# Patient Record
Sex: FEMALE | Race: WHITE | NOT HISPANIC OR LATINO | Employment: OTHER | URBAN - METROPOLITAN AREA
[De-identification: names, ages, dates, MRNs, and addresses within clinical notes are randomized per-mention and may not be internally consistent; named-entity substitution may affect disease eponyms.]

---

## 2017-05-02 ENCOUNTER — HISTORICAL (OUTPATIENT)
Dept: ADMINISTRATIVE | Facility: HOSPITAL | Age: 82
End: 2017-05-02

## 2017-05-02 LAB
ABS NEUT (OLG): 4.38 X10(3)/MCL (ref 2.1–9.2)
ALBUMIN SERPL-MCNC: 3.5 GM/DL (ref 3.4–5)
ALBUMIN/GLOB SERPL: 1.2 {RATIO}
ALP SERPL-CCNC: 79 UNIT/L (ref 38–126)
ALT SERPL-CCNC: 22 UNIT/L (ref 12–78)
APPEARANCE, UA: CLEAR
AST SERPL-CCNC: 19 UNIT/L (ref 15–37)
BACTERIA SPEC CULT: NORMAL /HPF
BASOPHILS # BLD AUTO: 0.1 X10(3)/MCL (ref 0–0.2)
BASOPHILS NFR BLD AUTO: 1 %
BILIRUB SERPL-MCNC: 0.6 MG/DL (ref 0.2–1)
BILIRUB UR QL STRIP: NEGATIVE
BILIRUBIN DIRECT+TOT PNL SERPL-MCNC: 0.1 MG/DL (ref 0–0.2)
BILIRUBIN DIRECT+TOT PNL SERPL-MCNC: 0.5 MG/DL (ref 0–0.8)
BUN SERPL-MCNC: 13 MG/DL (ref 7–18)
CALCIUM SERPL-MCNC: 8.8 MG/DL (ref 8.5–10.1)
CHLORIDE SERPL-SCNC: 105 MMOL/L (ref 98–107)
CHOLEST SERPL-MCNC: 199 MG/DL (ref 0–200)
CHOLEST/HDLC SERPL: 3.2 {RATIO} (ref 0–4)
CO2 SERPL-SCNC: 30 MMOL/L (ref 21–32)
COLOR UR: YELLOW
CREAT SERPL-MCNC: 0.66 MG/DL (ref 0.55–1.02)
DEPRECATED CALCIDIOL+CALCIFEROL SERPL-MC: 47.58 NG/ML (ref 30–80)
EOSINOPHIL # BLD AUTO: 0.3 X10(3)/MCL (ref 0–0.9)
EOSINOPHIL NFR BLD AUTO: 4 %
ERYTHROCYTE [DISTWIDTH] IN BLOOD BY AUTOMATED COUNT: 13.1 % (ref 11.5–17)
GLOBULIN SER-MCNC: 2.9 GM/DL (ref 2.4–3.5)
GLUCOSE (UA): NEGATIVE
GLUCOSE SERPL-MCNC: 86 MG/DL (ref 74–106)
HCT VFR BLD AUTO: 44.3 % (ref 37–47)
HDLC SERPL-MCNC: 63 MG/DL (ref 35–60)
HGB BLD-MCNC: 13.7 GM/DL (ref 12–16)
HGB UR QL STRIP: NEGATIVE
KETONES UR QL STRIP: NEGATIVE
LDLC SERPL CALC-MCNC: 113 MG/DL (ref 0–129)
LEUKOCYTE ESTERASE UR QL STRIP: NEGATIVE
LYMPHOCYTES # BLD AUTO: 1.8 X10(3)/MCL (ref 0.6–4.6)
LYMPHOCYTES NFR BLD AUTO: 25 %
MCH RBC QN AUTO: 28.2 PG (ref 27–31)
MCHC RBC AUTO-ENTMCNC: 30.9 GM/DL (ref 33–36)
MCV RBC AUTO: 91.3 FL (ref 80–94)
MONOCYTES # BLD AUTO: 0.6 X10(3)/MCL (ref 0.1–1.3)
MONOCYTES NFR BLD AUTO: 8 %
NEUTROPHILS # BLD AUTO: 4.38 X10(3)/MCL (ref 1.4–7.9)
NEUTROPHILS NFR BLD AUTO: 60 %
NITRITE UR QL STRIP: NEGATIVE
PH UR STRIP: 7 [PH] (ref 5–9)
PLATELET # BLD AUTO: 296 X10(3)/MCL (ref 130–400)
PMV BLD AUTO: 10.9 FL (ref 9.4–12.4)
POTASSIUM SERPL-SCNC: 5.3 MMOL/L (ref 3.5–5.1)
PROT SERPL-MCNC: 6.4 GM/DL (ref 6.4–8.2)
PROT UR QL STRIP: NEGATIVE
RBC # BLD AUTO: 4.85 X10(6)/MCL (ref 4.2–5.4)
RBC #/AREA URNS HPF: NORMAL /[HPF]
SODIUM SERPL-SCNC: 140 MMOL/L (ref 136–145)
SP GR UR STRIP: 1.01 (ref 1–1.03)
SQUAMOUS EPITHELIAL, UA: NORMAL
TRIGL SERPL-MCNC: 113 MG/DL (ref 30–150)
UROBILINOGEN UR STRIP-ACNC: 0.2
VLDLC SERPL CALC-MCNC: 23 MG/DL
WBC # SPEC AUTO: 7.3 X10(3)/MCL (ref 4.5–11.5)
WBC #/AREA URNS HPF: NORMAL /[HPF]

## 2017-08-01 ENCOUNTER — HISTORICAL (OUTPATIENT)
Dept: ADMINISTRATIVE | Facility: HOSPITAL | Age: 82
End: 2017-08-01

## 2017-11-28 ENCOUNTER — HISTORICAL (OUTPATIENT)
Dept: ADMINISTRATIVE | Facility: HOSPITAL | Age: 82
End: 2017-11-28

## 2017-11-28 LAB
ABS NEUT (OLG): 6.44 X10(3)/MCL (ref 2.1–9.2)
ALBUMIN SERPL-MCNC: 3.4 GM/DL (ref 3.4–5)
ALBUMIN/GLOB SERPL: 1.1 {RATIO}
ALP SERPL-CCNC: 82 UNIT/L (ref 38–126)
ALT SERPL-CCNC: 25 UNIT/L (ref 12–78)
APPEARANCE, UA: ABNORMAL
AST SERPL-CCNC: 18 UNIT/L (ref 15–37)
BACTERIA #/AREA URNS AUTO: ABNORMAL /HPF
BASOPHILS # BLD AUTO: 0.1 X10(3)/MCL (ref 0–0.2)
BASOPHILS NFR BLD AUTO: 1 %
BILIRUB SERPL-MCNC: 0.6 MG/DL (ref 0.2–1)
BILIRUB UR QL STRIP: NEGATIVE
BILIRUBIN DIRECT+TOT PNL SERPL-MCNC: 0.2 MG/DL (ref 0–0.2)
BILIRUBIN DIRECT+TOT PNL SERPL-MCNC: 0.4 MG/DL (ref 0–0.8)
BUN SERPL-MCNC: 13 MG/DL (ref 7–18)
CALCIUM SERPL-MCNC: 8.7 MG/DL (ref 8.5–10.1)
CHLORIDE SERPL-SCNC: 103 MMOL/L (ref 98–107)
CHOLEST SERPL-MCNC: 191 MG/DL (ref 0–200)
CHOLEST/HDLC SERPL: 2.8 {RATIO} (ref 0–4)
CO2 SERPL-SCNC: 29 MMOL/L (ref 21–32)
COLOR UR: YELLOW
CREAT SERPL-MCNC: 0.65 MG/DL (ref 0.55–1.02)
DEPRECATED CALCIDIOL+CALCIFEROL SERPL-MC: 42.86 NG/ML (ref 30–80)
EOSINOPHIL # BLD AUTO: 0.3 X10(3)/MCL (ref 0–0.9)
EOSINOPHIL NFR BLD AUTO: 3 %
ERYTHROCYTE [DISTWIDTH] IN BLOOD BY AUTOMATED COUNT: 12.9 % (ref 11.5–17)
GLOBULIN SER-MCNC: 3.2 GM/DL (ref 2.4–3.5)
GLUCOSE (UA): NEGATIVE
GLUCOSE SERPL-MCNC: 84 MG/DL (ref 74–106)
HCT VFR BLD AUTO: 42.9 % (ref 37–47)
HDLC SERPL-MCNC: 68 MG/DL (ref 35–60)
HGB BLD-MCNC: 13.3 GM/DL (ref 12–16)
HGB UR QL STRIP: ABNORMAL
KETONES UR QL STRIP: NEGATIVE
LDLC SERPL CALC-MCNC: 103 MG/DL (ref 0–129)
LEUKOCYTE ESTERASE UR QL STRIP: ABNORMAL
LYMPHOCYTES # BLD AUTO: 1.4 X10(3)/MCL (ref 0.6–4.6)
LYMPHOCYTES NFR BLD AUTO: 16 %
MCH RBC QN AUTO: 28.6 PG (ref 27–31)
MCHC RBC AUTO-ENTMCNC: 31 GM/DL (ref 33–36)
MCV RBC AUTO: 92.3 FL (ref 80–94)
MONOCYTES # BLD AUTO: 0.7 X10(3)/MCL (ref 0.1–1.3)
MONOCYTES NFR BLD AUTO: 8 %
NEUTROPHILS # BLD AUTO: 6.44 X10(3)/MCL (ref 1.4–7.9)
NEUTROPHILS NFR BLD AUTO: 72 %
NITRITE UR QL STRIP.AUTO: POSITIVE
PH UR STRIP: 6.5 [PH] (ref 5–9)
PLATELET # BLD AUTO: 336 X10(3)/MCL (ref 130–400)
PMV BLD AUTO: 10.1 FL (ref 9.4–12.4)
POTASSIUM SERPL-SCNC: 4.2 MMOL/L (ref 3.5–5.1)
PROT SERPL-MCNC: 6.6 GM/DL (ref 6.4–8.2)
PROT UR QL STRIP: ABNORMAL
RBC # BLD AUTO: 4.65 X10(6)/MCL (ref 4.2–5.4)
RBC #/AREA URNS HPF: 12 /HPF (ref 0–2)
SODIUM SERPL-SCNC: 139 MMOL/L (ref 136–145)
SP GR UR STRIP: 1.01 (ref 1–1.03)
SQUAMOUS EPITHELIAL, UA: ABNORMAL
TRIGL SERPL-MCNC: 101 MG/DL (ref 30–150)
UROBILINOGEN UR STRIP-ACNC: 0.2
VLDLC SERPL CALC-MCNC: 20 MG/DL
WBC # SPEC AUTO: 9 X10(3)/MCL (ref 4.5–11.5)
WBC #/AREA URNS AUTO: >900 /HPF (ref 0–3)

## 2018-02-08 ENCOUNTER — HISTORICAL (OUTPATIENT)
Dept: RADIOLOGY | Facility: HOSPITAL | Age: 83
End: 2018-02-08

## 2018-08-13 ENCOUNTER — HISTORICAL (OUTPATIENT)
Dept: ADMINISTRATIVE | Facility: HOSPITAL | Age: 83
End: 2018-08-13

## 2018-08-13 LAB
ABS NEUT (OLG): 4.32 X10(3)/MCL (ref 2.1–9.2)
ALBUMIN SERPL-MCNC: 3.9 GM/DL (ref 3.4–5)
ALBUMIN/GLOB SERPL: 1.3 RATIO (ref 1.1–2)
ALP SERPL-CCNC: 75 UNIT/L (ref 38–126)
ALT SERPL-CCNC: 22 UNIT/L (ref 12–78)
AST SERPL-CCNC: 21 UNIT/L (ref 15–37)
BASOPHILS # BLD AUTO: 0.1 X10(3)/MCL (ref 0–0.2)
BASOPHILS NFR BLD AUTO: 1 %
BILIRUB SERPL-MCNC: 0.4 MG/DL (ref 0.2–1)
BILIRUBIN DIRECT+TOT PNL SERPL-MCNC: 0.1 MG/DL (ref 0–0.5)
BILIRUBIN DIRECT+TOT PNL SERPL-MCNC: 0.3 MG/DL (ref 0–0.8)
BUN SERPL-MCNC: 11 MG/DL (ref 7–18)
CALCIUM SERPL-MCNC: 8.8 MG/DL (ref 8.5–10.1)
CHLORIDE SERPL-SCNC: 102 MMOL/L (ref 98–107)
CO2 SERPL-SCNC: 29 MMOL/L (ref 21–32)
CREAT SERPL-MCNC: 0.72 MG/DL (ref 0.55–1.02)
EOSINOPHIL # BLD AUTO: 0.2 X10(3)/MCL (ref 0–0.9)
EOSINOPHIL NFR BLD AUTO: 4 %
ERYTHROCYTE [DISTWIDTH] IN BLOOD BY AUTOMATED COUNT: 12.7 % (ref 11.5–17)
GLOBULIN SER-MCNC: 3 GM/DL (ref 2.4–3.5)
GLUCOSE SERPL-MCNC: 83 MG/DL (ref 74–106)
HCT VFR BLD AUTO: 45.3 % (ref 37–47)
HGB BLD-MCNC: 14.2 GM/DL (ref 12–16)
LYMPHOCYTES # BLD AUTO: 1.9 X10(3)/MCL (ref 0.6–4.6)
LYMPHOCYTES NFR BLD AUTO: 27 %
MCH RBC QN AUTO: 28.6 PG (ref 27–31)
MCHC RBC AUTO-ENTMCNC: 31.3 GM/DL (ref 33–36)
MCV RBC AUTO: 91.3 FL (ref 80–94)
MONOCYTES # BLD AUTO: 0.6 X10(3)/MCL (ref 0.1–1.3)
MONOCYTES NFR BLD AUTO: 9 %
NEUTROPHILS # BLD AUTO: 4.32 X10(3)/MCL (ref 1.4–7.9)
NEUTROPHILS NFR BLD AUTO: 60 %
PLATELET # BLD AUTO: 298 X10(3)/MCL (ref 130–400)
PMV BLD AUTO: 10.5 FL (ref 9.4–12.4)
POTASSIUM SERPL-SCNC: 4.6 MMOL/L (ref 3.5–5.1)
PROT SERPL-MCNC: 6.9 GM/DL (ref 6.4–8.2)
RBC # BLD AUTO: 4.96 X10(6)/MCL (ref 4.2–5.4)
SODIUM SERPL-SCNC: 139 MMOL/L (ref 136–145)
TSH SERPL-ACNC: 2.52 MIU/L (ref 0.36–3.74)
WBC # SPEC AUTO: 7.2 X10(3)/MCL (ref 4.5–11.5)

## 2018-08-29 ENCOUNTER — HISTORICAL (OUTPATIENT)
Dept: INTENSIVE CARE | Facility: HOSPITAL | Age: 83
End: 2018-08-29

## 2018-10-03 ENCOUNTER — HISTORICAL (OUTPATIENT)
Dept: ADMINISTRATIVE | Facility: HOSPITAL | Age: 83
End: 2018-10-03

## 2018-10-03 LAB
ALBUMIN SERPL-MCNC: 3.5 GM/DL (ref 3.4–5)
ALBUMIN/GLOB SERPL: 1.1 {RATIO}
ALP SERPL-CCNC: 78 UNIT/L (ref 38–126)
ALT SERPL-CCNC: 22 UNIT/L (ref 12–78)
AMYLASE SERPL-CCNC: 53 UNIT/L (ref 25–115)
AST SERPL-CCNC: 17 UNIT/L (ref 15–37)
BILIRUB SERPL-MCNC: 0.4 MG/DL (ref 0.2–1)
BILIRUBIN DIRECT+TOT PNL SERPL-MCNC: 0.1 MG/DL (ref 0–0.2)
BILIRUBIN DIRECT+TOT PNL SERPL-MCNC: 0.3 MG/DL (ref 0–0.8)
BUN SERPL-MCNC: 14 MG/DL (ref 7–18)
CALCIUM SERPL-MCNC: 9 MG/DL (ref 8.5–10.1)
CHLORIDE SERPL-SCNC: 104 MMOL/L (ref 98–107)
CO2 SERPL-SCNC: 27 MMOL/L (ref 21–32)
CREAT SERPL-MCNC: 0.96 MG/DL (ref 0.55–1.02)
ERYTHROCYTE [DISTWIDTH] IN BLOOD BY AUTOMATED COUNT: 13 % (ref 11.5–17)
GLOBULIN SER-MCNC: 3.2 GM/DL (ref 2.4–3.5)
GLUCOSE SERPL-MCNC: 102 MG/DL (ref 74–106)
HCT VFR BLD AUTO: 44.9 % (ref 37–47)
HGB BLD-MCNC: 14.1 GM/DL (ref 12–16)
LIPASE SERPL-CCNC: 70 UNIT/L (ref 73–393)
MCH RBC QN AUTO: 28.8 PG (ref 27–31)
MCHC RBC AUTO-ENTMCNC: 31.4 GM/DL (ref 33–36)
MCV RBC AUTO: 91.6 FL (ref 80–94)
PLATELET # BLD AUTO: 309 X10(3)/MCL (ref 130–400)
PMV BLD AUTO: 10 FL (ref 9.4–12.4)
POTASSIUM SERPL-SCNC: 4.6 MMOL/L (ref 3.5–5.1)
PROT SERPL-MCNC: 6.7 GM/DL (ref 6.4–8.2)
RBC # BLD AUTO: 4.9 X10(6)/MCL (ref 4.2–5.4)
SODIUM SERPL-SCNC: 139 MMOL/L (ref 136–145)
WBC # SPEC AUTO: 9.1 X10(3)/MCL (ref 4.5–11.5)

## 2018-11-06 ENCOUNTER — HISTORICAL (OUTPATIENT)
Dept: ADMINISTRATIVE | Facility: HOSPITAL | Age: 83
End: 2018-11-06

## 2018-11-06 LAB
APPEARANCE, UA: ABNORMAL
BACTERIA #/AREA URNS AUTO: ABNORMAL /HPF
BILIRUB UR QL STRIP: NEGATIVE
COLOR UR: YELLOW
GLUCOSE (UA): NEGATIVE
HGB UR QL STRIP: NEGATIVE
KETONES UR QL STRIP: NEGATIVE
LEUKOCYTE ESTERASE UR QL STRIP: ABNORMAL
NITRITE UR QL STRIP.AUTO: NEGATIVE
PH UR STRIP: 7 [PH] (ref 5–9)
PROT UR QL STRIP: NEGATIVE
RBC #/AREA URNS HPF: ABNORMAL /[HPF]
SP GR UR STRIP: 1.01 (ref 1–1.03)
SQUAMOUS EPITHELIAL, UA: ABNORMAL
UROBILINOGEN UR STRIP-ACNC: 0.2
WBC #/AREA URNS AUTO: 12 /HPF (ref 0–3)

## 2019-04-09 ENCOUNTER — HISTORICAL (OUTPATIENT)
Dept: ADMINISTRATIVE | Facility: HOSPITAL | Age: 84
End: 2019-04-09

## 2019-04-09 LAB
CHOLEST SERPL-MCNC: 195 MG/DL (ref 0–200)
CHOLEST/HDLC SERPL: 3 {RATIO} (ref 0–4)
HDLC SERPL-MCNC: 66 MG/DL (ref 35–60)
LDLC SERPL CALC-MCNC: 101 MG/DL (ref 0–129)
TRIGL SERPL-MCNC: 140 MG/DL (ref 30–150)
VLDLC SERPL CALC-MCNC: 28 MG/DL

## 2019-04-17 ENCOUNTER — HISTORICAL (OUTPATIENT)
Dept: ADMINISTRATIVE | Facility: HOSPITAL | Age: 84
End: 2019-04-17

## 2019-04-17 LAB
ABS NEUT (OLG): 9.1 X10(3)/MCL (ref 2.1–9.2)
BASOPHILS # BLD AUTO: 0 X10(3)/MCL (ref 0–0.2)
BASOPHILS NFR BLD AUTO: 0 %
EOSINOPHIL # BLD AUTO: 0.7 X10(3)/MCL (ref 0–0.9)
EOSINOPHIL NFR BLD AUTO: 5 %
ERYTHROCYTE [DISTWIDTH] IN BLOOD BY AUTOMATED COUNT: 13.1 % (ref 11.5–17)
HCT VFR BLD AUTO: 46.4 % (ref 37–47)
HGB BLD-MCNC: 14.3 GM/DL (ref 12–16)
LYMPHOCYTES # BLD AUTO: 2.2 X10(3)/MCL (ref 0.6–4.6)
LYMPHOCYTES NFR BLD AUTO: 16 %
MCH RBC QN AUTO: 28.1 PG (ref 27–31)
MCHC RBC AUTO-ENTMCNC: 30.8 GM/DL (ref 33–36)
MCV RBC AUTO: 91.3 FL (ref 80–94)
MONOCYTES # BLD AUTO: 1.4 X10(3)/MCL (ref 0.1–1.3)
MONOCYTES NFR BLD AUTO: 10 %
NEUTROPHILS # BLD AUTO: 9.1 X10(3)/MCL (ref 2.1–9.2)
NEUTROPHILS NFR BLD AUTO: 67 %
PLATELET # BLD AUTO: 387 X10(3)/MCL (ref 130–400)
PMV BLD AUTO: 10.1 FL (ref 9.4–12.4)
RBC # BLD AUTO: 5.08 X10(6)/MCL (ref 4.2–5.4)
TSH SERPL-ACNC: 2.93 MIU/L (ref 0.36–3.74)
WBC # SPEC AUTO: 13.6 X10(3)/MCL (ref 4.5–11.5)

## 2019-07-30 ENCOUNTER — HISTORICAL (OUTPATIENT)
Dept: RADIOLOGY | Facility: HOSPITAL | Age: 84
End: 2019-07-30

## 2019-12-04 ENCOUNTER — HISTORICAL (OUTPATIENT)
Dept: LAB | Facility: HOSPITAL | Age: 84
End: 2019-12-04

## 2019-12-04 LAB
ALBUMIN SERPL-MCNC: 3.6 GM/DL (ref 3.4–5)
ALBUMIN/GLOB SERPL: 1.1 RATIO (ref 1.1–2)
ALP SERPL-CCNC: 68 UNIT/L (ref 38–126)
ALT SERPL-CCNC: 27 UNIT/L (ref 12–78)
AST SERPL-CCNC: 20 UNIT/L (ref 15–37)
BILIRUB SERPL-MCNC: 0.5 MG/DL (ref 0.2–1)
BILIRUBIN DIRECT+TOT PNL SERPL-MCNC: 0.1 MG/DL (ref 0–0.5)
BILIRUBIN DIRECT+TOT PNL SERPL-MCNC: 0.4 MG/DL (ref 0–0.8)
BUN SERPL-MCNC: 12 MG/DL (ref 7–18)
CALCIUM SERPL-MCNC: 9 MG/DL (ref 8.5–10.1)
CHLORIDE SERPL-SCNC: 104 MMOL/L (ref 98–107)
CHOLEST SERPL-MCNC: 185 MG/DL (ref 0–200)
CHOLEST/HDLC SERPL: 3.1 {RATIO} (ref 0–4)
CO2 SERPL-SCNC: 28 MMOL/L (ref 21–32)
CREAT SERPL-MCNC: 0.79 MG/DL (ref 0.55–1.02)
GLOBULIN SER-MCNC: 3.2 GM/DL (ref 2.4–3.5)
GLUCOSE SERPL-MCNC: 98 MG/DL (ref 74–106)
HDLC SERPL-MCNC: 60 MG/DL (ref 35–60)
LDLC SERPL CALC-MCNC: 107 MG/DL (ref 0–129)
POTASSIUM SERPL-SCNC: 4.4 MMOL/L (ref 3.5–5.1)
PROT SERPL-MCNC: 6.8 GM/DL (ref 6.4–8.2)
SODIUM SERPL-SCNC: 139 MMOL/L (ref 136–145)
TRIGL SERPL-MCNC: 89 MG/DL (ref 30–150)
TSH SERPL-ACNC: 2.84 MIU/L (ref 0.36–3.74)
VLDLC SERPL CALC-MCNC: 18 MG/DL

## 2019-12-11 ENCOUNTER — HISTORICAL (OUTPATIENT)
Dept: LAB | Facility: HOSPITAL | Age: 84
End: 2019-12-11

## 2020-05-11 ENCOUNTER — HISTORICAL (OUTPATIENT)
Dept: LAB | Facility: HOSPITAL | Age: 85
End: 2020-05-11

## 2020-06-25 ENCOUNTER — HISTORICAL (OUTPATIENT)
Dept: ADMINISTRATIVE | Facility: HOSPITAL | Age: 85
End: 2020-06-25

## 2020-06-25 LAB
ABS NEUT (OLG): 4.19 X10(3)/MCL (ref 2.1–9.2)
ALBUMIN SERPL-MCNC: 3.6 GM/DL (ref 3.4–5)
ALBUMIN/GLOB SERPL: 1.1 RATIO (ref 1.1–2)
ALP SERPL-CCNC: 75 UNIT/L (ref 40–150)
ALT SERPL-CCNC: 17 UNIT/L (ref 0–55)
AST SERPL-CCNC: 20 UNIT/L (ref 5–34)
BASOPHILS # BLD AUTO: 0.1 X10(3)/MCL (ref 0–0.2)
BASOPHILS NFR BLD AUTO: 1 %
BILIRUB SERPL-MCNC: 0.5 MG/DL
BILIRUBIN DIRECT+TOT PNL SERPL-MCNC: 0.2 MG/DL (ref 0–0.5)
BILIRUBIN DIRECT+TOT PNL SERPL-MCNC: 0.3 MG/DL (ref 0–0.8)
BUN SERPL-MCNC: 13.7 MG/DL (ref 9.8–20.1)
CALCIUM SERPL-MCNC: 9.1 MG/DL (ref 8.4–10.2)
CHLORIDE SERPL-SCNC: 100 MMOL/L (ref 98–107)
CHOLEST SERPL-MCNC: 194 MG/DL
CHOLEST/HDLC SERPL: 3 {RATIO} (ref 0–5)
CO2 SERPL-SCNC: 30 MMOL/L (ref 23–31)
CREAT SERPL-MCNC: 0.68 MG/DL (ref 0.55–1.02)
DEPRECATED CALCIDIOL+CALCIFEROL SERPL-MC: 98.3 NG/ML (ref 6.6–49.9)
EOSINOPHIL # BLD AUTO: 0.3 X10(3)/MCL (ref 0–0.9)
EOSINOPHIL NFR BLD AUTO: 5 %
ERYTHROCYTE [DISTWIDTH] IN BLOOD BY AUTOMATED COUNT: 14.1 % (ref 11.5–17)
GLOBULIN SER-MCNC: 3.3 GM/DL (ref 2.4–3.5)
GLUCOSE SERPL-MCNC: 89 MG/DL (ref 82–115)
HCT VFR BLD AUTO: 44.7 % (ref 37–47)
HDLC SERPL-MCNC: 65 MG/DL (ref 35–60)
HGB BLD-MCNC: 13.8 GM/DL (ref 12–16)
LDLC SERPL CALC-MCNC: 107 MG/DL (ref 50–140)
LYMPHOCYTES # BLD AUTO: 1.6 X10(3)/MCL (ref 0.6–4.6)
LYMPHOCYTES NFR BLD AUTO: 23 %
MCH RBC QN AUTO: 27.7 PG (ref 27–31)
MCHC RBC AUTO-ENTMCNC: 30.9 GM/DL (ref 33–36)
MCV RBC AUTO: 89.8 FL (ref 80–94)
MONOCYTES # BLD AUTO: 0.6 X10(3)/MCL (ref 0.1–1.3)
MONOCYTES NFR BLD AUTO: 8 %
NEUTROPHILS # BLD AUTO: 4.19 X10(3)/MCL (ref 2.1–9.2)
NEUTROPHILS NFR BLD AUTO: 62 %
PLATELET # BLD AUTO: 316 X10(3)/MCL (ref 130–400)
PMV BLD AUTO: 10.4 FL (ref 9.4–12.4)
POTASSIUM SERPL-SCNC: 4.1 MMOL/L (ref 3.5–5.1)
PROT SERPL-MCNC: 6.9 GM/DL (ref 5.8–7.6)
RBC # BLD AUTO: 4.98 X10(6)/MCL (ref 4.2–5.4)
SODIUM SERPL-SCNC: 135 MMOL/L (ref 136–145)
TRIGL SERPL-MCNC: 110 MG/DL (ref 37–140)
TSH SERPL-ACNC: 3.1 UIU/ML (ref 0.35–4.94)
VLDLC SERPL CALC-MCNC: 22 MG/DL
WBC # SPEC AUTO: 6.7 X10(3)/MCL (ref 4.5–11.5)

## 2021-01-21 ENCOUNTER — HISTORICAL (OUTPATIENT)
Dept: CARDIOLOGY | Facility: HOSPITAL | Age: 86
End: 2021-01-21

## 2021-07-07 ENCOUNTER — HISTORICAL (OUTPATIENT)
Dept: ADMINISTRATIVE | Facility: HOSPITAL | Age: 86
End: 2021-07-07

## 2021-07-07 LAB
ABS NEUT (OLG): 4.22 X10(3)/MCL (ref 2.1–9.2)
ALBUMIN SERPL-MCNC: 3.6 GM/DL (ref 3.4–4.8)
ALBUMIN/GLOB SERPL: 1 RATIO (ref 1.1–2)
ALP SERPL-CCNC: 68 UNIT/L (ref 40–150)
ALT SERPL-CCNC: 17 UNIT/L (ref 0–55)
APPEARANCE, UA: ABNORMAL
AST SERPL-CCNC: 24 UNIT/L (ref 5–34)
BACTERIA SPEC CULT: ABNORMAL /HPF
BASOPHILS # BLD AUTO: 0 X10(3)/MCL (ref 0–0.2)
BASOPHILS NFR BLD AUTO: 1 %
BILIRUB SERPL-MCNC: 0.9 MG/DL
BILIRUB UR QL STRIP: NEGATIVE
BILIRUBIN DIRECT+TOT PNL SERPL-MCNC: 0.3 MG/DL (ref 0–0.5)
BILIRUBIN DIRECT+TOT PNL SERPL-MCNC: 0.6 MG/DL (ref 0–0.8)
BUN SERPL-MCNC: 17.6 MG/DL (ref 9.8–20.1)
CALCIUM SERPL-MCNC: 9.7 MG/DL (ref 8.4–10.2)
CHLORIDE SERPL-SCNC: 101 MMOL/L (ref 98–107)
CHOLEST SERPL-MCNC: 216 MG/DL
CHOLEST/HDLC SERPL: 3 {RATIO} (ref 0–5)
CO2 SERPL-SCNC: 29 MMOL/L (ref 23–31)
COLOR UR: YELLOW
CREAT SERPL-MCNC: 0.83 MG/DL (ref 0.55–1.02)
DEPRECATED CALCIDIOL+CALCIFEROL SERPL-MC: 81.2 NG/ML (ref 30–80)
EOSINOPHIL # BLD AUTO: 0.2 X10(3)/MCL (ref 0–0.9)
EOSINOPHIL NFR BLD AUTO: 4 %
ERYTHROCYTE [DISTWIDTH] IN BLOOD BY AUTOMATED COUNT: 13.9 % (ref 11.5–17)
GLOBULIN SER-MCNC: 3.5 GM/DL (ref 2.4–3.5)
GLUCOSE (UA): NEGATIVE
GLUCOSE SERPL-MCNC: 77 MG/DL (ref 82–115)
HCT VFR BLD AUTO: 45.8 % (ref 37–47)
HDLC SERPL-MCNC: 69 MG/DL (ref 35–60)
HGB BLD-MCNC: 14.2 GM/DL (ref 12–16)
HGB UR QL STRIP: NEGATIVE
KETONES UR QL STRIP: NEGATIVE
LDLC SERPL CALC-MCNC: 123 MG/DL (ref 50–140)
LEUKOCYTE ESTERASE UR QL STRIP: ABNORMAL
LYMPHOCYTES # BLD AUTO: 1.6 X10(3)/MCL (ref 0.6–4.6)
LYMPHOCYTES NFR BLD AUTO: 24 %
MCH RBC QN AUTO: 28.7 PG (ref 27–31)
MCHC RBC AUTO-ENTMCNC: 31 GM/DL (ref 33–36)
MCV RBC AUTO: 92.5 FL (ref 80–94)
MONOCYTES # BLD AUTO: 0.7 X10(3)/MCL (ref 0.1–1.3)
MONOCYTES NFR BLD AUTO: 10 %
MUCOUS THREADS URNS QL MICRO: ABNORMAL
NEUTROPHILS # BLD AUTO: 4.22 X10(3)/MCL (ref 2.1–9.2)
NEUTROPHILS NFR BLD AUTO: 62 %
NITRITE UR QL STRIP: POSITIVE
PH UR STRIP: 7 [PH] (ref 5–9)
PLATELET # BLD AUTO: 317 X10(3)/MCL (ref 130–400)
PMV BLD AUTO: 10.7 FL (ref 9.4–12.4)
POTASSIUM SERPL-SCNC: 4.3 MMOL/L (ref 3.5–5.1)
PROT SERPL-MCNC: 7.1 GM/DL (ref 5.8–7.6)
PROT UR QL STRIP: NEGATIVE
RBC # BLD AUTO: 4.95 X10(6)/MCL (ref 4.2–5.4)
RBC #/AREA URNS HPF: ABNORMAL /[HPF]
SODIUM SERPL-SCNC: 139 MMOL/L (ref 136–145)
SP GR UR STRIP: 1.01 (ref 1–1.03)
SQUAMOUS EPITHELIAL, UA: ABNORMAL /HPF
TRIGL SERPL-MCNC: 118 MG/DL (ref 37–140)
UROBILINOGEN UR STRIP-ACNC: 0.2
VLDLC SERPL CALC-MCNC: 24 MG/DL
WBC # SPEC AUTO: 6.8 X10(3)/MCL (ref 4.5–11.5)
WBC #/AREA URNS HPF: ABNORMAL /HPF

## 2022-03-10 ENCOUNTER — HISTORICAL (OUTPATIENT)
Dept: ADMINISTRATIVE | Facility: HOSPITAL | Age: 87
End: 2022-03-10

## 2022-04-10 ENCOUNTER — HISTORICAL (OUTPATIENT)
Dept: ADMINISTRATIVE | Facility: HOSPITAL | Age: 87
End: 2022-04-10

## 2022-04-12 ENCOUNTER — HISTORICAL (OUTPATIENT)
Dept: LAB | Facility: HOSPITAL | Age: 87
End: 2022-04-12

## 2022-04-12 LAB
ALBUMIN SERPL-MCNC: 3.8 G/DL (ref 3.4–4.8)
ALBUMIN/GLOB SERPL: 1.3 {RATIO} (ref 1.1–2)
ALP SERPL-CCNC: 73 U/L (ref 40–150)
ALT SERPL-CCNC: 17 U/L (ref 0–55)
AST SERPL-CCNC: 23 U/L (ref 5–34)
BILIRUB SERPL-MCNC: 0.8 MG/DL
BILIRUBIN DIRECT+TOT PNL SERPL-MCNC: 0.3 (ref 0–0.5)
BILIRUBIN DIRECT+TOT PNL SERPL-MCNC: 0.5 (ref 0–0.8)
BUN SERPL-MCNC: 11.6 MG/DL (ref 9.8–20.1)
CALCIUM SERPL-MCNC: 9.6 MG/DL (ref 8.7–10.5)
CHLORIDE SERPL-SCNC: 101 MMOL/L (ref 98–111)
CO2 SERPL-SCNC: 27 MMOL/L (ref 23–31)
CREAT SERPL-MCNC: 0.86 MG/DL (ref 0.55–1.02)
GLOBULIN SER-MCNC: 3 G/DL (ref 2.4–3.5)
GLUCOSE SERPL-MCNC: 76 MG/DL (ref 75–121)
HEMOLYSIS INTERF INDEX SERPL-ACNC: 2
ICTERIC INTERF INDEX SERPL-ACNC: 1
LIPEMIC INTERF INDEX SERPL-ACNC: 6
POTASSIUM SERPL-SCNC: 4.4 MMOL/L (ref 3.5–5.1)
PROT SERPL-MCNC: 6.8 G/DL (ref 5.8–7.6)
SODIUM SERPL-SCNC: 139 MMOL/L (ref 132–146)

## 2022-04-25 VITALS
SYSTOLIC BLOOD PRESSURE: 150 MMHG | WEIGHT: 150.81 LBS | BODY MASS INDEX: 26.72 KG/M2 | HEIGHT: 63 IN | DIASTOLIC BLOOD PRESSURE: 84 MMHG

## 2022-07-27 ENCOUNTER — LAB VISIT (OUTPATIENT)
Dept: LAB | Facility: HOSPITAL | Age: 87
End: 2022-07-27
Attending: INTERNAL MEDICINE
Payer: MEDICARE

## 2022-07-27 DIAGNOSIS — I10 ESSENTIAL HYPERTENSION, MALIGNANT: Primary | ICD-10-CM

## 2022-07-27 DIAGNOSIS — E55.9 AVITAMINOSIS D: ICD-10-CM

## 2022-07-27 LAB
ALBUMIN SERPL-MCNC: 3.8 GM/DL (ref 3.4–4.8)
ALBUMIN/GLOB SERPL: 1.4 RATIO (ref 1.1–2)
ALP SERPL-CCNC: 68 UNIT/L (ref 40–150)
ALT SERPL-CCNC: 21 UNIT/L (ref 0–55)
APPEARANCE UR: ABNORMAL
AST SERPL-CCNC: 21 UNIT/L (ref 5–34)
BACTERIA #/AREA URNS AUTO: ABNORMAL /HPF
BASOPHILS # BLD AUTO: 0.09 X10(3)/MCL (ref 0–0.2)
BASOPHILS NFR BLD AUTO: 1.3 %
BILIRUB UR QL STRIP.AUTO: NEGATIVE MG/DL
BILIRUBIN DIRECT+TOT PNL SERPL-MCNC: 0.7 MG/DL
BUN SERPL-MCNC: 13.1 MG/DL (ref 9.8–20.1)
CALCIUM SERPL-MCNC: 9.4 MG/DL (ref 8.4–10.2)
CHLORIDE SERPL-SCNC: 101 MMOL/L (ref 98–111)
CHOLEST SERPL-MCNC: 222 MG/DL
CHOLEST/HDLC SERPL: 4 {RATIO} (ref 0–5)
CO2 SERPL-SCNC: 28 MMOL/L (ref 23–31)
COLOR UR AUTO: YELLOW
CREAT SERPL-MCNC: 0.77 MG/DL (ref 0.55–1.02)
DEPRECATED CALCIDIOL+CALCIFEROL SERPL-MC: 89.2 NG/ML (ref 30–80)
EOSINOPHIL # BLD AUTO: 0.66 X10(3)/MCL (ref 0–0.9)
EOSINOPHIL NFR BLD AUTO: 9.8 %
ERYTHROCYTE [DISTWIDTH] IN BLOOD BY AUTOMATED COUNT: 13.5 % (ref 11.5–17)
GLOBULIN SER-MCNC: 2.7 GM/DL (ref 2.4–3.5)
GLUCOSE SERPL-MCNC: 85 MG/DL (ref 75–121)
GLUCOSE UR QL STRIP.AUTO: NEGATIVE MG/DL
HCT VFR BLD AUTO: 44 % (ref 37–47)
HDLC SERPL-MCNC: 60 MG/DL (ref 35–60)
HGB BLD-MCNC: 13.7 GM/DL (ref 12–16)
IMM GRANULOCYTES # BLD AUTO: 0.03 X10(3)/MCL (ref 0–0.04)
IMM GRANULOCYTES NFR BLD AUTO: 0.4 %
KETONES UR QL STRIP.AUTO: NEGATIVE MG/DL
LDLC SERPL CALC-MCNC: 137 MG/DL (ref 50–140)
LEUKOCYTE ESTERASE UR QL STRIP.AUTO: ABNORMAL UNIT/L
LYMPHOCYTES # BLD AUTO: 1.41 X10(3)/MCL (ref 0.6–4.6)
LYMPHOCYTES NFR BLD AUTO: 21 %
MCH RBC QN AUTO: 29 PG (ref 27–31)
MCHC RBC AUTO-ENTMCNC: 31.1 MG/DL (ref 33–36)
MCV RBC AUTO: 93.2 FL (ref 80–94)
MONOCYTES # BLD AUTO: 0.58 X10(3)/MCL (ref 0.1–1.3)
MONOCYTES NFR BLD AUTO: 8.6 %
NEUTROPHILS # BLD AUTO: 4 X10(3)/MCL (ref 2.1–9.2)
NEUTROPHILS NFR BLD AUTO: 58.9 %
NITRITE UR QL STRIP.AUTO: POSITIVE
NRBC BLD AUTO-RTO: 0 %
PH UR STRIP.AUTO: 7 [PH]
PLATELET # BLD AUTO: 359 X10(3)/MCL (ref 130–400)
PMV BLD AUTO: 10.4 FL (ref 7.4–10.4)
POTASSIUM SERPL-SCNC: 4.4 MMOL/L (ref 3.5–5.1)
PROT SERPL-MCNC: 6.5 GM/DL (ref 5.8–7.6)
PROT UR QL STRIP.AUTO: NEGATIVE MG/DL
RBC # BLD AUTO: 4.72 X10(6)/MCL (ref 4.2–5.4)
RBC #/AREA URNS AUTO: <5 /HPF
RBC UR QL AUTO: NEGATIVE UNIT/L
SODIUM SERPL-SCNC: 138 MMOL/L (ref 132–146)
SP GR UR STRIP.AUTO: 1.01 (ref 1–1.03)
SQUAMOUS #/AREA URNS AUTO: <5 /HPF
TRIGL SERPL-MCNC: 124 MG/DL (ref 37–140)
UROBILINOGEN UR STRIP-ACNC: 0.2 MG/DL
VLDLC SERPL CALC-MCNC: 25 MG/DL
WBC # SPEC AUTO: 6.7 X10(3)/MCL (ref 4.5–11.5)
WBC #/AREA URNS AUTO: 42 /HPF

## 2022-07-27 PROCEDURE — 82306 VITAMIN D 25 HYDROXY: CPT

## 2022-07-27 PROCEDURE — 85025 COMPLETE CBC W/AUTO DIFF WBC: CPT

## 2022-07-27 PROCEDURE — 87077 CULTURE AEROBIC IDENTIFY: CPT

## 2022-07-27 PROCEDURE — 36415 COLL VENOUS BLD VENIPUNCTURE: CPT

## 2022-07-27 PROCEDURE — 81001 URINALYSIS AUTO W/SCOPE: CPT

## 2022-07-27 PROCEDURE — 80061 LIPID PANEL: CPT

## 2022-07-27 PROCEDURE — 80053 COMPREHEN METABOLIC PANEL: CPT

## 2022-07-29 LAB — BACTERIA UR CULT: ABNORMAL

## 2022-12-06 ENCOUNTER — IMMUNIZATION (OUTPATIENT)
Dept: FAMILY MEDICINE | Facility: CLINIC | Age: 87
End: 2022-12-06
Payer: MEDICARE

## 2022-12-06 DIAGNOSIS — Z23 NEED FOR VACCINATION: Primary | ICD-10-CM

## 2022-12-06 PROCEDURE — 91312 COVID-19, MRNA, LNP-S, BIVALENT BOOSTER, PF, 30 MCG/0.3 ML DOSE: CPT | Mod: S$GLB,,, | Performed by: INTERNAL MEDICINE

## 2022-12-06 PROCEDURE — 91312 COVID-19, MRNA, LNP-S, BIVALENT BOOSTER, PF, 30 MCG/0.3 ML DOSE: ICD-10-PCS | Mod: S$GLB,,, | Performed by: INTERNAL MEDICINE

## 2022-12-06 PROCEDURE — 0124A COVID-19, MRNA, LNP-S, BIVALENT BOOSTER, PF, 30 MCG/0.3 ML DOSE: ICD-10-PCS | Mod: S$GLB,,, | Performed by: INTERNAL MEDICINE

## 2022-12-06 PROCEDURE — 0124A COVID-19, MRNA, LNP-S, BIVALENT BOOSTER, PF, 30 MCG/0.3 ML DOSE: CPT | Mod: S$GLB,,, | Performed by: INTERNAL MEDICINE

## 2023-10-07 ENCOUNTER — HOSPITAL ENCOUNTER (EMERGENCY)
Facility: HOSPITAL | Age: 88
Discharge: HOME | End: 2023-10-07
Attending: EMERGENCY MEDICINE
Payer: MEDICARE

## 2023-10-07 VITALS
OXYGEN SATURATION: 96 % | HEART RATE: 70 BPM | WEIGHT: 143 LBS | TEMPERATURE: 98 F | SYSTOLIC BLOOD PRESSURE: 168 MMHG | DIASTOLIC BLOOD PRESSURE: 76 MMHG | RESPIRATION RATE: 17 BRPM | HEIGHT: 63 IN | BODY MASS INDEX: 25.34 KG/M2

## 2023-10-07 DIAGNOSIS — S63.259A DISLOCATION OF FINGER, INITIAL ENCOUNTER: Primary | ICD-10-CM

## 2023-10-07 PROCEDURE — 96375 TX/PRO/DX INJ NEW DRUG ADDON: CPT

## 2023-10-07 PROCEDURE — 96374 THER/PROPH/DIAG INJ IV PUSH: CPT

## 2023-10-07 PROCEDURE — 99284 EMERGENCY DEPT VISIT MOD MDM: CPT | Mod: 25

## 2023-10-07 PROCEDURE — 25000003 PHARM REV CODE 250: Performed by: EMERGENCY MEDICINE

## 2023-10-07 PROCEDURE — 63600175 PHARM REV CODE 636 W HCPCS: Performed by: EMERGENCY MEDICINE

## 2023-10-07 RX ORDER — CLINDAMYCIN HYDROCHLORIDE 300 MG/1
300 CAPSULE ORAL 3 TIMES DAILY
Qty: 21 CAPSULE | Refills: 0 | Status: SHIPPED | OUTPATIENT
Start: 2023-10-07 | End: 2023-10-14

## 2023-10-07 RX ORDER — ONDANSETRON 2 MG/ML
4 INJECTION INTRAMUSCULAR; INTRAVENOUS
Status: COMPLETED | OUTPATIENT
Start: 2023-10-07 | End: 2023-10-07

## 2023-10-07 RX ORDER — MORPHINE SULFATE 4 MG/ML
4 INJECTION, SOLUTION INTRAMUSCULAR; INTRAVENOUS
Status: COMPLETED | OUTPATIENT
Start: 2023-10-07 | End: 2023-10-07

## 2023-10-07 RX ORDER — HYDROCODONE BITARTRATE AND ACETAMINOPHEN 5; 325 MG/1; MG/1
1 TABLET ORAL EVERY 6 HOURS PRN
Qty: 18 TABLET | Refills: 0 | Status: SHIPPED | OUTPATIENT
Start: 2023-10-07

## 2023-10-07 RX ORDER — HYDROCODONE BITARTRATE AND ACETAMINOPHEN 5; 325 MG/1; MG/1
1 TABLET ORAL
Status: COMPLETED | OUTPATIENT
Start: 2023-10-07 | End: 2023-10-07

## 2023-10-07 RX ORDER — ONDANSETRON 4 MG/1
4 TABLET, ORALLY DISINTEGRATING ORAL
Status: DISCONTINUED | OUTPATIENT
Start: 2023-10-07 | End: 2023-10-07 | Stop reason: HOSPADM

## 2023-10-07 RX ORDER — CLINDAMYCIN HYDROCHLORIDE 150 MG/1
300 CAPSULE ORAL
Status: COMPLETED | OUTPATIENT
Start: 2023-10-07 | End: 2023-10-07

## 2023-10-07 RX ADMIN — HYDROCODONE BITARTRATE AND ACETAMINOPHEN 1 TABLET: 5; 325 TABLET ORAL at 11:10

## 2023-10-07 RX ADMIN — MORPHINE SULFATE 4 MG: 4 INJECTION INTRAVENOUS at 11:10

## 2023-10-07 RX ADMIN — ONDANSETRON 4 MG: 2 INJECTION INTRAMUSCULAR; INTRAVENOUS at 11:10

## 2023-10-07 RX ADMIN — BACITRACIN ZINC, NEOMYCIN, POLYMYXIN B: 400; 3.5; 5 OINTMENT TOPICAL at 12:10

## 2023-10-07 RX ADMIN — CLINDAMYCIN HYDROCHLORIDE 300 MG: 150 CAPSULE ORAL at 11:10

## 2023-10-07 NOTE — ED PROVIDER NOTES
Encounter Date: 10/7/2023    SCRIBE #1 NOTE: I, Diana Cam, am scribing for, and in the presence of,  Francisco Hughes MD. I have scribed the following portions of the note - Other sections scribed: HPI, RED, MD SHERRI.       History     Chief Complaint   Patient presents with    Fall     Patient fell today while walking her dog. Right pinky appears dislocated and had a nose bleed. No blood thinner. GCS 15.      89 year old female with a history of HTN presents to ED after a fall.  Pt says that she tripped while walking her dog this morning about 2 hours ago.   Pt says that she used her right hand to stop herself when she fell. She is complaining of right pinky pain and a nosebleed. She denies hitting her head, headaches or use of blood thinners.  She says her tetanus is UTD.    The history is provided by the patient.     Review of patient's allergies indicates:   Allergen Reactions    Codeine     Ofloxacin     Penicillins     Sulfamethoxazole-trimethoprim      Other reaction(s): Unknown     No past medical history on file.  No past surgical history on file.  No family history on file.     Review of Systems   Constitutional:  Negative for activity change, chills, diaphoresis, fatigue and fever.   HENT:  Negative for congestion, ear pain, sinus pain and sore throat.    Eyes:  Negative for visual disturbance.   Respiratory:  Negative for cough, shortness of breath, wheezing and stridor.    Cardiovascular:  Negative for chest pain, palpitations and leg swelling.   Gastrointestinal:  Negative for abdominal pain, constipation, diarrhea, nausea, rectal pain and vomiting.   Genitourinary:  Negative for dysuria and hematuria.   Musculoskeletal:  Negative for arthralgias, back pain and myalgias.        Right 5th digit pain   Skin:  Negative for rash.        Abrasion and laceration to right hand   Neurological:  Negative for dizziness, syncope, weakness, numbness and headaches.   All other systems reviewed and are  negative.      Physical Exam     Initial Vitals [10/07/23 1102]   BP Pulse Resp Temp SpO2   133/75 95 18 97.5 °F (36.4 °C) 98 %      MAP       --         Physical Exam    Nursing note and vitals reviewed.  Constitutional: No distress.   HENT:   Head: Normocephalic and atraumatic.   Eyes: EOM are normal.   Neck: Trachea normal. Neck supple.   Normal range of motion.  Cardiovascular:  Normal rate and regular rhythm.           No murmur heard.  Pulmonary/Chest: Breath sounds normal. No respiratory distress.   Abdominal: Abdomen is soft. Bowel sounds are normal. She exhibits no distension. There is no abdominal tenderness. There is no rebound and no guarding.   Musculoskeletal:         General: Normal range of motion.      Cervical back: Normal range of motion and neck supple.      Lumbar back: Normal range of motion.      Comments: Abrasion to right hypothenar eminence.  Deformity starting at the 1st phalanx of the 5th finger of the right hand with an overlying laceration.      Neurological: She is alert and oriented to person, place, and time. She has normal strength.   Skin: Skin is warm and dry. No rash noted.   Psychiatric: She has a normal mood and affect.         ED Course   Procedures  Labs Reviewed - No data to display       Imaging Results              X-Ray Finger 2 or More Views Right (In process)                      X-Ray Hand 3 view Right (Final result)  Result time 10/07/23 12:00:26      Final result by Avery Cross MD (10/07/23 12:00:26)                   Impression:      Right 5th finger proximal interphalangeal joint dislocation.      Electronically signed by: Avery Cross  Date:    10/07/2023  Time:    12:00               Narrative:    EXAMINATION:  XR HAND COMPLETE 3 VIEW RIGHT    CLINICAL HISTORY:  Fall;    TECHNIQUE:  Three views    COMPARISON:  None available.    FINDINGS:  There is dislocated proximal interphalangeal joint of the the 5th finger.  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  Demineralization of the bones.                                       Medications   ondansetron disintegrating tablet 4 mg (4 mg Oral Not Given 10/7/23 1130)   HYDROcodone-acetaminophen 5-325 mg per tablet 1 tablet (1 tablet Oral Given 10/7/23 1128)   clindamycin capsule 300 mg (300 mg Oral Given 10/7/23 1128)   morphine injection 4 mg (4 mg Intravenous Given 10/7/23 1144)   ondansetron injection 4 mg (4 mg Intravenous Given 10/7/23 1144)   neomycin-bacitracnZn-polymyxnB packet ( Topical (Top) Given 10/7/23 1247)     Medical Decision Making  As per HPI.  Differential diagnosis: Finger dislocation, finger fracture    Amount and/or Complexity of Data Reviewed  Radiology: ordered.    Risk  OTC drugs.  Prescription drug management.            Scribe Attestation:   Scribe #1: I performed the above scribed service and the documentation accurately describes the services I performed. I attest to the accuracy of the note.    Attending Attestation:           Physician Attestation for Scribe:  Physician Attestation Statement for Scribe #1: I, Francisco Hughes MD, reviewed documentation, as scribed by Diana Cam in my presence, and it is both accurate and complete.             ED Course as of 10/07/23 1405   Sat Oct 07, 2023   1356 Dr. Guy Jaquez, have patient follow-up with Dr. Adams [KG]      ED Course User Index  [KG] Francisco Hughes MD               Medical Decision Making:   Clinical Tests:   Lab Tests: Ordered and Reviewed  Radiological Study: Ordered and Reviewed      Clinical Impression:   Final diagnoses:  [S63.259A] Dislocation of finger, initial encounter (Primary)        ED Disposition Condition    Discharge Stable          ED Prescriptions       Medication Sig Dispense Start Date End Date Auth. Provider    HYDROcodone-acetaminophen (NORCO) 5-325 mg per tablet Take 1 tablet by mouth  every 6 (six) hours as needed for Pain. 18 tablet 10/7/2023 -- Francisco Hughes MD    clindamycin (CLEOCIN) 300 MG capsule Take 1 capsule (300 mg total) by mouth 3 (three) times daily. for 7 days 21 capsule 10/7/2023 10/14/2023 Francisco Hughes MD          Follow-up Information       Follow up With Specialties Details Why Contact Info    Clark Adams MD Hand Surgery, Orthopedic Surgery  All above number on Monday morning to establish an appointment time [address] [facility] [address] [place] [id number] [phone]               Francisco Hughes MD  10/07/23 5888

## 2023-10-18 ENCOUNTER — HOSPITAL ENCOUNTER (OUTPATIENT)
Dept: RADIOLOGY | Facility: CLINIC | Age: 88
Discharge: HOME | End: 2023-10-18
Attending: STUDENT IN AN ORGANIZED HEALTH CARE EDUCATION/TRAINING PROGRAM
Payer: MEDICARE

## 2023-10-18 ENCOUNTER — OFFICE VISIT (OUTPATIENT)
Dept: ORTHOPEDICS | Facility: CLINIC | Age: 88
End: 2023-10-18
Payer: MEDICARE

## 2023-10-18 DIAGNOSIS — M79.644 FINGER PAIN, RIGHT: Primary | ICD-10-CM

## 2023-10-18 DIAGNOSIS — S63.286A DISLOCATION OF PROXIMAL INTERPHALANGEAL JOINT OF RIGHT LITTLE FINGER, INITIAL ENCOUNTER: ICD-10-CM

## 2023-10-18 DIAGNOSIS — M79.644 FINGER PAIN, RIGHT: ICD-10-CM

## 2023-10-18 PROCEDURE — 73140 X-RAY EXAM OF FINGER(S): CPT | Mod: RT,,, | Performed by: STUDENT IN AN ORGANIZED HEALTH CARE EDUCATION/TRAINING PROGRAM

## 2023-10-18 PROCEDURE — 73140 XR FINGER 2 OR MORE VIEWS RIGHT: ICD-10-PCS | Mod: RT,,, | Performed by: STUDENT IN AN ORGANIZED HEALTH CARE EDUCATION/TRAINING PROGRAM

## 2023-10-18 PROCEDURE — 99203 PR OFFICE/OUTPT VISIT, NEW, LEVL III, 30-44 MIN: ICD-10-PCS | Mod: ,,, | Performed by: STUDENT IN AN ORGANIZED HEALTH CARE EDUCATION/TRAINING PROGRAM

## 2023-10-18 PROCEDURE — 99203 OFFICE O/P NEW LOW 30 MIN: CPT | Mod: ,,, | Performed by: STUDENT IN AN ORGANIZED HEALTH CARE EDUCATION/TRAINING PROGRAM

## 2023-10-18 RX ORDER — TRIAMCINOLONE ACETONIDE 55 UG/1
2 SPRAY, METERED NASAL
COMMUNITY

## 2023-10-18 RX ORDER — ZINC GLUCONATE 50 MG
50 TABLET ORAL
COMMUNITY

## 2023-10-18 RX ORDER — AMLODIPINE BESYLATE 5 MG/1
5 TABLET ORAL
COMMUNITY
Start: 2023-09-14

## 2023-10-18 RX ORDER — CEFUROXIME AXETIL 250 MG/1
250 TABLET ORAL EVERY 12 HOURS
COMMUNITY
Start: 2023-08-01

## 2023-10-18 RX ORDER — ESTRADIOL 0.05 MG/D
1 FILM, EXTENDED RELEASE TRANSDERMAL
COMMUNITY
Start: 2023-09-20

## 2023-10-18 RX ORDER — METHENAMINE HIPPURATE 1000 MG/1
1 TABLET ORAL
COMMUNITY

## 2023-10-18 RX ORDER — ASCORBIC ACID 125 MG
TABLET,CHEWABLE ORAL DAILY
COMMUNITY

## 2023-10-19 NOTE — PROGRESS NOTES
Chief Complaint:  Right little finger PIP joint dislocation    Consulting Physician: No ref. provider found    History of present illness:    Patient is a 89-year-old right-hand dominant female who presents for initial evaluation of a right little finger injury that she sustained on 10/07/2023.  She fell and injured her right little finger.  She was seen in the emergency department where she was diagnosed with a right little finger PIP joint dislocation.  This was reduced and splinted.  She is been wearing an Alumafoam splint.  She tried to wear the buddy tape but this was not comfortable for her so she is been wearing an aluminum splint in extension.  Her pain has been improving.  She is accompanied here by her grandson who is a physical therapist in our clinic.    Past Medical History:   Diagnosis Date    COPD (chronic obstructive pulmonary disease)     Hypertension        History reviewed. No pertinent surgical history.    Current Outpatient Medications   Medication Sig    amLODIPine (NORVASC) 5 MG tablet Take 5 mg by mouth.    calcium-vitamin D3-vitamin K 500-100-40 mg-unit-mcg Chew once daily.    cefUROXime (CEFTIN) 250 MG tablet Take 250 mg by mouth every 12 (twelve) hours.    cyanocobalamin, vitamin B-12, 5,000 mcg Cap once daily.    estradiol 0.05 mg/24 hr td ptsw (VIVELLE-DOT) 0.05 mg/24 hr 1 patch twice a week.    methenamine (HIPREX) 1 gram Tab Take 1 g by mouth.    triamcinolone (NASACORT) 55 mcg nasal inhaler 2 sprays by Nasal route.    zinc gluconate 50 mg tablet Take 50 mg by mouth.    HYDROcodone-acetaminophen (NORCO) 5-325 mg per tablet Take 1 tablet by mouth every 6 (six) hours as needed for Pain. (Patient not taking: Reported on 10/18/2023)     No current facility-administered medications for this visit.       Review of patient's allergies indicates:   Allergen Reactions    Codeine     Ofloxacin     Penicillins     Sulfamethoxazole-trimethoprim      Other reaction(s): Unknown       History  reviewed. No pertinent family history.    Social History     Socioeconomic History    Marital status:    Tobacco Use    Smoking status: Never    Smokeless tobacco: Never       Review of Systems:    Constitution:   Denies chills, fever, and sweats.  HENT:   Denies headaches or blurry vision.  Cardiovascular:  Denies chest pain or irregular heart beat.  Respiratory:   Denies cough or shortness of breath.  Gastrointestinal:  Denies abdominal pain, nausea, or vomiting.  Musculoskeletal:   Denies muscle cramps.  Neurological:   Denies dizziness or focal weakness.  Psychiatric/Behavior: Normal mental status.  Hematology/Lymph:  Denies bleeding problem or easy bruising/bleeding.  Skin:    Denies rash or suspicious lesions.    Examination:    Vital Signs:  There were no vitals filed for this visit.    There is no height or weight on file to calculate BMI.    Constitution:   Well-developed, well nourished patient in no acute distress.  Neurological:   Alert and oriented x 3 and cooperative to examination.     Psychiatric/Behavior: Normal mental status.  Respiratory:   No shortness of breath.  Eyes:    Extraoccular muscles intact  Skin:    No scars, rash or suspicious lesions.    MSK:   Right hand:  No open wounds or rashes.  There is mild swelling over the little finger PIP joint.  The little finger is stiff with very little motion of the PIP joint.  Clinically the alignment looks good without significant malrotation or coronal deviation compared to the contralateral side.  Sensation light touch is intact to median ulnar and radial distribution.  Radial pulses 2+ hand is warm well perfused    Imaging:   X-ray of the right hand shows that the right little finger PIP joint is reduced in acceptable position     Assessment:  Right little finger PIP joint dislocation status post reduction    Plan:  She has been immobilized in a reduced position for about a week and a half now.  I will send her to occupational hand therapy  where they will blake tape the ring and small finger together.  She can now start gentle active range of motion.  She still should not work on strengthening or passive range of motion of the digits yet but gentle active range of motion to try to get motion back at the PIP joint yet keeping it stable enough to reduce the risk of dislocation again.  I explained that if she notices swelling pain dysfunction or deformity at the PIP joint she should come back and get an x-ray immediately.  I will see her back in 2 weeks weeks to see how she is doing    Follow Up:  2 weeks  Xray at next visit:  Right hand

## 2024-04-08 ENCOUNTER — HOSPITAL ENCOUNTER (INPATIENT)
Facility: HOSPITAL | Age: 89
LOS: 4 days | Discharge: NURSING, REHAB OR LONG-TERM CARE | DRG: 481 | End: 2024-04-12
Attending: STUDENT IN AN ORGANIZED HEALTH CARE EDUCATION/TRAINING PROGRAM | Admitting: INTERNAL MEDICINE
Payer: MEDICARE

## 2024-04-08 DIAGNOSIS — Z01.818 PRE-OP TESTING: ICD-10-CM

## 2024-04-08 DIAGNOSIS — S72.142A CLOSED 2-PART INTERTROCHANTERIC FRACTURE OF LEFT FEMUR, INITIAL ENCOUNTER: Primary | ICD-10-CM

## 2024-04-08 DIAGNOSIS — R07.9 CHEST PAIN: ICD-10-CM

## 2024-04-08 LAB
ALBUMIN SERPL-MCNC: 3.7 G/DL (ref 3.4–4.8)
ALBUMIN/GLOB SERPL: 1.2 RATIO (ref 1.1–2)
ALP SERPL-CCNC: 77 UNIT/L (ref 40–150)
ALT SERPL-CCNC: 14 UNIT/L (ref 0–55)
APTT PPP: 31 SECONDS (ref 23.2–33.7)
AST SERPL-CCNC: 22 UNIT/L (ref 5–34)
BASOPHILS # BLD AUTO: 0.09 X10(3)/MCL
BASOPHILS NFR BLD AUTO: 0.6 %
BILIRUB SERPL-MCNC: 0.3 MG/DL
BUN SERPL-MCNC: 17.6 MG/DL (ref 9.8–20.1)
CALCIUM SERPL-MCNC: 9.1 MG/DL (ref 8.4–10.2)
CHLORIDE SERPL-SCNC: 104 MMOL/L (ref 98–111)
CO2 SERPL-SCNC: 22 MMOL/L (ref 23–31)
CREAT SERPL-MCNC: 1.01 MG/DL (ref 0.55–1.02)
EOSINOPHIL # BLD AUTO: 0.09 X10(3)/MCL (ref 0–0.9)
EOSINOPHIL NFR BLD AUTO: 0.6 %
ERYTHROCYTE [DISTWIDTH] IN BLOOD BY AUTOMATED COUNT: 13.6 % (ref 11.5–17)
GFR SERPLBLD CREATININE-BSD FMLA CKD-EPI: 52 MLS/MIN/1.73/M2
GLOBULIN SER-MCNC: 3.2 GM/DL (ref 2.4–3.5)
GLUCOSE SERPL-MCNC: 123 MG/DL (ref 75–121)
HCT VFR BLD AUTO: 44.5 % (ref 37–47)
HGB BLD-MCNC: 14.5 G/DL (ref 12–16)
IMM GRANULOCYTES # BLD AUTO: 0.08 X10(3)/MCL (ref 0–0.04)
IMM GRANULOCYTES NFR BLD AUTO: 0.5 %
INR PPP: 1.1
LYMPHOCYTES # BLD AUTO: 1.21 X10(3)/MCL (ref 0.6–4.6)
LYMPHOCYTES NFR BLD AUTO: 7.4 %
MCH RBC QN AUTO: 29.1 PG (ref 27–31)
MCHC RBC AUTO-ENTMCNC: 32.6 G/DL (ref 33–36)
MCV RBC AUTO: 89.4 FL (ref 80–94)
MONOCYTES # BLD AUTO: 0.99 X10(3)/MCL (ref 0.1–1.3)
MONOCYTES NFR BLD AUTO: 6.1 %
NEUTROPHILS # BLD AUTO: 13.85 X10(3)/MCL (ref 2.1–9.2)
NEUTROPHILS NFR BLD AUTO: 84.8 %
NRBC BLD AUTO-RTO: 0 %
OHS QRS DURATION: 84 MS
OHS QTC CALCULATION: 442 MS
PLATELET # BLD AUTO: 283 X10(3)/MCL (ref 130–400)
PMV BLD AUTO: 10.3 FL (ref 7.4–10.4)
POTASSIUM SERPL-SCNC: 4.2 MMOL/L (ref 3.5–5.1)
PROT SERPL-MCNC: 6.9 GM/DL (ref 5.8–7.6)
PROTHROMBIN TIME: 13.7 SECONDS (ref 12.5–14.5)
RBC # BLD AUTO: 4.98 X10(6)/MCL (ref 4.2–5.4)
SODIUM SERPL-SCNC: 136 MMOL/L (ref 132–146)
WBC # SPEC AUTO: 16.31 X10(3)/MCL (ref 4.5–11.5)

## 2024-04-08 PROCEDURE — S5010 5% DEXTROSE AND 0.45% SALINE: HCPCS | Performed by: INTERNAL MEDICINE

## 2024-04-08 PROCEDURE — 85730 THROMBOPLASTIN TIME PARTIAL: CPT | Performed by: STUDENT IN AN ORGANIZED HEALTH CARE EDUCATION/TRAINING PROGRAM

## 2024-04-08 PROCEDURE — 85610 PROTHROMBIN TIME: CPT | Performed by: STUDENT IN AN ORGANIZED HEALTH CARE EDUCATION/TRAINING PROGRAM

## 2024-04-08 PROCEDURE — 63600175 PHARM REV CODE 636 W HCPCS

## 2024-04-08 PROCEDURE — 96375 TX/PRO/DX INJ NEW DRUG ADDON: CPT

## 2024-04-08 PROCEDURE — 93010 ELECTROCARDIOGRAM REPORT: CPT | Mod: ,,, | Performed by: INTERNAL MEDICINE

## 2024-04-08 PROCEDURE — 25000003 PHARM REV CODE 250: Performed by: INTERNAL MEDICINE

## 2024-04-08 PROCEDURE — 80053 COMPREHEN METABOLIC PANEL: CPT | Performed by: STUDENT IN AN ORGANIZED HEALTH CARE EDUCATION/TRAINING PROGRAM

## 2024-04-08 PROCEDURE — 63600175 PHARM REV CODE 636 W HCPCS: Performed by: INTERNAL MEDICINE

## 2024-04-08 PROCEDURE — 93005 ELECTROCARDIOGRAM TRACING: CPT

## 2024-04-08 PROCEDURE — 85025 COMPLETE CBC W/AUTO DIFF WBC: CPT | Performed by: STUDENT IN AN ORGANIZED HEALTH CARE EDUCATION/TRAINING PROGRAM

## 2024-04-08 PROCEDURE — 99285 EMERGENCY DEPT VISIT HI MDM: CPT | Mod: 25

## 2024-04-08 PROCEDURE — 96374 THER/PROPH/DIAG INJ IV PUSH: CPT

## 2024-04-08 PROCEDURE — 11000001 HC ACUTE MED/SURG PRIVATE ROOM

## 2024-04-08 RX ORDER — METHOCARBAMOL 500 MG/1
500 TABLET, FILM COATED ORAL 4 TIMES DAILY PRN
Status: DISCONTINUED | OUTPATIENT
Start: 2024-04-08 | End: 2024-04-12 | Stop reason: HOSPADM

## 2024-04-08 RX ORDER — ACETAMINOPHEN 325 MG/1
650 TABLET ORAL EVERY 4 HOURS PRN
Status: DISCONTINUED | OUTPATIENT
Start: 2024-04-08 | End: 2024-04-12 | Stop reason: HOSPADM

## 2024-04-08 RX ORDER — ONDANSETRON HYDROCHLORIDE 2 MG/ML
4 INJECTION, SOLUTION INTRAVENOUS
Status: COMPLETED | OUTPATIENT
Start: 2024-04-08 | End: 2024-04-08

## 2024-04-08 RX ORDER — AMOXICILLIN 250 MG
2 CAPSULE ORAL 2 TIMES DAILY PRN
Status: DISCONTINUED | OUTPATIENT
Start: 2024-04-08 | End: 2024-04-12 | Stop reason: HOSPADM

## 2024-04-08 RX ORDER — CLONIDINE HYDROCHLORIDE 0.1 MG/1
0.1 TABLET ORAL 3 TIMES DAILY PRN
Status: DISCONTINUED | OUTPATIENT
Start: 2024-04-08 | End: 2024-04-12 | Stop reason: HOSPADM

## 2024-04-08 RX ORDER — MORPHINE SULFATE 4 MG/ML
2 INJECTION, SOLUTION INTRAMUSCULAR; INTRAVENOUS
Status: COMPLETED | OUTPATIENT
Start: 2024-04-08 | End: 2024-04-08

## 2024-04-08 RX ORDER — DEXTROSE MONOHYDRATE AND SODIUM CHLORIDE 5; .45 G/100ML; G/100ML
INJECTION, SOLUTION INTRAVENOUS CONTINUOUS
Status: DISCONTINUED | OUTPATIENT
Start: 2024-04-08 | End: 2024-04-10

## 2024-04-08 RX ORDER — ONDANSETRON HYDROCHLORIDE 2 MG/ML
INJECTION, SOLUTION INTRAVENOUS
Status: COMPLETED
Start: 2024-04-08 | End: 2024-04-08

## 2024-04-08 RX ORDER — ALUMINUM HYDROXIDE, MAGNESIUM HYDROXIDE, AND SIMETHICONE 1200; 120; 1200 MG/30ML; MG/30ML; MG/30ML
30 SUSPENSION ORAL 4 TIMES DAILY PRN
Status: DISCONTINUED | OUTPATIENT
Start: 2024-04-08 | End: 2024-04-12 | Stop reason: HOSPADM

## 2024-04-08 RX ORDER — MORPHINE SULFATE 4 MG/ML
4 INJECTION, SOLUTION INTRAMUSCULAR; INTRAVENOUS EVERY 4 HOURS PRN
Status: DISCONTINUED | OUTPATIENT
Start: 2024-04-08 | End: 2024-04-09

## 2024-04-08 RX ORDER — ENOXAPARIN SODIUM 100 MG/ML
40 INJECTION SUBCUTANEOUS EVERY 24 HOURS
Status: DISCONTINUED | OUTPATIENT
Start: 2024-04-08 | End: 2024-04-12 | Stop reason: HOSPADM

## 2024-04-08 RX ORDER — MORPHINE SULFATE 4 MG/ML
INJECTION, SOLUTION INTRAMUSCULAR; INTRAVENOUS
Status: COMPLETED
Start: 2024-04-08 | End: 2024-04-08

## 2024-04-08 RX ORDER — POLYETHYLENE GLYCOL 3350 17 G/17G
17 POWDER, FOR SOLUTION ORAL 2 TIMES DAILY PRN
Status: DISCONTINUED | OUTPATIENT
Start: 2024-04-08 | End: 2024-04-12 | Stop reason: HOSPADM

## 2024-04-08 RX ORDER — LABETALOL HYDROCHLORIDE 5 MG/ML
10 INJECTION, SOLUTION INTRAVENOUS EVERY 4 HOURS PRN
Status: DISCONTINUED | OUTPATIENT
Start: 2024-04-08 | End: 2024-04-12 | Stop reason: HOSPADM

## 2024-04-08 RX ORDER — ONDANSETRON HYDROCHLORIDE 2 MG/ML
4 INJECTION, SOLUTION INTRAVENOUS EVERY 4 HOURS PRN
Status: DISCONTINUED | OUTPATIENT
Start: 2024-04-08 | End: 2024-04-12 | Stop reason: HOSPADM

## 2024-04-08 RX ORDER — PROCHLORPERAZINE EDISYLATE 5 MG/ML
5 INJECTION INTRAMUSCULAR; INTRAVENOUS EVERY 6 HOURS PRN
Status: DISCONTINUED | OUTPATIENT
Start: 2024-04-08 | End: 2024-04-12 | Stop reason: HOSPADM

## 2024-04-08 RX ORDER — TALC
6 POWDER (GRAM) TOPICAL NIGHTLY PRN
Status: DISCONTINUED | OUTPATIENT
Start: 2024-04-08 | End: 2024-04-12 | Stop reason: HOSPADM

## 2024-04-08 RX ORDER — OXYCODONE AND ACETAMINOPHEN 5; 325 MG/1; MG/1
1 TABLET ORAL EVERY 6 HOURS PRN
Status: DISCONTINUED | OUTPATIENT
Start: 2024-04-08 | End: 2024-04-09

## 2024-04-08 RX ORDER — AMOXICILLIN 250 MG
2 CAPSULE ORAL NIGHTLY
Status: DISCONTINUED | OUTPATIENT
Start: 2024-04-09 | End: 2024-04-12 | Stop reason: HOSPADM

## 2024-04-08 RX ORDER — SODIUM CHLORIDE 0.9 % (FLUSH) 0.9 %
10 SYRINGE (ML) INJECTION
Status: DISCONTINUED | OUTPATIENT
Start: 2024-04-08 | End: 2024-04-12 | Stop reason: HOSPADM

## 2024-04-08 RX ORDER — HYDRALAZINE HYDROCHLORIDE 20 MG/ML
10 INJECTION INTRAMUSCULAR; INTRAVENOUS EVERY 4 HOURS PRN
Status: DISCONTINUED | OUTPATIENT
Start: 2024-04-08 | End: 2024-04-12 | Stop reason: HOSPADM

## 2024-04-08 RX ADMIN — ENOXAPARIN SODIUM 40 MG: 40 INJECTION SUBCUTANEOUS at 10:04

## 2024-04-08 RX ADMIN — DEXTROSE AND SODIUM CHLORIDE: 5; 450 INJECTION, SOLUTION INTRAVENOUS at 10:04

## 2024-04-08 RX ADMIN — Medication 6 MG: at 09:04

## 2024-04-08 RX ADMIN — OXYCODONE HYDROCHLORIDE AND ACETAMINOPHEN 1 TABLET: 5; 325 TABLET ORAL at 09:04

## 2024-04-08 RX ADMIN — ONDANSETRON HYDROCHLORIDE 4 MG: 2 INJECTION, SOLUTION INTRAVENOUS at 07:04

## 2024-04-08 RX ADMIN — MORPHINE SULFATE 2 MG: 4 INJECTION, SOLUTION INTRAMUSCULAR; INTRAVENOUS at 07:04

## 2024-04-08 RX ADMIN — ONDANSETRON 4 MG: 2 INJECTION INTRAMUSCULAR; INTRAVENOUS at 07:04

## 2024-04-08 RX ADMIN — MORPHINE SULFATE 2 MG: 4 INJECTION INTRAVENOUS at 07:04

## 2024-04-08 NOTE — Clinical Note
Diagnosis: Closed 2-part intertrochanteric fracture of left femur, initial encounter [[id number]]   Future Attending Provider: MOISÉS MONZON [[id number]]   Admit to which facility:: [facility] [[id number]]   Reason for IP Medical Treatment  (Clinical interventions that can only be accomplished in the IP setting? ) :: Ortho   I certify that Inpatient services for greater than or equal to 2 midnights are medically necessary:: Yes   Plans for Post-Acute care--if anticipated (pick the single best option):: A. No post acute care anticipated at this time

## 2024-04-09 ENCOUNTER — ANESTHESIA EVENT (OUTPATIENT)
Dept: SURGERY | Facility: HOSPITAL | Age: 89
DRG: 481 | End: 2024-04-09
Payer: MEDICARE

## 2024-04-09 ENCOUNTER — ANESTHESIA (OUTPATIENT)
Dept: SURGERY | Facility: HOSPITAL | Age: 89
DRG: 481 | End: 2024-04-09
Payer: MEDICARE

## 2024-04-09 LAB
ABORH RETYPE: NORMAL
AMORPH URATE CRY URNS QL MICRO: ABNORMAL /UL
ANION GAP SERPL CALC-SCNC: 10 MEQ/L
APPEARANCE UR: ABNORMAL
BACTERIA #/AREA URNS AUTO: ABNORMAL /HPF
BASOPHILS # BLD AUTO: 0.05 X10(3)/MCL
BASOPHILS NFR BLD AUTO: 0.4 %
BILIRUB UR QL STRIP.AUTO: NEGATIVE
BUN SERPL-MCNC: 13.6 MG/DL (ref 9.8–20.1)
CALCIUM SERPL-MCNC: 8.9 MG/DL (ref 8.4–10.2)
CHLORIDE SERPL-SCNC: 103 MMOL/L (ref 98–111)
CO2 SERPL-SCNC: 22 MMOL/L (ref 23–31)
COLOR UR AUTO: YELLOW
CREAT SERPL-MCNC: 0.79 MG/DL (ref 0.55–1.02)
CREAT/UREA NIT SERPL: 17
EOSINOPHIL # BLD AUTO: 0.02 X10(3)/MCL (ref 0–0.9)
EOSINOPHIL NFR BLD AUTO: 0.1 %
ERYTHROCYTE [DISTWIDTH] IN BLOOD BY AUTOMATED COUNT: 13.9 % (ref 11.5–17)
GFR SERPLBLD CREATININE-BSD FMLA CKD-EPI: >60 MLS/MIN/1.73/M2
GLUCOSE SERPL-MCNC: 140 MG/DL (ref 75–121)
GLUCOSE UR QL STRIP.AUTO: NORMAL
GROUP & RH: NORMAL
HCT VFR BLD AUTO: 43.6 % (ref 37–47)
HGB BLD-MCNC: 13.5 G/DL (ref 12–16)
HYALINE CASTS #/AREA URNS LPF: ABNORMAL /LPF
IMM GRANULOCYTES # BLD AUTO: 0.07 X10(3)/MCL (ref 0–0.04)
IMM GRANULOCYTES NFR BLD AUTO: 0.5 %
INDIRECT COOMBS: NORMAL
KETONES UR QL STRIP.AUTO: ABNORMAL
LEUKOCYTE ESTERASE UR QL STRIP.AUTO: 75
LYMPHOCYTES # BLD AUTO: 2.02 X10(3)/MCL (ref 0.6–4.6)
LYMPHOCYTES NFR BLD AUTO: 14.2 %
MCH RBC QN AUTO: 28.9 PG (ref 27–31)
MCHC RBC AUTO-ENTMCNC: 31 G/DL (ref 33–36)
MCV RBC AUTO: 93.4 FL (ref 80–94)
MONOCYTES # BLD AUTO: 1.47 X10(3)/MCL (ref 0.1–1.3)
MONOCYTES NFR BLD AUTO: 10.3 %
MUCOUS THREADS URNS QL MICRO: ABNORMAL /LPF
NEUTROPHILS # BLD AUTO: 10.58 X10(3)/MCL (ref 2.1–9.2)
NEUTROPHILS NFR BLD AUTO: 74.5 %
NITRITE UR QL STRIP.AUTO: ABNORMAL
NRBC BLD AUTO-RTO: 0 %
PH UR STRIP.AUTO: 5.5 [PH]
PLATELET # BLD AUTO: 270 X10(3)/MCL (ref 130–400)
PMV BLD AUTO: 10.2 FL (ref 7.4–10.4)
POTASSIUM SERPL-SCNC: 4.4 MMOL/L (ref 3.5–5.1)
PROT UR QL STRIP.AUTO: ABNORMAL
RBC # BLD AUTO: 4.67 X10(6)/MCL (ref 4.2–5.4)
RBC #/AREA URNS AUTO: ABNORMAL /HPF
RBC UR QL AUTO: NEGATIVE
SODIUM SERPL-SCNC: 135 MMOL/L (ref 132–146)
SP GR UR STRIP.AUTO: 1.03 (ref 1–1.03)
SPECIMEN OUTDATE: NORMAL
SQUAMOUS #/AREA URNS LPF: ABNORMAL /HPF
UROBILINOGEN UR STRIP-ACNC: NORMAL
WBC # SPEC AUTO: 14.21 X10(3)/MCL (ref 4.5–11.5)
WBC #/AREA URNS AUTO: ABNORMAL /HPF

## 2024-04-09 PROCEDURE — 87086 URINE CULTURE/COLONY COUNT: CPT | Performed by: NURSE PRACTITIONER

## 2024-04-09 PROCEDURE — 80048 BASIC METABOLIC PNL TOTAL CA: CPT | Performed by: INTERNAL MEDICINE

## 2024-04-09 PROCEDURE — 27000221 HC OXYGEN, UP TO 24 HOURS

## 2024-04-09 PROCEDURE — 25000003 PHARM REV CODE 250: Performed by: INTERNAL MEDICINE

## 2024-04-09 PROCEDURE — 63600175 PHARM REV CODE 636 W HCPCS: Performed by: PHYSICIAN ASSISTANT

## 2024-04-09 PROCEDURE — 36000710: Performed by: ORTHOPAEDIC SURGERY

## 2024-04-09 PROCEDURE — 27201423 OPTIME MED/SURG SUP & DEVICES STERILE SUPPLY: Performed by: ORTHOPAEDIC SURGERY

## 2024-04-09 PROCEDURE — 37000008 HC ANESTHESIA 1ST 15 MINUTES: Performed by: ORTHOPAEDIC SURGERY

## 2024-04-09 PROCEDURE — 85025 COMPLETE CBC W/AUTO DIFF WBC: CPT | Performed by: INTERNAL MEDICINE

## 2024-04-09 PROCEDURE — 63600175 PHARM REV CODE 636 W HCPCS: Performed by: ANESTHESIOLOGY

## 2024-04-09 PROCEDURE — S5010 5% DEXTROSE AND 0.45% SALINE: HCPCS | Performed by: INTERNAL MEDICINE

## 2024-04-09 PROCEDURE — 63600175 PHARM REV CODE 636 W HCPCS: Performed by: INTERNAL MEDICINE

## 2024-04-09 PROCEDURE — 71000015 HC POSTOP RECOV 1ST HR: Performed by: ORTHOPAEDIC SURGERY

## 2024-04-09 PROCEDURE — 36000711: Performed by: ORTHOPAEDIC SURGERY

## 2024-04-09 PROCEDURE — 25000003 PHARM REV CODE 250: Performed by: NURSE PRACTITIONER

## 2024-04-09 PROCEDURE — 63600175 PHARM REV CODE 636 W HCPCS: Mod: JZ,JG | Performed by: INTERNAL MEDICINE

## 2024-04-09 PROCEDURE — 63600175 PHARM REV CODE 636 W HCPCS: Performed by: ORTHOPAEDIC SURGERY

## 2024-04-09 PROCEDURE — 25000003 PHARM REV CODE 250: Performed by: NURSE ANESTHETIST, CERTIFIED REGISTERED

## 2024-04-09 PROCEDURE — 86850 RBC ANTIBODY SCREEN: CPT | Performed by: INTERNAL MEDICINE

## 2024-04-09 PROCEDURE — 11000001 HC ACUTE MED/SURG PRIVATE ROOM

## 2024-04-09 PROCEDURE — 63600175 PHARM REV CODE 636 W HCPCS: Performed by: NURSE ANESTHETIST, CERTIFIED REGISTERED

## 2024-04-09 PROCEDURE — 25000003 PHARM REV CODE 250: Performed by: PHYSICIAN ASSISTANT

## 2024-04-09 PROCEDURE — 0QH736Z INSERTION OF INTRAMEDULLARY INTERNAL FIXATION DEVICE INTO LEFT UPPER FEMUR, PERCUTANEOUS APPROACH: ICD-10-PCS | Performed by: ORTHOPAEDIC SURGERY

## 2024-04-09 PROCEDURE — 71000039 HC RECOVERY, EACH ADD'L HOUR: Performed by: ORTHOPAEDIC SURGERY

## 2024-04-09 PROCEDURE — C1713 ANCHOR/SCREW BN/BN,TIS/BN: HCPCS | Performed by: ORTHOPAEDIC SURGERY

## 2024-04-09 PROCEDURE — 99223 1ST HOSP IP/OBS HIGH 75: CPT | Mod: ,,, | Performed by: ORTHOPAEDIC SURGERY

## 2024-04-09 PROCEDURE — 81001 URINALYSIS AUTO W/SCOPE: CPT | Performed by: NURSE PRACTITIONER

## 2024-04-09 PROCEDURE — 71000033 HC RECOVERY, INTIAL HOUR: Performed by: ORTHOPAEDIC SURGERY

## 2024-04-09 PROCEDURE — D9220A PRA ANESTHESIA: Mod: ANES,,, | Performed by: ANESTHESIOLOGY

## 2024-04-09 PROCEDURE — D9220A PRA ANESTHESIA: Mod: CRNA,,, | Performed by: NURSE ANESTHETIST, CERTIFIED REGISTERED

## 2024-04-09 PROCEDURE — P9047 ALBUMIN (HUMAN), 25%, 50ML: HCPCS | Mod: JZ,JG | Performed by: INTERNAL MEDICINE

## 2024-04-09 PROCEDURE — 37000009 HC ANESTHESIA EA ADD 15 MINS: Performed by: ORTHOPAEDIC SURGERY

## 2024-04-09 PROCEDURE — 27245 TREAT THIGH FRACTURE: CPT | Mod: LT,,, | Performed by: ORTHOPAEDIC SURGERY

## 2024-04-09 DEVICE — IMPLANTABLE DEVICE: Type: IMPLANTABLE DEVICE | Site: HIP | Status: FUNCTIONAL

## 2024-04-09 DEVICE — BLADE HELICAL PERF GOLD 90MM: Type: IMPLANTABLE DEVICE | Site: HIP | Status: FUNCTIONAL

## 2024-04-09 DEVICE — SCREW XL25 IM NAIL 42X5MM: Type: IMPLANTABLE DEVICE | Site: HIP | Status: FUNCTIONAL

## 2024-04-09 DEVICE — SCREW LOK XL25 5X48MM: Type: IMPLANTABLE DEVICE | Site: HIP | Status: FUNCTIONAL

## 2024-04-09 DEVICE — NAIL TFN ADVANCE 10X380MM 130D: Type: IMPLANTABLE DEVICE | Site: HIP | Status: FUNCTIONAL

## 2024-04-09 RX ORDER — MORPHINE SULFATE 10 MG/ML
4 INJECTION INTRAMUSCULAR; INTRAVENOUS; SUBCUTANEOUS EVERY 6 HOURS PRN
Status: DISCONTINUED | OUTPATIENT
Start: 2024-04-09 | End: 2024-04-12 | Stop reason: HOSPADM

## 2024-04-09 RX ORDER — DIPHENHYDRAMINE HYDROCHLORIDE 50 MG/ML
25 INJECTION INTRAMUSCULAR; INTRAVENOUS EVERY 6 HOURS PRN
Status: DISCONTINUED | OUTPATIENT
Start: 2024-04-09 | End: 2024-04-09 | Stop reason: HOSPADM

## 2024-04-09 RX ORDER — HYDROMORPHONE HYDROCHLORIDE 2 MG/ML
0.5 INJECTION, SOLUTION INTRAMUSCULAR; INTRAVENOUS; SUBCUTANEOUS EVERY 5 MIN PRN
Status: DISCONTINUED | OUTPATIENT
Start: 2024-04-09 | End: 2024-04-09 | Stop reason: HOSPADM

## 2024-04-09 RX ORDER — HYDROCODONE BITARTRATE AND ACETAMINOPHEN 5; 325 MG/1; MG/1
1 TABLET ORAL
Status: DISCONTINUED | OUTPATIENT
Start: 2024-04-09 | End: 2024-04-09 | Stop reason: HOSPADM

## 2024-04-09 RX ORDER — ACETAMINOPHEN 500 MG
1000 TABLET ORAL ONCE
Status: DISCONTINUED | OUTPATIENT
Start: 2024-04-09 | End: 2024-04-09 | Stop reason: HOSPADM

## 2024-04-09 RX ORDER — OXYCODONE AND ACETAMINOPHEN 5; 325 MG/1; MG/1
1 TABLET ORAL EVERY 4 HOURS PRN
Status: DISCONTINUED | OUTPATIENT
Start: 2024-04-09 | End: 2024-04-12 | Stop reason: HOSPADM

## 2024-04-09 RX ORDER — PROPOFOL 10 MG/ML
VIAL (ML) INTRAVENOUS
Status: DISCONTINUED | OUTPATIENT
Start: 2024-04-09 | End: 2024-04-09

## 2024-04-09 RX ORDER — ONDANSETRON HYDROCHLORIDE 2 MG/ML
INJECTION, SOLUTION INTRAVENOUS
Status: DISCONTINUED | OUTPATIENT
Start: 2024-04-09 | End: 2024-04-09

## 2024-04-09 RX ORDER — ACETAMINOPHEN 10 MG/ML
INJECTION, SOLUTION INTRAVENOUS
Status: DISCONTINUED | OUTPATIENT
Start: 2024-04-09 | End: 2024-04-09

## 2024-04-09 RX ORDER — FENTANYL CITRATE 50 UG/ML
INJECTION, SOLUTION INTRAMUSCULAR; INTRAVENOUS
Status: DISCONTINUED | OUTPATIENT
Start: 2024-04-09 | End: 2024-04-09

## 2024-04-09 RX ORDER — FAMOTIDINE 10 MG/ML
20 INJECTION INTRAVENOUS ONCE
Status: DISCONTINUED | OUTPATIENT
Start: 2024-04-09 | End: 2024-04-09 | Stop reason: HOSPADM

## 2024-04-09 RX ORDER — METOCLOPRAMIDE HYDROCHLORIDE 5 MG/ML
10 INJECTION INTRAMUSCULAR; INTRAVENOUS ONCE
Status: DISCONTINUED | OUTPATIENT
Start: 2024-04-09 | End: 2024-04-09 | Stop reason: HOSPADM

## 2024-04-09 RX ORDER — MEPERIDINE HYDROCHLORIDE 25 MG/ML
12.5 INJECTION INTRAMUSCULAR; INTRAVENOUS; SUBCUTANEOUS ONCE
Status: DISCONTINUED | OUTPATIENT
Start: 2024-04-09 | End: 2024-04-09 | Stop reason: HOSPADM

## 2024-04-09 RX ORDER — PHENYLEPHRINE HYDROCHLORIDE 10 MG/ML
INJECTION INTRAVENOUS
Status: DISCONTINUED | OUTPATIENT
Start: 2024-04-09 | End: 2024-04-09

## 2024-04-09 RX ORDER — ONDANSETRON HYDROCHLORIDE 2 MG/ML
4 INJECTION, SOLUTION INTRAVENOUS ONCE
Status: DISCONTINUED | OUTPATIENT
Start: 2024-04-09 | End: 2024-04-09 | Stop reason: HOSPADM

## 2024-04-09 RX ORDER — HYDROCODONE BITARTRATE AND ACETAMINOPHEN 10; 325 MG/1; MG/1
1 TABLET ORAL EVERY 4 HOURS PRN
Status: DISCONTINUED | OUTPATIENT
Start: 2024-04-09 | End: 2024-04-12 | Stop reason: HOSPADM

## 2024-04-09 RX ORDER — LIDOCAINE HYDROCHLORIDE 10 MG/ML
1 INJECTION, SOLUTION EPIDURAL; INFILTRATION; INTRACAUDAL; PERINEURAL ONCE
Status: DISCONTINUED | OUTPATIENT
Start: 2024-04-09 | End: 2024-04-09 | Stop reason: HOSPADM

## 2024-04-09 RX ORDER — CEFAZOLIN SODIUM 2 G/50ML
2 SOLUTION INTRAVENOUS
Status: COMPLETED | OUTPATIENT
Start: 2024-04-09 | End: 2024-04-10

## 2024-04-09 RX ORDER — AMLODIPINE BESYLATE 5 MG/1
5 TABLET ORAL DAILY
Status: DISCONTINUED | OUTPATIENT
Start: 2024-04-09 | End: 2024-04-10

## 2024-04-09 RX ORDER — METOCLOPRAMIDE HYDROCHLORIDE 5 MG/ML
10 INJECTION INTRAMUSCULAR; INTRAVENOUS EVERY 10 MIN PRN
Status: DISCONTINUED | OUTPATIENT
Start: 2024-04-09 | End: 2024-04-09 | Stop reason: HOSPADM

## 2024-04-09 RX ORDER — MIDAZOLAM HYDROCHLORIDE 2 MG/2ML
2 INJECTION, SOLUTION INTRAMUSCULAR; INTRAVENOUS ONCE AS NEEDED
Status: DISCONTINUED | OUTPATIENT
Start: 2024-04-09 | End: 2024-04-09 | Stop reason: HOSPADM

## 2024-04-09 RX ORDER — IPRATROPIUM BROMIDE AND ALBUTEROL SULFATE 2.5; .5 MG/3ML; MG/3ML
3 SOLUTION RESPIRATORY (INHALATION) ONCE AS NEEDED
Status: DISCONTINUED | OUTPATIENT
Start: 2024-04-09 | End: 2024-04-09 | Stop reason: HOSPADM

## 2024-04-09 RX ORDER — SODIUM CHLORIDE 9 MG/ML
INJECTION, SOLUTION INTRAVENOUS CONTINUOUS
Status: DISCONTINUED | OUTPATIENT
Start: 2024-04-09 | End: 2024-04-09

## 2024-04-09 RX ORDER — ALBUMIN HUMAN 250 G/1000ML
25 SOLUTION INTRAVENOUS ONCE
Status: COMPLETED | OUTPATIENT
Start: 2024-04-09 | End: 2024-04-09

## 2024-04-09 RX ORDER — METHOCARBAMOL 100 MG/ML
1000 INJECTION, SOLUTION INTRAMUSCULAR; INTRAVENOUS ONCE
Status: COMPLETED | OUTPATIENT
Start: 2024-04-09 | End: 2024-04-09

## 2024-04-09 RX ORDER — HYDROMORPHONE HYDROCHLORIDE 2 MG/ML
0.2 INJECTION, SOLUTION INTRAMUSCULAR; INTRAVENOUS; SUBCUTANEOUS EVERY 5 MIN PRN
Status: DISCONTINUED | OUTPATIENT
Start: 2024-04-09 | End: 2024-04-09 | Stop reason: HOSPADM

## 2024-04-09 RX ORDER — VANCOMYCIN HYDROCHLORIDE 1 G/20ML
INJECTION, POWDER, LYOPHILIZED, FOR SOLUTION INTRAVENOUS
Status: DISCONTINUED | OUTPATIENT
Start: 2024-04-09 | End: 2024-04-09 | Stop reason: HOSPADM

## 2024-04-09 RX ORDER — ROCURONIUM BROMIDE 10 MG/ML
INJECTION, SOLUTION INTRAVENOUS
Status: DISCONTINUED | OUTPATIENT
Start: 2024-04-09 | End: 2024-04-09

## 2024-04-09 RX ORDER — LIDOCAINE HYDROCHLORIDE 20 MG/ML
INJECTION, SOLUTION EPIDURAL; INFILTRATION; INTRACAUDAL; PERINEURAL
Status: DISCONTINUED | OUTPATIENT
Start: 2024-04-09 | End: 2024-04-09

## 2024-04-09 RX ADMIN — SODIUM CHLORIDE: 9 INJECTION, SOLUTION INTRAVENOUS at 11:04

## 2024-04-09 RX ADMIN — PHENYLEPHRINE HYDROCHLORIDE 50 MCG: 10 INJECTION INTRAVENOUS at 12:04

## 2024-04-09 RX ADMIN — ROCURONIUM BROMIDE 50 MG: 10 SOLUTION INTRAVENOUS at 12:04

## 2024-04-09 RX ADMIN — LIDOCAINE HYDROCHLORIDE 60 MG: 20 INJECTION, SOLUTION EPIDURAL; INFILTRATION; INTRACAUDAL; PERINEURAL at 12:04

## 2024-04-09 RX ADMIN — MORPHINE SULFATE 4 MG: 4 INJECTION INTRAVENOUS at 01:04

## 2024-04-09 RX ADMIN — ONDANSETRON 4 MG: 2 INJECTION INTRAMUSCULAR; INTRAVENOUS at 01:04

## 2024-04-09 RX ADMIN — METHOCARBAMOL 1000 MG: 100 INJECTION, SOLUTION INTRAMUSCULAR; INTRAVENOUS at 01:04

## 2024-04-09 RX ADMIN — PHENYLEPHRINE HYDROCHLORIDE 100 MCG: 10 INJECTION INTRAVENOUS at 01:04

## 2024-04-09 RX ADMIN — PROPOFOL 80 MG: 10 INJECTION, EMULSION INTRAVENOUS at 12:04

## 2024-04-09 RX ADMIN — HYDROMORPHONE HYDROCHLORIDE 0.2 MG: 2 INJECTION, SOLUTION INTRAMUSCULAR; INTRAVENOUS; SUBCUTANEOUS at 01:04

## 2024-04-09 RX ADMIN — ENOXAPARIN SODIUM 40 MG: 40 INJECTION SUBCUTANEOUS at 10:04

## 2024-04-09 RX ADMIN — PHENYLEPHRINE HYDROCHLORIDE 100 MCG: 10 INJECTION INTRAVENOUS at 12:04

## 2024-04-09 RX ADMIN — FENTANYL CITRATE 25 MCG: 50 INJECTION, SOLUTION INTRAMUSCULAR; INTRAVENOUS at 01:04

## 2024-04-09 RX ADMIN — FENTANYL CITRATE 50 MCG: 50 INJECTION, SOLUTION INTRAMUSCULAR; INTRAVENOUS at 11:04

## 2024-04-09 RX ADMIN — ALBUMIN (HUMAN) 25 G: 12.5 SOLUTION INTRAVENOUS at 05:04

## 2024-04-09 RX ADMIN — SENNOSIDES, DOCUSATE SODIUM 2 TABLET: 8.6; 5 TABLET ORAL at 10:04

## 2024-04-09 RX ADMIN — DEXTROSE AND SODIUM CHLORIDE: 5; 450 INJECTION, SOLUTION INTRAVENOUS at 07:04

## 2024-04-09 RX ADMIN — ACETAMINOPHEN 1000 MG: 10 INJECTION, SOLUTION INTRAVENOUS at 12:04

## 2024-04-09 RX ADMIN — SUGAMMADEX 200 MG: 100 INJECTION, SOLUTION INTRAVENOUS at 01:04

## 2024-04-09 RX ADMIN — ONDANSETRON 4 MG: 2 INJECTION INTRAMUSCULAR; INTRAVENOUS at 12:04

## 2024-04-09 RX ADMIN — PROPOFOL 20 MG: 10 INJECTION, EMULSION INTRAVENOUS at 12:04

## 2024-04-09 RX ADMIN — AMLODIPINE BESYLATE 5 MG: 5 TABLET ORAL at 08:04

## 2024-04-09 RX ADMIN — CEFAZOLIN SODIUM 2 G: 2 SOLUTION INTRAVENOUS at 10:04

## 2024-04-09 RX ADMIN — CEFAZOLIN SODIUM 2 G: 2 SOLUTION INTRAVENOUS at 12:04

## 2024-04-09 RX ADMIN — OXYCODONE HYDROCHLORIDE AND ACETAMINOPHEN 1 TABLET: 5; 325 TABLET ORAL at 03:04

## 2024-04-09 RX ADMIN — OXYCODONE HYDROCHLORIDE AND ACETAMINOPHEN 1 TABLET: 5; 325 TABLET ORAL at 10:04

## 2024-04-09 NOTE — OP NOTE
OPERATIVE REPORT    Patient: Safia Muñoz   : 1931    MRN: [id number]  Date: 2024      Surgeon:Guy Jaquez DO  Preoperative Diagnosis: : Closed left intertrochanteric femur fracture  Postoperative Diagnosis: Same  Procedure:  Treatment left intertrochanteric femur fracture with intramedullary nail CPT 69964  Anesthesiologist: Cristian Higgins DO  OR Staff: Circulator: Ana Nix RN; Calixto Mcgraw RN  Scrub Person: Kay Paige CST  X-Ray Technologist: Galina Knapp, RT  Implants:   Implant Name Type Inv. Item Serial No.  Lot No. LRB No. Used Action   NAIL TFN ADVANCE [id number] - [id number]  NAIL TFN ADVANCE [id number] [id number]  [employer] [id number] Left 1 Implanted   BLADE HELICAL PERF GOLD 90MM - [id number]  BLADE HELICAL PERF GOLD 90MM  [employer] [id number] Left 1 Implanted   KASH REAM BALL TP [id number] - [id number]  KASH REAM BALL TP [id number]  [employer] [facility] [id number] Left 1 Implanted   SCREW XL25 IM NAIL 42X5MM - [id number]  SCREW XL25 IM NAIL 42X5MM  [employer] [id number] Left 1 Implanted   SCREW LATOSHA XL25 5X48MM - [id number]  SCREW LATOSHA XL25 5X48MM  [employer] [id number] Left 1 Implanted     EBL:  200 cc  Complications: None  Disposition: To PACU, stable    Indications: Safia Muñoz is a 89 y.o. female presenting with the aforementioned injuries. The procedure is indicated to best obtain and maintain stability of the femur while allowing early ROM.  The patient is awake and alert. After thorough discussion of the risks, benefits, expected outcomes, and alternatives to surgical intervention, the patient agreed to proceed with surgical treatment.  Specific risks discussed included, but were not limited to: superficial or deep infection, wound healing complications, DVT/PE, significant bleeding requiring transfusion, damage to named anatomic structures in the immediate area including named neurovascular structures, implant failure, and general risks of anesthesia.  The  patient voiced understanding and written as well as verbal consent was obtained by myself prior to the procedure.    Procedure in Detail:  The patient's left lower extremity was marked by me in the preoperative area.  She was taken to the operating room, and after satisfactory induction of anesthesia, the patient was placed in the supine position with a small bump under the ipsilateral hip.   The ipsilateral lower extremity was then sterilely prepped and draped in the usual sterile fashion.  Standard time out procedure was performed confirming the correct surgeon, site, side, patient ID, procedure, and administration of antibiotics.       A 3 cm longitudinal incision was made just proximal to the greater trochanter.  The subcutaneous tissue and gluteal fascia were incised.  A threaded guide pin was then placed in the tip of the greater trochanter and extended and introduced into the proximal femur under AP and lateral fluoroscopy.  The Synthes one-step reamer was then used to ream proximally. A ball-tipped guide diana was then placed through the entry site down to the physeal scar of the femur.  This was measured and then was reamed .  A Synthes TFN  appropiate size  was then passed over the guidewire, which was subsequently removed.  The proximal interlocking device was then placed and a 2-cm longitudinal incision was made over the lateral aspect of the proximal thigh and the fascia kylah was incised.  A threaded guide pin was then placed through the lateral cortex of the femur through the femoral neck and into the femoral head in the center-center position.  A helical blade was then placed under fluoroscopy and satisfactory position was noted on AP and lateral fluoroscopy and the setscrew was then set. The proximal interlocking device was then removed.    Distal interlocking screws were done with  two single lateral to medial interlocking screw under fluoroscopic guidance.  All wounds were then thoroughly irrigated.   Subcutaneous tissues were closed with interrupted inverted of 2-0 Monocryl.  Skin was approximated using skin staples.  Sterile dressing was applied.  General anesthesia was reversed and she was returned to the Postanesthesia Care Unit in stable condition.      All sponge and needle counts were correct at the end of the case.  I was present and participated in all aspects of the procedure.    Prognosis:  The patient will be kept WBAT on the ipsilateral extremity.  DVT prophylaxis is indicated for this patient and procedure.  The patient is at risk of pain, infection, and nonunion, and we will watch for all of these, amongst other issues, as the patient continues to heal.      This note/OR report was created with the assistance of  voice recognition software or phone  dictation.  There may be transcription errors as a result of using this technology however minimal. Effort has been made to assure accuracy of transcription but any obvious errors or omissions should be clarified with the author of the document.       Guy Jaquez, DO  Orthopedic Trauma Surgery

## 2024-04-09 NOTE — ANESTHESIA PREPROCEDURE EVALUATION
04/09/2024  Safia Muñoz is a 89 y.o., female w/ history of COPD and HTN presenting to the ED with c/o left hip pain. The pt is reporting left hip pain following a mechanical fall that occurred PTA. The pt denies any blood thinners. She denies any other complaints. She   sustained a left intertrochanteric femur fracture and is to have insertion of left IM Nail.    No specialty history recorded    Other Medical History   COPD (chronic obstructive pulmonary disease) Hypertension     EKG:  Sinus rhythm with Premature supraventricular complexes   Nonspecific T wave abnormality   Abnormal ECG     H/H 13/43    Pre-op Assessment    I have reviewed the Patient Summary Reports.     I have reviewed the Nursing Notes. I have reviewed the NPO Status.   I have reviewed the Medications.     Review of Systems  Anesthesia Hx:  No problems with previous Anesthesia                Social:  Former Smoker       Cardiovascular:     Hypertension                                        Pulmonary:   COPD                         Physical Exam  General: Well nourished, Cooperative, Alert and Oriented    Airway:  Mallampati: II   Mouth Opening: Normal  TM Distance: Normal  Tongue: Normal  Neck ROM: Normal ROM    Dental:  Intact    Chest/Lungs:  Clear to auscultation, Normal Respiratory Rate    Heart:  Rate: Normal  Rhythm: Regular Rhythm  Sounds: Normal    Abdomen:  Normal, Soft, Nontender        Anesthesia Plan  Type of Anesthesia, risks & benefits discussed:    Anesthesia Type: Gen ETT  Intra-op Monitoring Plan: Standard ASA Monitors  Post Op Pain Control Plan: multimodal analgesia  Induction:  IV  Airway Plan: Direct  Informed Consent: Informed consent signed with the Patient and all parties understand the risks and agree with anesthesia plan.  All questions answered.   ASA Score: 3 Emergent  Day of Surgery Review of History &  Physical: H&P Update referred to the surgeon/provider.    Ready For Surgery From Anesthesia Perspective.     .

## 2024-04-09 NOTE — TRANSFER OF CARE
Anesthesia Transfer of Care Note    Patient: Safia Muñoz    Procedure(s) Performed: Procedure(s) (LRB):  INSERTION, INTRAMEDULLARY NAIL INTERTROCHENTERIC FRACTURE (Left)    Patient location: PACU    Anesthesia Type: general    Transport from OR: Transported from OR on room air with adequate spontaneous ventilation    Post pain: adequate analgesia    Post assessment: no apparent anesthetic complications and tolerated procedure well    Post vital signs: stable    Level of consciousness: sedated and responds to stimulation    Nausea/Vomiting: no nausea/vomiting    Complications: none    Transfer of care protocol was followed

## 2024-04-09 NOTE — H&P
[facility] Hospital Medicine - H&P Note    Patient Name: Safia Muñoz  : 1931  MRN: [id number]  PCP: Iglesia Steel MD  Admitting Physician: Micheal Rivera MD  Admission Class: IP- Inpatient   Length of Stay: 0  Face-to-Face encounter date: 2024  Code status: --Full    Chief Complaint   Left hip pain    History of Present Illness   Ms. Muñoz is a 89 year old female with apmh of HTN and COPD who presented to the ED with c/o left hip pain following a trip and fall at home. She states that she tripped over her rug and fell. Denies hitting her head, Denies LOC. Not on any anticoagulants.    ED vitals on arrival:  Temp 99° F, pulse 95, resp 16, /110, SpO2 95% on RA.  Today's ED lab work revealed WBC 16.31, all other indices unremarkable.  CXR showed no acute pulmonary process.  Left hip x-ray with pelvis showed intertrochanteric left femur fracture.  Orthopedics was consulted in the ED. She was admitted to Hospital Medicine for management.    ROS   Except as documented, all other systems reviewed and negative     Past Medical History     Past Medical History:   Diagnosis Date    COPD (chronic obstructive pulmonary disease)     Hypertension        Past Surgical History   No past surgical history on file.    Social History     Social History     Tobacco Use    Smoking status: Never    Smokeless tobacco: Never   Substance Use Topics    Alcohol use: Not on file        Family History   Reviewed and negative    Allergies   Codeine, Ofloxacin, Penicillins, and Sulfamethoxazole-trimethoprim    Home Medications     Prior to Admission medications    Medication Sig Start Date End Date Taking? Authorizing Provider   amLODIPine (NORVASC) 5 MG tablet Take 5 mg by mouth. 23  Yes Provider, Historical   cyanocobalamin, vitamin B-12, 5,000 mcg Cap once daily.   Yes Provider, Historical   estradiol 0.05 mg/24 hr td ptsw (VIVELLE-DOT) 0.05 mg/24 hr 1 patch twice a week. 23  " Yes Provider, Historical   calcium-vitamin D3-vitamin K 500-100-40 mg-unit-mcg Chew once daily.    Provider, Historical   cefUROXime (CEFTIN) 250 MG tablet Take 250 mg by mouth every 12 (twelve) hours. 8/1/23   Provider, Historical   HYDROcodone-acetaminophen (NORCO) 5-325 mg per tablet Take 1 tablet by mouth every 6 (six) hours as needed for Pain.  Patient not taking: Reported on 10/18/2023 10/7/23   Francisco Hughes MD   methenamine (HIPREX) 1 gram Tab Take 1 g by mouth.    Provider, Historical   triamcinolone (NASACORT) 55 mcg nasal inhaler 2 sprays by Nasal route.    Provider, Historical   zinc gluconate 50 mg tablet Take 50 mg by mouth.    Provider, Historical        Physical Exam   Vital Signs  Temp:  [99 °F (37.2 °C)]   Pulse:  [92-95]   Resp:  [16-24]   BP: (131-191)/(107-110)   SpO2:  [95 %-96 %]    General: Well developed, well nourished. In no acute distress, non-toxic appearing  HEENT: NC/AT  Neck:  Supple. No JVD  Chest: Clear bilaterally, no wheezing, no accessory muscle use.  CVS: Regular rhythm. Normal S1/S2.  Abdomen: nondistended, normoactive BS, soft and non-tender.  MSK: LLE shortened and externally rotated, decreased ROM due to pain.  Skin: Warm and dry  Neuro: AAOx3, no focal neurological deficit  Psych: Cooperative      Labs     Recent Labs     04/08/24 2000   WBC 16.31*   RBC 4.98   HGB 14.5   HCT 44.5   MCV 89.4   MCH 29.1   MCHC 32.6*   RDW 13.6        Recent Labs     04/08/24 2000   PROTIME 13.7   INR 1.1   PTT 31.0      Recent Labs     04/08/24 2000      K 4.2   CHLORIDE 104   CO2 22*   BUN 17.6   CREATININE 1.01   EGFRNORACEVR 52   GLUCOSE 123*   CALCIUM 9.1   ALBUMIN 3.7   GLOBULIN 3.2   ALKPHOS 77   ALT 14   AST 22   BILITOT 0.3     No results for input(s): "LACTIC" in the last 72 hours.  No results for input(s): "CPK", "TROPONINI" in the last 72 hours.     Microbiology Results (last 7 days)       ** No results found for the last 168 hours. **           Imaging "     X-Ray Hip 2 or 3 views Left (with Pelvis when performed)   Final Result      Inter trochanteric left femur fracture.         Electronically signed by: Jaren Nix   Date:    04/08/2024   Time:    19:36      X-Ray Chest 1 View   Final Result      No acute pulmonary process identified.         Electronically signed by: Jaren Nix   Date:    04/08/2024   Time:    19:34        Assessment & Plan   Mechanical fall resulting in closed left intertrochanteric femur fracture  Leukocytosis - likely reactive  Accelerated blood pressure    History:  HTN, COPD    Plan:  -Orthopedics consulted in ED  -Plan for surgery tomorrow  -NPO after midnight  -Pain control  -D5 1/2 NS @ 70 ml/hr  -Monitor WBC, signs of infection  -Antihypertensives as needed  -Resume home meds as appropriate  -Labs in AM       VTE Prophylaxis: SCDs, Lovenox       I, Melissa Lanier, NP have reviewed and discussed this case with Dr. Rivera.  Please see addendum for further assessment and plan from attending MD.

## 2024-04-09 NOTE — CONSULTS
[facility]                       [address] [place] [address]    PATIENT NAME:       BRIDGETT MONTEZ  YOB: 1931  CSN:                [id number]   MRN:                [id number]  ADMIT DATE:         04/08/2024 19:02:00  PHYSICIAN:          Dallas Hutchinson MD                            CONSULTATION    DATE OF CONSULT:  04/09/2024 00:00:00    CONSULTATION DIAGNOSIS:  Left intertrochanteric femur fracture.    CHIEF COMPLAINT:  Left hip pain.    HISTORY OF PRESENT ILLNESS:  The patient is a 89-year-old female with past   medical history significant for hypertension and COPD, presents to the emergency   department after a fall at home.  She tripped over her rug.  She had no head   injury.  No loss of consciousness.  She is not on any anticoagulation.  She   sustained a left intertrochanteric femur fracture.  She was seen and evaluated   and admitted to the hospital Medicine Service and Orthopedics was consulted for   management of her left intertrochanteric fracture.  Upon my evaluation at the   bedside, she is resting comfortably.  States she has pain with any attempted   movement of her left hip and she has had no acute issues since admission.    REVIEW OF SYSTEMS:  All reported negative aside from HPI  Constitutional: Negative for chills and fever.   HENT: Negative for congestion and hearing loss.    Eyes: Negative for visual disturbance.   Cardiovascular: Negative for chest pain and syncope.   Respiratory: Negative for cough and shortness of breath.    Hematologic/Lymphatic: Does not bruise/bleed easily.   Skin: Negative for color change and rash.   Gastrointestinal: Negative for abdominal pain, nausea and vomiting.   Genitourinary: Negative for dysuria and hematuria.   Neurological: Negative for numbness, sensory change and weakness.   Psychiatric/Behavioral: Negative for altered mental status.       PAST  MEDICAL HISTORY:  As above.    PAST SURGICAL HISTORY:  She states that she has operations in the past and   tolerated anesthesia well.    SOCIAL HISTORY:  She denies alcohol, tobacco, or drug use.    PHYSICAL EXAMINATION:  GENERAL:  She is in no apparent distress.  She is awake and alert.  HEAD, EYES, EARS, NOSE, THROAT:  Her extraocular movements are intact.  She is   normocephalic, atraumatic.  She has good range of motion of cervical spine   without tenderness.  PULMONARY:  She has unlabored respirations.  She is saturating well on nasal   cannula O2.  CARDIOVASCULAR:  She has normal rate with regular rhythm.  She has normal   peripheral perfusion.  SKIN:  Clean, dry, and intact.  MUSCULOSKELETAL:  Evaluation of left lower extremity, her limb is shortened and   externally rotated.  Her thigh is soft and compressible.  No attempted range of   motion of the hip was performed.  She is able to perform active range of motion   of the ankle and digits with palpable DP pulses.  Sensation to light touch   intact distally.    IMAGING DATA:  X-ray evaluations of left hip revealed displaced left   intertrochanteric femur fracture.    PLAN:  The risks, benefits, and alternatives to treatment were discussed at   length with the patient and family that is at the bedside including, but not   limited to pain, bleeding, scarring, infection, damage to neurovascular   structures, malunion, nonunion, need for future procedures, and complications   leading to amputation and even death.  She will benefit from intramedullary   stabilization of her left intertrochanteric femur fracture.  We will plan to   take her to the operating room later today.  They understand and agree with all   that we have discussed and all questions and concerns were addressed.        ______________________________  MD MARIBEL Hernandez/NALINI  DD:  04/09/2024  Time:  09:36AM  DT:  04/09/2024  Time:  10:30AM  Job #:  [id number]      CONSULTATION

## 2024-04-09 NOTE — ED PROVIDER NOTES
Encounter Date: 4/8/2024    SCRIBE #1 NOTE: I, Kelli Granda, am scribing for, and in the presence of,  Ephraim Menjivar MD. I have scribed the following portions of the note - Other sections scribed: HPI, ROS, PE.       History     Chief Complaint   Patient presents with    Hip Pain     Presents via [facility] with c/o L hip pain following a fall. LLE shortened and rotated. +PMS. 100mcg Fentanyl.      Patient is a 89-year-old female with a history of COPD and HTN presenting to the ED with c/o left hip pain. The pt is reporting left hip pain following a mechanical fall that occurred PTA. The pt denies any blood thinners. She denies any other complaints.       PCP: Iglesia Steel MD.     The history is provided by the patient. No  was used.     Review of patient's allergies indicates:   Allergen Reactions    Codeine     Ofloxacin     Penicillins     Sulfamethoxazole-trimethoprim      Other reaction(s): Unknown     Past Medical History:   Diagnosis Date    COPD (chronic obstructive pulmonary disease)     Hypertension      No past surgical history on file.  No family history on file.  Social History     Tobacco Use    Smoking status: Never    Smokeless tobacco: Never     Review of Systems   Musculoskeletal:  Positive for arthralgias (left hip pain.).       Physical Exam     Initial Vitals [04/08/24 1901]   BP Pulse Resp Temp SpO2   (!) 191/110 95 16 99 °F (37.2 °C) 95 %      MAP       --         Physical Exam    Nursing note and vitals reviewed.  Constitutional: She appears well-developed and well-nourished. She is not diaphoretic. No distress.   Very pleasant.    HENT:   Head: Normocephalic and atraumatic.   Right Ear: External ear normal.   Left Ear: External ear normal.   Nose: Nose normal.   Eyes: Conjunctivae and EOM are normal. Pupils are equal, round, and reactive to light. Right eye exhibits no discharge. Left eye exhibits no discharge.   Cardiovascular:  Normal rate, regular rhythm, normal heart  sounds and intact distal pulses.     Exam reveals no gallop and no friction rub.       No murmur heard.  Pulses:       Dorsalis pedis pulses are 2+ on the left side.   Pulmonary/Chest: Breath sounds normal. No respiratory distress. She has no wheezes. She has no rhonchi. She has no rales. She exhibits no tenderness.   Abdominal: Abdomen is soft. Bowel sounds are normal. She exhibits no distension and no mass. There is no abdominal tenderness. There is no rebound and no guarding.   Musculoskeletal:         General: No edema. Normal range of motion.      Comments: Left hip shortened and externally rotated.       Neurological: She is alert and oriented to person, place, and time. No cranial nerve deficit or sensory deficit. GCS eye subscore is 4. GCS verbal subscore is 5. GCS motor subscore is 6.   Motor and sensation intact in the left lower extremity.    Skin: Skin is warm and dry. Capillary refill takes less than 2 seconds. No erythema. No pallor.         ED Course   Procedures  Labs Reviewed   COMPREHENSIVE METABOLIC PANEL - Abnormal; Notable for the following components:       Result Value    Carbon Dioxide 22 (*)     Glucose Level 123 (*)     All other components within normal limits   CBC WITH DIFFERENTIAL - Abnormal; Notable for the following components:    WBC 16.31 (*)     MCHC 32.6 (*)     Neut # 13.85 (*)     IG# 0.08 (*)     All other components within normal limits   APTT - Normal   PROTIME-INR - Normal   CBC W/ AUTO DIFFERENTIAL    Narrative:     The following orders were created for panel order CBC auto differential.  Procedure                               Abnormality         Status                     ---------                               -----------         ------                     CBC with Differential[[id number]]       Abnormal            Final result                 Please view results for these tests on the individual orders.          Imaging Results              X-Ray Hip 2 or 3 views Left  (with Pelvis when performed) (Final result)  Result time 04/08/24 19:36:15      Final result by Jaren Nix MD (04/08/24 19:36:15)                   Impression:      Inter trochanteric left femur fracture.      Electronically signed by: Jaren Nix  Date:    04/08/2024  Time:    19:36               Narrative:    EXAMINATION:  XR HIP WITH PELVIS WHEN PERFORMED, 2 OR 3 VIEWS LEFT    CLINICAL HISTORY:  hip pain;    TECHNIQUE:  AP view of the pelvis with frontal and frog leg lateral views of the left hip.    COMPARISON:  No relevant comparison studies available at time of dictation.    FINDINGS:  Comminuted inter trochanteric left femur fracture.  Mild impaction with horizontal rotation of the left femoral neck.  Left femoral head remains aligned with the acetabulum.                                       X-Ray Chest 1 View (Final result)  Result time 04/08/24 19:34:45      Final result by Jaren Nix MD (04/08/24 19:34:45)                   Impression:      No acute pulmonary process identified.      Electronically signed by: Jaren Nix  Date:    04/08/2024  Time:    19:34               Narrative:    EXAMINATION:  XR CHEST 1 VIEW    CLINICAL HISTORY:  fall;    TECHNIQUE:  Frontal view(s) of the chest.    COMPARISON:  Radiography 03/10/2022    FINDINGS:  Normal cardiac silhouette.  The lungs are well-inflated.  No consolidation identified.  No significant pleural effusion or discernible pneumothorax.                                       Medications   morphine injection 4 mg (has no administration in time range)   ondansetron injection 4 mg (has no administration in time range)   prochlorperazine injection Soln 5 mg (has no administration in time range)   oxyCODONE-acetaminophen 5-325 mg per tablet 1 tablet (1 tablet Oral Given 4/8/24 2112)   methocarbamoL tablet 500 mg (has no administration in time range)   sodium chloride 0.9% flush 10 mL (has no administration in time range)   melatonin tablet 6 mg  (6 mg Oral Given 4/8/24 2112)   polyethylene glycol packet 17 g (has no administration in time range)   senna-docusate 8.6-50 mg per tablet 2 tablet (has no administration in time range)   acetaminophen tablet 650 mg (has no administration in time range)   aluminum-magnesium hydroxide-simethicone 200-200-20 mg/5 mL suspension 30 mL (has no administration in time range)   enoxaparin injection 40 mg (40 mg Subcutaneous Given 4/8/24 2200)   hydrALAZINE injection 10 mg (has no administration in time range)   labetaloL injection 10 mg (has no administration in time range)   cloNIDine tablet 0.1 mg (has no administration in time range)   senna-docusate 8.6-50 mg per tablet 2 tablet (has no administration in time range)   dextrose 5 % and 0.45 % NaCl infusion ( Intravenous New Bag 4/8/24 2201)   morphine injection 2 mg (2 mg Intravenous Given 4/8/24 1947)   ondansetron injection 4 mg (4 mg Intravenous Given 4/8/24 1947)     Medical Decision Making  Differential diagnosis include but are not limited to:  Fracture laceration abrasion contusion.      Patient is awake alert well-appearing.  NAD.  No respiratory distress.  Does have a shortened deformed externally rotated left lower extremity.  Plain films do reveal intertrochanteric fracture.  Discussed with Orthopedic surgery will admit to medicine.  NPO at midnight.  Plan for OR in a.m..  Patient was comfortable with plan.  Discussed with Melissa on-call for hospitalist.  Will admit.      Amount and/or Complexity of Data Reviewed  External Data Reviewed: notes.  Labs: ordered. Decision-making details documented in ED Course.  Radiology: ordered and independent interpretation performed. Decision-making details documented in ED Course.    Risk  Prescription drug management.  Decision regarding hospitalization.            Scribe Attestation:   Scribe #1: I performed the above scribed service and the documentation accurately describes the services I performed. I attest to the  accuracy of the note.    Attending Attestation:           Physician Attestation for Scribe:  Physician Attestation Statement for Scribe #1: I, Ephraim Menjivar MD, reviewed documentation, as scribed by Kelli Granda in my presence, and it is both accurate and complete.             ED Course as of 04/08/24 2258 Mon Apr 08, 2024 1949 History of PIP joint dislocation 10/07/2023 seen evaluated by hand surgery. [MM]   1949 Patient ortho [MM]   1957 Spoke with ortho, Dr. Hutchinson agrees medicine admit.  Recommends NPO at midnight. [MM]   2005 Paged hospitalist.  [RB]   2014 EKG done at 8:04 p.m. shows sinus rhythm rate of 86 .  Normal axis.  No ST elevation or depression.  Sinus arrhythmia noted.   [MM]   2258 PTT: 31.0 [MM]   2258 INR: 1.1 [MM]   2258 CBC auto differential(!)  Leukocytosis no anemia [MM]   2258 Comprehensive metabolic panel(!)  No significant electrolyte abnormality or renal dysfunction. [MM]      ED Course User Index  [MM] Ephraim Menjivar MD  [RB] Kelli Granda                           Clinical Impression:  Final diagnoses:  [Z01.818] Pre-op testing  [S72.142A] Closed 2-part intertrochanteric fracture of left femur, initial encounter (Primary)          ED Disposition Condition    Admit Stable                Ephraim Menjivar MD  04/08/24 2258

## 2024-04-09 NOTE — FIRST PROVIDER EVALUATION
"Medical screening examination initiated.  I have conducted a focused provider triage encounter, findings are as follows:    Brief history of present illness:  89-year-old female presents to the ER for evaluation of left hip pain following trip and fall at home.  Patient reports that she stood up and her right foot got caught in the rug and she fell onto her left hip.  She denies any numbness tingling or weakness to the left lower extremity.  She reports she has not ambulated since the fall.  She reports exquisite pain in the left hip.  She denies any head injury or loss of consciousness.  She was given fentanyl en route to the ER.    Vitals:    04/08/24 1901   BP: (!) 191/110   Pulse: 95   Resp: 16   Temp: 99 °F (37.2 °C)   TempSrc: Oral   SpO2: 95%   Weight: 68 kg (150 lb)   Height: 5' 3" (1.6 m)       Pertinent physical exam:  Alert, nonlabored, on stretcher.  Limited exam and movement of left lower extremity secondary to hip pain.    Brief workup plan:  Imaging    Preliminary workup initiated; this workup will be continued and followed by the physician or advanced practice provider that is assigned to the patient when roomed.  "

## 2024-04-09 NOTE — PROGRESS NOTES
Agree with Dr. Hutchinson is a previous consult note.  Patient has a left 3 part intertrochanteric femur fracture meeting surgical indications for stabilization.  It was shortened and displaced.  Femur films show no sign of fracture distally.  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  Patient understands risks benefits with the procedure as stated below.  We will proceed with internal fixation    I explained that surgery and the nature of their condition are not without risks. These include, but are not limited to, bleeding, infection, neurovascular compromise, malunion, nonunion, hardware complications, wound complications, scarring, cosmetic defects, need for later and/or repeated surgeries, avascular necrosis, bone death due to initial trauma, pain, loss of ROM, loss of function, PTOA, deformity, stance/gait and/or functional abnormalities, thromboembolic complications, compartment syndrome, loss of limb, loss of life, anesthetic complications, and other imponderables. I explained that these can occur despite the adequacy of treatments rendered, and that their risks are heightened given the nature of their condition.  I have also discussed the importance not using nicotine products due to the increased risk of infection surgical wound healing complications and nonunion of the fracture.  They verbalized understanding.  No guarantees were made.  They would like to continue with surgery at this time. If appropriate family was involved with surgical discussion.     This note/OR report was created with the assistance of  voice recognition software or phone  dictation.  There may be transcription errors as a result of using this technology however minimal. Effort has been made to assure accuracy of transcription but any obvious errors or omissions should be clarified with the author of the document.       Guy Jaquez, DO  Orthopedic Trauma Surgery

## 2024-04-09 NOTE — ANESTHESIA PROCEDURE NOTES
Intubation    Date/Time: 4/9/2024 12:09 PM    Performed by: Arielle Enriquez CRNA  Authorized by: Arielle Enriquez CRNA    Intubation:     Induction:  Intravenous    Intubated:  Postinduction    Mask Ventilation:  Easy mask    Attempts:  1    Attempted By:  CRNA    Method of Intubation:  Direct    Blade:  Issa 2    Laryngeal View Grade: Grade I - full view of cords      Difficult Airway Encountered?: No      Complications:  None    Airway Device:  Oral endotracheal tube    Airway Device Size:  7.0    Style/Cuff Inflation:  Cuffed (inflated to minimal occlusive pressure)    Tube secured:  22    Secured at:  The lips    Placement Verified By:  Capnometry    Complicating Factors:  None    Findings Post-Intubation:  BS equal bilateral and atraumatic/condition of teeth unchanged

## 2024-04-09 NOTE — ANESTHESIA POSTPROCEDURE EVALUATION
Anesthesia Post Evaluation    Patient: Safia Muñoz    Procedure(s) Performed: Procedure(s) (LRB):  INSERTION, INTRAMEDULLARY NAIL INTERTROCHENTERIC FRACTURE (Left)    Final Anesthesia Type: general      Patient location during evaluation: PACU  Patient participation: Yes- Able to Participate  Level of consciousness: awake and alert  Post-procedure vital signs: reviewed and stable  Pain management: adequate  Airway patency: patent  NADIA mitigation strategies: Multimodal analgesia  PONV status at discharge: No PONV  Anesthetic complications: no      Cardiovascular status: hemodynamically stable  Respiratory status: unassisted  Hydration status: euvolemic  Follow-up not needed.              Vitals Value Taken Time   /70 04/09/24 1422   Temp 36.4 °C (97.5 °F) 04/09/24 1320   Pulse 85 04/09/24 1424   Resp 10 04/09/24 1424   SpO2 97 % 04/09/24 1424   Vitals shown include unvalidated device data.      No case tracking events are documented in the log.      Pain/Blanco Score: Pain Rating Prior to Med Admin: 6 (4/9/2024  1:31 PM)  Pain Rating Post Med Admin: 0 (patient resting comfortably with no s/s of acute distress noted.) (4/9/2024  4:55 AM)  Blanco Score: 8 (4/9/2024  1:15 PM)

## 2024-04-10 LAB
ALBUMIN SERPL-MCNC: 3.3 G/DL (ref 3.4–4.8)
ALBUMIN/GLOB SERPL: 1.6 RATIO (ref 1.1–2)
ALP SERPL-CCNC: 60 UNIT/L (ref 40–150)
ALT SERPL-CCNC: 18 UNIT/L (ref 0–55)
AST SERPL-CCNC: 35 UNIT/L (ref 5–34)
BASOPHILS # BLD AUTO: 0.05 X10(3)/MCL
BASOPHILS NFR BLD AUTO: 0.5 %
BILIRUB SERPL-MCNC: 0.5 MG/DL
BUN SERPL-MCNC: 12.8 MG/DL (ref 9.8–20.1)
CALCIUM SERPL-MCNC: 7.8 MG/DL (ref 8.4–10.2)
CHLORIDE SERPL-SCNC: 100 MMOL/L (ref 98–111)
CO2 SERPL-SCNC: 23 MMOL/L (ref 23–31)
CREAT SERPL-MCNC: 0.82 MG/DL (ref 0.55–1.02)
DEPRECATED CALCIDIOL+CALCIFEROL SERPL-MC: 42.1 NG/ML (ref 30–80)
EOSINOPHIL # BLD AUTO: 0.1 X10(3)/MCL (ref 0–0.9)
EOSINOPHIL NFR BLD AUTO: 0.9 %
ERYTHROCYTE [DISTWIDTH] IN BLOOD BY AUTOMATED COUNT: 13.9 % (ref 11.5–17)
FERRITIN SERPL-MCNC: 92.84 NG/ML (ref 4.63–204)
GFR SERPLBLD CREATININE-BSD FMLA CKD-EPI: >60 MLS/MIN/1.73/M2
GLOBULIN SER-MCNC: 2.1 GM/DL (ref 2.4–3.5)
GLUCOSE SERPL-MCNC: 135 MG/DL (ref 75–121)
HCT VFR BLD AUTO: 33.5 % (ref 37–47)
HGB BLD-MCNC: 10.4 G/DL (ref 12–16)
IMM GRANULOCYTES # BLD AUTO: 0.05 X10(3)/MCL (ref 0–0.04)
IMM GRANULOCYTES NFR BLD AUTO: 0.5 %
IRON SATN MFR SERPL: 12 % (ref 20–50)
IRON SERPL-MCNC: 26 UG/DL (ref 50–170)
LYMPHOCYTES # BLD AUTO: 1.33 X10(3)/MCL (ref 0.6–4.6)
LYMPHOCYTES NFR BLD AUTO: 12.6 %
MCH RBC QN AUTO: 29.3 PG (ref 27–31)
MCHC RBC AUTO-ENTMCNC: 31 G/DL (ref 33–36)
MCV RBC AUTO: 94.4 FL (ref 80–94)
MONOCYTES # BLD AUTO: 1.33 X10(3)/MCL (ref 0.1–1.3)
MONOCYTES NFR BLD AUTO: 12.6 %
NEUTROPHILS # BLD AUTO: 7.71 X10(3)/MCL (ref 2.1–9.2)
NEUTROPHILS NFR BLD AUTO: 72.9 %
NRBC BLD AUTO-RTO: 0 %
PLATELET # BLD AUTO: 211 X10(3)/MCL (ref 130–400)
PMV BLD AUTO: 10.4 FL (ref 7.4–10.4)
POTASSIUM SERPL-SCNC: 4 MMOL/L (ref 3.5–5.1)
PROT SERPL-MCNC: 5.4 GM/DL (ref 5.8–7.6)
RBC # BLD AUTO: 3.55 X10(6)/MCL (ref 4.2–5.4)
SODIUM SERPL-SCNC: 132 MMOL/L (ref 132–146)
TIBC SERPL-MCNC: 192 UG/DL (ref 70–310)
TIBC SERPL-MCNC: 218 UG/DL (ref 250–450)
TRANSFERRIN SERPL-MCNC: 195 MG/DL
VIT B12 SERPL-MCNC: 1025 PG/ML (ref 213–816)
WBC # SPEC AUTO: 10.57 X10(3)/MCL (ref 4.5–11.5)

## 2024-04-10 PROCEDURE — 11000001 HC ACUTE MED/SURG PRIVATE ROOM

## 2024-04-10 PROCEDURE — 27000221 HC OXYGEN, UP TO 24 HOURS

## 2024-04-10 PROCEDURE — 63600175 PHARM REV CODE 636 W HCPCS: Performed by: NURSE PRACTITIONER

## 2024-04-10 PROCEDURE — 97166 OT EVAL MOD COMPLEX 45 MIN: CPT

## 2024-04-10 PROCEDURE — 83540 ASSAY OF IRON: CPT | Performed by: INTERNAL MEDICINE

## 2024-04-10 PROCEDURE — 85025 COMPLETE CBC W/AUTO DIFF WBC: CPT | Performed by: PHYSICIAN ASSISTANT

## 2024-04-10 PROCEDURE — 25000003 PHARM REV CODE 250: Performed by: NURSE PRACTITIONER

## 2024-04-10 PROCEDURE — 82728 ASSAY OF FERRITIN: CPT | Performed by: INTERNAL MEDICINE

## 2024-04-10 PROCEDURE — 80053 COMPREHEN METABOLIC PANEL: CPT | Performed by: PHYSICIAN ASSISTANT

## 2024-04-10 PROCEDURE — 97162 PT EVAL MOD COMPLEX 30 MIN: CPT

## 2024-04-10 PROCEDURE — 25000003 PHARM REV CODE 250: Performed by: INTERNAL MEDICINE

## 2024-04-10 PROCEDURE — 97530 THERAPEUTIC ACTIVITIES: CPT

## 2024-04-10 PROCEDURE — 25000003 PHARM REV CODE 250: Performed by: PHYSICIAN ASSISTANT

## 2024-04-10 PROCEDURE — 82306 VITAMIN D 25 HYDROXY: CPT | Performed by: INTERNAL MEDICINE

## 2024-04-10 PROCEDURE — 82607 VITAMIN B-12: CPT | Performed by: INTERNAL MEDICINE

## 2024-04-10 PROCEDURE — 63600175 PHARM REV CODE 636 W HCPCS: Performed by: PHYSICIAN ASSISTANT

## 2024-04-10 PROCEDURE — 63600175 PHARM REV CODE 636 W HCPCS: Performed by: INTERNAL MEDICINE

## 2024-04-10 RX ORDER — ESTRADIOL 0.05 MG/D
1 FILM, EXTENDED RELEASE TRANSDERMAL
Status: DISCONTINUED | OUTPATIENT
Start: 2024-04-10 | End: 2024-04-12 | Stop reason: HOSPADM

## 2024-04-10 RX ORDER — ACETAMINOPHEN 325 MG/1
650 TABLET ORAL 3 TIMES DAILY
Status: DISCONTINUED | OUTPATIENT
Start: 2024-04-10 | End: 2024-04-12 | Stop reason: HOSPADM

## 2024-04-10 RX ORDER — ALBUTEROL SULFATE 90 UG/1
2 AEROSOL, METERED RESPIRATORY (INHALATION) EVERY 6 HOURS PRN
Status: DISCONTINUED | OUTPATIENT
Start: 2024-04-10 | End: 2024-04-12 | Stop reason: HOSPADM

## 2024-04-10 RX ORDER — FERROUS SULFATE, DRIED 160(50) MG
1 TABLET, EXTENDED RELEASE ORAL 2 TIMES DAILY
Status: DISCONTINUED | OUTPATIENT
Start: 2024-04-10 | End: 2024-04-12 | Stop reason: HOSPADM

## 2024-04-10 RX ORDER — LANOLIN ALCOHOL/MO/W.PET/CERES
1 CREAM (GRAM) TOPICAL DAILY
Status: DISCONTINUED | OUTPATIENT
Start: 2024-04-11 | End: 2024-04-12 | Stop reason: HOSPADM

## 2024-04-10 RX ORDER — ESTRADIOL 0.05 MG/D
1 FILM, EXTENDED RELEASE TRANSDERMAL
Status: DISCONTINUED | OUTPATIENT
Start: 2024-04-12 | End: 2024-04-12 | Stop reason: HOSPADM

## 2024-04-10 RX ADMIN — CEFTRIAXONE SODIUM 1 G: 1 INJECTION, POWDER, FOR SOLUTION INTRAMUSCULAR; INTRAVENOUS at 12:04

## 2024-04-10 RX ADMIN — AMLODIPINE BESYLATE 5 MG: 5 TABLET ORAL at 07:04

## 2024-04-10 RX ADMIN — CEFAZOLIN SODIUM 2 G: 2 SOLUTION INTRAVENOUS at 06:04

## 2024-04-10 RX ADMIN — METHOCARBAMOL 500 MG: 500 TABLET ORAL at 01:04

## 2024-04-10 RX ADMIN — ACETAMINOPHEN 650 MG: 325 TABLET, FILM COATED ORAL at 02:04

## 2024-04-10 RX ADMIN — ENOXAPARIN SODIUM 40 MG: 40 INJECTION SUBCUTANEOUS at 04:04

## 2024-04-10 RX ADMIN — METHOCARBAMOL 500 MG: 500 TABLET ORAL at 07:04

## 2024-04-10 RX ADMIN — ACETAMINOPHEN 650 MG: 325 TABLET, FILM COATED ORAL at 07:04

## 2024-04-10 RX ADMIN — OXYCODONE HYDROCHLORIDE AND ACETAMINOPHEN 1 TABLET: 5; 325 TABLET ORAL at 07:04

## 2024-04-10 RX ADMIN — Medication 1 TABLET: at 02:04

## 2024-04-10 RX ADMIN — Medication 1 TABLET: at 09:04

## 2024-04-10 RX ADMIN — OXYCODONE HYDROCHLORIDE AND ACETAMINOPHEN 1 TABLET: 5; 325 TABLET ORAL at 09:04

## 2024-04-10 RX ADMIN — OXYCODONE HYDROCHLORIDE AND ACETAMINOPHEN 1 TABLET: 5; 325 TABLET ORAL at 12:04

## 2024-04-10 RX ADMIN — METHOCARBAMOL 500 MG: 500 TABLET ORAL at 02:04

## 2024-04-10 RX ADMIN — METHOCARBAMOL 500 MG: 500 TABLET ORAL at 09:04

## 2024-04-10 RX ADMIN — SENNOSIDES, DOCUSATE SODIUM 2 TABLET: 8.6; 5 TABLET ORAL at 09:04

## 2024-04-10 RX ADMIN — ESTRADIOL 1 PATCH: 0.05 PATCH TRANSDERMAL at 04:04

## 2024-04-10 RX ADMIN — OXYCODONE HYDROCHLORIDE AND ACETAMINOPHEN 1 TABLET: 5; 325 TABLET ORAL at 03:04

## 2024-04-10 RX ADMIN — OXYCODONE HYDROCHLORIDE AND ACETAMINOPHEN 1 TABLET: 5; 325 TABLET ORAL at 04:04

## 2024-04-10 RX ADMIN — ONDANSETRON 4 MG: 2 INJECTION INTRAMUSCULAR; INTRAVENOUS at 04:04

## 2024-04-10 RX ADMIN — Medication 6 MG: at 07:04

## 2024-04-10 NOTE — PROGRESS NOTES
Inpatient Nutrition Evaluation    Admit Date: 4/8/2024   Total duration of encounter: 2 days    Nutrition Recommendation/Prescription     - continue regular diet as tolerated    Nutrition Assessment     Chart Review    Reason Seen: continuous nutrition monitoring    Malnutrition Screening Tool Results   Have you recently lost weight without trying?: No  Have you been eating poorly because of a decreased appetite?: No   MST Score: 0     Diagnosis:  Closed left intertrochanteric fracture status post intramedullary nailing  Leukocytosis most likely reactive   Essential hypertension  COPD not in exacerbation    Relevant Medical History: hypertension and COPD     Nutrition-Related Medications: Scheduled Medications:  amLODIPine, 5 mg, Daily  cefTRIAXone (Rocephin) IV (PEDS and ADULTS), 1 g, Q24H  enoxparin, 40 mg, Daily  senna-docusate 8.6-50 mg, 2 tablet, QHS    Continuous Infusions:  dextrose 5 % and 0.45 % NaCl, Last Rate: 70 mL/hr at 04/09/24 1959    PRN Medications: acetaminophen, aluminum-magnesium hydroxide-simethicone, cloNIDine, hydrALAZINE, HYDROcodone-acetaminophen, labetalol, melatonin, methocarbamoL, morphine, ondansetron, oxyCODONE-acetaminophen, polyethylene glycol, prochlorperazine, senna-docusate 8.6-50 mg, sodium chloride 0.9%     Nutrition-Related Labs:  Recent Labs   Lab 04/08/24 2000 04/09/24  0509 04/10/24  0459    135 132   K 4.2 4.4 4.0   CALCIUM 9.1 8.9 7.8*   CHLORIDE 104 103 100   CO2 22* 22* 23   BUN 17.6 13.6 12.8   CREATININE 1.01 0.79 0.82   EGFRNORACEVR 52 >60 >60   GLUCOSE 123* 140* 135*   BILITOT 0.3  --  0.5   ALKPHOS 77  --  60   ALT 14  --  18   AST 22  --  35*   ALBUMIN 3.7  --  3.3*   WBC 16.31* 14.21* 10.57   HGB 14.5 13.5 10.4*   HCT 44.5 43.6 33.5*        Diet Order: Diet Adult Regular  Oral Supplement Order: none  Appetite/Oral Intake: good/% of meals  Factors Affecting Nutritional Intake: none identified  Food/Mosque/Cultural Preferences: none reported  Food  "Allergies: no known food allergies    Skin Integrity: incision  Wound(s):       Comments    4/10/24 pt tolerating oral diet, reports good appetite and intake of meals. Eats well at home, no recent weight loss    Anthropometrics    Height: 5' 3" (160 cm) Height Method: Stated  Last Weight: 68.2 kg (150 lb 5.7 oz) (04/09/24 1951) Weight Method: Bed Scale  BMI (Calculated): 26.6  BMI Classification: overweight (BMI 25-29.9)     Ideal Body Weight (IBW), Female: 115 lb     % Ideal Body Weight, Female (lb): 130.75 %                             Usual Weight Provided By: patient denies unintentional weight loss    Wt Readings from Last 5 Encounters:   04/09/24 68.2 kg (150 lb 5.7 oz)   10/07/23 64.9 kg (143 lb)   09/24/18 68.4 kg (150 lb 12.7 oz)     Weight Change(s) Since Admission:  Admit Weight: 68 kg (150 lb) (04/08/24 1901)      Patient Education    Not applicable.    Monitoring & Evaluation     Dietitian will monitor food and beverage intake and weight change.  Nutrition Risk/Follow-Up: low (follow-up in 5-7 days)  Patients assigned 'low nutrition risk' status do not qualify for a full nutritional assessment but will be monitored and re-evaluated in a 5-7 day time period. Please consult if re-evaluation needed sooner.   "

## 2024-04-10 NOTE — PT/OT/SLP EVAL
Occupational Therapy  Evaluation    Name: Safia Muñoz  MRN: [id number]  Admitting Diagnosis: Fall: L IT femur fx s/p IM nail   Recent Surgery: Procedure(s) (LRB):  INSERTION, INTRAMEDULLARY NAIL INTERTROCHENTERIC FRACTURE (Left) 1 Day Post-Op    Recommendations:     Discharge therapy intensity: High Intensity Therapy   Discharge Equipment Recommendations:  to be determined by next level of care  Barriers to discharge:   (Severity of deficits, fall risk)    Assessment:     Safia Muñoz is a 89 y.o. female with a medical diagnosis of Fall: L IT femur fx s/p IM nail. Pt reported prior to admit she was IND c ADLs and mobility.  She presents with the following performance deficits affecting function: weakness, impaired endurance, impaired self care skills, impaired functional mobility, gait instability, impaired balance, pain. Pt required Max A for LB dressing, Mod A for sit<>stand from chair, and Min A for balance during grooming task while standing at sink. Pt is at high risk for falls and would benefit from high intensity therapy at d/c.     Rehab Prognosis: Good; patient would benefit from acute skilled OT services to address these deficits and reach maximum level of function.       Plan:     Patient to be seen 5 x/week to address the above listed problems via self-care/home management, therapeutic activities, therapeutic exercises  Plan of Care Expires: 05/08/24  Plan of Care Reviewed with: patient    Subjective     Chief Complaint: Pain in LLE   Patient/Family Comments/goals: To return to PLOF     Occupational Profile:  Living Environment: Pt lives in a [facility] c a walk in a shower c a built in seat and grab bars. Reported there are also grab bars by the ollie   Previous level of function: IND c ADLs and mobility without AD   Roles and Routines: Drives, mother, grandmother   Equipment Used at Home: grab bar  Assistance upon Discharge: Pt reported her son and daughter can check in on her at d/c.      Pain/Comfort:  Pain Rating 1: 8/10  Location - Side 1: Left  Location 1: leg  Pain Addressed 1: Reposition, Pre-medicate for activity, Distraction    Patients cultural, spiritual, Sabianist conflicts given the current situation: no    Objective:     OT communicated with RN prior to session.      Patient was found up in chair with peripheral Mikel WOODS upon OT entry to room.    General Precautions: Standard, fall  Orthopedic Precautions: LLE weight bearing as tolerated  Braces: N/A    Bed Mobility:    Did not assess; Pt seated UIC upon arrival.     Functional Mobility/Transfers:  Patient completed Sit <> Stand Transfer with moderate assistance  with  rolling walker   Functional Mobility: Pt required Max cues for hand placement during transitions.     Activities of Daily Living:  Grooming: minimum assistance Pt prepared toothbrush and brushed teeth while standing at sink c  RW c Min  for balance   Lower Body Dressing: maximal assistance doff/don socks   Toileting: total assistance mikel; OT to order bsc for future sessions.     Functional Cognition:  Intact    Visual Perceptual Skills:  Intact    Upper Extremity Function:  Right Upper Extremity:   WFL    Left Upper Extremity:  WFL      Therapeutic Positioning  Risk for acquired pressure injuries is increased due to relative decrease in mobility d/t hospitalization .    OT interventions performed during the course of today's session:   Education was provided on benefits of and recommendations for therapeutic positioning    Skin assessment: all bony prominences were assessed    Findings: no redness or breakdown noted    OT recommendations for therapeutic positioning throughout hospitalization:   Follow [facility] Pressure Injury Prevention Protocol      Patient Education:  Patient provided with verbal education education regarding OT role/goals/POC, fall prevention, and safety awareness.  Understanding was verbalized.     Patient left up in chair with all lines  intact, call button in reach, and geomat cushion.    GOALS:   Multidisciplinary Problems       Occupational Therapy Goals          Problem: Occupational Therapy    Goal Priority Disciplines Outcome Interventions   Occupational Therapy Goal     OT, PT/OT Ongoing, Progressing    Description: LTG: Pt will perform basic ADLs and ADL transfers with Modified independence using LRAD by discharge.    STG: to be met by 5/8/24:  Pt will complete grooming standing at sink with RW with SBA.  Pt will complete UB dressing with SBA.  Pt will complete LB dressing with SBA using LRAD.  Pt will complete toileting with SBA using LRAD.  Pt will complete functional mobility to/from toilet and toilet transfer with SBA using RW.                        History:     Past Medical History:   Diagnosis Date    COPD (chronic obstructive pulmonary disease)     Hypertension          Past Surgical History:   Procedure Laterality Date    RETROGRADE INTRAMEDULLARY RODDING OF DISTAL FEMUR Left 4/9/2024    Procedure: INSERTION, INTRAMEDULLARY NAIL INTERTROCHENTERIC FRACTURE;  Surgeon: Guy Jaquez DO;  Location: [facility];  Service: Orthopedics;  Laterality: Left;  HANA TABLE, SYNTHES TFNA       Time Tracking:     OT Date of Treatment: 04/10/24  OT Start Time: 0948  OT Stop Time: 1013  OT Total Time (min): 25 min    Billable Minutes:Evaluation Moderate complexity     4/10/2024

## 2024-04-10 NOTE — PROGRESS NOTES
[facility] - Ortho Neuro  Orthopedics  Progress Note    Patient Name: Safia Muñoz  MRN: [id number]  Admission Date: 4/8/2024  Hospital Length of Stay: 2 days  Attending Provider: Jn Guillermo MD  Primary Care Provider: Iglesia Steel MD  Follow-up For: Procedure(s) (LRB):  INSERTION, INTRAMEDULLARY NAIL INTERTROCHENTERIC FRACTURE (Left)    Post-Operative Day: 1 Day Post-Op  Subjective:     Principal Problem:Left IT fracture    Principal Orthopedic Problem: 1 Day Post-Op   S/P IMN Left Femur    Interval History: Seen this am. Doing well and pain controlled. Sore with movement. Discussed therapy for mobility today. All questions answered.     Review of patient's allergies indicates:   Allergen Reactions    Codeine     Ofloxacin     Penicillins     Sulfamethoxazole-trimethoprim      Other reaction(s): Unknown       Current Facility-Administered Medications   Medication    acetaminophen tablet 650 mg    aluminum-magnesium hydroxide-simethicone 200-200-20 mg/5 mL suspension 30 mL    amLODIPine tablet 5 mg    cefTRIAXone (Rocephin) 1 g in dextrose 5 % in water (D5W) 100 mL IVPB (MB+)    cloNIDine tablet 0.1 mg    dextrose 5 % and 0.45 % NaCl infusion    enoxaparin injection 40 mg    hydrALAZINE injection 10 mg    HYDROcodone-acetaminophen  mg per tablet 1 tablet    labetaloL injection 10 mg    melatonin tablet 6 mg    methocarbamoL tablet 500 mg    morphine injection 4 mg    ondansetron injection 4 mg    oxyCODONE-acetaminophen 5-325 mg per tablet 1 tablet    polyethylene glycol packet 17 g    prochlorperazine injection Soln 5 mg    senna-docusate 8.6-50 mg per tablet 2 tablet    senna-docusate 8.6-50 mg per tablet 2 tablet    sodium chloride 0.9% flush 10 mL     Objective:     Vital Signs (Most Recent):  Temp: 98.1 °F (36.7 °C) (04/10/24 0701)  Pulse: 92 (04/10/24 0701)  Resp: 18 (04/10/24 0743)  BP: 121/65 (04/10/24 0701)  SpO2: (!) 93 % (04/10/24 0701) Vital Signs (24h Range):  Temp:   "[97.5 °F (36.4 °C)-99.5 °F (37.5 °C)] 98.1 °F (36.7 °C)  Pulse:  [71-94] 92  Resp:  [9-24] 18  SpO2:  [90 %-100 %] 93 %  BP: ()/(55-96) 121/65     Weight: 68.2 kg (150 lb 5.7 oz)  Height: 5' 3" (160 cm)  Body mass index is 26.63 kg/m².      Intake/Output Summary (Last 24 hours) at 4/10/2024 0937  Last data filed at 4/10/2024 0928  Gross per 24 hour   Intake 900 ml   Output 750 ml   Net 150 ml       Physical Exam:   General the patient is alert and oriented x3 no acute distress nontoxic-appearing appropriate affect.    Constitutional: Vital signs are examined and stable.  Resp: No signs of labored breathing               LLE: -Skin: Dressing CDI           -MSK: +EHL/FHL, Gastroc/Tib anterior            -Neuro:  Sensation intact to light touch L3-S1 dermatomes            -CV: Capillary refill is less than 2 seconds. + DP  Compartments soft and compressible      Diagnostic Findings:   Significant Labs:   Recent Lab Results  (Last 5 results in the past 72 hours)        04/10/24  0459   04/09/24  2322   04/09/24  0729   04/09/24  0648   04/09/24  0509        Albumin/Globulin Ratio 1.6               ABO and RH     O POS           Albumin 3.3               ALP 60               ALT 18               AMORPHOUS CRYSTAL, UA ([facility]) UL   Trace             Anion Gap         10.0       Appearance, UA   Turbid             AST 35               Bacteria, UA   Many             Baso # 0.05         0.05       Basophil % 0.5         0.4       BILIRUBIN TOTAL 0.5               Bilirubin, UA   Negative             BUN 12.8         13.6       BUN/CREAT RATIO         17       Calcium 7.8         8.9       Chloride 100         103       CO2 23         22       Color, UA   Yellow             Creatinine 0.82         0.79       eGFR >60         >60       Eos # 0.10         0.02       Eos % 0.9         0.1       Globulin, Total 2.1               Glucose 135         140       Glucose, UA   Normal             Group & Rh       O POS         " Hematocrit 33.5         43.6       Hemoglobin 10.4         13.5       Hyaline Casts, UA   0-2             Immature Grans (Abs) 0.05         0.07       Immature Granulocytes 0.5         0.5       Indirect Sakshi GEL       NEG         Ketones, UA   Trace             Leukocyte Esterase, UA   75             Lymph # 1.33         2.02       LYMPH % 12.6         14.2       MCH 29.3         28.9       MCHC 31.0         31.0       MCV 94.4         93.4       Mono # 1.33         1.47       Mono % 12.6         10.3       MPV 10.4         10.2       Mucous, UA   Trace             Neut # 7.71         10.58       Neut % 72.9         74.5       NITRITE UA   2+             nRBC 0.0         0.0       Blood, UA   Negative             pH, UA   5.5             Platelet Count 211         270       Potassium 4.0         4.4       PROTEIN TOTAL 5.4               Protein, UA   Trace             RBC 3.55         4.67       RBC, UA   0-5             RDW 13.9         13.9       Sodium 132         135       Specific Gravity,UA   1.029             Specimen Outdate       04/12/2024 23:59         Squamous Epithelial Cells, UA   Few             Urobilinogen, UA   Normal             WBC, UA   11-20             WBC 10.57         14.21                               Significant Imaging: I have reviewed all pertinent imaging results/findings.     Assessment/Plan:     Active Diagnoses:    Diagnosis Date Noted POA    Closed 2-part intertrochanteric fracture of proximal end of left femur [S72.142A] 04/08/2024 Yes      Problems Resolved During this Admission:   89YO F here following a fall  POD #1 S/P IMN left IT fracture    Diet: as tolerated  Pain: multimodal PRN  DVT: lovenox scheduled, ok for ASA 81mg BID on DC  Abx: ceftriaxone for UTI per primary  ABLA: expected; stable will monitor  PT/OT eval and treat  Activity: WBAT LLE  Dressing changes begin tomorrow    The above findings, diagnostics, and treatment plan were discussed with Dr Jaquez who is in  agreement with the plan of care except as stated in additional documentation.      Khadijah Paz, FNP   Orthopedic Trauma Surgery  [facility]

## 2024-04-10 NOTE — PROGRESS NOTES
[facility] Medicine Progress Note        Chief Complaint: Inpatient Follow-up for     HPI: 89-year-old female with medical history of hypertension and COPD not on home oxygen present to the ED with chief complaint of left hip pain after a ground level fall where she slipped on her rug and fell. On exam she appears comfortable, lungs clear to auscultation, normal heart sounds, no pedal edema, palpable pedal pulses and sensation intact on left leg there is tenderness to left groin. X-ray show intertrochanteric left femur fracture. Orthopedic surgery consulted and referred to our service for admission.     Interval Hx:   Patient seen and examined while she was in PACU open her eyes to verbal stimuli had just had intramedullary nailing done  Case was discussed with patient's nurse     Objective/physical exam:  General: In no acute distress, afebrile  Chest: Clear to auscultation bilaterally  Heart: RRR, +S1, S2, no appreciable murmur  Abdomen: Soft, nontender, BS +  MSK: Warm,   Neurologic:  Slightly sleepy from anesthesia but opens her eyes to verbal  VITAL SIGNS: 24 HRS MIN & MAX LAST   Temp  Min: 97.5 °F (36.4 °C)  Max: 98.8 °F (37.1 °C) 98.2 °F (36.8 °C)   BP  Min: 84/62  Max: 179/96 (!) 142/80   Pulse  Min: 71  Max: 95  89   Resp  Min: 9  Max: 24 20   SpO2  Min: 84 %  Max: 100 % 95 %     I have reviewed the following labs:  Recent Labs   Lab 04/08/24 2000 04/09/24  0509   WBC 16.31* 14.21*   RBC 4.98 4.67   HGB 14.5 13.5   HCT 44.5 43.6   MCV 89.4 93.4   MCH 29.1 28.9   MCHC 32.6* 31.0*   RDW 13.6 13.9    270   MPV 10.3 10.2     Recent Labs   Lab 04/08/24 2000 04/09/24  0509    135   K 4.2 4.4   CO2 22* 22*   BUN 17.6 13.6   CREATININE 1.01 0.79   CALCIUM 9.1 8.9   ALBUMIN 3.7  --    ALKPHOS 77  --    ALT 14  --    AST 22  --    BILITOT 0.3  --      Microbiology Results (last 7 days)       ** No results found for the last 168 hours. **             See below for  Radiology    Scheduled Med:   amLODIPine  5 mg Oral Daily    ceFAZolin (Ancef) IV (PEDS and ADULTS)  2 g Intravenous Q8H    enoxparin  40 mg Subcutaneous Daily    senna-docusate 8.6-50 mg  2 tablet Oral QHS      Continuous Infusions:   sodium chloride 0.9%      dextrose 5 % and 0.45 % NaCl 70 mL/hr at 04/09/24 1959      PRN Meds:  acetaminophen, aluminum-magnesium hydroxide-simethicone, cloNIDine, hydrALAZINE, HYDROcodone-acetaminophen, labetalol, melatonin, methocarbamoL, morphine, ondansetron, oxyCODONE-acetaminophen, polyethylene glycol, prochlorperazine, senna-docusate 8.6-50 mg, sodium chloride 0.9%     Assessment/Plan:  Closed left intertrochanteric fracture status post intramedullary nailing  Leukocytosis most likely reactive   Essential hypertension  COPD not in exacerbation    Patient is status post intramedullary nailing done on April 9, 2024   Orthopedics following   Repeat blood work in a.m.   PT OT consulted   White cell count is trending down most likely stress-induced chest x-ray negative patient afebrile   Will see how patient is doing postop accordingly will make discharge disposition  Postop patient is on 2 L of OxyMask  Had episode of hypotension but now blood pressure is on the higher side    Repeat blood work in a.m.      VTE prophylaxis:  Lovenox    Patient condition:  Stable/Fair/Guarded/ Serious/ Critical    Anticipated discharge and Disposition:         All diagnosis and differential diagnosis have been reviewed; assessment and plan has been documented; I have personally reviewed the labs and test results that are presently available; I have reviewed the patients medication list; I have reviewed the consulting providers response and recommendations. I have reviewed or attempted to review medical records based upon their availability    All of the patient's questions have been  addressed and answered. Patient's is agreeable to the above stated plan. I will continue to monitor closely and make  adjustments to medical management as needed.  _____________________________________________________________________    Nutrition Status:    Radiology:  I have personally reviewed the following imaging and agree with the radiologist.     X-Ray Femur 2 View Left  Narrative: EXAMINATION:  XR FEMUR 2 VIEW LEFT    CLINICAL HISTORY:  post-op;    COMPARISON:  X-rays dated 04/09/2024    FINDINGS:  There is improved alignment following internal fixation of the femur.  Postoperative changes are noted within the soft tissues.  Impression: Improved alignment following internal fixation of the femur.    Electronically signed by: Corinna Zurita  Date:    04/09/2024  Time:    15:02  SURG FL Surgery Fluoro Usage  See OP Notes for results.     IMPRESSION: See OP Notes for results.     This procedure was auto-finalized by: Virtual Radiologist  X-Ray Femur 2 View Left  Narrative: EXAMINATION:  XR FEMUR 2 VIEW LEFT    CLINICAL HISTORY:  fx;    TECHNIQUE:  AP and lateral views of the left femur were performed.    COMPARISON:  None.    FINDINGS:  Intertrochanteric left femoral fracture with varus angulation.  Degenerative change at the knee and hip.    Atherosclerosis.  Impression: Intertrochanteric left femoral fracture    Electronically signed by: Shanell Madera  Date:    04/09/2024  Time:    12:50      Vera Barron MD  Department of Hospital Medicine   [facility]   04/09/2024

## 2024-04-10 NOTE — PLAN OF CARE
Problem: Physical Therapy  Goal: Physical Therapy Goal  Description: Goals to be met by: 5/10/24     Patient will increase functional independence with mobility by performin. Supine to sit with [place]  2. Sit to supine with [place]  3. Sit to stand transfer with Modified [place]  4. Gait  x 150 feet with Modified [place] using Rolling Walker.     Outcome: Ongoing, Progressing

## 2024-04-10 NOTE — NURSING
Nurses Note -- 4 Eyes      4/10/2024   2:54 AM      Skin assessed during: Admit      [x] No Altered Skin Integrity Present    []Prevention Measures Documented      [] Yes- Altered Skin Integrity Present or Discovered   [] LDA Added if Not in Epic (Describe Wound)   [] New Altered Skin Integrity was Present on Admit and Documented in LDA   [] Wound Image Taken    Wound Care Consulted? No    Attending Nurse:  Cindy Carolina RN/Staff Member:   Rafiq Chavarria RN

## 2024-04-10 NOTE — PT/OT/SLP PROGRESS
Physical Therapy Treatment    Patient Name:  Safia Muñoz   MRN:  [id number]    Recommendations:     Discharge therapy intensity: High Intensity Therapy   Discharge Equipment Recommendations:  (TBD)  Barriers to discharge: None    Assessment:     Safia Muñoz is a 89 y.o. female admitted with a medical diagnosis of left femur fracture.  She presents with the following impairments/functional limitations: impaired endurance, impaired self care skills, gait instability, weakness, impaired functional mobility, decreased lower extremity function, decreased safety awareness, pain.    Rehab Prognosis: Good; patient would benefit from acute skilled PT services to address these deficits and reach maximum level of function.    Recent Surgery: Procedure(s) (LRB):  INSERTION, INTRAMEDULLARY NAIL INTERTROCHENTERIC FRACTURE (Left) 1 Day Post-Op    Plan:     During this hospitalization, patient to be seen 6 x/week to address the identified rehab impairments via gait training, therapeutic activities, therapeutic exercises, neuromuscular re-education and progress toward the following goals:    Plan of Care Expires:  05/10/24    Subjective     Chief Complaint: pain  Patient/Family Comments/goals: none  Pain/Comfort:  Pain Rating 1: 8/10  Location - Side 1: Left  Location 1: hip  Pain Addressed 1: Reposition, Distraction  Pain Rating Post-Intervention 1: 8/10      Objective:     Communicated with nurse prior to session.  Patient found supine with [place] upon PT entry to room.     General Precautions: Standard, fall  Orthopedic Precautions: LLE weight bearing as tolerated  Braces: N/A  Respiratory Status: Room air  Skin Integrity: Visible skin intact      Functional Mobility:  Bed Mobility:  Sit to Supine: moderate assistance  Transfers:  Sit to Stand:  moderate assistance with rolling walker  Gait: 5 ft with RW & MOD A. Difficulty with weight shifting  Balance: poor    Education Provided:  Role and goals of PT, transfer  training, bed mobility, gait training, balance training, safety awareness, assistive device, strengthening exercises, and importance of participating in PT to return to PLOF.    Patient left supine with all lines intact, call button in reach, and family present    GOALS:   Multidisciplinary Problems       Physical Therapy Goals          Problem: Physical Therapy    Goal Priority Disciplines Outcome Goal Variances Interventions   Physical Therapy Goal     PT, PT/OT Ongoing, Progressing     Description: Goals to be met by: 5/10/24     Patient will increase functional independence with mobility by performin. Supine to sit with [place]  2. Sit to supine with [place]  3. Sit to stand transfer with Modified [place]  4. Gait  x 150 feet with Modified [place] using Rolling Walker.                          Time Tracking:     PT Received On: 04/10/24  PT Start Time: 1515     PT Stop Time: 1530  PT Total Time (min): 15 min     Billable Minutes: Therapeutic Activity 15 minutes    Treatment Type: Treatment  PT/PTA: PT     Number of PTA visits since last PT visit: 0     04/10/2024

## 2024-04-10 NOTE — PT/OT/SLP EVAL
Physical Therapy Evaluation    Patient Name:  Safia Muñoz   MRN:  [id number]    Recommendations:     Discharge therapy intensity: High Intensity Therapy   Discharge Equipment Recommendations:  (TBD by next level of care)   Barriers to discharge: Impaired mobility    Assessment:     Safia Muñoz is a 89 y.o. female admitted with a medical diagnosis of left femur fracture. S/p IM nail 4/9. Prior to admit, patient was completely independent.  She presents with the following impairments/functional limitations: weakness, impaired endurance, impaired functional mobility, impaired self care skills, gait instability, impaired balance, decreased lower extremity function, decreased safety awareness, pain. She required MOD A of 2 for bed mobility. MOD A for sit<>stand. Ambulated 3 ft with RW & MOD A. Difficulty weight shifting to LLE. Patient to benefit from skilled PT to address deficits, improve functional mobility, and progress towards functional independence. Recommending high intensity therapy at discharge. Progress as tolerated.    Rehab Prognosis: Good; patient would benefit from acute skilled PT services to address these deficits and reach maximum level of function.    Recent Surgery: Procedure(s) (LRB):  INSERTION, INTRAMEDULLARY NAIL INTERTROCHENTERIC FRACTURE (Left) 1 Day Post-Op    Plan:     During this hospitalization, patient to be seen 6 x/week to address the identified rehab impairments via gait training, therapeutic activities, therapeutic exercises, neuromuscular re-education and progress toward the following goals:    Plan of Care Expires:  05/10/24    Subjective     Chief Complaint: pain  Patient/Family Comments/goals: none  Pain/Comfort:  Pain Rating 1: 8/10  Location - Side 1: Left  Location 1: hip  Pain Addressed 1: Reposition, Distraction  Pain Rating Post-Intervention 1: 8/10    Patients cultural, spiritual, Jewish conflicts given the current situation: no    Living Environment:  Lives  alone in a 1st floor cottage.   Prior to admission, patients level of function was independent.  Equipment used at home: grab bar (Owns RW. Did not use).  DME owned (not currently used): rolling walker.  Upon discharge, patient will have assistance from TBD.    Objective:     Communicated with nurse prior to session.  Patient found supine with PureWick, telemetry  upon PT entry to room.    General Precautions: Standard, fall  Orthopedic Precautions:LLE weight bearing as tolerated   Braces: N/A  Respiratory Status: Room air    Exams:  Cognitive Exam:  Patient is oriented to Person, Place, Time, and Situation  Sensation: -       Intact  RLE ROM: WFL  RLE Strength: 4/5 grossly  LLE ROM: WFL  LLE Strength: -3/5 grossly  Skin integrity: Visible skin intact      Functional Mobility:  Bed Mobility:  Supine to Sit: moderate assistance and of 2 persons  Transfers:  Sit to Stand:  moderate assistance with rolling walker  Gait: 3 ft with RW & MOD A. Difficulty weight shifting to LLE.   Balance: fair    Therapeutic Activity:   -Multiple sit<>stands with MOD A. Rest breaks between sets  -Pre-gait: weight shifting.   Gait: 3 ft with RW & MOD A      AM-PAC 6 CLICK MOBILITY  Total Score:11     Education Provided:  Role and goals of PT, transfer training, bed mobility, gait training, balance training, safety awareness, assistive device, strengthening exercises, and importance of participating in PT to return to PLOF.    Patient left up in chair with all lines intact, call button in reach, and son present.    GOALS:   Multidisciplinary Problems       Physical Therapy Goals          Problem: Physical Therapy    Goal Priority Disciplines Outcome Goal Variances Interventions   Physical Therapy Goal     PT, PT/OT Ongoing, Progressing     Description: Goals to be met by: 5/10/24     Patient will increase functional independence with mobility by performin. Supine to sit with [place]  2. Sit to supine with [place]  3. Sit  to stand transfer with Modified [place]  4. Gait  x 150 feet with Modified [place] using Rolling Walker.                          History:     Past Medical History:   Diagnosis Date    COPD (chronic obstructive pulmonary disease)     Hypertension        Past Surgical History:   Procedure Laterality Date    RETROGRADE INTRAMEDULLARY RODDING OF DISTAL FEMUR Left 4/9/2024    Procedure: INSERTION, INTRAMEDULLARY NAIL INTERTROCHENTERIC FRACTURE;  Surgeon: Guy Jaquez DO;  Location: [facility];  Service: Orthopedics;  Laterality: Left;  HANA TABLE, SYNTHES TFNA       Time Tracking:     PT Received On: 04/10/24  PT Start Time: 0827     PT Stop Time: 0910  PT Total Time (min): 43 min     Billable Minutes: Evaluation 25 minutes and Therapeutic Activity 18 minutes      04/10/2024

## 2024-04-10 NOTE — PLAN OF CARE
04/10/24 1237   Discharge Assessment   Assessment Type Discharge Planning Assessment   Confirmed/corrected address, phone number and insurance Yes   Confirmed Demographics Correct on Facesheet   Source of Information patient;family   Communicated ANNE with patient/caregiver Date not available/Unable to determine   Reason For Admission IMN of left femur   People in Home alone   Facility Arrived From: [facility] (Independent living)   Do you expect to return to your current living situation? Yes   Do you have help at home or someone to help you manage your care at home? No   Prior to hospitilization cognitive status: Unable to Assess   Current cognitive status: Alert/Oriented   Walking or Climbing Stairs Difficulty no   Dressing/Bathing Difficulty yes   Dressing/Bathing bathing difficulty, requires equipment   Dressing/Bathing Management Seat in shower   Home Accessibility wheelchair accessible   Home Layout Able to live on 1st floor   Equipment Currently Used at Home grab bar   Do you currently have service(s) that help you manage your care at home? No   Do you take prescription medications? Yes   Do you have prescription coverage? Yes   Coverage Medicare   Who is going to help you get home at discharge? Family   How do you get to doctors appointments? car, drives self   Are you on dialysis? No   Do you take coumadin? No   Discharge Plan A Rehab   Discharge Plan B Home   DME Needed Upon Discharge  other (see comments)  (TBD)   Discharge Plan discussed with: Patient;Adult children   Transition of Care Barriers None     Patient lives at [facility] on the independent living side. PT/OT consulted CM will follow recommendations. If rehab placement is  needed patient and son would like ortho campus. FOC obtained and placed in chart.

## 2024-04-10 NOTE — PLAN OF CARE
Problem: Occupational Therapy  Goal: Occupational Therapy Goal  Description: LTG: Pt will perform basic ADLs and ADL transfers with Modified independence using LRAD by discharge.    STG: to be met by 5/8/24:  Pt will complete grooming standing at sink with RW with SBA.  Pt will complete UB dressing with SBA.  Pt will complete LB dressing with SBA using LRAD.  Pt will complete toileting with SBA using LRAD.  Pt will complete functional mobility to/from toilet and toilet transfer with SBA using RW.   Outcome: Ongoing, Progressing

## 2024-04-10 NOTE — PROGRESS NOTES
[facility] Hospital Medicine Progress Note        Chief Complaint: Inpatient Follow-up for left femur fracture     HPI:   89-year-old female with medical history of hypertension and COPD not on home oxygen present to the ED with a chief complaint of left hip pain after a ground level fall where she slipped on her rug and fell. On exam she appears comfortable, lungs clear to auscultation, normal heart sounds, no pedal edema, palpable pedal pulses and sensation intact on left leg. There is tenderness to left groin. X-ray show intertrochanteric left femur fracture. Orthopedic surgery consulted and Pt admitted to  service . Pt was totally independent with ADLs before  the fall.     Pt underwent Left intertrochanteric femur fracture  with IM nailing on 4/9/24.       Interval Hx:   Pt was seen today after PT session, sitting in the bedside chair. Reports pain is fairly controlled.  She has no other c/o.   BP noted to be soft. Will hold home Amlodipine for now.   Labs showed WBC normalized , Hgb 10.4 , Cr 0.8.  UA is suggestive of UTI. Rocephin initiated. Urine culture is pending       Case was discussed with patient's nurse and  on the floor.    Objective/physical exam:  General: In no acute distress, afebrile  Chest: Clear to auscultation bilaterally  Heart: RRR, +S1, S2, no appreciable murmur  Abdomen: Soft, nontender, BS +  MSK: Warm, no lower extremity edema, no clubbing or cyanosis. Surgical dressings to left lat upper thigh. Dressing is clean , dry and intact.   Neurologic: Alert and oriented x4, Cranial nerve II-XII intact, Moves all ext spontaneously.     VITAL SIGNS: 24 HRS MIN & MAX LAST   Temp  Min: 97.8 °F (36.6 °C)  Max: 99.5 °F (37.5 °C) 97.8 °F (36.6 °C)   BP  Min: 84/62  Max: 147/82 109/64   Pulse  Min: 75  Max: 94  86   Resp  Min: 9  Max: 20 18   SpO2  Min: 92 %  Max: 99 % (!) 93 %     I have reviewed the following labs:  Recent Labs   Lab 04/08/24 2000  04/09/24  0509 04/10/24  0459   WBC 16.31* 14.21* 10.57   RBC 4.98 4.67 3.55*   HGB 14.5 13.5 10.4*   HCT 44.5 43.6 33.5*   MCV 89.4 93.4 94.4*   MCH 29.1 28.9 29.3   MCHC 32.6* 31.0* 31.0*   RDW 13.6 13.9 13.9    270 211   MPV 10.3 10.2 10.4     Recent Labs   Lab 04/08/24  2000 04/09/24  0509 04/10/24  0459    135 132   K 4.2 4.4 4.0   CO2 22* 22* 23   BUN 17.6 13.6 12.8   CREATININE 1.01 0.79 0.82   CALCIUM 9.1 8.9 7.8*   ALBUMIN 3.7  --  3.3*   ALKPHOS 77  --  60   ALT 14  --  18   AST 22  --  35*   BILITOT 0.3  --  0.5     Microbiology Results (last 7 days)       Procedure Component Value Units Date/Time    Urine culture [[id number]] Collected: 04/09/24 2322    Order Status: Sent Specimen: Urine Updated: 04/09/24 5436             See below for Radiology    Scheduled Med:   acetaminophen  650 mg Oral TID    B12-levomefolate calcium-B6  1 tablet Oral Daily    calcium-vitamin D3  1 tablet Oral BID    cefTRIAXone (Rocephin) IV (PEDS and ADULTS)  1 g Intravenous Q24H    enoxparin  40 mg Subcutaneous Daily    senna-docusate 8.6-50 mg  2 tablet Oral QHS      Continuous Infusions:     PRN Meds:  acetaminophen, aluminum-magnesium hydroxide-simethicone, cloNIDine, hydrALAZINE, HYDROcodone-acetaminophen, labetalol, melatonin, methocarbamoL, morphine, ondansetron, oxyCODONE-acetaminophen, polyethylene glycol, prochlorperazine, senna-docusate 8.6-50 mg, sodium chloride 0.9%     Assessment/Plan:  Ground level mechanical fall at home , initial encounter   Closed 2-part intertrochanteric fracture of proximal end of left femur, s/p IM nail on 4/9/24  Leukocytosis , reactive - resolved   Acute anemia , post op  Hyperglycemia , check HbA1c  UTI due to unspecified organism , POA  Advanced age     Hx- HTN, COPD without exacerbation     Plan-   Pain control.   Continue PT/OT service   High intensity therapy is suggested   CM consult to assist with DC planning.    BP noted to be soft.   Hold home Amlodipine for now ,  resume once appropriate   Monitor H/H and transfused blood for Hgb 7 or 8 if symptomatic   Follow up urine culture   Continue Rocephin   Home meds are reviewed   Check Vitamin D      VTE prophylaxis: Lovenox     Patient condition:  Fair     Anticipated discharge and Disposition:     Rehab placement     All diagnosis and differential diagnosis have been reviewed; assessment and plan has been documented; I have personally reviewed the labs and test results that are presently available; I have reviewed the patients medication list; I have reviewed the consulting providers response and recommendations. I have reviewed or attempted to review medical records based upon their availability    All of the patient's questions have been  addressed and answered. Patient's is agreeable to the above stated plan. I will continue to monitor closely and make adjustments to medical management as needed.  ____________________________    Jn Guillermo MD  Department of Hospital Medicine   [facility]   04/10/2024

## 2024-04-10 NOTE — CARE UPDATE
[id number] Therapy is recommending inpt rehab for pt. Referral [facility]. Notified [facility] of the referral

## 2024-04-11 LAB
BACTERIA UR CULT: ABNORMAL
BASOPHILS # BLD AUTO: 0.04 X10(3)/MCL
BASOPHILS NFR BLD AUTO: 0.4 %
EOSINOPHIL # BLD AUTO: 0.15 X10(3)/MCL (ref 0–0.9)
EOSINOPHIL NFR BLD AUTO: 1.3 %
ERYTHROCYTE [DISTWIDTH] IN BLOOD BY AUTOMATED COUNT: 13.9 % (ref 11.5–17)
EST. AVERAGE GLUCOSE BLD GHB EST-MCNC: 122.6 MG/DL
HBA1C MFR BLD: 5.9 %
HCT VFR BLD AUTO: 32.8 % (ref 37–47)
HGB BLD-MCNC: 10.5 G/DL (ref 12–16)
IMM GRANULOCYTES # BLD AUTO: 0.05 X10(3)/MCL (ref 0–0.04)
IMM GRANULOCYTES NFR BLD AUTO: 0.4 %
LYMPHOCYTES # BLD AUTO: 1.13 X10(3)/MCL (ref 0.6–4.6)
LYMPHOCYTES NFR BLD AUTO: 10 %
MCH RBC QN AUTO: 29.7 PG (ref 27–31)
MCHC RBC AUTO-ENTMCNC: 32 G/DL (ref 33–36)
MCV RBC AUTO: 92.9 FL (ref 80–94)
MONOCYTES # BLD AUTO: 1.39 X10(3)/MCL (ref 0.1–1.3)
MONOCYTES NFR BLD AUTO: 12.3 %
NEUTROPHILS # BLD AUTO: 8.5 X10(3)/MCL (ref 2.1–9.2)
NEUTROPHILS NFR BLD AUTO: 75.6 %
NRBC BLD AUTO-RTO: 0 %
PLATELET # BLD AUTO: 198 X10(3)/MCL (ref 130–400)
PMV BLD AUTO: 10.7 FL (ref 7.4–10.4)
RBC # BLD AUTO: 3.53 X10(6)/MCL (ref 4.2–5.4)
WBC # SPEC AUTO: 11.26 X10(3)/MCL (ref 4.5–11.5)

## 2024-04-11 PROCEDURE — 25000003 PHARM REV CODE 250: Performed by: NURSE PRACTITIONER

## 2024-04-11 PROCEDURE — 94760 N-INVAS EAR/PLS OXIMETRY 1: CPT

## 2024-04-11 PROCEDURE — 83036 HEMOGLOBIN GLYCOSYLATED A1C: CPT | Performed by: INTERNAL MEDICINE

## 2024-04-11 PROCEDURE — 99900031 HC PATIENT EDUCATION (STAT)

## 2024-04-11 PROCEDURE — 25000003 PHARM REV CODE 250: Performed by: PHYSICIAN ASSISTANT

## 2024-04-11 PROCEDURE — 97530 THERAPEUTIC ACTIVITIES: CPT

## 2024-04-11 PROCEDURE — 25000003 PHARM REV CODE 250: Performed by: INTERNAL MEDICINE

## 2024-04-11 PROCEDURE — 97110 THERAPEUTIC EXERCISES: CPT

## 2024-04-11 PROCEDURE — 63600175 PHARM REV CODE 636 W HCPCS: Performed by: PHYSICIAN ASSISTANT

## 2024-04-11 PROCEDURE — 99900035 HC TECH TIME PER 15 MIN (STAT)

## 2024-04-11 PROCEDURE — 63600175 PHARM REV CODE 636 W HCPCS: Performed by: INTERNAL MEDICINE

## 2024-04-11 PROCEDURE — 85025 COMPLETE CBC W/AUTO DIFF WBC: CPT | Performed by: PHYSICIAN ASSISTANT

## 2024-04-11 PROCEDURE — 11000001 HC ACUTE MED/SURG PRIVATE ROOM

## 2024-04-11 PROCEDURE — 63600175 PHARM REV CODE 636 W HCPCS: Performed by: NURSE PRACTITIONER

## 2024-04-11 PROCEDURE — 97535 SELF CARE MNGMENT TRAINING: CPT | Mod: CO

## 2024-04-11 RX ADMIN — SENNOSIDES AND DOCUSATE SODIUM 2 TABLET: 8.6; 5 TABLET ORAL at 06:04

## 2024-04-11 RX ADMIN — POLYETHYLENE GLYCOL 3350 17 G: 17 POWDER, FOR SOLUTION ORAL at 06:04

## 2024-04-11 RX ADMIN — FERROUS SULFATE TAB 325 MG (65 MG ELEMENTAL FE) 1 EACH: 325 (65 FE) TAB at 09:04

## 2024-04-11 RX ADMIN — METHOCARBAMOL 500 MG: 500 TABLET ORAL at 06:04

## 2024-04-11 RX ADMIN — Medication 1 TABLET: at 08:04

## 2024-04-11 RX ADMIN — Medication 1 TABLET: at 09:04

## 2024-04-11 RX ADMIN — SENNOSIDES AND DOCUSATE SODIUM 2 TABLET: 8.6; 5 TABLET ORAL at 08:04

## 2024-04-11 RX ADMIN — OXYCODONE HYDROCHLORIDE AND ACETAMINOPHEN 1 TABLET: 5; 325 TABLET ORAL at 01:04

## 2024-04-11 RX ADMIN — ONDANSETRON 4 MG: 2 INJECTION INTRAMUSCULAR; INTRAVENOUS at 09:04

## 2024-04-11 RX ADMIN — ACETAMINOPHEN 650 MG: 325 TABLET, FILM COATED ORAL at 09:04

## 2024-04-11 RX ADMIN — HYDROCODONE BITARTRATE AND ACETAMINOPHEN 1 TABLET: 10; 325 TABLET ORAL at 05:04

## 2024-04-11 RX ADMIN — MORPHINE SULFATE 4 MG: 10 INJECTION INTRAVENOUS at 08:04

## 2024-04-11 RX ADMIN — ACETAMINOPHEN 650 MG: 325 TABLET, FILM COATED ORAL at 08:04

## 2024-04-11 RX ADMIN — METHOCARBAMOL 500 MG: 500 TABLET ORAL at 08:04

## 2024-04-11 RX ADMIN — SENNOSIDES, DOCUSATE SODIUM 2 TABLET: 8.6; 5 TABLET ORAL at 08:04

## 2024-04-11 RX ADMIN — HYDROCODONE BITARTRATE AND ACETAMINOPHEN 1 TABLET: 10; 325 TABLET ORAL at 11:04

## 2024-04-11 RX ADMIN — OXYCODONE HYDROCHLORIDE AND ACETAMINOPHEN 1 TABLET: 5; 325 TABLET ORAL at 06:04

## 2024-04-11 RX ADMIN — Medication 6 MG: at 08:04

## 2024-04-11 RX ADMIN — ENOXAPARIN SODIUM 40 MG: 40 INJECTION SUBCUTANEOUS at 05:04

## 2024-04-11 RX ADMIN — CEFTRIAXONE SODIUM 1 G: 1 INJECTION, POWDER, FOR SOLUTION INTRAMUSCULAR; INTRAVENOUS at 02:04

## 2024-04-11 RX ADMIN — METHOCARBAMOL 500 MG: 500 TABLET ORAL at 01:04

## 2024-04-11 RX ADMIN — HYDROCODONE BITARTRATE AND ACETAMINOPHEN 1 TABLET: 10; 325 TABLET ORAL at 01:04

## 2024-04-11 NOTE — PT/OT/SLP PROGRESS
Occupational Therapy   Treatment    Name: Safia Muñoz  MRN: [id number]    Recommendations:     Recommended therapy intensity at discharge: High Intensity Therapy   Discharge Equipment Recommendations:  to be determined by next level of care  Barriers to discharge:       Assessment:     Safia Muñoz is a 89 y.o. female with a medical diagnosis of L femur fx.  She presents with good ax tolerance and motivation to participate. Performance deficits affecting function are weakness, impaired endurance, impaired self care skills, impaired functional mobility, gait instability, impaired balance, decreased safety awareness, decreased lower extremity function.     Rehab Prognosis:  Good; patient would benefit from acute skilled OT services to address these deficits and reach maximum level of function.       Plan:     Patient to be seen 5 x/week to address the above listed problems via self-care/home management, therapeutic activities, therapeutic exercises  Plan of Care Expires: 05/08/24  Plan of Care Reviewed with: patient, daughter    Subjective     Pain/Comfort:  Location - Side 1: Left  Location 1: hip  Pain Addressed 1: Reposition, Distraction    Objective:     Communicated with: RN prior to session.  Patient found up in chair with PureWick upon OT entry to room.    General Precautions: Standard, fall    Orthopedic Precautions:LLE weight bearing as tolerated  Braces: N/A  Respiratory Status: Room air     Occupational Performance:     Functional Mobility/Transfers:  Patient completed Sit <> Stand Transfer with moderate assistance  with  rolling walker   Functional Mobility: stand step t/f with Min-Mod A and RW. Cues for weight shifting. Difficulty Wb'ing onto LLE.     Activities of Daily Living:  Toileting: performed BSC t/f with Mod A. Max A for management of brief in standing.     Therapeutic Positioning    OT interventions performed during the course of today's session in an effort to prevent and/or reduce  Pt here for PIV access for procedure. 20g IV placed to Left wrist. Pt left in NAD   acquired pressure injuries:   Therapeutic positioning was provided at the conclusion of session to offload all bony prominences for the prevention and/or reduction of pressure injuries    [facility] 6 Click ADL:      Patient Education:  Patient and daughter/s were provided with verbal education education regarding OT role/goals/POC, post op precautions, fall prevention, and safety awareness.  Understanding was verbalized.      Patient left up in chair with all lines intact, call button in reach, and geomat cushion.    GOALS:   Multidisciplinary Problems       Occupational Therapy Goals          Problem: Occupational Therapy    Goal Priority Disciplines Outcome Interventions   Occupational Therapy Goal     OT, PT/OT Ongoing, Progressing    Description: LTG: Pt will perform basic ADLs and ADL transfers with Modified independence using LRAD by discharge.    STG: to be met by 5/8/24:  Pt will complete grooming standing at sink with RW with SBA.  Pt will complete UB dressing with SBA.  Pt will complete LB dressing with SBA using LRAD.  Pt will complete toileting with SBA using LRAD.  Pt will complete functional mobility to/from toilet and toilet transfer with SBA using RW.                        Time Tracking:     OT Date of Treatment: 04/11/24  OT Start Time: 1415  OT Stop Time: 1430  OT Total Time (min): 15 min    Billable Minutes:Self Care/Home Management 15    OT/SHELLI: SHELLI     Number of SHELLI visits since last OT visit: 1 4/11/2024

## 2024-04-11 NOTE — PROGRESS NOTES
[facility] Neuro  Orthopedics  Progress Note    Patient Name: Safia Muñoz  MRN: [id number]  Admission Date: 4/8/2024  Hospital Length of Stay: 3 days  Attending Provider: Jn Guillermo MD  Primary Care Provider: Iglesia Steel MD  Follow-up For: Procedure(s) (LRB):  INSERTION, INTRAMEDULLARY NAIL INTERTROCHENTERIC FRACTURE (Left)    Post-Operative Day: 2 Day Post-Op  Subjective:     Principal Problem:Left IT fracture    Principal Orthopedic Problem: 2 Days Post-Op   S/P IMN Left Femur    Interval History: Patient resting comfortably this morning. States her pain has been well controlled. Able to take a few steps with physical therapy with moderate assistance per therapy note.  She has no numbness or tingling distally.   Review of patient's allergies indicates:   Allergen Reactions    Ofloxacin     Penicillins     Sulfamethoxazole-trimethoprim      Other reaction(s): Unknown    Codeine Nausea Only       Current Facility-Administered Medications   Medication    acetaminophen tablet 650 mg    acetaminophen tablet 650 mg    albuterol inhaler 2 puff    aluminum-magnesium hydroxide-simethicone 200-200-20 mg/5 mL suspension 30 mL    B12-levomefolate calcium-B6 (FOLBIC RF) 2-1.13-25 mg tablet 1 tablet    calcium-vitamin D3 500 mg-5 mcg (200 unit) per tablet 1 tablet    cefTRIAXone (Rocephin) 1 g in dextrose 5 % in water (D5W) 100 mL IVPB (MB+)    cloNIDine tablet 0.1 mg    enoxaparin injection 40 mg    estradiol 0.05 mg/24 hr td ptsw patch 1 patch    And    [START ON 4/12/2024] estradiol 0.05 mg/24 hr td ptsw patch 1 patch    ferrous sulfate tablet 1 each    hydrALAZINE injection 10 mg    HYDROcodone-acetaminophen  mg per tablet 1 tablet    labetaloL injection 10 mg    melatonin tablet 6 mg    methocarbamoL tablet 500 mg    morphine injection 4 mg    ondansetron injection 4 mg    oxyCODONE-acetaminophen 5-325 mg per tablet 1 tablet    polyethylene glycol packet 17 g     "prochlorperazine injection Soln 5 mg    senna-docusate 8.6-50 mg per tablet 2 tablet    senna-docusate 8.6-50 mg per tablet 2 tablet    sodium chloride 0.9% flush 10 mL     Objective:     Vital Signs (Most Recent):  Temp: 97.5 °F (36.4 °C) (04/11/24 0717)  Pulse: 76 (04/11/24 0717)  Resp: 16 (04/11/24 0615)  BP: 130/68 (04/11/24 0717)  SpO2: (!) 92 % (04/11/24 0717) Vital Signs (24h Range):  Temp:  [97.4 °F (36.3 °C)-98.1 °F (36.7 °C)] 97.5 °F (36.4 °C)  Pulse:  [76-91] 76  Resp:  [16-18] 16  SpO2:  [90 %-93 %] 92 %  BP: (109-152)/(56-68) 130/68     Weight: 68.2 kg (150 lb 5.7 oz)  Height: 5' 3" (160 cm)  Body mass index is 26.63 kg/m².      Intake/Output Summary (Last 24 hours) at 4/11/2024 0739  Last data filed at 4/11/2024 0643  Gross per 24 hour   Intake --   Output 2100 ml   Net -2100 ml         Physical Exam:   General the patient is alert and oriented x3 no acute distress nontoxic-appearing appropriate affect.    Constitutional: Vital signs are examined and stable.  Resp: No signs of labored breathing               LLE: -Skin: Dressing CDI to the left hip           -MSK: +EHL/FHL, Gastroc/Tib anterior            -Neuro:  Sensation intact to light touch L3-S1 dermatomes            -CV: Capillary refill is less than 2 seconds. + DP  Compartments soft and compressible      Diagnostic Findings:   Significant Labs:   Recent Lab Results  (Last 5 results in the past 72 hours)        04/11/24  0439   04/10/24  0459   04/09/24  2322   04/09/24  0729   04/09/24  0648        Albumin/Globulin Ratio   1.6             ABO and RH       O POS         Albumin   3.3             ALP   60             ALT   18             AMORPHOUS CRYSTAL, UA ([facility]) UL     Trace           Appearance, UA     Turbid           AST   35             Bacteria, UA     Many           Baso # 0.04   0.05             Basophil % 0.4   0.5             BILIRUBIN TOTAL   0.5             Bilirubin, UA     Negative           BUN   12.8             Calcium   7.8   "           Chloride   100             CO2   23             Color, UA     Yellow           Creatinine   0.82             eGFR   >60             Eos # 0.15   0.10             Eos % 1.3   0.9             Estimated Avg Glucose 122.6               Ferritin   92.84             Globulin, Total   2.1             Glucose   135             Glucose, UA     Normal           Group & Rh         O POS       Hematocrit 32.8   33.5             Hemoglobin 10.5   10.4             Hemoglobin A1C External 5.9               Hyaline Casts, UA     0-2           Immature Grans (Abs) 0.05   0.05             Immature Granulocytes 0.4   0.5             Indirect Sakshi GEL         NEG       Iron   26             Iron Binding Capacity Unsaturated   192             Iron Saturation   12             Ketones, UA     Trace           Leukocyte Esterase, UA     75           Lymph # 1.13   1.33             LYMPH % 10.0   12.6             MCH 29.7   29.3             MCHC 32.0   31.0             MCV 92.9   94.4             Mono # 1.39   1.33             Mono % 12.3   12.6             MPV 10.7   10.4             Mucous, UA     Trace           Neut # 8.50   7.71             Neut % 75.6   72.9             NITRITE UA     2+           nRBC 0.0   0.0             Blood, UA     Negative           pH, UA     5.5           Platelet Count 198   211             Potassium   4.0             PROTEIN TOTAL   5.4             Protein, UA     Trace           RBC 3.53   3.55             RBC, UA     0-5           RDW 13.9   13.9             Sodium   132             Specific Gravity,UA     1.029           Specimen Outdate         04/12/2024 23:59       Squamous Epithelial Cells, UA     Few           TIBC   218             Transferrin   195             Urine Culture     Multiple organisms present indicate probable contamination. Recommend recollection.                >/= 100,000 colonies/ml - Three different Gram-negative Rods                10,000 - 25,000 colonies/ml GAMMA  STREPTOCOCCUS  Comment: We have not further evaluated these organisms because three or more species compatible with normal genital jacques usually represents specimen contamination from the time of collection, rather than infection. If clinical circumstances warrant further   workup of these organisms, please contact the Microbiology dept at [phone]; otherwise please have the patient submit another specimen.           Urobilinogen, UA     Normal           Vitamin D   42.1             Vitamin B12   1,025             WBC, UA     11-20           WBC 11.26   10.57                                     Significant Imaging: I have reviewed all pertinent imaging results/findings.     Assessment/Plan:     Active Diagnoses:    Diagnosis Date Noted POA    Closed 2-part intertrochanteric fracture of proximal end of left femur [S72.142A] 04/08/2024 Yes      Problems Resolved During this Admission:   89YO F here following a fall  POD #2 S/P IMN left IT fracture  Daily dry dressing changes beginning today.   Diet: as tolerated  Pain: multimodal PRN  DVT: lovenox scheduled during stay, ok for ASA 81mg BID on DC   Abx: ceftriaxone for UTI per primary  ABLA: expected; now stabilized.   PT/OT eval and treat  Activity: WBAT LLE  She is orthopaedically stable for DC at this time. Follow up with Carmen Porter in 3 weeks for wound check and repeat x rays.     The above findings, diagnostics, and treatment plan were discussed with Dr Jaquez who is in agreement with the plan of care except as stated in additional documentation.      Carmen Porter PA-C   Orthopedic Trauma Surgery  [facility]

## 2024-04-11 NOTE — PT/OT/SLP PROGRESS
Physical Therapy Treatment    Patient Name:  Safia Muñoz   MRN:  [id number]    Recommendations:     Discharge therapy intensity: High Intensity Therapy   Discharge Equipment Recommendations:  (TBD)  Barriers to discharge: None    Assessment:     Safia Muñoz is a 89 y.o. female admitted with a medical diagnosis of left femur fracture..  She presents with the following impairments/functional limitations: weakness, impaired endurance, impaired self care skills, impaired functional mobility, gait instability, impaired balance, decreased lower extremity function, decreased safety awareness, pain.    Rehab Prognosis: Good; patient would benefit from acute skilled PT services to address these deficits and reach maximum level of function.    Recent Surgery: Procedure(s) (LRB):  INSERTION, INTRAMEDULLARY NAIL INTERTROCHENTERIC FRACTURE (Left) 2 Days Post-Op    Plan:     During this hospitalization, patient to be seen 6 x/week to address the identified rehab impairments via gait training, therapeutic activities, therapeutic exercises, neuromuscular re-education and progress toward the following goals:    Plan of Care Expires:  05/10/24    Subjective     Chief Complaint: pain  Patient/Family Comments/goals: none  Pain/Comfort:  Pain Rating 1: 9/10  Location - Side 1: Left  Location 1: hip  Pain Addressed 1: Reposition, Distraction  Pain Rating Post-Intervention 1: 9/10      Objective:     Communicated with nurse prior to session.  Patient found supine with PureWick upon PT entry to room.     General Precautions: Standard, fall  Orthopedic Precautions: LLE weight bearing as tolerated  Braces: N/A  Respiratory Status: Room air  Skin Integrity: Visible skin intact      Functional Mobility:  Bed Mobility:  Supine to Sit: moderate assistance  Transfers:  Sit to Stand:  moderate assistance with rolling walker  Bed to Chair: moderate assistance with  rolling walker  using  Step Transfer  Gait: 26 ft with RW & MIN A.  Assistance with weight shifting.   Balance: poor    Therapeutic Exercises:  Semi-supine AP, quad sets, glute sets, heel slides, SLR, hip ABD/ADD. 2 x 10 reps.   Therapeutic Activity:   -Bed mobility  -Sit<>stands: Performed multiple with focus on improving technique.   -Gait training    Education Provided:  Role and goals of PT, transfer training, bed mobility, gait training, balance training, safety awareness, assistive device, strengthening exercises, and importance of participating in PT to return to PLOF.    Patient left up in chair with all lines intact and call button in reach    GOALS:   Multidisciplinary Problems       Physical Therapy Goals          Problem: Physical Therapy    Goal Priority Disciplines Outcome Goal Variances Interventions   Physical Therapy Goal     PT, PT/OT Ongoing, Progressing     Description: Goals to be met by: 5/10/24     Patient will increase functional independence with mobility by performin. Supine to sit with [place]  2. Sit to supine with [place]  3. Sit to stand transfer with Modified [place]  4. Gait  x 150 feet with Modified [place] using Rolling Walker.                          Time Tracking:     PT Received On: 24  PT Start Time: 0755     PT Stop Time: 0835  PT Total Time (min): 40 min     Billable Minutes: Therapeutic Activity 25 minutes and Therapeutic Exercise 15 minutes    Treatment Type: Treatment  PT/PTA: PT     Number of PTA visits since last PT visit: 2024

## 2024-04-11 NOTE — CARE UPDATE
[id number] Rec message from Lacy with [facility] re[facility] reporting they will have a bed tomorrow. Informed Dr Solis and pt.

## 2024-04-11 NOTE — PROGRESS NOTES
[facility] Hospital Medicine Progress Note        Chief Complaint: Inpatient Follow-up for left femur fracture     HPI: 89-year-old female with medical history of hypertension and COPD not on home oxygen present to the ED with a chief complaint of left hip pain after a ground level fall where she slipped on her rug and fell. On exam she appears comfortable, lungs clear to auscultation, normal heart sounds, no pedal edema, palpable pedal pulses and sensation intact on left leg. There is tenderness to left groin. X-ray show intertrochanteric left femur fracture. Orthopedic surgery consulted and Pt admitted to  service . Pt was totally independent with ADLs before  the fall.   Pt underwent Left intertrochanteric femur fracture  with IM nailing on 4/9/24.     Interval Hx:   Patient is sitting in chair.  Family is at bedside.  Reports she walked to the door and bag this morning.  She feels she has not improving as fast as she would want to    Patient complained nausea.  Discussed will inform nurse to bring her some Zofran    Discussed awaiting rehab placement  Case was discussed with patient's nurse and  on the floor.    Objective/physical exam:  General: In no acute distress, afebrile, elderly female, looks good for her stated age  Chest: Clear to auscultation bilaterally anteriorly  Heart: RRR, +S1, S2, no appreciable murmur  Abdomen: Soft, nontender, BS +  MSK: Warm, no lower extremity edema, no clubbing or cyanosis. Surgical dressings to left lat upper thigh. Dressing is clean , dry and intact.   Neurologic: Alert and oriented x4, Cranial nerve II-XII intact, Moves all ext spontaneously.     VITAL SIGNS: 24 HRS MIN & MAX LAST   Temp  Min: 97.4 °F (36.3 °C)  Max: 98.1 °F (36.7 °C) 97.9 °F (36.6 °C)   BP  Min: 109/64  Max: 152/68 (!) 152/68   Pulse  Min: 76  Max: 91  76   Resp  Min: 16  Max: 18 16   SpO2  Min: 90 %  Max: 93 % (!) 91 %     I have reviewed the following  labs:  Recent Labs   Lab 04/09/24  0509 04/10/24  0459 04/11/24  0439   WBC 14.21* 10.57 11.26   RBC 4.67 3.55* 3.53*   HGB 13.5 10.4* 10.5*   HCT 43.6 33.5* 32.8*   MCV 93.4 94.4* 92.9   MCH 28.9 29.3 29.7   MCHC 31.0* 31.0* 32.0*   RDW 13.9 13.9 13.9    211 198   MPV 10.2 10.4 10.7*       Recent Labs   Lab 04/08/24  2000 04/09/24  0509 04/10/24  0459    135 132   K 4.2 4.4 4.0   CO2 22* 22* 23   BUN 17.6 13.6 12.8   CREATININE 1.01 0.79 0.82   CALCIUM 9.1 8.9 7.8*   ALBUMIN 3.7  --  3.3*   ALKPHOS 77  --  60   ALT 14  --  18   AST 22  --  35*   BILITOT 0.3  --  0.5       Microbiology Results (last 7 days)       Procedure Component Value Units Date/Time    Urine culture [[id number]] Collected: 04/09/24 2322    Order Status: Sent Specimen: Urine Updated: 04/09/24 5497             See below for Radiology    Scheduled Med:   acetaminophen  650 mg Oral TID    B12-levomefolate calcium-B6  1 tablet Oral Daily    calcium-vitamin D3  1 tablet Oral BID    cefTRIAXone (Rocephin) IV (PEDS and ADULTS)  1 g Intravenous Q24H    enoxparin  40 mg Subcutaneous Daily    estradiol 0.05 mg/24 hr td ptsw  1 patch Transdermal Every Wed    And    [START ON 4/12/2024] estradiol 0.05 mg/24 hr td ptsw  1 patch Transdermal Every Fri    ferrous sulfate  1 tablet Oral Daily    senna-docusate 8.6-50 mg  2 tablet Oral QHS      Continuous Infusions:     PRN Meds:  acetaminophen, albuterol, aluminum-magnesium hydroxide-simethicone, cloNIDine, hydrALAZINE, HYDROcodone-acetaminophen, labetalol, melatonin, methocarbamoL, morphine, ondansetron, oxyCODONE-acetaminophen, polyethylene glycol, prochlorperazine, senna-docusate 8.6-50 mg, sodium chloride 0.9%     Assessment/Plan:  Ground level mechanical fall at home with Closed 2-part intertrochanteric fracture of proximal end of left femur--> s/p IM nail on 4/9/24  Leukocytosis, reactive - resolved   Acute anemia, post op  Hyperglycemia, check HbA1c  Bacteriuria multiple organism- probable  contamination  Advanced age   Hx- HTN, COPD without exacerbation     Postop day 2  Pain is controlled  PT/OT service recommended High intensity therapy   CM on board for rehab placement  Amlodipine currently on hold, blood pressure acceptable  H&H stable with 10.5/32.8, transfuse less than 7  Abdomen profile shows iron-deficiency anemia, iron supplements initiated  Urinalysis was concerning for UTI, urine culture showing multiple organisms-probable contamination  Continue Rocephin - day 2 of 3  Leukocytosis resolved, now 11 K  A1c 5.9, nondiabetic for her age  Appropriate home medications for medical condition has been resumed on admission  Morning CBC, BMP ordered      VTE prophylaxis: Lovenox     Patient condition:  Fair     Anticipated discharge and Disposition:   Rehab placement, patient is medically cleared for discharge    All diagnosis and differential diagnosis have been reviewed; assessment and plan has been documented; I have personally reviewed the labs and test results that are presently available; I have reviewed the patients medication list; I have reviewed the consulting providers response and recommendations. I have reviewed or attempted to review medical records based upon their availability    All of the patient's questions have been  addressed and answered. Patient's is agreeable to the above stated plan. I will continue to monitor closely and make adjustments to medical management as needed.  ____________________________  X-Ray Femur 2 View Left  Narrative: EXAMINATION:  XR FEMUR 2 VIEW LEFT    CLINICAL HISTORY:  post-op;    COMPARISON:  X-rays dated 04/09/2024    FINDINGS:  There is improved alignment following internal fixation of the femur.  Postoperative changes are noted within the soft tissues.  Impression: Improved alignment following internal fixation of the femur.    Electronically signed by: Corinna Zurita  Date:    04/09/2024  Time:    15:02  SURG [place] Surgery Fluoro Usage  See OP  Notes for results.     IMPRESSION: See OP Notes for results.     This procedure was auto-finalized by: Virtual Radiologist  X-Ray Femur 2 View Left  Narrative: EXAMINATION:  XR FEMUR 2 VIEW LEFT    CLINICAL HISTORY:  fx;    TECHNIQUE:  AP and lateral views of the left femur were performed.    COMPARISON:  None.    FINDINGS:  Intertrochanteric left femoral fracture with varus angulation.  Degenerative change at the knee and hip.    Atherosclerosis.  Impression: Intertrochanteric left femoral fracture    Electronically signed by: Shanell Madera  Date:    04/09/2024  Time:    12:50       Fadi Solis MD  Department of Hospital Medicine   [facility]   04/11/2024

## 2024-04-11 NOTE — PT/OT/SLP PROGRESS
Physical Therapy Treatment    Patient Name:  Safia Muñoz   MRN:  [id number]    Recommendations:     Discharge therapy intensity: High Intensity Therapy   Discharge Equipment Recommendations:  (TBD by next level of care)  Barriers to discharge: None    Assessment:     Safia Muñoz is a 89 y.o. female admitted with a medical diagnosis of left femur fracture. .  She presents with the following impairments/functional limitations: weakness, impaired endurance, impaired self care skills, impaired functional mobility, gait instability, impaired balance, decreased lower extremity function, decreased safety awareness, pain.    Rehab Prognosis: Good; patient would benefit from acute skilled PT services to address these deficits and reach maximum level of function.    Recent Surgery: Procedure(s) (LRB):  INSERTION, INTRAMEDULLARY NAIL INTERTROCHENTERIC FRACTURE (Left) 2 Days Post-Op    Plan:     During this hospitalization, patient to be seen 6 x/week to address the identified rehab impairments via gait training, therapeutic activities, therapeutic exercises, neuromuscular re-education and progress toward the following goals:    Plan of Care Expires:  05/10/24    Subjective     Chief Complaint: pain  Patient/Family Comments/goals: none  Pain/Comfort:  Pain Rating 1: 5/10  Location - Side 1: Left  Location 1: hip  Pain Addressed 1: Reposition, Distraction  Pain Rating Post-Intervention 1: 5/10      Objective:     Communicated with  nurse prior to session.  Patient found supine with PureWick upon PT entry to room.     General Precautions: Standard, fall  Orthopedic Precautions: LLE weight bearing as tolerated  Braces: N/A  Respiratory Status: Room air  Skin Integrity: Visible skin intact      Functional Mobility:  Bed Mobility:  Sit to Supine: moderate assistance and of 2 persons  Transfers:  Sit to Stand:  moderate assistance with rolling walker  Bed to Chair: minimum assistance with  rolling walker  using  Stand  Pivot  Balance: fair/poor    Education Provided:  Role and goals of PT, transfer training, bed mobility, gait training, balance training, safety awareness, assistive device, strengthening exercises, and importance of participating in PT to return to PLOF.    Patient left supine with all lines intact, call button in reach, and family present    GOALS:   Multidisciplinary Problems       Physical Therapy Goals          Problem: Physical Therapy    Goal Priority Disciplines Outcome Goal Variances Interventions   Physical Therapy Goal     PT, PT/OT Ongoing, Progressing     Description: Goals to be met by: 5/10/24     Patient will increase functional independence with mobility by performin. Supine to sit with [place]  2. Sit to supine with [place]  3. Sit to stand transfer with Modified [place]  4. Gait  x 150 feet with Modified [place] using Rolling Walker.                          Time Tracking:     PT Received On: 24  PT Start Time: 1517     PT Stop Time: 1530  PT Total Time (min): 13 min     Billable Minutes: Therapeutic Activity 13 minutes    Treatment Type: Treatment  PT/PTA: PT     Number of PTA visits since last PT visit: 2024

## 2024-04-12 ENCOUNTER — HOSPITAL ENCOUNTER (INPATIENT)
Facility: HOSPITAL | Age: 89
LOS: 17 days | Discharge: HOME HEALTH | DRG: 560 | End: 2024-04-29
Attending: INTERNAL MEDICINE | Admitting: INTERNAL MEDICINE
Payer: MEDICARE

## 2024-04-12 VITALS
HEART RATE: 80 BPM | SYSTOLIC BLOOD PRESSURE: 153 MMHG | TEMPERATURE: 98 F | OXYGEN SATURATION: 90 % | WEIGHT: 150.38 LBS | RESPIRATION RATE: 16 BRPM | BODY MASS INDEX: 26.64 KG/M2 | HEIGHT: 63 IN | DIASTOLIC BLOOD PRESSURE: 71 MMHG

## 2024-04-12 DIAGNOSIS — R33.9 URINARY RETENTION: ICD-10-CM

## 2024-04-12 DIAGNOSIS — J44.9 CHRONIC OBSTRUCTIVE PULMONARY DISEASE, UNSPECIFIED COPD TYPE: ICD-10-CM

## 2024-04-12 DIAGNOSIS — S72.142A: ICD-10-CM

## 2024-04-12 DIAGNOSIS — R00.0 TACHYCARDIA: ICD-10-CM

## 2024-04-12 DIAGNOSIS — R11.10 VOMITING, UNSPECIFIED VOMITING TYPE, UNSPECIFIED WHETHER NAUSEA PRESENT: ICD-10-CM

## 2024-04-12 DIAGNOSIS — S72.142S CLOSED 2-PART INTERTROCHANTERIC FRACTURE OF LEFT FEMUR, SEQUELA: Primary | ICD-10-CM
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
## 2024-04-12 PROBLEM — I10 HYPERTENSION: Status: ACTIVE | Noted: 2024-04-12

## 2024-04-12 PROBLEM — M81.0 OSTEOPOROSIS: Status: ACTIVE | Noted: 2024-04-12

## 2024-04-12 PROBLEM — N39.0 FREQUENT UTI: Status: ACTIVE | Noted: 2024-04-12

## 2024-04-12 LAB
ANION GAP SERPL CALC-SCNC: 9 MEQ/L
BASOPHILS # BLD AUTO: 0.04 X10(3)/MCL
BASOPHILS NFR BLD AUTO: 0.3 %
BUN SERPL-MCNC: 13.1 MG/DL (ref 9.8–20.1)
CALCIUM SERPL-MCNC: 8.8 MG/DL (ref 8.4–10.2)
CHLORIDE SERPL-SCNC: 97 MMOL/L (ref 98–111)
CO2 SERPL-SCNC: 26 MMOL/L (ref 23–31)
CREAT SERPL-MCNC: 0.76 MG/DL (ref 0.55–1.02)
CREAT/UREA NIT SERPL: 17
EOSINOPHIL # BLD AUTO: 0.47 X10(3)/MCL (ref 0–0.9)
EOSINOPHIL NFR BLD AUTO: 3.7 %
ERYTHROCYTE [DISTWIDTH] IN BLOOD BY AUTOMATED COUNT: 14 % (ref 11.5–17)
GFR SERPLBLD CREATININE-BSD FMLA CKD-EPI: >60 MLS/MIN/1.73/M2
GLUCOSE SERPL-MCNC: 126 MG/DL (ref 75–121)
HCT VFR BLD AUTO: 30.4 % (ref 37–47)
HGB BLD-MCNC: 9.9 G/DL (ref 12–16)
IMM GRANULOCYTES # BLD AUTO: 0.09 X10(3)/MCL (ref 0–0.04)
IMM GRANULOCYTES NFR BLD AUTO: 0.7 %
LYMPHOCYTES # BLD AUTO: 2.5 X10(3)/MCL (ref 0.6–4.6)
LYMPHOCYTES NFR BLD AUTO: 19.7 %
MCH RBC QN AUTO: 30 PG (ref 27–31)
MCHC RBC AUTO-ENTMCNC: 32.6 G/DL (ref 33–36)
MCV RBC AUTO: 92.1 FL (ref 80–94)
MONOCYTES # BLD AUTO: 1.46 X10(3)/MCL (ref 0.1–1.3)
MONOCYTES NFR BLD AUTO: 11.5 %
NEUTROPHILS # BLD AUTO: 8.12 X10(3)/MCL (ref 2.1–9.2)
NEUTROPHILS NFR BLD AUTO: 64.1 %
NRBC BLD AUTO-RTO: 0 %
PLATELET # BLD AUTO: 256 X10(3)/MCL (ref 130–400)
PMV BLD AUTO: 10.4 FL (ref 7.4–10.4)
POTASSIUM SERPL-SCNC: 4.5 MMOL/L (ref 3.5–5.1)
RBC # BLD AUTO: 3.3 X10(6)/MCL (ref 4.2–5.4)
SODIUM SERPL-SCNC: 132 MMOL/L (ref 132–146)
WBC # SPEC AUTO: 12.68 X10(3)/MCL (ref 4.5–11.5)

## 2024-04-12 PROCEDURE — 99900035 HC TECH TIME PER 15 MIN (STAT)

## 2024-04-12 PROCEDURE — 63600175 PHARM REV CODE 636 W HCPCS: Performed by: PHYSICIAN ASSISTANT

## 2024-04-12 PROCEDURE — 99900031 HC PATIENT EDUCATION (STAT)

## 2024-04-12 PROCEDURE — 85025 COMPLETE CBC W/AUTO DIFF WBC: CPT | Performed by: PHYSICIAN ASSISTANT

## 2024-04-12 PROCEDURE — 99223 1ST HOSP IP/OBS HIGH 75: CPT | Mod: ,,, | Performed by: NURSE PRACTITIONER

## 2024-04-12 PROCEDURE — 97535 SELF CARE MNGMENT TRAINING: CPT

## 2024-04-12 PROCEDURE — 97166 OT EVAL MOD COMPLEX 45 MIN: CPT

## 2024-04-12 PROCEDURE — 94799 UNLISTED PULMONARY SVC/PX: CPT

## 2024-04-12 PROCEDURE — 25000003 PHARM REV CODE 250: Performed by: NURSE PRACTITIONER

## 2024-04-12 PROCEDURE — 11800000 HC REHAB PRIVATE ROOM

## 2024-04-12 PROCEDURE — 25000003 PHARM REV CODE 250: Performed by: INTERNAL MEDICINE

## 2024-04-12 PROCEDURE — 80048 BASIC METABOLIC PNL TOTAL CA: CPT | Performed by: INTERNAL MEDICINE

## 2024-04-12 PROCEDURE — 25000003 PHARM REV CODE 250: Performed by: PHYSICIAN ASSISTANT

## 2024-04-12 PROCEDURE — 63600175 PHARM REV CODE 636 W HCPCS: Performed by: NURSE PRACTITIONER

## 2024-04-12 PROCEDURE — 63600175 PHARM REV CODE 636 W HCPCS: Performed by: INTERNAL MEDICINE

## 2024-04-12 RX ORDER — ESTRADIOL 0.05 MG/D
1 FILM, EXTENDED RELEASE TRANSDERMAL
OUTPATIENT
Start: 2024-04-12

## 2024-04-12 RX ORDER — ESTRADIOL 0.05 MG/D
1 FILM, EXTENDED RELEASE TRANSDERMAL
Status: DISCONTINUED | OUTPATIENT
Start: 2024-04-18 | End: 2024-04-17

## 2024-04-12 RX ORDER — DOCUSATE SODIUM 100 MG/1
100 CAPSULE, LIQUID FILLED ORAL 2 TIMES DAILY
Status: DISCONTINUED | OUTPATIENT
Start: 2024-04-12 | End: 2024-04-17

## 2024-04-12 RX ORDER — ONDANSETRON 4 MG/1
4 TABLET, ORALLY DISINTEGRATING ORAL EVERY 6 HOURS PRN
Status: DISCONTINUED | OUTPATIENT
Start: 2024-04-12 | End: 2024-04-29 | Stop reason: HOSPADM

## 2024-04-12 RX ORDER — NITROGLYCERIN 0.4 MG/1
0.4 TABLET SUBLINGUAL EVERY 5 MIN PRN
Status: DISCONTINUED | OUTPATIENT
Start: 2024-04-12 | End: 2024-04-29 | Stop reason: HOSPADM

## 2024-04-12 RX ORDER — POLYETHYLENE GLYCOL 3350 17 G/17G
17 POWDER, FOR SOLUTION ORAL 2 TIMES DAILY PRN
OUTPATIENT
Start: 2024-04-12

## 2024-04-12 RX ORDER — METHOCARBAMOL 500 MG/1
500 TABLET, FILM COATED ORAL 3 TIMES DAILY PRN
Status: DISCONTINUED | OUTPATIENT
Start: 2024-04-12 | End: 2024-04-29 | Stop reason: HOSPADM

## 2024-04-12 RX ORDER — TALC
6 POWDER (GRAM) TOPICAL NIGHTLY PRN
OUTPATIENT
Start: 2024-04-12

## 2024-04-12 RX ORDER — ALBUTEROL SULFATE 90 UG/1
2 AEROSOL, METERED RESPIRATORY (INHALATION) EVERY 6 HOURS PRN
OUTPATIENT
Start: 2024-04-12

## 2024-04-12 RX ORDER — OXYCODONE HYDROCHLORIDE 5 MG/1
10 TABLET ORAL EVERY 4 HOURS PRN
Status: DISCONTINUED | OUTPATIENT
Start: 2024-04-12 | End: 2024-04-29 | Stop reason: HOSPADM

## 2024-04-12 RX ORDER — PROCHLORPERAZINE EDISYLATE 5 MG/ML
5 INJECTION INTRAMUSCULAR; INTRAVENOUS EVERY 6 HOURS PRN
OUTPATIENT
Start: 2024-04-12

## 2024-04-12 RX ORDER — AMLODIPINE BESYLATE 5 MG/1
5 TABLET ORAL DAILY
Status: DISCONTINUED | OUTPATIENT
Start: 2024-04-12 | End: 2024-04-18

## 2024-04-12 RX ORDER — FERROUS SULFATE, DRIED 160(50) MG
1 TABLET, EXTENDED RELEASE ORAL 2 TIMES DAILY
OUTPATIENT
Start: 2024-04-12

## 2024-04-12 RX ORDER — POLYETHYLENE GLYCOL 3350 17 G/17G
17 POWDER, FOR SOLUTION ORAL 2 TIMES DAILY PRN
Status: DISCONTINUED | OUTPATIENT
Start: 2024-04-12 | End: 2024-04-29 | Stop reason: HOSPADM

## 2024-04-12 RX ORDER — ENOXAPARIN SODIUM 100 MG/ML
40 INJECTION SUBCUTANEOUS EVERY 24 HOURS
OUTPATIENT
Start: 2024-04-12

## 2024-04-12 RX ORDER — ESTRADIOL 0.05 MG/D
1 FILM, EXTENDED RELEASE TRANSDERMAL
OUTPATIENT
Start: 2024-04-17

## 2024-04-12 RX ORDER — HYDROCODONE BITARTRATE AND ACETAMINOPHEN 10; 325 MG/1; MG/1
1 TABLET ORAL EVERY 4 HOURS PRN
OUTPATIENT
Start: 2024-04-12

## 2024-04-12 RX ORDER — OXYCODONE HYDROCHLORIDE 5 MG/1
5 TABLET ORAL EVERY 6 HOURS PRN
Status: DISCONTINUED | OUTPATIENT
Start: 2024-04-12 | End: 2024-04-12

## 2024-04-12 RX ORDER — UBIDECARENONE 75 MG
500 CAPSULE ORAL DAILY
Status: DISCONTINUED | OUTPATIENT
Start: 2024-04-12 | End: 2024-04-29 | Stop reason: HOSPADM

## 2024-04-12 RX ORDER — METHOCARBAMOL 500 MG/1
500 TABLET, FILM COATED ORAL 2 TIMES DAILY
Status: DISCONTINUED | OUTPATIENT
Start: 2024-04-12 | End: 2024-04-12

## 2024-04-12 RX ORDER — BISACODYL 10 MG/1
10 SUPPOSITORY RECTAL ONCE
Status: COMPLETED | OUTPATIENT
Start: 2024-04-12 | End: 2024-04-12

## 2024-04-12 RX ORDER — ACETAMINOPHEN 325 MG/1
650 TABLET ORAL 3 TIMES DAILY
OUTPATIENT
Start: 2024-04-12 | End: 2024-04-13

## 2024-04-12 RX ORDER — BENZONATATE 100 MG/1
100 CAPSULE ORAL 3 TIMES DAILY PRN
Status: DISCONTINUED | OUTPATIENT
Start: 2024-04-12 | End: 2024-04-29 | Stop reason: HOSPADM

## 2024-04-12 RX ORDER — HYDRALAZINE HYDROCHLORIDE 20 MG/ML
10 INJECTION INTRAMUSCULAR; INTRAVENOUS EVERY 4 HOURS PRN
OUTPATIENT
Start: 2024-04-12

## 2024-04-12 RX ORDER — BISACODYL 10 MG/1
10 SUPPOSITORY RECTAL DAILY PRN
Status: DISCONTINUED | OUTPATIENT
Start: 2024-04-12 | End: 2024-04-29 | Stop reason: HOSPADM

## 2024-04-12 RX ORDER — METHOCARBAMOL 500 MG/1
500 TABLET, FILM COATED ORAL 3 TIMES DAILY
Status: DISCONTINUED | OUTPATIENT
Start: 2024-04-12 | End: 2024-04-16

## 2024-04-12 RX ORDER — LABETALOL HCL 20 MG/4 ML
10 SYRINGE (ML) INTRAVENOUS EVERY 4 HOURS PRN
Status: DISCONTINUED | OUTPATIENT
Start: 2024-04-12 | End: 2024-04-29 | Stop reason: HOSPADM

## 2024-04-12 RX ORDER — ENOXAPARIN SODIUM 100 MG/ML
30 INJECTION SUBCUTANEOUS EVERY 24 HOURS
Status: DISCONTINUED | OUTPATIENT
Start: 2024-04-12 | End: 2024-04-29 | Stop reason: HOSPADM

## 2024-04-12 RX ORDER — AMOXICILLIN 250 MG
2 CAPSULE ORAL 2 TIMES DAILY PRN
OUTPATIENT
Start: 2024-04-12

## 2024-04-12 RX ORDER — CLONIDINE HYDROCHLORIDE 0.1 MG/1
0.1 TABLET ORAL 3 TIMES DAILY PRN
OUTPATIENT
Start: 2024-04-12

## 2024-04-12 RX ORDER — LANOLIN ALCOHOL/MO/W.PET/CERES
1 CREAM (GRAM) TOPICAL DAILY
Status: DISCONTINUED | OUTPATIENT
Start: 2024-04-12 | End: 2024-04-29 | Stop reason: HOSPADM

## 2024-04-12 RX ORDER — METHOCARBAMOL 500 MG/1
500 TABLET, FILM COATED ORAL 4 TIMES DAILY PRN
OUTPATIENT
Start: 2024-04-12

## 2024-04-12 RX ORDER — ONDANSETRON HYDROCHLORIDE 2 MG/ML
4 INJECTION, SOLUTION INTRAVENOUS EVERY 4 HOURS PRN
OUTPATIENT
Start: 2024-04-12

## 2024-04-12 RX ORDER — HYDROXYZINE PAMOATE 50 MG/1
50 CAPSULE ORAL NIGHTLY PRN
Status: DISCONTINUED | OUTPATIENT
Start: 2024-04-12 | End: 2024-04-18

## 2024-04-12 RX ORDER — OXYCODONE AND ACETAMINOPHEN 5; 325 MG/1; MG/1
1 TABLET ORAL EVERY 4 HOURS PRN
OUTPATIENT
Start: 2024-04-12

## 2024-04-12 RX ORDER — PROMETHAZINE HYDROCHLORIDE 25 MG/1
25 TABLET ORAL EVERY 6 HOURS PRN
Status: DISCONTINUED | OUTPATIENT
Start: 2024-04-12 | End: 2024-04-29 | Stop reason: HOSPADM

## 2024-04-12 RX ORDER — AMOXICILLIN 250 MG
2 CAPSULE ORAL NIGHTLY
OUTPATIENT
Start: 2024-04-12

## 2024-04-12 RX ORDER — ALUMINUM HYDROXIDE, MAGNESIUM HYDROXIDE, AND SIMETHICONE 1200; 120; 1200 MG/30ML; MG/30ML; MG/30ML
30 SUSPENSION ORAL 4 TIMES DAILY PRN
OUTPATIENT
Start: 2024-04-12

## 2024-04-12 RX ORDER — ACETAMINOPHEN 325 MG/1
650 TABLET ORAL EVERY 8 HOURS PRN
Status: DISCONTINUED | OUTPATIENT
Start: 2024-04-12 | End: 2024-04-29 | Stop reason: HOSPADM

## 2024-04-12 RX ORDER — ACETAMINOPHEN 325 MG/1
650 TABLET ORAL EVERY 6 HOURS
Status: DISCONTINUED | OUTPATIENT
Start: 2024-04-12 | End: 2024-04-29 | Stop reason: HOSPADM

## 2024-04-12 RX ORDER — ACETAMINOPHEN 325 MG/1
650 TABLET ORAL 3 TIMES DAILY
Status: DISCONTINUED | OUTPATIENT
Start: 2024-04-12 | End: 2024-04-12

## 2024-04-12 RX ORDER — SODIUM CHLORIDE 0.9 % (FLUSH) 0.9 %
10 SYRINGE (ML) INJECTION
OUTPATIENT
Start: 2024-04-12

## 2024-04-12 RX ORDER — ACETAMINOPHEN 325 MG/1
650 TABLET ORAL EVERY 4 HOURS PRN
OUTPATIENT
Start: 2024-04-12

## 2024-04-12 RX ORDER — MORPHINE SULFATE 4 MG/ML
4 INJECTION, SOLUTION INTRAMUSCULAR; INTRAVENOUS EVERY 6 HOURS PRN
OUTPATIENT
Start: 2024-04-12

## 2024-04-12 RX ORDER — GABAPENTIN 100 MG/1
200 CAPSULE ORAL NIGHTLY
Status: DISCONTINUED | OUTPATIENT
Start: 2024-04-12 | End: 2024-04-13

## 2024-04-12 RX ORDER — METOPROLOL TARTRATE 1 MG/ML
10 INJECTION, SOLUTION INTRAVENOUS
Status: DISCONTINUED | OUTPATIENT
Start: 2024-04-12 | End: 2024-04-29 | Stop reason: HOSPADM

## 2024-04-12 RX ORDER — LANOLIN ALCOHOL/MO/W.PET/CERES
1 CREAM (GRAM) TOPICAL DAILY
OUTPATIENT
Start: 2024-04-12

## 2024-04-12 RX ORDER — OXYCODONE HYDROCHLORIDE 5 MG/1
5 TABLET ORAL EVERY 4 HOURS PRN
Status: DISCONTINUED | OUTPATIENT
Start: 2024-04-12 | End: 2024-04-29 | Stop reason: HOSPADM

## 2024-04-12 RX ORDER — HYDRALAZINE HYDROCHLORIDE 20 MG/ML
10 INJECTION INTRAMUSCULAR; INTRAVENOUS EVERY 4 HOURS PRN
Status: DISCONTINUED | OUTPATIENT
Start: 2024-04-12 | End: 2024-04-29 | Stop reason: HOSPADM

## 2024-04-12 RX ORDER — LABETALOL HYDROCHLORIDE 5 MG/ML
10 INJECTION, SOLUTION INTRAVENOUS EVERY 4 HOURS PRN
OUTPATIENT
Start: 2024-04-12

## 2024-04-12 RX ADMIN — METHOCARBAMOL 500 MG: 500 TABLET ORAL at 04:04

## 2024-04-12 RX ADMIN — OXYCODONE HYDROCHLORIDE 5 MG: 5 TABLET ORAL at 01:04

## 2024-04-12 RX ADMIN — AMLODIPINE BESYLATE 5 MG: 5 TABLET ORAL at 04:04

## 2024-04-12 RX ADMIN — Medication 1 TABLET: at 08:04

## 2024-04-12 RX ADMIN — FERROUS SULFATE TAB 325 MG (65 MG ELEMENTAL FE) 1 EACH: 325 (65 FE) TAB at 08:04

## 2024-04-12 RX ADMIN — METHOCARBAMOL 500 MG: 500 TABLET ORAL at 10:04

## 2024-04-12 RX ADMIN — ACETAMINOPHEN 325MG 650 MG: 325 TABLET ORAL at 05:04

## 2024-04-12 RX ADMIN — BENZONATATE 100 MG: 100 CAPSULE ORAL at 08:04

## 2024-04-12 RX ADMIN — ENOXAPARIN SODIUM 30 MG: 30 INJECTION SUBCUTANEOUS at 04:04

## 2024-04-12 RX ADMIN — BISACODYL 10 MG: 10 SUPPOSITORY RECTAL at 04:04

## 2024-04-12 RX ADMIN — DOCUSATE SODIUM 100 MG: 100 CAPSULE, LIQUID FILLED ORAL at 08:04

## 2024-04-12 RX ADMIN — GABAPENTIN 200 MG: 100 CAPSULE ORAL at 08:04

## 2024-04-12 RX ADMIN — MORPHINE SULFATE 4 MG: 10 INJECTION INTRAVENOUS at 11:04

## 2024-04-12 RX ADMIN — ACETAMINOPHEN 650 MG: 325 TABLET, FILM COATED ORAL at 08:04

## 2024-04-12 RX ADMIN — ACETAMINOPHEN 325MG 650 MG: 325 TABLET ORAL at 11:04

## 2024-04-12 RX ADMIN — CEFTRIAXONE SODIUM 1 G: 1 INJECTION, POWDER, FOR SOLUTION INTRAMUSCULAR; INTRAVENOUS at 01:04

## 2024-04-12 RX ADMIN — OXYCODONE 10 MG: 5 TABLET ORAL at 10:04

## 2024-04-12 RX ADMIN — OXYCODONE 5 MG: 5 TABLET ORAL at 06:04

## 2024-04-12 RX ADMIN — HYDROCODONE BITARTRATE AND ACETAMINOPHEN 1 TABLET: 10; 325 TABLET ORAL at 09:04

## 2024-04-12 NOTE — ACP (ADVANCE CARE PLANNING)
Advance Directives:   Living Will: No        Oral Declaration: Yes  [place]: No    Do Not Resuscitate Status: Yes    Medical Power of : No        Oral Declaration: Yes    Decision Making:  Patient answered questions  Goals of Care: The patient endorses that what is most important right now is to focus on remaining as independent as possible, symptom/pain control, quality of life, even if it means sacrificing a little time, and improvement in condition but with limits to invasive therapies    Accordingly, we have decided that the best plan to meet the patient's goals includes continuing with treatment      Total time: 17 minutes

## 2024-04-12 NOTE — H&P
[facility] ([facility])  Rehab Admission H&P    Patient Name: Safia Muñoz  MRN: [id number]  Age: 89 y.o. Sex: female  : 1931  Hospital Length of Stay: 0 days  Date of Service: 2024   Chief Complaint:  Left hip fracture s/p left hip IM nailing on 2024    Subjective:   History of Present Illness   89-year-old white female presented to [facility] ED on 2024 complaining of left hip pain after a ground level fall.  PMH significant for HTN and COPD not on home O2.  Workup significant for closed comminuted intertrochanteric left femur fracture.  Tolerated left hip IM nailing on  without perioperative complications.  Received 3 days Rocephin due to underlying UTI.  Urine culture appeared to be contaminated.  Orthopedic surgery recommended WBAT to LLE.  Tolerated transfer to [facility] inpatient rehab unit on  without incident.    Sitting up in bed.  Reports good sleep.  Appetite slowly improving.  No BM in several days.  BP elevated.  WBC 12.6.  H&H trending down.  BMP unremarkable.  Recent imaging reported below.    Past Medical History: Left hip fracture s/p left hip IM nailing on 2024, osteoporosis, HTN, COPD, normocytic anemia  Procedure history:  eft hip IM nailing on 2024  Family History: Father:  Heart disease +   at age 89.  Mother: Unknown + at age 93.   Social History:   (-) TOB.  Thirty pack years quit in     (-) ETOH.   (-) Illicit drug use.   Completed high school in 1 year college.  Retired from secretarial work.   since .  Independent without use of assistive devices.  The plan is to return to the [facility].  Prior level of function:  Independent ADLs, drives, make simple meals like sandwiches, usually eats in the dining room of the [employer], grocery shops, manages finances, manages own medications, and cares for her dog.  She has a medical alert but does not use it.  Residence:  Lives  alone in [place], [place] [facility] with no steps to enter the residence.  She has a walk-in shower with a small threshold, grab bars and hand-held shower.  She does not have an elevated toilet.  No grab bars adjacent.  DME:  Rolling walker, grab bars, and hand-held shower  Anticipated discharge destination:  Home with home health    Review of patient's allergies indicates:   Allergen Reactions    Ofloxacin     Penicillins     Sulfamethoxazole-trimethoprim      Other reaction(s): Unknown    Codeine Nausea Only      No current facility-administered medications for this encounter.  No current outpatient medications on file.    Facility-Administered Medications Ordered in Other Encounters:     acetaminophen tablet 650 mg, 650 mg, Oral, Q4H PRN, Micheal Rivera MD    acetaminophen tablet 650 mg, 650 mg, Oral, TID, Jn Guillermo MD, 650 mg at 04/12/24 0834    albuterol inhaler 2 puff, 2 puff, Inhalation, Q6H PRN, Jn Guillermo MD    aluminum-magnesium hydroxide-simethicone 200-200-20 mg/5 mL suspension 30 mL, 30 mL, Oral, QID PRN, Micheal Rivera MD    G63-uzqfyrleussh calcium-B6 (FOLBIC RF) 2-1.13-25 mg tablet 1 tablet, 1 tablet, Oral, Daily, Jn Guillermo MD, 1 tablet at 04/12/24 0835    calcium-vitamin D3 500 mg-5 mcg (200 unit) per tablet 1 tablet, 1 tablet, Oral, BID, Jn Guillermo MD, 1 tablet at 04/12/24 0834    cloNIDine tablet 0.1 mg, 0.1 mg, Oral, TID PRN, Micheal Rivera MD    enoxaparin injection 40 mg, 40 mg, Subcutaneous, Daily, Micheal Rivera MD, 40 mg at 04/11/24 1748    estradiol 0.05 mg/24 hr td ptsw patch 1 patch, 1 patch, Transdermal, Every Wed, 1 patch at 04/10/24 1604 **AND** estradiol 0.05 mg/24 hr td ptsw patch 1 patch, 1 patch, Transdermal, Every Fri, Jn Guillermo MD    ferrous sulfate tablet 1 each, 1 tablet, Oral, Daily, Jn Guillermo MD, 1 each at 04/12/24 0834    hydrALAZINE injection 10 mg, 10 mg, Intravenous, Q4H PRN, Miguel, Micheal VERDUGO MD     HYDROcodone-acetaminophen  mg per tablet 1 tablet, 1 tablet, Oral, Q4H PRN, Carmen Porter PA-C, 1 tablet at 04/11/24 2331    labetaloL injection 10 mg, 10 mg, Intravenous, Q4H PRN, Micheal Rivera MD    melatonin tablet 6 mg, 6 mg, Oral, Nightly PRN, Micheal Rivera MD, 6 mg at 04/11/24 2043    methocarbamoL tablet 500 mg, 500 mg, Oral, QID PRN, Micheal Rivera MD, 500 mg at 04/11/24 2043    morphine injection 4 mg, 4 mg, Intravenous, Q6H PRN, Carmen Porter PA-C, 4 mg at 04/11/24 2015    ondansetron injection 4 mg, 4 mg, Intravenous, Q4H PRN, Micheal Rivera MD, 4 mg at 04/11/24 0944    oxyCODONE-acetaminophen 5-325 mg per tablet 1 tablet, 1 tablet, Oral, Q4H PRN, Carmen Porter PA-C, 1 tablet at 04/11/24 0615    polyethylene glycol packet 17 g, 17 g, Oral, BID PRN, Micheal Rivera MD, 17 g at 04/11/24 0616    prochlorperazine injection Soln 5 mg, 5 mg, Intravenous, Q6H PRN, Micheal Rivera MD    senna-docusate 8.6-50 mg per tablet 2 tablet, 2 tablet, Oral, BID PRN, Micheal Rivera MD, 2 tablet at 04/11/24 2043    senna-docusate 8.6-50 mg per tablet 2 tablet, 2 tablet, Oral, QHS, Micheal Rivera MD, 2 tablet at 04/11/24 2044    sodium chloride 0.9% flush 10 mL, 10 mL, Intravenous, PRN, Micheal Rivera MD     Review of Systems   Complete 12-point review of symptoms negative except for what's mentioned in HPI     Objective:     Resp 18     Physical Exam  Constitutional:       Appearance: Normal appearance.   HENT:      Head: Normocephalic.      Mouth/Throat:      Mouth: Mucous membranes are moist.   Eyes:      Pupils: Pupils are equal, round, and reactive to light.   Cardiovascular:      Rate and Rhythm: Normal rate and regular rhythm.      Heart sounds: Normal heart sounds.   Pulmonary:      Effort: Pulmonary effort is normal.      Breath sounds: Normal breath sounds.   Abdominal:      General: Bowel sounds are normal.      Palpations: Abdomen is soft.   Musculoskeletal:      Cervical back: Neck supple.       Comments: Left hip dressing clean and intact.  Diffuse muscle atrophy.   Skin:     General: Skin is warm and dry.      Comments: Scattered bruising   Neurological:      General: No focal deficit present.      Mental Status: She is alert and oriented to person, place, and time.   Psychiatric:         Mood and Affect: Mood normal.         Behavior: Behavior normal.         Thought Content: Thought content normal.         Judgment: Judgment normal.     *MD performed and documented physical examination       Lines/Drains/Airways       Peripheral Intravenous Line  Duration                  Peripheral IV - Single Lumen 04/08/24 2159 20 G Anterior;Left;Proximal Forearm 3 days         Peripheral IV - Single Lumen 04/09/24 1210 18 G Right Forearm 2 days                    Labs  Recent Results (from the past 24 hour(s))   Basic Metabolic Panel    Collection Time: 04/12/24  3:43 AM   Result Value Ref Range    Sodium Level 132 132 - 146 mmol/L    Potassium Level 4.5 3.5 - 5.1 mmol/L    Chloride 97 (L) 98 - 111 mmol/L    Carbon Dioxide 26 23 - 31 mmol/L    Glucose Level 126 (H) 75 - 121 mg/dL    Blood Urea Nitrogen 13.1 9.8 - 20.1 mg/dL    Creatinine 0.76 0.55 - 1.02 mg/dL    BUN/Creatinine Ratio 17     Calcium Level Total 8.8 8.4 - 10.2 mg/dL    Anion Gap 9.0 mEq/L    eGFR >60 mls/min/1.73/m2   CBC with Differential    Collection Time: 04/12/24  3:43 AM   Result Value Ref Range    WBC 12.68 (H) 4.50 - 11.50 x10(3)/mcL    RBC 3.30 (L) 4.20 - 5.40 x10(6)/mcL    Hgb 9.9 (L) 12.0 - 16.0 g/dL    Hct 30.4 (L) 37.0 - 47.0 %    MCV 92.1 80.0 - 94.0 fL    MCH 30.0 27.0 - 31.0 pg    MCHC 32.6 (L) 33.0 - 36.0 g/dL    RDW 14.0 11.5 - 17.0 %    Platelet 256 130 - 400 x10(3)/mcL    MPV 10.4 7.4 - 10.4 fL    Neut % 64.1 %    Lymph % 19.7 %    Mono % 11.5 %    Eos % 3.7 %    Basophil % 0.3 %    Lymph # 2.50 0.6 - 4.6 x10(3)/mcL    Neut # 8.12 2.1 - 9.2 x10(3)/mcL    Mono # 1.46 (H) 0.1 - 1.3 x10(3)/mcL    Eos # 0.47 0 - 0.9 x10(3)/mcL     Baso # 0.04 <=0.2 x10(3)/mcL    IG# 0.09 (H) 0 - 0.04 x10(3)/mcL    IG% 0.7 %    NRBC% 0.0 %     Radiology  Left hip XR on 04/09/2024, IMPRESSION: Improved alignment following internal fixation of the femur.     Assessment/Plan:     89 y.o. WF admitted on 4/12/2024    Left hip fracture   - s/p left hip IM nailing on 04/09/2024  - WBAT to LLE  - discontinue staples on 04/30  - continue    Tylenol 650 mg every 6 hours    Calcium-vitamin D3 1 tablet b.i.d.     Oxycodone 5 mg q.6 hours.    Robaxin 500 mg t.i.d.    Gabapentin 200 mg at bedtime (initiated 4/12)  - Consult physiatry for rehab and pain management  - PT/OT/RT/ST to eval and treat     Osteoporosis  - stable  - vitamin-D level 42.1 on 04/10/2024  - continue.    Calcium-vitamin D3 1 tablet b.i.d.     Estradiol 0.05 mg patch every Wednesday     HTN  - BP running on the high side  - resume    Norvasc 5 mg daily (resumed 4/12)  - continue     Hydralazine 10 mg every 2 hours as needed for BP > 160/90    Labetalol 10 mg every 2 hours as needed for BP > 160/90  - low sodium diet    COPD  - stable  - not on home 02  - keep 02 > 88%   - monitor closely    Normocytic anemia  - asymptomatic  - H/H trending down  - continue    Vitamin B12 tablet daily      Ferrous sulfate 325 mg daily   - no evidence of active bleeds  - will closely monitor and transfuse if needed     Constipation  - stable   - continue  Colace 100 mg BID   Miralax 17 gm BID PRN    MEDICAL PLAN:  - Admit to [facility]  - Medical reconciliation completed and included in the electronic health record  - Draw admit labs including CBC, CMP, and Prealbumin  - Maintain weightbearing status as ordered  - Monitor postoperative surgical incision changing dressing daily  - Teach skin protection and wound prevention techniques  - Initiate fall precaution  - Initiate bowel training program  - Initiate bladder training as indicated  - Pain management  - IS q2 hours while  awake    THERAPEUTIC PLAN:  - Physical therapy 60-90 minutes 5 to week to increase functional mobility, maintain weightbearing precautions, increase lower extremity restrengthening, increase overall activity tolerance, teach balance and safety measures as well as fall precautions, make equipment and orthotic recommendations, be involved in family training and discharge planning, monitor pain levels and vital signs during therapy adjusting treatment accordingly  - Occupational therapy 60-90 minutes 5 times a week to increase functional independence with activities of daily living, maintain weightbearing precautions, teach use of adaptive equipment, increase upper extremity restrengthening, increase overall activity tolerance, teach balance and safety measures as well as fall precautions, make equipment and orthotic recommendations, be involved in family training and discharge planning, monitor pain levels and vital signs during therapy adjusting treatment accordingly  - Speech and language pathology will do an in-depth evaluation of patient's cognition, phonation, and swallowing. They will initiate therapy 30- 60 minutes 5 times a week as indicated  - Recreational therapy will incorporate all patient's functional abilities  into recreational activities that will require visual, spatial, and perceptual abilities; gross and fine motor coordination skills; the cognition to follow multistep commands; dynamic and static balance and reaching abilities. They will also incorporate animal assisted therapy into their weekly program  - Rehabilitation nursing will act as patient family physician liaison. They will integrate patient's functional abilities, after discussions with therapist, into everyday activities with the CNA's,  LPN's and RNs that will include but are not limited to dressing, bathing, feeding, grooming, toileting, transfers, hygiene etc. They will administer medications watching for wanted as well as unwanted  effects. They will initiate DVT/VTE prophylaxis as ordered. Will monitor vital signs, lab values, and pain levels, making appropriate physician notification. They will monitor skin integrity including postop incision, making daily dressing changes. They will teach skin protection and wound prevention techniques. They will initiate bowel training They will initiate bladder training as indicated. They will initiate fall precautions  - Rehabilitation counseling/case management will act as patient and family liaison. They will set up family conferences, family training, aid in discharge planning, and aid in the procurement of assistive devices  - Patient will have 24 hour availability to physiatric care  - Patient will have nutritional services involved, monitoring oral input and visceral protein stores. They will make dietary and supplement recommendations and follow-up as necessary  - Patient will have weekly interdisciplinary team conferences  - Patient will have initial family conference and follow up conferences as indicated  - Patient will have family training prior to discharge     Estimated length of stay - 14-17 days  Anticipated discharge destination - Home with   Medical status - stabilizing postoperatively; will need to monitor pain levels, postoperative skin integrity, watch for evidence of deep vein thrombosis, monitor postoperative H&H, monitor vital signs closely.  Medical prognosis - Good for post-op healing and functional improvement  Previous functional Status:  Independent  Present Functional Status:  Min/mod assist    AB therapy  Rocephin 4/9-4/11    VTE Prophylaxis: Lovenox 30 mg daily   COVID-19 testing:  Unknown  COVID-19 vaccination status:  Vaccinated (Pfizer):  01/10/2021, 01/31/2021, 12/06/2022    POA: No  Living will: No  Contacts: Lacey Pizarro(Daughter) [phone]     CODE STATUS: DNR  Internal Medicine (attending): Pawan Myers MD  Physiatry (consulting):  Iglesia Tran  MD    OUTPATIENT PROVIDERS  PCP:  Iglesia Steel MD  Orthopedic surgery:  Guy Jaquez D.O.    DISPOSITION: Condition stable. Tolerated transfer.  Recent labs and imaging reviewed.  Rehab admission orders initiated.  Med reconciliation completed.  Consult physiatry for rehab and pain management.  PT/OT/RT/ST to eval and treat.  Resume Norvasc 5 mg daily.  Fair pain management.  Initiate Tylenol 650 mg q.6 hours, Robaxin 500 mg t.i.d., and gabapentin 200 mg at bedtime.  Obtain admission lab work in the morning.  Monitor closely.  Notify of acute changes.    PHYSICIAN ATTESTATION: I have been involved in the patient's rehabilitation prescreening process and I have now completed the post admission physician evaluation. Patient is in need of, appropriate for, and should tolerate the above outlined inpatient rehabilitation plan. I believe this is necessary to reach maximal postoperative healing and maximal functional abilities. I do not believe this would be possible in a timely fashion in a less intensive setting      Jacob Guan NP conducted independent physical examination and assisted with medical documentation.    Total time spent on this encounter including chart review and direct MD + NP 1-on-1 patient interaction: 91 minutes   Over 50% of this time was spent in counseling and coordination of care

## 2024-04-12 NOTE — PT/OT/SLP EVAL
Occupational Therapy Inpatient Rehab Evaluation    Name: Safia Muñoz  MRN: [id number]    Recommendations:     Discharge Recommendations:  Low Intensity Therapy   Discharge Equipment Recommendations: walker, rolling, bedside commode, shower chair   Barriers to discharge: time since onset    Assessment:  Safia Muñoz is a 89 y.o. female admitted with a medical diagnosis of Closed 2-part intertrochanteric fracture of proximal end of left femur.  She presents with the following impairments/functional limitations:  weakness, impaired endurance, impaired self care skills, impaired sensation, impaired functional mobility, gait instability, impaired balance, pain, decreased safety awareness, decreased lower extremity function, orthopedic precautions, impaired skin.    General Precautions: Standard, fall     Orthopedic Precautions:LLE weight bearing as tolerated     Braces: N/A    Rehab Prognosis: Fair; patient would benefit from acute skilled OT services to address these deficits and reach maximum level of function.      History:     Past Medical History:   Diagnosis Date    COPD (chronic obstructive pulmonary disease)     Hypertension        Past Surgical History:   Procedure Laterality Date    RETROGRADE INTRAMEDULLARY RODDING OF DISTAL FEMUR Left 4/9/2024    Procedure: INSERTION, INTRAMEDULLARY NAIL INTERTROCHENTERIC FRACTURE;  Surgeon: Guy Jaquez DO;  Location: [facility];  Service: Orthopedics;  Laterality: Left;  HANA TABLE, SYNTHES TFNA       Subjective     Orientation: Oriented x4    Chief Complaint: pain in LLE with movement    Patient/Family Comments/goals: to get better    Respiratory Status: Room air    Patients cultural, spiritual, Taoism conflicts given the current situation: no       Living Environment   Living Environment  People in Home: alone  Home Accessibility: wheelchair accessible  Number of Stairs, Main Entrance: none (small threshold)  Home Layout: Able to live on 1st  "floor  Transportation Anticipated: family or friend will provide  Equipment Currently Used at Home: other (see comments) (has RW, but does not use it. grab bar and HHS in WIS.)  Shower Setup: Walk-in shower    Prior Level of Function  BADL: Independent    IADL: Independent; eats in dining room at the [employer]    Equipment used at home: other (see comments) (has RW, but does not use it. grab bar and HHS in WIS.).  DME owned (not currently used): rolling walker.      Upon discharge, patient will have assistance from family.    Objective:     Patient found HOB elevated with peripheral IV  upon OT entry to room.    Mobility   Patient completed:  Rolling/Turning to Left with moderate assistance  Rolling/Turning to Right with moderate assistance  Supine to Sit with moderate assistance  Sit to Supine with moderate assistance  Sit to Stand Transfer with maximal assistance with rolling walker  Stand to Sit Transfer with maximal assistance with rolling walker    Functional Mobility   NT due to pain in LLE with movement    ADLs     Current Status   Eating 5   Oral Hygiene 4   Shower, Bathe Self 7   Upper Body Dressing 7   Lower Body Dressing 2   Toileting Hygiene 7   Toilet Transfer 2   Putting On, Taking Off Footwear 1     Limiting Factors for ADLs: motor, endurance, limited ROM, balance, weakness, coordination, safety awareness, and pain    Exams     ROM:          -       WFL    ROM Hand  Left Hand: WFL  Right Hand: WFL    Strength  Overall Strength:          -       WFL    Sensation  Patient reports that the tips of her fingers are occasionally numb, "It depends on the day."    Coordination:      -       Intact    Tone  Left: WNL  Right: WNL    Visual/Perceptual  Intact and wears "cheaters" for reading    Cognition:   WFL    Balance    Sitting  Sitting Surface: EOB  Static: Bilateral UE Extremity Support, Fair (-)  Dynamic: Bilateral UE extremity support, Poor (+)    Standing  Static: Bilateral UE Extremity Support, Poor " (+)    Patient left HOB elevated with all lines intact, call button in reach, and CNA and grandson present.    Education provided: Roles and goals of OT, ADLs, transfer training, bed mobility, body mechanics, assistive device, wheelchair precautions, sequencing, safety precautions, fall prevention, and post-op precautions    Multidisciplinary Problems       Occupational Therapy Goals          Problem: Occupational Therapy    Goal Priority Disciplines Outcome Interventions   Occupational Therapy Goal     OT, PT/OT Ongoing, Progressing    Description: Pt to perform eating tasks with independence by re-eval.  Pt to perform oral hygiene tasks with min A standing at sink with RW by re-eval.  Pt to perform UB dressing with independence by re-eval.  Pt to perform LB dressing with mod A using AE PRN by re-eval.  Pt to perform donning/doffing footwear with max A using AE PRN by re-eval.  Pt to perform toileting hygiene with mod A by re-eval.  Pt to perform bathing tasks with mod A by re-eval.    Pt to perform toilet transfers with mod A using LRAD by re-eval.  Pt to perform WIS transfers with mod A using LRAD by re-eval.    Pt to perform meal prep activity with mod A by re-eval.  Pt to perform laundry management task with mod A by re-eval.  Pt to perform light housekeeping activity with mod A by re-eval.    Balance, Strengthening, Endurance, Balance:  Pt to consistently demonstrate adherence to orthopedic precautions during all ADL's as instructed by OT.  Pt to demonstrate consistent adherence to breathing control and energy conservation techniques as educated by OT.                         Plan     During this hospitalization, patient to be seen 5 x/week to address the identified rehab impairments via self-care/home management, therapeutic activities, therapeutic exercises and progress toward the following goals:    Plan of Care Expires:  04/18/24    Time Tracking     OT Received On: 04/12/24  Time In 1415     Time Out  1500  Total Time 45 min  Therapy Time: OT Individual: 45  Missed Time:    Missed Time Reason:      Billable Minutes: Evaluation 30 and Self Care/Home Management 15    04/12/2024

## 2024-04-12 NOTE — CONSULTS
Chief Complaint  intertrochanteric left femur fracture 2/2 trip and fall    Reason for Consultation  Physiatry    History of Present Illness  Admit MD: Pawan Myers MD  Consult Physiatry: Iglesia Tran MD  HPI: 89 yo WF with a PMH of COPD and hypertension presented to the ED at [facility] on 4/8/24 with complaint of left hip pain after a ground level fall where she tripped on a rug and fell. X-ray show intertrochanteric left femur fracture. Orthopedic surgery consulted and referred to hospital medicine for admission. Chest x-ray show no airspace disease. EKG sinus rhythm with PACs. On 4/9, orthopedic surgeon recommended surgical intervention. Patient underwent IM nailing, and was placed WBAT to LLE. On 4/10, patient placed on Lovenox for DVT ppx, and can change on ASA 81mg BID at discharge. Patient was started on Ceftriazone for UTI. Patient gets frequent UTIs. Labs showed low H&H of 10.4 & 33.5, low Iron of 26, low TIBC of 218, elevated B12 of 1,025, low Iron sat of 12, elevated glucose of 135, low calcium of 7.8, low protein of 5.4, low albumin of 3.3, elevated AST of 35. PT/OT evals completed with deficits noted with recommendation for high intensity therapy needed. On 4/11, patient complained of nausea so zofran was given. Labs showed low RBC of 3.53, low H&H Of 10.5 & 32.8, low calcium of 7.8, low albumin of 3.3. Dry dressing changes started. No BM reported since admission. On 4/12, labs showed elevated WBC of 12.68, low RBC of 3.30, low H&H of 9.9 & 30.4, low MCHC of 32.6, low chloride of 97, and elevated glucose of 126. Patient will need appointment with Carmen AMAYA (Ortho) in 3 weeks for wound check and repeat Xrays. Patient complained of pain to LLE rating at 8-9/10 with movement, but controlled with rest. Patient is AAOx4.  Participating with therapy. Functional status includes setup/supervision needed for eating, moderate assist for transfers with RW, max assist for toileting, moderate assist bed  mobility, minimal assist for walking 26ft with RW, minimal assist for grooming, and max assist for lower body dressing. Patient was evaluated, accepted, and admitted to inpatient rehab to improve functional status. Transferred to [facility] on 4/12 without incident.  4/12: Seen in patient room, seated in bed with nursing at bedside administering pain medication. Patient states that she hollers sometimes when moving, but not because it hurts too bad. States that medication has been working well. Reports having eaten lunch prior to transfer. Appetite is slowly improving. Sleeping well. Reports constipation over the past several days. Suppository ordered.  Pleasant. Agreeable with not getting up unassisted. States that she has had the purewick on, so had not had to get up to go to restroom. Therapy to evaluate. VSSAF with noted mild SBP elevation. IM evaluating.             Review of Systems  Barriers to Discharge:  Social: Completed high school and 1 year of college. Denies  history. Pt. Is retired from secretarial work.  Is  approximately 3 years ago.  Was independent without use of assistive devices. The plan is to return to [facility].  Children: ()    Medical:  Left hip fracture s/p left hip IM nailing on 04/09/2024, UTI, osteoporosis, HTN, COPD, normocytic anemia      Functional:    Prior Level of Function: Independent ADLs, drives, makes simple meals like sandwiches, usually eats in the dining room of [facility], grocery shops, manages finances, manages own medications, and cares for her dog. She has a medic alert but does not use it.   Residence: Lives alone in [place] in an independent living [facility] with no steps to enter. She has a walk-in shower with a small threshold, grab bars and a HHS. She does not have an elevated toilet. No grab bars adjacent.   DME: RW, grab bars, and HHS.    Psychiatric: Does not have any confirmed mental health history or  diagnoses. Per daughter, pt. seems to be depressed and anxious since her spouse passed away. She is a former smoker.      Depression/Anxiety: no current complaints     Pain: left hip-controlled with medication  acetaminophen tablet 650 mg q6h  gabapentin capsule 200 mg qHS  methocarbamoL tablet 500 mg TID     ADD methocarbamoL tablet 500 mg TID PRN muscle spasm  ADD acetaminophen tablet 650 mg q8h PRN mild pain  oxyCODONE immediate release tablet 5 mg q4h PRN mod pain  ADD oxyCODONE immediate release tablet 10 mg q4h PRN severe pain  Bowels/Bladder: last BM prior to admission (4/8). Suppository ordered    Appetite: slowly improving     Sleep: good              Physical Exam  General: well-developed, well-nourished, in no acute distress  Eye: EOMI, clear conjunctiva, eyelids normal  HENT: normocephalic, oropharynx and nasal mucosal surfaces moist  Neck: full range of motion, supple  Respiratory: equal chest rise, no SOB, no audible wheeze  Cardiovascular: regular rate and rhythm, no edema  Gastrointestinal: soft, non-tender, non-distended   Musculoskeletal: decreased ROM/strength to LLE  Integumentary: no rashes or skin lesions present  Neurologic: cranial nerves intact, no signs of peripheral neurological deficit, motor/sensory function intact  *MD performed and documented physical examination         Labs:   Latest Reference Range & Units 04/14/24 08:57 04/15/24 05:08   WBC 4.50 - 11.50 x10(3)/mcL  11.67 (H)   RBC 4.20 - 5.40 x10(6)/mcL  3.77 (L)   Hemoglobin 12.0 - 16.0 g/dL  11.0 (L)   Hematocrit 37.0 - 47.0 %  34.0 (L)   MCV 80.0 - 94.0 fL  90.2   MCH 27.0 - 31.0 pg  29.2   MCHC 33.0 - 36.0 g/dL  32.4 (L)   RDW 11.5 - 17.0 %  14.4   Platelet Count 130 - 400 x10(3)/mcL  424 (H)   MPV 7.4 - 10.4 fL  10.1   Neut % %  72.5   LYMPH % %  14.7   Mono % %  9.5   Eos % %  0.9   Basophil % %  0.5   Immature Granulocytes %  1.9   Neut # 2.1 - 9.2 x10(3)/mcL  8.46   Lymph # 0.6 - 4.6 x10(3)/mcL  1.71   Mono # 0.1 - 1.3  x10(3)/mcL  1.11   Eos # 0 - 0.9 x10(3)/mcL  0.11   Baso # <=0.2 x10(3)/mcL  0.06   Immature Grans (Abs) 0 - 0.04 x10(3)/mcL  0.22 (H)   nRBC %  0.0   Sodium 132 - 146 mmol/L  133   Potassium 3.5 - 5.1 mmol/L  4.8   Chloride 98 - 111 mmol/L  94 (L)   CO2 23 - 31 mmol/L  31   BUN 9.8 - 20.1 mg/dL  16.9   Creatinine 0.55 - 1.02 mg/dL  0.80   eGFR mls/min/1.73/m2  >60   Glucose 75 - 121 mg/dL  121   Calcium 8.4 - 10.2 mg/dL  8.9   Phosphorus Level 2.3 - 4.7 mg/dL  4.2   Magnesium  1.60 - 2.60 mg/dL  2.10   ALP 40 - 150 unit/L  71   PROTEIN TOTAL 5.8 - 7.6 gm/dL  5.8   Albumin 3.4 - 4.8 g/dL  2.8 (L)   Albumin/Globulin Ratio 1.1 - 2.0 ratio  0.9 (L)   Prealbumin 14.0 - 37.0 mg/dL  13.5 (L)   BILIRUBIN TOTAL <=1.5 mg/dL  0.8   AST 5 - 34 unit/L  47 (H)   ALT 0 - 55 unit/L  28   Globulin, Total 2.4 - 3.5 gm/dL  3.0   Color, UA Yellow, Light-Yellow, Dark Yellow, Sharon, Straw  Yellow    Appearance, UA Clear  Clear    Specific Gravity,UA 1.005 - 1.030  1.015    pH, UA 5.0 - 8.5  6.0    Protein, UA Negative  Negative    Glucose, UA Negative, Normal  Negative    Ketones, UA Negative  Negative    Blood, UA Negative  Negative    NITRITE UA Negative  Negative    Bilirubin, UA Negative  Negative    Urobilinogen, UA 0.2, 1.0, Normal  0.2    Leukocyte Esterase, UA Negative  Negative    (H): Data is abnormally high  (L): Data is abnormally low            Diagnostics:  XR CHEST 1 VIEW   Impression:  No acute pulmonary process identified.  Date:                                            04/08/2024  Time:                                           19:34      XR HIP WITH PELVIS WHEN PERFORMED, 2 OR 3 VIEWS LEFT   FINDINGS:  Comminuted inter trochanteric left femur fracture.  Mild impaction with horizontal rotation of the left femoral neck.  Left femoral head remains aligned with the acetabulum.  Impression:  Inter trochanteric left femur fracture.  Date:                                            04/08/2024  Time:                                            19:36      XR FEMUR 2 VIEW LEFT   FINDINGS:  Intertrochanteric left femoral fracture with varus angulation.  Degenerative change at the knee and hip.  Atherosclerosis.  Impression:  Intertrochanteric left femoral fracture  Date:                                            04/09/2024  Time:                                           12:50          Assessment/Plan  Hospital   Closed 2-part intertrochanteric fracture of proximal end of left femur s/p repair   Fall     Non-Hospital   COPD (chronic obstructive pulmonary disease)   Hypertension   Frequent UTI   Osteoporosis       Wounds: LLE incisions x 3-staples, dressing-c/d/I, surrounding ecchymosis and tape blisters-use paper tape  S/p IM nail of intertrochanteric left femur fracture on 4/9  Precautions: WBAT LLE  Bracing/AD: RW  Swallowing: Regular Diet  Function: Tolerating therapy. Continue PT/OT  VTE Prophylaxis:   enoxaparin injection 30 mg SubQ q24hr  Code Status: FULL CODE   Discharge: Lives alone in [place] in an [place] living [facility] with no steps to enter. Completed high school and 1 year of college. Denies  history. Pt. Is retired from secretarial work.  Is  approximately 3 years ago.  Was independent without use of assistive devices. The plan is to return to [facility]. Date pending.   Dos 4/15/24  I have evaluated the patient on initial IRF admit. I have been involved in rehab prescreen. I have reviewed records.I have reviewed admit orders.I have discussed case with IM team.  I find the patient appropriate for, in need of and should tolerate an aggressive IRF program with good potential for functional improvement.  I am in agreement with initial Plan of Care  I have been involved in the creation of this initial PM&R consult with Ana Laura Biggs NP, conducted additional independent physical examination and assisted with medical  documentation.

## 2024-04-12 NOTE — CARE UPDATE
[id number] Pt accepted to [facility] rehab today. Alise will bring pt for 12:30 today. Pt's nurse will call report to [phone]

## 2024-04-12 NOTE — CONSULTS
[facility] - Rehab Inpatient Services  Inpatient Rehab Prescreen    PATIENT INFORMATION     Assessment date/time: 4/12/24 0733    Name: Safia Muñoz Phone: [phone]   Address:   [address] [place] [address]  Avelino VARGHESE [address] SSN:    YOB: 1931 Age: 89 y.o. Gender: female   Race: White   Marital Status:    Advance Directives: Full code     COVERAGE INFORMATION     Patient Medicare #:  [id number]      Primary Insurance Type: MEDICARE/MEDICARE PART A & B effective 9/1/96 /  Secondary Insurance Type: BLUE CROSS BLUE SHIELD/MEDICARE SUPPLEMENT BCBS OF [place]  /    Policy #:   Policy #:  [id number]    Insurance contact name/number:  Insurance contact name/number:    Authorization #:  Authorization #:    Verified Coverage: Insurance Carrier   Pending Coverage:    Prescription Coverage:  Insurance details/comments:      PHYSICIAN/REFERRAL INFORMATION     Primary Care Physician: Iglesia tSeel MD Attending Physician: Pawan Myers MD   Consulting physician/specialist:  Referring Physician:    Referring facility:  Referring contact name/phone:    Physician details/comments:      CONTACT INFORMATION   Extended Emergency Contact Information  Primary Emergency Contact: Lacey Pizarro  Mobile Phone: [phone]  Relation: Daughter  Preferred language: English   needed? No    PRIOR LIVING SITUATION    Patient lives in the independent living [facility] at the [facility] location/setup: single story    Bathroom location/setup: walk-in shower with railing, and normal height commode without railing   Equipment at home: rolling walker    Prior device use:  none      PRIOR LEVEL OF FUNCTION     Did a helper need to assist with the following activities prior to the current illness, exacerbation, or injury?   Self-care:  No, independent    Indoor mobility:   No, independent    Stairs:  No, independent    Functional Cognition:  No, independent    Comments:  Patient was independent for mobility and all ADLs    Caregivers providing assistance: Lacey Pizarro- daughter- [phone], Jeremy Muñoz- son- [phone], Speedy Muñoz- son- [phone]   Pre-hospital home care service: none  Name of Agency:    Home/personal responsibilities: Personal ADLs, driving, grocery shops, manages finances, manages medications, makes simple meals like sandwiches (usually eats in dining room at [employer]), and cares for her dog.    Pre-hospital vocational category: Retired for age   Pre-hospital vocational effort: Retired for age   Occupation/profession:  Return to work/school plan:    Educational history:    Hobbies/leisure activities:  Resources used prior to admission:    Available resources:  Resource information comments:      REHABILITATION DIAGNOSIS     History of present illness: This is a 89 year old female with a PMH of COPD and hypertension who presented to the ED at [facility] on 4/8/24 with complaint of left hip pain after a ground level fall where she tripped on a rug and fell. X-ray show intertrochanteric left femur fracture. Orthopedic surgery consulted and referred to our service for admission. Chest x-ray show no airspace disease EKG sinus rhythm with PACs. On 4/9, orthopedic surgeon recommended surgical intervention. Patient underwent IM nailing, and was placed WBAT to LLE. On 4/10, patient placed on Lovenox for DVT ppx, and can place on ASA 81mg BID at discharge. Patient was started on Ceftriazone for UTI. Patient gets frequent UTIs. Labs showed low H&H of 10.4 & 33.5, low Iron of 26, low TIBC of 218, elevated B12 of 1,025, low Iron sat of 12, elevated glucose of 135, low calcium of 7.8, low protein of 5.4, low albumin of 3.3, elevated AST of 35. PT/OT evals completed with deficits noted with recommendation for high intensity therapy needed. On 4/11, patient complained of nausea so zofran was given. Labs showed low RBC of 3.53, low H&H Of 10.5 & 32.8, low calcium of 7.8,  low albumin of 3.3. Dry dressing changes started. No BM reported since admission. On 4/12, labs showed elevated WBC of 12.68, low RBC of 3.30, low H&H of 9.9 & 30.4, low MCHC of 32.6, low chloride of 97, and elevated glucose of 126. Patient will need appointment with Carmen Porter in 3 weeks for wound check and repeat Xrays. Patient complained of pain to LLE rating at 8-9/10 with movement, but controlled with rest. Prior to hospitalization, patient was living at the [facility] in the independent living [facility], has a walk-in shower with railing, and a normal height commode without railing. Patient was independent for mobility and all ADLs without use of equipment, still driving, caring for dog, makes simple meals but mostly eats at dining room at [facility], grocery shops, manages medications, and manages finances. Currently, patient is AAOx4; setup/supervision for eating, moderate assist for transfers with RW, max assist for toileting, moderate assist bed mobility, minimal assist for walking 26ft with RW, minimal assist for grooming, and max assist for lower body dressing. Pt. Requires acute inpatient rehab admission with 24-hour nursing and active physician oversight to monitor and manage acute medical comorbid conditions, labs, pain, and functional deficits.  Patient/family will also require teaching and integration of improving functional skills into daily living.  She will also require an individualized, interdisciplinary approach to her care, receiving PT, OT services 3 hours per day, 5 days per week.    Required care cannot be provided at a lower level of care. Patient is anticipated to require approximately 10-12 days LOS with expected discharge home with spouse with HH   Impairment group (IGC):   Unilateral Hip Fracture 08.11 Etiologic diagnosis/description: Fall with left intertrochanteric femur fracture s/p IM nailing    Date of onset:  4/8/24 Date of surgery: 4/9/24   Allergies: Ofloxacin, Penicillins,  Sulfamethoxazole-trimethoprim, and Codeine   Comorbid condition: Anemia, COPD, and Hypertension, UTI, leukocytosis, hyperglycemia   Medical/functional conditions requiring inpatient rehabilitation: This patient requires medical management/24-hour nursing of complex   comorbidities (Fall with left intertrochanteric femur fracture s/p IM nailing, Anemia, COPD, and Hypertension, UTI, leukocytosis, hyperglycemia ), labs, medications (see medications list), pain, sleep hygiene, anticoagulation, nutrition, hydration, neurological, pulmonary, cardiac status, and preventive healthcare.      This patient requires intense therapy and an integrated,   interdisciplinary approach to address safety, impaired mobility,   impaired ADL's (dressing, toileting, grooming, showering), surgical wound care,   pulmonary insufficiency, bowel/bladder problems,   preventive healthcare, medication management, integration of   improving functional skills into daily living, and home caregiver   support and training.     Risk for medical/clinical complications: This patient is at risk for the following complications: DVT/PE, pneumonia,   malnutrition, neurological decline, respiratory insufficiency,   worsening activity intolerance, complications from anticoagulation,   infection, skin breakdown, inadequate sleep, and constipation.       SPECIAL REHABILITATION NEEDS     IV: 20G left forearm, 18G right forearm Preferred Language: english         Peripheral IV - Single Lumen 04/08/24 2159 20 G Anterior;Left;Proximal Forearm (Active)   Site Assessment Clean;Dry;Intact 04/12/24 0319   Extremity Assessment Distal to IV No warmth;No swelling;No redness;No abnormal discoloration 04/11/24 0800   Line Status Saline locked;Capped 04/12/24 0319   Dressing Status Clean;Dry;Intact 04/12/24 0319   Dressing Intervention Integrity maintained 04/12/24 0319            Peripheral IV - Single Lumen 04/09/24 1210 18 G Right Forearm (Active)   Site Assessment  "Clean;Dry;Intact 04/12/24 0319   Extremity Assessment Distal to IV No warmth;No swelling;No redness 04/11/24 0800   Line Status Saline locked;Capped 04/12/24 0319   Dressing Status Clean;Dry;Intact 04/12/24 0319   Dressing Intervention Integrity maintained 04/12/24 0319          PRECAUTIONS     Safety/fall precautions: High fall risk and Weight-bearing status: Weight-bearing as tolerated: LLE     PAST MEDICAL, SOCIAL, FAMILY HISTORY     Pertinent past medical history:   Past Medical History:   Diagnosis Date    COPD (chronic obstructive pulmonary disease)     Hypertension       Has the patient had two or more falls in the past year or any fall with injury in the past year: Yes    Has the patient had major surgery during the 100 days prior to admission: Yes    Family Medical History: No family history on file.   Substance use history:   Social History     Substance and Sexual Activity   Alcohol Use None     Social History     Tobacco Use   Smoking Status Never   Smokeless Tobacco Never     Social History     Substance and Sexual Activity   Drug Use Not on file      VITALS   BP:  (!) 156/92     Temp: 98 °F (36.7 °C)     HR: 92     Resp: 18     SpO2: (!) 93 %     Height: 5' 3" (1.6 m)     Weight: 68.2 kg (150 lb 5.7 oz)     BMI: 26.6          MED/LABS/DIAGNOSITICS   No current facility-administered medications for this encounter.     No current outpatient medications on file.     Facility-Administered Medications Ordered in Other Encounters   Medication Dose Route Frequency Provider Last Rate Last Admin    acetaminophen tablet 650 mg  650 mg Oral Q4H PRN Micheal Rivera MD        acetaminophen tablet 650 mg  650 mg Oral TID Jn Guillermo MD   650 mg at 04/11/24 2044    albuterol inhaler 2 puff  2 puff Inhalation Q6H PRN Jn Guillermo MD        aluminum-magnesium hydroxide-simethicone 200-200-20 mg/5 mL suspension 30 mL  30 mL Oral QID PRN Micheal Rivera MD        B12-levomefolate calcium-B6 (FOLBIC RF) 2-1.13-25 mg " tablet 1 tablet  1 tablet Oral Daily Jn Guillermo MD   1 tablet at 04/11/24 0908    calcium-vitamin D3 500 mg-5 mcg (200 unit) per tablet 1 tablet  1 tablet Oral BID Jn Guillermo MD   1 tablet at 04/11/24 2043    cloNIDine tablet 0.1 mg  0.1 mg Oral TID PRN Micheal Rivera MD        enoxaparin injection 40 mg  40 mg Subcutaneous Daily Micheal Rivera MD   40 mg at 04/11/24 1748    estradiol 0.05 mg/24 hr td ptsw patch 1 patch  1 patch Transdermal Every Wed Jn Guillermo MD   1 patch at 04/10/24 1604    And    estradiol 0.05 mg/24 hr td ptsw patch 1 patch  1 patch Transdermal Every Fri Jn Guillermo MD        ferrous sulfate tablet 1 each  1 tablet Oral Daily Jn Guillermo MD   1 each at 04/11/24 0909    hydrALAZINE injection 10 mg  10 mg Intravenous Q4H PRN Micheal Rivera MD        HYDROcodone-acetaminophen  mg per tablet 1 tablet  1 tablet Oral Q4H PRN Carmen Porter PA-C   1 tablet at 04/11/24 2331    labetaloL injection 10 mg  10 mg Intravenous Q4H PRN Micheal Rivera MD        melatonin tablet 6 mg  6 mg Oral Nightly PRN Micheal Rivera MD   6 mg at 04/11/24 2043    methocarbamoL tablet 500 mg  500 mg Oral QID PRN Micheal Rivera MD   500 mg at 04/11/24 2043    morphine injection 4 mg  4 mg Intravenous Q6H PRN Carmen Porter PA-C   4 mg at 04/11/24 2015    ondansetron injection 4 mg  4 mg Intravenous Q4H PRN Micheal Rivera MD   4 mg at 04/11/24 0944    oxyCODONE-acetaminophen 5-325 mg per tablet 1 tablet  1 tablet Oral Q4H PRN Carmen Porter PA-C   1 tablet at 04/11/24 0615    polyethylene glycol packet 17 g  17 g Oral BID PRN Micheal Rivera MD   17 g at 04/11/24 0616    prochlorperazine injection Soln 5 mg  5 mg Intravenous Q6H PRN MiguelMicheal pace MD        senna-docusate 8.6-50 mg per tablet 2 tablet  2 tablet Oral BID PRN MiguelMicheal pace MD   2 tablet at 04/11/24 2043    senna-docusate 8.6-50 mg per tablet 2 tablet  2 tablet Oral QHS MiguelMicheal pace MD   2 tablet at 04/11/24 2044     sodium chloride 0.9% flush 10 mL  10 mL Intravenous PRN Micheal Rivera MD          Pertinent lab results:   Lab Results   Component Value Date    WBC 12.68 (H) 04/12/2024    HGB 9.9 (L) 04/12/2024    HCT 30.4 (L) 04/12/2024     04/12/2024     04/12/2024    K 4.5 04/12/2024    BUN 13.1 04/12/2024    CO2 26 04/12/2024      Pertinent diagnostic studies:    Additional labs and diagnostic studies required prior to admission:      QI: GG Care Tool     QI: GG Care Tool  Therapy Evaluation   Swallowing/  Nutritional Status   Diet/Feeding/  Swallowing Setup/ supervision   Eating       Oral Hygiene   Grooming minimum assistance Pt prepared toothbrush and brushed teeth while standing at sink c  RW c Min  for balance    Toileting Hygiene       Shower/Bathe   Bathing    Upper Body Dressing   Dressing Upper    Lower Body Dressing   Dressing Lower maximal assistance doff/don socks    Putting On/Taking Off Footwear       Toilet Transfer   Toileting performed BSC t/f with Mod A. Max A for management of brief in standing.    Bladder Continence Status   Bladder     Bowel Continence Status   Bowel     Roll Left and Right   Bed Mobility Sit to Supine: moderate assistance and of 2 persons    Sit to Lying       Lying to Sitting on Side of Bed       Sit to Stand       Chair/Bed-to- Chair   Transfers Patient completed Sit <> Stand Transfer with moderate assistance  with  rolling walker      Car Transfer       Walk 10 Feet   Equipment      Walk 50 Feet with Two Turns   Balance fair/poor    Walk 150 Feet   Endurance    Walk 10 Feet on Uneven Surfaces   Gait 26 ft with RW & MIN A. Assistance with weight shifting.    Picking up an Object       Wheel 50 Feet with Two Turns   Wheelchair    Wheel 150 Feet       1 Step (Curb)   Stairs    4 Steps       12 Steps       Expression of Ideas and Wants   Communication Independent    Understanding  Verbal and Non-Verbal Content       Cognitive Patterns   Cognition Independent       Safety  Precautions       Lower Extremity      Strength RLE Strength: 4/5 grossly  LLE Strength: -3/5 grossly      ROM LLE ROM: WFL  RLE ROM: WFL      Upper Extremity      Strength Right Upper Extremity:   WFL     Left Upper Extremity:  WFL      ROM Right Upper Extremity:   WFL     Left Upper Extremity:  WFL     Care Score Value Definitions  6: Independent. Charlotte provides no assistance with tasks. A device may or may not have been used.  5: Set-up or clean-up assistance. Charlotte sets up or cleans up, but does not assist with tasks. Charlotte may have assisted prior to or following the activity.  4: Supervision or touching assistance. Charlotte provides verbal cues or touching/steadying or contact guard assistance. Assistance may be provided throughout the activity or intermittently.  3: Partial/moderate assistance. Charlotte does less than half the effort. Charlotte lifts, holds, or supports trunk or limbs, but provides less than half the effort.  2: Substantial/maximal assistance. Charlotte does more than half the effort. Charlotte lifts or holds trunk or limbs, and provides more than half the effort.  1: Dependent. Charlotte does all of the effort, or the assistance of two or more helpers is required for the patient to complete the activity.  Care Score Activity Not Attempted Value Definitions  7: Patient refused.  9: Not applicable. Not attempted and the patient did not perform this activity prior to the current illness, exacerbation, or injury.  10: Not attempted due to environmental limitations (e.g., lack of equipment, weather constraints).  88: Not attempted due to medical condition or safety concerns.    DISCHARGE GOALS/ANTICPATED INTERVENTIONS/SERVICES     Expected Level of Improvement for Safe Discharge: Patient/caregivers anticipate discharge home at or near baseline level of functioning and/or decreased burden of care.   Patient/Family/Caregiver Goals: Patient desires to increase current level of functioning to modified independence  for mobility and all ADLs so that she is able to discharge home safely    Required Treatments/Services: Rehabilitation Nursing, Dietitian/nutrition, Social , and Case Management   Required Therapy Therapy Type Min/Day Days/Week Duration of Therapy   Physical Therapy 90 5 10-12 days   Occupational Therapy 90 5 10-12 days    Speech and Language Pathology      Prosthetic/Orthotics      Recreational Therapy      Anticipated Services upon Discharge:  home health vs outpatient therapy    Additional rehabilitation needs:  none    Expected Discharge Destination:  independent living at [facility]    Barriers to Discharge: None   Discharge Support: Patient has a caregiver available, Discharge plan has been verified with patient's caregiver, and Caregiver is in agreement with the discharge plan   Patient/Family/Caregiver Orientation: Patient/family/caregiver oriented and agreeable to inpatient rehabilitation plan   Estimated Length of Stay: 10-12 days    Projected Admission Date: 4/12/24    Medical Prognosis: good   Physicians Review and Admission Determination: I have discussed patient with Nurse Liaison. I have reviewed the prescreen. Patient is in need of, appropriate for and should tolerate and benefit from an aggressive IRF stay

## 2024-04-12 NOTE — PLAN OF CARE
Safia Muñoz age: 89 *LLE WBAT      Dx: Trip and fall, left intertrochanteric femur fracture s/p IM nailing 4/9/2024. Pt. Has a past medical history of COPD and HTN. *See chart for full and complete medical history*       Hx mental health/substance abuse: Pt. Does not have any confirmed mental health history or diagnoses. Per daughter, pt. Seems to be depressed and anxious since her spouse passed away. She is a former smoker.      Is there evidence of an acute change in mental status from the patients baseline? No. Pt. Was confused after surgery but has since cleared up.     Insurance:  Medicare Part A&B/BCBS      Education//Work Hx: Pt. Completed high school and 1 year of college. Denies  history. Pt. Is retired from secretarial work.     Pt. Is  approximately 3 years ago. Pt. Was independent without use of assistive devices. The plan is to return [facility].      Children: ()/Friends/Family: 1) Lacey Pizarro, daughter ([phone]) 2) Jeremy Muñoz, son ([phone]) 3) Speedy Muñoz, son ([phone])     Power of  (yes, Lacey medical POA)/Living Will (yes)     Prior Level of Function: Independent ADLs, drives, makes simple meals like sandwiches, usually eats in the dining room of [facility], grocery shops, manages finances, manages own medications, and cares for her dog. She has a medic alert but does not use it.     Residence: Pt. Lives alone in [place] in an independent living [facility] at [facility] with no steps to enter. She has a walk-in shower with a small threshold, grab bars and a HHS. She does not have an elevated toilet. No grab bars adjacent.     DME: RW, grab bars, and HHS. She will use [facility] for DME.   HH/OP tx: Pt. Has a history of outpatient therapy in October with [facility]. She has no history of HH. Will use [facility].     FOC was obtained for [facility] for DME and [facility] for DME.      Pharmacy: [id number]  / (has prescription drug coverage)       MD(s): PCP: Dr. Iglesia Steel

## 2024-04-12 NOTE — PLAN OF CARE
04/12/24 1147   Final Note   Assessment Type Discharge Planning Assessment   Anticipated Discharge Disposition Rehab   Post-Acute Status   Post-Acute Authorization Placement   Post-Acute Placement Status Set-up Complete/Auth obtained   Discharge Delays None known at this time     [facility].

## 2024-04-12 NOTE — PROGRESS NOTES
[facility]  Orthopedics  Progress Note    Patient Name: Safia Muñoz  MRN: [id number]  Admission Date: 4/8/2024  Hospital Length of Stay: 4 days  Attending Provider: Fadi Solis MD  Primary Care Provider: Iglesia Steel MD  Follow-up For: Procedure(s) (LRB):  INSERTION, INTRAMEDULLARY NAIL INTERTROCHENTERIC FRACTURE (Left)    Post-Operative Day: 3 Day Post-Op  Subjective:     Principal Problem:Left IT fracture    Principal Orthopedic Problem: 3 Days Post-Op   S/P IMN Left Femur    Interval History: Patient resting comfortably this morning. Family bedside. States she is doing well, working with therapy. Planning DC to rehab at [facility]. No complaints overall.     Review of patient's allergies indicates:   Allergen Reactions    Ofloxacin     Penicillins     Sulfamethoxazole-trimethoprim      Other reaction(s): Unknown    Codeine Nausea Only       Current Facility-Administered Medications   Medication    acetaminophen tablet 650 mg    acetaminophen tablet 650 mg    albuterol inhaler 2 puff    aluminum-magnesium hydroxide-simethicone 200-200-20 mg/5 mL suspension 30 mL    B12-levomefolate calcium-B6 (FOLBIC RF) 2-1.13-25 mg tablet 1 tablet    calcium-vitamin D3 500 mg-5 mcg (200 unit) per tablet 1 tablet    cloNIDine tablet 0.1 mg    enoxaparin injection 40 mg    estradiol 0.05 mg/24 hr td ptsw patch 1 patch    And    estradiol 0.05 mg/24 hr td ptsw patch 1 patch    ferrous sulfate tablet 1 each    hydrALAZINE injection 10 mg    HYDROcodone-acetaminophen  mg per tablet 1 tablet    labetaloL injection 10 mg    melatonin tablet 6 mg    methocarbamoL tablet 500 mg    morphine injection 4 mg    ondansetron injection 4 mg    oxyCODONE-acetaminophen 5-325 mg per tablet 1 tablet    polyethylene glycol packet 17 g    prochlorperazine injection Soln 5 mg    senna-docusate 8.6-50 mg per tablet 2 tablet    senna-docusate 8.6-50 mg per tablet 2 tablet    sodium chloride 0.9% flush  "10 mL     Objective:     Vital Signs (Most Recent):  Temp: 97.5 °F (36.4 °C) (04/12/24 1125)  Pulse: 80 (04/12/24 1125)  Resp: 16 (04/12/24 1100)  BP: (!) 153/71 (04/12/24 1125)  SpO2: (!) 90 % (04/12/24 1125) Vital Signs (24h Range):  Temp:  [97.5 °F (36.4 °C)-98.2 °F (36.8 °C)] 97.5 °F (36.4 °C)  Pulse:  [] 80  Resp:  [16-24] 16  SpO2:  [90 %-93 %] 90 %  BP: (135-171)/(69-92) 153/71     Weight: 68.2 kg (150 lb 5.7 oz)  Height: 5' 3" (160 cm)  Body mass index is 26.63 kg/m².      Intake/Output Summary (Last 24 hours) at 4/12/2024 1230  Last data filed at 4/12/2024 0950  Gross per 24 hour   Intake 240 ml   Output 1650 ml   Net -1410 ml         Physical Exam:   General the patient is alert and oriented x3 no acute distress nontoxic-appearing appropriate affect.    Constitutional: Vital signs are examined and stable.  Resp: No signs of labored breathing               LLE: -Skin: Dressing with minimal amount of serosanguinous drainage           -MSK: +EHL/FHL, Gastroc/Tib anterior            -Neuro:  Sensation intact to light touch L3-S1 dermatomes            -CV: Capillary refill is less than 2 seconds. + DP  Compartments soft and compressible      Diagnostic Findings:   Significant Labs:   Recent Lab Results         04/12/24  0343   04/11/24  0439   04/10/24  0459   04/09/24  2322        Albumin/Globulin Ratio     1.6         Albumin     3.3         ALP     60         ALT     18         AMORPHOUS CRYSTAL, UA ([facility]) UL       Trace       Anion Gap 9.0             Appearance, UA       Turbid       AST     35         Bacteria, UA       Many       Baso # 0.04   0.04   0.05         Basophil % 0.3   0.4   0.5         BILIRUBIN TOTAL     0.5         Bilirubin, UA       Negative       BUN 13.1     12.8         BUN/CREAT RATIO 17             Calcium 8.8     7.8         Chloride 97     100         CO2 26     23         Color, UA       Yellow       Creatinine 0.76     0.82         eGFR >60     >60         Eos # 0.47   " 0.15   0.10         Eos % 3.7   1.3   0.9         Estimated Avg Glucose   122.6           Ferritin     92.84         Globulin, Total     2.1         Glucose 126     135         Glucose, UA       Normal       Hematocrit 30.4   32.8   33.5         Hemoglobin 9.9   10.5   10.4         Hemoglobin A1C External   5.9           Hyaline Casts, UA       0-2       Immature Grans (Abs) 0.09   0.05   0.05         Immature Granulocytes 0.7   0.4   0.5         Iron     26         Iron Binding Capacity Unsaturated     192         Iron Saturation     12         Ketones, UA       Trace       Leukocyte Esterase, UA       75       Lymph # 2.50   1.13   1.33         LYMPH % 19.7   10.0   12.6         MCH 30.0   29.7   29.3         MCHC 32.6   32.0   31.0         MCV 92.1   92.9   94.4         Mono # 1.46   1.39   1.33         Mono % 11.5   12.3   12.6         MPV 10.4   10.7   10.4         Mucous, UA       Trace       Neut # 8.12   8.50   7.71         Neut % 64.1   75.6   72.9         NITRITE UA       2+       nRBC 0.0   0.0   0.0         Blood, UA       Negative       pH, UA       5.5       Platelet Count 256   198   211         Potassium 4.5     4.0         PROTEIN TOTAL     5.4         Protein, UA       Trace       RBC 3.30   3.53   3.55         RBC, UA       0-5       RDW 14.0   13.9   13.9         Sodium 132     132         Specific Gravity,UA       1.029       Squamous Epithelial Cells, UA       Few       TIBC     218         Transferrin     195         Urine Culture       Multiple organisms present indicate probable contamination. Recommend recollection.              >/= 100,000 colonies/ml - Three different Gram-negative Rods              10,000 - 25,000 colonies/ml GAMMA STREPTOCOCCUS  Comment: We have not further evaluated these organisms because three or more species compatible with normal genital jacques usually represents specimen contamination from the time of collection, rather than infection. If clinical circumstances  warrant further   workup of these organisms, please contact the Microbiology dept at [phone]; otherwise please have the patient submit another specimen.       Urobilinogen, UA       Normal       Vitamin D     42.1         Vitamin B12     1,025         WBC, UA       11-20       WBC 12.68   11.26   10.57                  Significant Imaging: I have reviewed all pertinent imaging results/findings.     Assessment/Plan:     Active Diagnoses:    Diagnosis Date Noted POA    PRINCIPAL PROBLEM:  Closed 2-part intertrochanteric fracture of proximal end of left femur s/p repair [S72.142A] 04/08/2024 Yes      Problems Resolved During this Admission:    Diagnosis Date Noted Date Resolved POA    Fall [W19.XXXA] 04/12/2024 04/12/2024 Unknown   89YO F here following a fall  POD #3 S/P IMN left IT fracture  Daily dry dressing changes.  Diet: as tolerated  Pain: multimodal PRN  DVT: lovenox scheduled during stay, ok for ASA 81mg BID on DC   Abx: ceftriaxone for UTI per primary. No further abx needed per ortho  ABLA: expected; now stabilized.   PT/OT eval and treat  Activity: WBAT LLE  She is orthopaedically stable for DC at this time. Follow up with Carmen Porter in 3 weeks for wound check and repeat x rays. Planning DC today to rehab.    The above findings, diagnostics, and treatment plan were discussed with Dr Jaquez who is in agreement with the plan of care except as stated in additional documentation.      Carmen Porter PA-C   Orthopedic Trauma Surgery  [facility]

## 2024-04-12 NOTE — DISCHARGE SUMMARY
[facility] Hospital Medicine Discharge Summary    Admit Date: 4/8/2024  Discharge Date and Time: 4/12/20248:16 AM  Admitting Physician:  Team  Discharging Physician: Fadi Solis MD.  Primary Care Physician: Iglesia Steel MD  Consults: Orthopedic Surgery    Discharge Diagnoses:  Ground level mechanical fall at home with Closed 2-part intertrochanteric fracture of proximal end of left femur--> s/p IM nail on 4/9/24  Leukocytosis, reactive - resolved   Acute anemia, post op  Hyperglycemia, check HbA1c  Bacteriuria multiple organism- probable contamination  Advanced age   Hx- HTN, COPD without exacerbation     Hospital Course:   89-year-old female with medical history of hypertension and COPD not on home oxygen present to the ED with a chief complaint of left hip pain after a ground level fall where she slipped on her rug and fell. On exam she appears comfortable, lungs clear to auscultation, normal heart sounds, no pedal edema, palpable pedal pulses and sensation intact on left leg. There is tenderness to left groin. X-ray show intertrochanteric left femur fracture. Orthopedic surgery consulted and Pt admitted to  service . Pt was totally independent with ADLs before  the fall.   Pt underwent Left intertrochanteric femur fracture  with IM nailing on 4/9/24.  Postop day 3  Pain is controlled  PT/OT service recommended High intensity therapy   CM on board for rehab placement  Amlodipine currently on hold, blood pressure acceptable  H&H slightly trickled down, today 9.9, monitor closely, transfuse if less than 7  Iron profile shows iron-deficiency anemia, iron supplements initiated  Urinalysis was concerning for UTI, urine culture showing multiple organisms-probable contamination  Continue Rocephin - day 3 of 3 today  Wbc 12k  A1c 5.9, nondiabetic for her age  Appropriate home medications for medical condition has been resumed on admission  CM informed pt is accepted to [facility]  and requested D/C orders     Pt was seen and examined on the day of discharge  Vitals:  VITAL SIGNS: 24 HRS MIN & MAX LAST   Temp  Min: 97.5 °F (36.4 °C)  Max: 98.2 °F (36.8 °C) 98 °F (36.7 °C)   BP  Min: 131/69  Max: 171/69 (!) 156/92   Pulse  Min: 79  Max: 116  92   Resp  Min: 18  Max: 24 18   SpO2  Min: 92 %  Max: 93 % (!) 93 %       Physical Exam:  General: In no acute distress, afebrile, elderly female, looks good for her stated age  Chest: Clear to auscultation bilaterally anteriorly  Heart: RRR, +S1, S2, no appreciable murmur  Abdomen: Soft, nontender, BS +  MSK: Warm, no lower extremity edema, no clubbing or cyanosis. Surgical dressings to left lat upper thigh. Dressing is clean , dry and intact.   Neurologic: Alert and oriented x4, Cranial nerve II-XII intact, Moves all ext spontaneously.     Recent Labs   Lab 04/10/24  0459 04/11/24  0439 04/12/24  0343   WBC 10.57 11.26 12.68*   RBC 3.55* 3.53* 3.30*   HGB 10.4* 10.5* 9.9*   HCT 33.5* 32.8* 30.4*   MCV 94.4* 92.9 92.1   MCH 29.3 29.7 30.0   MCHC 31.0* 32.0* 32.6*   RDW 13.9 13.9 14.0    198 256   MPV 10.4 10.7* 10.4       Recent Labs   Lab 04/08/24 2000 04/09/24  0509 04/10/24  0459 04/12/24  0343    135 132 132   K 4.2 4.4 4.0 4.5   CO2 22* 22* 23 26   BUN 17.6 13.6 12.8 13.1   CREATININE 1.01 0.79 0.82 0.76   CALCIUM 9.1 8.9 7.8* 8.8   ALBUMIN 3.7  --  3.3*  --    ALKPHOS 77  --  60  --    ALT 14  --  18  --    AST 22  --  35*  --    BILITOT 0.3  --  0.5  --         Microbiology Results (last 7 days)       Procedure Component Value Units Date/Time    Urine culture [[id number]]  (Abnormal) Collected: 04/09/24 0352    Order Status: Completed Specimen: Urine Updated: 04/11/24 0714     Urine Culture Multiple organisms present indicate probable contamination. Recommend recollection.      >/= 100,000 colonies/ml - Three different Gram-negative Rods      10,000 - 25,000 colonies/ml GAMMA STREPTOCOCCUS     Comment: We have not further evaluated  these organisms because three or more species compatible with normal genital jacques usually represents specimen contamination from the time of collection, rather than infection. If clinical circumstances warrant further   workup of these organisms, please contact the Microbiology dept at [phone]; otherwise please have the patient submit another specimen.                X-Ray Femur 2 View Left  Narrative: EXAMINATION:  XR FEMUR 2 VIEW LEFT    CLINICAL HISTORY:  post-op;    COMPARISON:  X-rays dated 04/09/2024    FINDINGS:  There is improved alignment following internal fixation of the femur.  Postoperative changes are noted within the soft tissues.  Impression: Improved alignment following internal fixation of the femur.    Electronically signed by: Corinna Zurita  Date:    04/09/2024  Time:    15:02  SURG FL Surgery Fluoro Usage  See OP Notes for results.     IMPRESSION: See OP Notes for results.     This procedure was auto-finalized by: Virtual Radiologist  X-Ray Femur 2 View Left  Narrative: EXAMINATION:  XR FEMUR 2 VIEW LEFT    CLINICAL HISTORY:  fx;    TECHNIQUE:  AP and lateral views of the left femur were performed.    COMPARISON:  None.    FINDINGS:  Intertrochanteric left femoral fracture with varus angulation.  Degenerative change at the knee and hip.    Atherosclerosis.  Impression: Intertrochanteric left femoral fracture    Electronically signed by: Shanell Madera  Date:    04/09/2024  Time:    12:50         Medication List        ASK your doctor about these medications      amLODIPine 5 MG tablet  Commonly known as: NORVASC     calcium-vitamin D3-vitamin K 500-100-40 mg-unit-mcg Chew     cefUROXime 250 MG tablet  Commonly known as: CEFTIN     cyanocobalamin (vitamin B-12) 5,000 mcg Cap     estradiol 0.05 mg/24 hr td ptsw 0.05 mg/24 hr  Commonly known as: VIVELLE-DOT               Explained in detail to the patient about the discharge plan, medications, and follow-up visits. Pt understands and agrees  with the treatment plan  Discharge Disposition: [facility] rehab   Discharged Condition: stable  Diet-   Dietary Orders (From admission, onward)       Start     Ordered    04/10/24 0911  Diet Adult Regular  Diet effective now         04/10/24 0910                   Medications Per DC med rec  Activities as tolerated    For further questions contact hospitalist office    Discharge time 34 minutes    For worsening symptoms, chest pain, shortness of breath, increased abdominal pain, high grade fever, stroke or stroke like symptoms, immediately go to the nearest Emergency Room or call 911 as soon as possible.    Portions of this note dictated using EMR integrated voice recognition software, and may be subject to voice recognition errors not corrected at proofreading. Please contact writer for clarification if needed    Fadi Solis MD  Department of Hospital Medicine   [facility]   04/12/2024 8:16 AM

## 2024-04-12 NOTE — PLAN OF CARE
Problem: Occupational Therapy  Goal: Occupational Therapy Goal  Description: Pt to perform eating tasks with independence by re-eval.  Pt to perform oral hygiene tasks with min A standing at sink with RW by re-eval.  Pt to perform UB dressing with independence by re-eval.  Pt to perform LB dressing with mod A using AE PRN by re-eval.  Pt to perform donning/doffing footwear with max A using AE PRN by re-eval.  Pt to perform toileting hygiene with mod A by re-eval.  Pt to perform bathing tasks with mod A by re-eval.    Pt to perform toilet transfers with mod A using LRAD by re-eval.  Pt to perform WIS transfers with mod A using LRAD by re-eval.    Pt to perform meal prep activity with mod A by re-eval.  Pt to perform laundry management task with mod A by re-eval.  Pt to perform light housekeeping activity with mod A by re-eval.    Balance, Strengthening, Endurance, Balance:  Pt to consistently demonstrate adherence to orthopedic precautions during all ADL's as instructed by OT.  Pt to demonstrate consistent adherence to breathing control and energy conservation techniques as educated by OT.    Outcome: Ongoing, Progressing

## 2024-04-13 LAB
ALBUMIN SERPL-MCNC: 2.8 G/DL (ref 3.4–4.8)
ALBUMIN/GLOB SERPL: 0.9 RATIO (ref 1.1–2)
ALP SERPL-CCNC: 62 UNIT/L (ref 40–150)
ALT SERPL-CCNC: 11 UNIT/L (ref 0–55)
AST SERPL-CCNC: 35 UNIT/L (ref 5–34)
BASOPHILS # BLD AUTO: 0.06 X10(3)/MCL
BASOPHILS NFR BLD AUTO: 0.5 %
BILIRUB SERPL-MCNC: 0.8 MG/DL
BUN SERPL-MCNC: 14.9 MG/DL (ref 9.8–20.1)
CALCIUM SERPL-MCNC: 8.6 MG/DL (ref 8.4–10.2)
CHLORIDE SERPL-SCNC: 98 MMOL/L (ref 98–111)
CO2 SERPL-SCNC: 26 MMOL/L (ref 23–31)
CREAT SERPL-MCNC: 0.68 MG/DL (ref 0.55–1.02)
EOSINOPHIL # BLD AUTO: 0.58 X10(3)/MCL (ref 0–0.9)
EOSINOPHIL NFR BLD AUTO: 5.2 %
ERYTHROCYTE [DISTWIDTH] IN BLOOD BY AUTOMATED COUNT: 14 % (ref 11.5–17)
FERRITIN SERPL-MCNC: 110.93 NG/ML (ref 4.63–204)
GFR SERPLBLD CREATININE-BSD FMLA CKD-EPI: >60 MLS/MIN/1.73/M2
GLOBULIN SER-MCNC: 3 GM/DL (ref 2.4–3.5)
GLUCOSE SERPL-MCNC: 99 MG/DL (ref 75–121)
HCT VFR BLD AUTO: 31.2 % (ref 37–47)
HGB BLD-MCNC: 10.1 G/DL (ref 12–16)
IMM GRANULOCYTES # BLD AUTO: 0.11 X10(3)/MCL (ref 0–0.04)
IMM GRANULOCYTES NFR BLD AUTO: 1 %
IRON SATN MFR SERPL: 17 % (ref 20–50)
IRON SERPL-MCNC: 36 UG/DL (ref 50–170)
LYMPHOCYTES # BLD AUTO: 2.29 X10(3)/MCL (ref 0.6–4.6)
LYMPHOCYTES NFR BLD AUTO: 20.6 %
MAGNESIUM SERPL-MCNC: 1.9 MG/DL (ref 1.6–2.6)
MCH RBC QN AUTO: 29.8 PG (ref 27–31)
MCHC RBC AUTO-ENTMCNC: 32.4 G/DL (ref 33–36)
MCV RBC AUTO: 92 FL (ref 80–94)
MONOCYTES # BLD AUTO: 1.19 X10(3)/MCL (ref 0.1–1.3)
MONOCYTES NFR BLD AUTO: 10.7 %
NEUTROPHILS # BLD AUTO: 6.91 X10(3)/MCL (ref 2.1–9.2)
NEUTROPHILS NFR BLD AUTO: 62 %
NRBC BLD AUTO-RTO: 0 %
PHOSPHATE SERPL-MCNC: 2.9 MG/DL (ref 2.3–4.7)
PLATELET # BLD AUTO: 304 X10(3)/MCL (ref 130–400)
PMV BLD AUTO: 10 FL (ref 7.4–10.4)
POTASSIUM SERPL-SCNC: 4.4 MMOL/L (ref 3.5–5.1)
PREALB SERPL-MCNC: 9.7 MG/DL (ref 14–37)
PROT SERPL-MCNC: 5.8 GM/DL (ref 5.8–7.6)
RBC # BLD AUTO: 3.39 X10(6)/MCL (ref 4.2–5.4)
SODIUM SERPL-SCNC: 131 MMOL/L (ref 132–146)
TIBC SERPL-MCNC: 177 UG/DL (ref 70–310)
TIBC SERPL-MCNC: 213 UG/DL (ref 250–450)
TRANSFERRIN SERPL-MCNC: 187 MG/DL
WBC # SPEC AUTO: 11.14 X10(3)/MCL (ref 4.5–11.5)

## 2024-04-13 PROCEDURE — 11800000 HC REHAB PRIVATE ROOM

## 2024-04-13 PROCEDURE — 63600175 PHARM REV CODE 636 W HCPCS: Performed by: NURSE PRACTITIONER

## 2024-04-13 PROCEDURE — 80053 COMPREHEN METABOLIC PANEL: CPT | Performed by: NURSE PRACTITIONER

## 2024-04-13 PROCEDURE — 97535 SELF CARE MNGMENT TRAINING: CPT

## 2024-04-13 PROCEDURE — 94799 UNLISTED PULMONARY SVC/PX: CPT

## 2024-04-13 PROCEDURE — 97542 WHEELCHAIR MNGMENT TRAINING: CPT

## 2024-04-13 PROCEDURE — 97162 PT EVAL MOD COMPLEX 30 MIN: CPT

## 2024-04-13 PROCEDURE — 85025 COMPLETE CBC W/AUTO DIFF WBC: CPT | Performed by: NURSE PRACTITIONER

## 2024-04-13 PROCEDURE — 25000003 PHARM REV CODE 250: Performed by: NURSE PRACTITIONER

## 2024-04-13 PROCEDURE — 99900031 HC PATIENT EDUCATION (STAT)

## 2024-04-13 PROCEDURE — 83540 ASSAY OF IRON: CPT | Performed by: NURSE PRACTITIONER

## 2024-04-13 PROCEDURE — 97530 THERAPEUTIC ACTIVITIES: CPT

## 2024-04-13 PROCEDURE — 84100 ASSAY OF PHOSPHORUS: CPT | Performed by: NURSE PRACTITIONER

## 2024-04-13 PROCEDURE — 99900035 HC TECH TIME PER 15 MIN (STAT)

## 2024-04-13 PROCEDURE — 84134 ASSAY OF PREALBUMIN: CPT | Performed by: NURSE PRACTITIONER

## 2024-04-13 PROCEDURE — 83735 ASSAY OF MAGNESIUM: CPT | Performed by: NURSE PRACTITIONER

## 2024-04-13 PROCEDURE — 82728 ASSAY OF FERRITIN: CPT | Performed by: NURSE PRACTITIONER

## 2024-04-13 RX ORDER — FUROSEMIDE 10 MG/ML
40 INJECTION INTRAMUSCULAR; INTRAVENOUS ONCE
Status: COMPLETED | OUTPATIENT
Start: 2024-04-13 | End: 2024-04-13

## 2024-04-13 RX ORDER — GABAPENTIN 100 MG/1
100 CAPSULE ORAL 3 TIMES DAILY
Status: DISCONTINUED | OUTPATIENT
Start: 2024-04-13 | End: 2024-04-15

## 2024-04-13 RX ADMIN — AMLODIPINE BESYLATE 5 MG: 5 TABLET ORAL at 07:04

## 2024-04-13 RX ADMIN — METHOCARBAMOL 500 MG: 500 TABLET ORAL at 01:04

## 2024-04-13 RX ADMIN — GABAPENTIN 100 MG: 100 CAPSULE ORAL at 09:04

## 2024-04-13 RX ADMIN — OXYCODONE 10 MG: 5 TABLET ORAL at 05:04

## 2024-04-13 RX ADMIN — FERROUS SULFATE TAB 325 MG (65 MG ELEMENTAL FE) 1 EACH: 325 (65 FE) TAB at 07:04

## 2024-04-13 RX ADMIN — ONDANSETRON 4 MG: 4 TABLET, ORALLY DISINTEGRATING ORAL at 10:04

## 2024-04-13 RX ADMIN — GABAPENTIN 100 MG: 100 CAPSULE ORAL at 02:04

## 2024-04-13 RX ADMIN — ENOXAPARIN SODIUM 30 MG: 30 INJECTION SUBCUTANEOUS at 05:04

## 2024-04-13 RX ADMIN — OXYCODONE 10 MG: 5 TABLET ORAL at 09:04

## 2024-04-13 RX ADMIN — DOCUSATE SODIUM 100 MG: 100 CAPSULE, LIQUID FILLED ORAL at 09:04

## 2024-04-13 RX ADMIN — CYANOCOBALAMIN TAB 500 MCG 500 MCG: 500 TAB at 07:04

## 2024-04-13 RX ADMIN — FUROSEMIDE 40 MG: 10 INJECTION, SOLUTION INTRAMUSCULAR; INTRAVENOUS at 02:04

## 2024-04-13 RX ADMIN — METHOCARBAMOL 500 MG: 500 TABLET ORAL at 06:04

## 2024-04-13 RX ADMIN — ACETAMINOPHEN 325MG 650 MG: 325 TABLET ORAL at 06:04

## 2024-04-13 RX ADMIN — OXYCODONE 10 MG: 5 TABLET ORAL at 06:04

## 2024-04-13 RX ADMIN — ACETAMINOPHEN 325MG 650 MG: 325 TABLET ORAL at 05:04

## 2024-04-13 RX ADMIN — DOCUSATE SODIUM 100 MG: 100 CAPSULE, LIQUID FILLED ORAL at 07:04

## 2024-04-13 RX ADMIN — METHOCARBAMOL 500 MG: 500 TABLET ORAL at 09:04

## 2024-04-13 RX ADMIN — OXYCODONE 10 MG: 5 TABLET ORAL at 12:04

## 2024-04-13 NOTE — PLAN OF CARE
Problem: Fall Injury Risk  Goal: Absence of Fall and Fall-Related Injury  Outcome: Ongoing, Progressing  Intervention: Promote Injury-Free Environment  Flowsheets (Taken 4/12/2024 2231)  Safety Promotion/Fall Prevention:   assistive device/personal item within reach   bed alarm set   Fall Risk signage in place   lighting adjusted   nonskid shoes/socks when out of bed   side rails raised x 3   supervised activity   Supervised toileting - stay within arms reach   instructed to call staff for mobility

## 2024-04-13 NOTE — CONSULTS
Inpatient Nutrition Assessment    Admit Date: 4/12/2024   Total duration of encounter: 1 day   Patient Age: 89 y.o.    Nutrition Recommendation/Prescription     Continue Regular diet as tolerated.   Ross, BID, to assist with healing.   Monitor intake, weight, and labs.     RD following and available as needed. Thank you.    Communication of Recommendations:  EMR.    Nutrition Assessment     Malnutrition Assessment/Nutrition-Focused Physical Exam    Unable to complete. Will attempt at F/U.                                                                 A minimum of two characteristics is recommended for diagnosis of either severe or non-severe malnutrition.    Chart Review    Reason Seen: continuous nutrition monitoring    Malnutrition Screening Tool Results   Have you recently lost weight without trying?: No  Have you been eating poorly because of a decreased appetite?: No   MST Score: 0   Diagnosis:  Left hip fracture s/p left hip IM nailing on 4/9/2024.    Relevant Medical History:   COPD.  HTN.    Scheduled Medications:  acetaminophen, 650 mg, Q6H  amLODIPine, 5 mg, Daily  cyanocobalamin, 500 mcg, Daily  docusate sodium, 100 mg, BID  enoxparin, 30 mg, Q24H (prophylaxis, 1700)  [START ON 4/18/2024] estradiol 0.05 mg/24 hr td ptsw, 1 patch, Every Thurs  ferrous sulfate, 1 tablet, Daily  furosemide (LASIX) injection, 40 mg, Once  gabapentin, 100 mg, TID  methocarbamoL, 500 mg, TID    Continuous Infusions:   PRN Medications:   Current Facility-Administered Medications   Medication Dose Route Frequency Provider Last Rate Last Admin    acetaminophen tablet 650 mg  650 mg Oral Q6H Jacob Guanothy A, FNP   650 mg at 04/13/24 0610    acetaminophen tablet 650 mg  650 mg Oral Q8H PRN Disha Biggs, FNP        amLODIPine tablet 5 mg  5 mg Oral Daily Freida, Augusto A, FNP   5 mg at 04/13/24 0736    benzonatate capsule 100 mg  100 mg Oral TID PRN Freiad, Augusto A, FNP   100 mg at 04/12/24 2025    bisacodyL  suppository 10 mg  10 mg Rectal Daily PRN Freida, Augusto A, FNP        cyanocobalamin tablet 500 mcg  500 mcg Oral Daily Freida, Augusto A, FNP   500 mcg at 04/13/24 0736    docusate sodium capsule 100 mg  100 mg Oral BID Freida, Augusto A, FNP   100 mg at 04/13/24 0736    enoxaparin injection 30 mg  30 mg Subcutaneous Q24H (prophylaxis, 1700) Freida, Augusto A, FNP   30 mg at 04/12/24 1624    [START ON 4/18/2024] estradiol 0.05 mg/24 hr td ptsw patch 1 patch  1 patch Transdermal Every Thurs Freida, Augusto A, FNP        ferrous sulfate tablet 1 each  1 tablet Oral Daily Freida, Augusto A, FNP   1 each at 04/13/24 0736    furosemide injection 40 mg  40 mg Intravenous Once Freida, Augusto A, FNP        gabapentin capsule 100 mg  100 mg Oral TID Freida, Augusto A, FNP        hydrALAZINE injection 10 mg  10 mg Intravenous Q4H PRN Freida, Augusto A, FNP        hydrOXYzine pamoate capsule 50 mg  50 mg Oral Nightly PRN Freida, Augusto A, FNP        labetalol 20 mg/4 mL (5 mg/mL) IV syring  10 mg Intravenous Q4H PRN Freida, Augusto A, FNP        methocarbamoL tablet 500 mg  500 mg Oral TID Freida, Augusto A, FNP   500 mg at 04/13/24 1319    methocarbamoL tablet 500 mg  500 mg Oral TID PRN Disha Biggs, FNP   500 mg at 04/12/24 1623    metoprolol injection 10 mg  10 mg Intravenous Q2H PRN Freida, Augusto A, FNP        nitroGLYCERIN SL tablet 0.4 mg  0.4 mg Sublingual Q5 Min PRN Freida, Augusto A, FNP        ondansetron disintegrating tablet 4 mg  4 mg Oral Q6H PRN Freida, Augusto A, FNP   4 mg at 04/13/24 1036    oxyCODONE immediate release tablet 10 mg  10 mg Oral Q4H PRN Disha Biggs, FNP   10 mg at 04/13/24 1249    oxyCODONE immediate release tablet 5 mg  5 mg Oral Q4H PRN Disha Biggs FNP   5 mg at 04/12/24 1855    polyethylene glycol packet 17 g  17 g Oral BID PRN Augusto Guan FNP        promethazine tablet 25 mg  25 mg Oral Q6H PRN Freida,  "JUAN Hunter           Calorie Containing IV Medications: no significant kcals from medications at this time    Recent Labs   Lab 04/08/24 2000 04/09/24  0509 04/10/24  0459 04/11/24  0439 04/12/24  0343 04/13/24  0518    135 132  --  132 131*   K 4.2 4.4 4.0  --  4.5 4.4   CALCIUM 9.1 8.9 7.8*  --  8.8 8.6   PHOS  --   --   --   --   --  2.9   MG  --   --   --   --   --  1.90   CHLORIDE 104 103 100  --  97* 98   CO2 22* 22* 23  --  26 26   BUN 17.6 13.6 12.8  --  13.1 14.9   CREATININE 1.01 0.79 0.82  --  0.76 0.68   EGFRNORACEVR 52 >60 >60  --  >60 >60   GLUCOSE 123* 140* 135*  --  126* 99   BILITOT 0.3  --  0.5  --   --  0.8   ALKPHOS 77  --  60  --   --  62   ALT 14  --  18  --   --  11   AST 22  --  35*  --   --  35*   ALBUMIN 3.7  --  3.3*  --   --  2.8*   PREALB  --   --   --   --   --  9.7*   HGBA1C  --   --   --  5.9  --   --    WBC 16.31* 14.21* 10.57 11.26 12.68* 11.14   HGB 14.5 13.5 10.4* 10.5* 9.9* 10.1*   HCT 44.5 43.6 33.5* 32.8* 30.4* 31.2*     Nutrition Orders:  Diet Adult Regular      Appetite/Oral Intake: good/% of meals  Factors Affecting Nutritional Intake: none identified  Social Needs Impacting Access to Food: unable to assess at this time; will attempt on follow-up  Food/Hinduism/Cultural Preferences: none reported  Food Allergies: none reported  Last Bowel Movement: 04/12/24  Wound(s):      Comments    4/13: Pt with good appetite and intake. No recent weight loss noted/reported on nursing admit screen. Labs and meds reviewed. Will continue to monitor during stay.     Anthropometrics    Height: 5' 2.99" (160 cm), Height Method: Stated  Last Weight: 74.5 kg (164 lb 3.9 oz) (04/13/24 0700), Weight Method: Bed Scale  BMI (Calculated): 29.1  BMI Classification: overweight (BMI 25-29.9)     Ideal Body Weight (IBW), Female: 114.95 lb     % Ideal Body Weight, Female (lb): 142.05 %                             Usual Weight Provided By: EMR weight history    Wt Readings from Last 5 " Encounters:   04/13/24 74.5 kg (164 lb 3.9 oz)   04/09/24 68.2 kg (150 lb 5.7 oz)   10/07/23 64.9 kg (143 lb)   09/24/18 68.4 kg (150 lb 12.7 oz)     Weight Change(s) Since Admission:   Wt Readings from Last 1 Encounters:   04/13/24 0700 74.5 kg (164 lb 3.9 oz)   04/12/24 1310 74.1 kg (163 lb 5.8 oz)   Admit Weight: 74.1 kg (163 lb 5.8 oz) (04/12/24 1310), Weight Method: Standard Scale    Estimated Needs    Weight Used For Calorie Calculations: 74 kg (163 lb 2.3 oz)  Energy Calorie Requirements (kcal): 1500 to 1850 kcals/day (20-25 kcals/kg/CBW).  Energy Need Method: Kcal/kg  Weight Used For Protein Calculations: 74 kg (163 lb 2.3 oz)  Protein Requirements: 75 to 90 g/day (1.0-1.2 g/kg/CBW)  Fluid Requirements (mL): 1500 to 1850 kcals/day (1mL/kcal) or per MD Guidance.        Enteral Nutrition     Patient not receiving enteral nutrition at this time.    Parenteral Nutrition     Patient not receiving parenteral nutrition support at this time.    Evaluation of Received Nutrient Intake    Calories: meeting estimated needs  Protein: meeting estimated needs    Patient Education     Not applicable.    Nutrition Diagnosis     PES: Increased nutrient needs (protein and wound healing vitamins ) related to increased protein energy demand for wound healing as evidenced by S/P IM nailing. (new)       Nutrition Interventions     Intervention(s): multivitamin/mineral supplement therapy and collaboration with other providers    Goal: Meet greater than 80% of nutritional needs by follow-up. (new)      Nutrition Goals & Monitoring     Dietitian will monitor: food and beverage intake, energy intake, weight, weight change, electrolyte/renal panel, glucose/endocrine profile, and gastrointestinal profile  Discharge planning: too early to determine; pending clinical course  Nutrition Risk/Follow-Up: low (follow-up in 5-7 days)   Please consult if re-assessment needed sooner.

## 2024-04-13 NOTE — PLAN OF CARE
Problem: Physical Therapy  Goal: Physical Therapy Goal  Description: Bed Mobility:  Roll left and right with partial/moderate assist.   Sit to supine transfer with partial/moderate assist.   Supine to sit transfer with partial/moderate assist.     Transfers:  Sit to stand transfer with supervision/touching assist using RW.   Bed to chair transfer with supervision/touching assist Stand Step  using RW.     Mobility:  Ambulate 150 feet with supervision/touching assist using RW.   Outcome: Ongoing, Progressing

## 2024-04-13 NOTE — PT/OT/SLP EVAL
"Physical Therapy Rehab Evaluation    Patient Name:  Safia Muñoz   MRN:  [id number]    Recommendations:     Discharge Recommendations:  Moderate Intensity Therapy   Discharge Equipment Recommendations: walker, rolling, bedside commode, shower chair   Barriers to discharge: Impaired functional mobility  and Severity of Deficits     Assessment:     Safia Muñoz is a 89 y.o. female admitted with a medical diagnosis of Closed 2-part intertrochanteric fracture of proximal end of left femur.  She presents with the following impairments/functional limitations:  weakness, impaired endurance, impaired self care skills, impaired functional mobility, gait instability, impaired balance, decreased lower extremity function, pain, decreased ROM .    Rehab Diagnosis: medical diagnosis of Closed 2-part intertrochanteric fracture of proximal end of left femur    General Precautions: Standard, fall     Orthopedic Precautions: LLE weight bearing as tolerated     Braces: N/A    Rehab Prognosis: Fair; patient would benefit from acute skilled PT services to address these deficits and reach maximum level of function.      History:     Past Medical History:   Diagnosis Date    COPD (chronic obstructive pulmonary disease)     Hypertension        Past Surgical History:   Procedure Laterality Date    RETROGRADE INTRAMEDULLARY RODDING OF DISTAL FEMUR Left 4/9/2024    Procedure: INSERTION, INTRAMEDULLARY NAIL INTERTROCHENTERIC FRACTURE;  Surgeon: Guy Jaquez DO;  Location: [facility];  Service: Orthopedics;  Laterality: Left;  [employer] TABLE, SYNTHES TFNA       Subjective     Patient Goal: To improve functional mobility and to return home.    Patient Comments: "My left hip hurts."    Patients cultural, spiritual, Yazidi conflicts given the current situation: no       Living Environment  People in Home: alone  Living Arrangements: other (see comments) (lives in single level home)  Home Accessibility: wheelchair accessible  Number of " Stairs, Main Entrance: none (small threshold)  Home Layout: Able to live on 1st floor  Transportation Anticipated: family or friend will provide  Equipment Currently Used at Home: other (see comments) (has RW, but does not use it. grab bar and HHS in WIS.)    Prior Level of Function  Ambulation Skills: independent  Stairs:  (has small threshold into home no steps)  Transfer Skills: independent  ADL Skills: independent  Cognitive Communication: independent    Equipment used at home: other (see comments) (has RW, but does not use it. grab bar and HHS in WIS.).  DME owned (not currently used): rolling walker.      Upon discharge, patient will have assistance from daughter and sons.    Objective:     Communicated with NSG prior to session.  Patient found with seatbelt in wheelchair with peripheral IV  upon PT entry to room.    Vitals   Vitals at Rest  BP     HR     O2 Sat     Pain Pain Rating 1: 8/10  Location - Side 1: Left  Location 1: hip  Pain Addressed 1: Reposition     Vitals With Activity  BP     HR     O2 Sat     Pain Pain Rating 1: 8/10  Location - Side 1: Left  Location 1: hip  Pain Addressed 1: Reposition     Respiratory Status: Room air    Exams  Cognitive Exam:  Patient is oriented to Person, Place, Time, and Situation      GGs   Admit Current   Status Goal   Functional Area: Care Score: Care Score: Care Score:   Roll Left and Right 3 3  Required moderate assistance Independent   Sit to Lying 1 1  Max assist x2  Supervision or touching assistance   Lying to Sitting on Side of Bed 1 1  Max assist x2 Supervision or touching assistance   Sit to Stand 2 2  Max assist x1 with RW Supervision or touching assistance   Chair/Bed-to-Chair Transfer 2 2  Max assist x1 with RW Supervision or touching assistance   Car Transfer 88 88 Not attempted due to medical/safety concerns   Walk 10 Feet 88 88 Not attempted due to medical/safety concerns   Walk 50 Feet with Two Turns 88 88 Not attempted due to medical/safety concerns    Walk 150 Feet 88 88 Not attempted due to medical/safety concerns   Walk 10 Feet Uneven Surface 88 88 Not attempted due to medical/safety concerns   1 Step (Curb) 88 88 Not attempted due to medical/safety concerns   4 Steps 88 88 Not attempted due to medical/safety concerns   12 Steps 88 88 Not attempted due to medical/safety concerns   Picking Up Object 88 88 Not attempted due to medical/safety concerns   Wheel 50 Feet with Two Turns 3 3  Moderate assistance for steering, slow speed, required VC for how to use WC Independent   Wheel 150 Feet 88 88 Not attempted due to medical/safety concerns     Therapeutic Activities and Exercises:  Patient educated on role of acute care PT and PT POC, safety while in hospital including calling nurse for mobility, and call light usage  Patient educated about importance of OOB mobility and remaining up in chair most of the day.  Patient educated about pursed lip breathing technique and cued for use with mobility.    Activity Tolerance: Poor    Patient left with seatbelt in wheelchair with all lines intact, call button in reach, chair alarm on, and NSG notified.    Education Provided: roles and goals of PT/PTA, transfer training, bed mob, gait training, safety awareness, body mechanics, assistive device, wheelchair management, and fall prevention    Expected compliance: Moderate compliance      Plan:     During this hospitalization, patient to be seen 6 x/week to address the identified rehab impairments via gait training, therapeutic activities, therapeutic exercises, neuromuscular re-education, wheelchair management/training and progress toward the following goals:    GOALS:   Multidisciplinary Problems       Physical Therapy Goals          Problem: Physical Therapy    Goal Priority Disciplines Outcome Goal Variances Interventions   Physical Therapy Goal     PT, PT/OT Ongoing, Progressing     Description: Bed Mobility:  Roll left and right with partial/moderate assist.   Sit to  supine transfer with partial/moderate assist.   Supine to sit transfer with partial/moderate assist.     Transfers:  Sit to stand transfer with supervision/touching assist using RW.   Bed to chair transfer with supervision/touching assist Stand Step  using RW.     Mobility:  Ambulate 150 feet with supervision/touching assist using RW.                        Plan of Care Expires:  04/19/24  PT Next Visit Date: 04/19/24  Plan of Care reviewed with: patient      Additional Infomation:           Time Tracking:     Therapy Time   PT Received On: 04/13/24  PT Start Time: 1005  PT Stop Time: 1135  PT Total Time (min): 90 min  PT Individual: 90  Missed Time:    Time Missed due to:      Billable Minutes: Evaluation 30 minutes, Therapeutic Activity 45 minutes, and Train/Wheelchair Management 15 minutes    04/13/2024

## 2024-04-13 NOTE — PROGRESS NOTES
[facility] ([facility])  Rehab Progress Note    Patient Name: Safia Muñoz  MRN: [id number]  Age: 89 y.o. Sex: female  : 1931  Hospital Length of Stay: 1 days  Date of Service: 2024   Chief Complaint: Left hip fracture s/p left hip IM nailing on 2024     Subjective:     Basic Information  Admit Information: 89-year-old white female presented to [facility] ED on 2024 complaining of left hip pain after a ground level fall.  PMH significant for HTN and COPD not on home O2.  Workup significant for closed comminuted intertrochanteric left femur fracture.  Tolerated left hip IM nailing on  without perioperative complications.  Received 3 days Rocephin due to underlying UTI.  Urine culture appeared to be contaminated.  Orthopedic surgery recommended WBAT to LLE.  Tolerated transfer to [facility] inpatient rehab unit on  without incident.   Today's Information: No acute events overnight.  Sitting up in chair.  Reports good sleep and appetite.  Last BM .  Vital signs at goal with recorded fevers.  CBC unremarkable.  Sodium 131.  No new imaging today.      Review of patient's allergies indicates:   Allergen Reactions    Adhesive tape-silicones Blisters    Ofloxacin     Penicillins     Sulfamethoxazole-trimethoprim      Other reaction(s): Unknown    Codeine Nausea Only        Current Facility-Administered Medications:     acetaminophen tablet 650 mg, 650 mg, Oral, Q6H, Freida, Augusto A, FNP, 650 mg at 24 2355    acetaminophen tablet 650 mg, 650 mg, Oral, Q8H PRN, Disha Biggs A, FNP    amLODIPine tablet 5 mg, 5 mg, Oral, Daily, Freida, Augusto A, FNP, 5 mg at 24 162    benzonatate capsule 100 mg, 100 mg, Oral, TID PRN, Freida, Augusto A, FNP, 100 mg at 24    bisacodyL suppository 10 mg, 10 mg, Rectal, Daily PRN, Freida, Augusto A, FNP    cyanocobalamin tablet 500 mcg, 500 mcg, Oral, Daily, Freida, Augusto A, FNP    docusate  "sodium capsule 100 mg, 100 mg, Oral, BID, Freida, Augusto A, FNP, 100 mg at 04/12/24 2025    enoxaparin injection 30 mg, 30 mg, Subcutaneous, Q24H (prophylaxis, 1700), Freida, Augusto A, FNP, 30 mg at 04/12/24 1624    [START ON 4/18/2024] estradiol 0.05 mg/24 hr td ptsw patch 1 patch, 1 patch, Transdermal, Every Thurs, Freida, Augusto A, FNP    ferrous sulfate tablet 1 each, 1 tablet, Oral, Daily, Freida, Augusto A, FNP    gabapentin capsule 200 mg, 200 mg, Oral, QHS, Freida, Augusto A, FNP, 200 mg at 04/12/24 2025    hydrALAZINE injection 10 mg, 10 mg, Intravenous, Q4H PRN, Freida, Augusto A, FNP    hydrOXYzine pamoate capsule 50 mg, 50 mg, Oral, Nightly PRN, Freida, Augusto A, FNP    labetalol 20 mg/4 mL (5 mg/mL) IV syring, 10 mg, Intravenous, Q4H PRN, Freida, Augusto A, FNP    methocarbamoL tablet 500 mg, 500 mg, Oral, TID, Freida, Augusto A, FNP, 500 mg at 04/12/24 2255    methocarbamoL tablet 500 mg, 500 mg, Oral, TID PRN, [place] Disha A, FNP, 500 mg at 04/12/24 1623    metoprolol injection 10 mg, 10 mg, Intravenous, Q2H PRN, Freida, Augusto A, FNP    nitroGLYCERIN SL tablet 0.4 mg, 0.4 mg, Sublingual, Q5 Min PRN, Freida, Augusto A, FNP    ondansetron disintegrating tablet 4 mg, 4 mg, Oral, Q6H PRN, Freida, Augusto A, FNP    oxyCODONE immediate release tablet 10 mg, 10 mg, Oral, Q4H PRN, Disha Biggs, FNP, 10 mg at 04/12/24 2255    oxyCODONE immediate release tablet 5 mg, 5 mg, Oral, Q4H PRN, Disha Biggs FNP, 5 mg at 04/12/24 7604    polyethylene glycol packet 17 g, 17 g, Oral, BID PRN, Augusto Guan A, FNP    promethazine tablet 25 mg, 25 mg, Oral, Q6H PRN, Freida Augusto A, FNP     Review of Systems   Complete 12-point review of symptoms negative except for what's mentioned in HPI     Objective:     /70   Pulse 82   Temp 98.1 °F (36.7 °C) (Oral)   Resp 18   Ht 5' 3" (1.6 m)   Wt 74.1 kg (163 lb 5.8 oz)   SpO2 95%   Breastfeeding No   " BMI 28.94 kg/m²      Physical Exam  Constitutional:       Appearance: Normal appearance.   HENT:      Head: Normocephalic.      Mouth/Throat:      Mouth: Mucous membranes are moist.   Eyes:      Pupils: Pupils are equal, round, and reactive to light.   Cardiovascular:      Rate and Rhythm: Normal rate and regular rhythm.      Heart sounds: Normal heart sounds.   Pulmonary:      Effort: Pulmonary effort is normal.      Breath sounds: Normal breath sounds.   Abdominal:      General: Bowel sounds are normal.      Palpations: Abdomen is soft.   Musculoskeletal:      Cervical back: Neck supple.      Left lower le+ Edema present.      Comments: Left hip dressing clean and intact.  Diffuse muscle atrophy.    Skin:     General: Skin is warm and dry.   Neurological:      General: No focal deficit present.      Mental Status: She is alert and oriented to person, place, and time.   Psychiatric:         Mood and Affect: Mood normal.         Behavior: Behavior normal.         Thought Content: Thought content normal.         Judgment: Judgment normal.         Lines/Drains/Airways       Peripheral Intravenous Line  Duration                  Peripheral IV - Single Lumen 24 2159 20 G Anterior;Left;Proximal Forearm 4 days         Peripheral IV - Single Lumen 24 1210 18 G Right Forearm 3 days                    Labs  Admission on 2024   Component Date Value Ref Range Status    Sodium Level 2024 131 (L)  132 - 146 mmol/L Final    Potassium Level 2024 4.4  3.5 - 5.1 mmol/L Final    Chloride 2024 98  98 - 111 mmol/L Final    Carbon Dioxide 2024 26  23 - 31 mmol/L Final    Glucose Level 2024 99  75 - 121 mg/dL Final    Blood Urea Nitrogen 2024 14.9  9.8 - 20.1 mg/dL Final    Creatinine 2024 0.68  0.55 - 1.02 mg/dL Final    Calcium Level Total 2024 8.6  8.4 - 10.2 mg/dL Final    Protein Total 2024 5.8  5.8 - 7.6 gm/dL Final    Albumin Level 2024 2.8 (L)  3.4  - 4.8 g/dL Final    Globulin 04/13/2024 3.0  2.4 - 3.5 gm/dL Final    Albumin/Globulin Ratio 04/13/2024 0.9 (L)  1.1 - 2.0 ratio Final    Bilirubin Total 04/13/2024 0.8  <=1.5 mg/dL Final    Alkaline Phosphatase 04/13/2024 62  40 - 150 unit/L Final    Alanine Aminotransferase 04/13/2024 11  0 - 55 unit/L Final    Aspartate Aminotransferase 04/13/2024 35 (H)  5 - 34 unit/L Final    eGFR 04/13/2024 >60  mls/min/1.73/m2 Final    Magnesium Level 04/13/2024 1.90  1.60 - 2.60 mg/dL Final    Phosphorus Level 04/13/2024 2.9  2.3 - 4.7 mg/dL Final    Prealbumin 04/13/2024 9.7 (L)  14.0 - 37.0 mg/dL Final    WBC 04/13/2024 11.14  4.50 - 11.50 x10(3)/mcL Final    RBC 04/13/2024 3.39 (L)  4.20 - 5.40 x10(6)/mcL Final    Hgb 04/13/2024 10.1 (L)  12.0 - 16.0 g/dL Final    Hct 04/13/2024 31.2 (L)  37.0 - 47.0 % Final    MCV 04/13/2024 92.0  80.0 - 94.0 fL Final    MCH 04/13/2024 29.8  27.0 - 31.0 pg Final    MCHC 04/13/2024 32.4 (L)  33.0 - 36.0 g/dL Final    RDW 04/13/2024 14.0  11.5 - 17.0 % Final    Platelet 04/13/2024 304  130 - 400 x10(3)/mcL Final    MPV 04/13/2024 10.0  7.4 - 10.4 fL Final    Neut % 04/13/2024 62.0  % Final    Lymph % 04/13/2024 20.6  % Final    Mono % 04/13/2024 10.7  % Final    Eos % 04/13/2024 5.2  % Final    Basophil % 04/13/2024 0.5  % Final    Lymph # 04/13/2024 2.29  0.6 - 4.6 x10(3)/mcL Final    Neut # 04/13/2024 6.91  2.1 - 9.2 x10(3)/mcL Final    Mono # 04/13/2024 1.19  0.1 - 1.3 x10(3)/mcL Final    Eos # 04/13/2024 0.58  0 - 0.9 x10(3)/mcL Final    Baso # 04/13/2024 0.06  <=0.2 x10(3)/mcL Final    IG# 04/13/2024 0.11 (H)  0 - 0.04 x10(3)/mcL Final    IG% 04/13/2024 1.0  % Final    NRBC% 04/13/2024 0.0  % Final     Radiology  Left hip XR on 04/09/2024, IMPRESSION: Improved alignment following internal fixation of the femur.      Assessment/Plan:     89 y.o. WF admitted on 4/12/2024    Left hip fracture   - s/p left hip IM nailing on 04/09/2024  - WBAT to LLE  - discontinue staples on 04/30  -  continue                Tylenol 650 mg every 6 hours                Calcium-vitamin D3 1 tablet b.i.d.                 Oxycodone 5 mg q.6 hours.                Robaxin 500 mg t.i.d.                Gabapentin 100 mg TID (initiated 4/12)  - defer to physiatry for rehab and pain management  - PT/OT/RT following     Osteoporosis  - stable  - vitamin-D level 42.1 on 04/10/2024  - continue.                Calcium-vitamin D3 1 tablet b.i.d.                 Estradiol 0.05 mg patch every Wednesday      HTN  - BP at goal!!  - continue                  Norvasc 5 mg daily (resumed 4/12)                Hydralazine 10 mg every 2 hours as needed for BP > 160/90                Labetalol 10 mg every 2 hours as needed for BP > 160/90  - low sodium diet     COPD  - stable  - not on home 02  - keep 02 > 88%   - monitor closely     Normocytic anemia  - asymptomatic  - H/H trending down  - continue                Vitamin B12 tablet daily                    Ferrous sulfate 325 mg daily     - no evidence of active bleeds  - will closely monitor and transfuse if needed      Constipation  - stable   - continue  Colace 100 mg BID   Miralax 17 gm BID PRN     AB therapy  Rocephin 4/9-4/11     VTE Prophylaxis: Lovenox 30 mg daily   COVID-19 testing:  Unknown  COVID-19 vaccination status:  Vaccinated (Pfizer):  01/10/2021, 01/31/2021, 12/06/2022     POA: No  Living will: No  Contacts: Lacey Pizarro(Daughter) [phone]      CODE STATUS: DNR  Internal Medicine (attending): Pawan Myers MD  Physiatry (consulting):  Iglesia Tran MD     OUTPATIENT PROVIDERS  PCP:  Iglesia Steel MD  Orthopedic surgery:  Guy Jaquez D.O.     DISPOSITION:  Sleep hygiene, bowel maintenance, and appetite at goal.  Last BM 4/12.  Vital signs at goal fevers.  CBC unremarkable.  Sodium 131 (from 132).  No new imaging today.  Initiate Lasix 40 mg x 1.  Repeat lab in the morning.  Monitor closely.  Notify of acute changes.     Total time spent on this encounter  including chart review and direct 1-on-1 patient interaction: 53 minutes   Over 50% of this time was spent in counseling and coordination of care

## 2024-04-13 NOTE — PT/OT/SLP PROGRESS
Occupational Therapy Inpatient Rehab Treatment    Name: Safia Muñoz  MRN: [id number]    Assessment:  Safia Muñoz is a 89 y.o. female admitted with a medical diagnosis of Closed 2-part intertrochanteric fracture of proximal end of left femur.  She presents with the following impairments/functional limitations:  weakness, impaired endurance, impaired self care skills, impaired functional mobility, gait instability, impaired balance, decreased lower extremity function, pain, decreased ROM.    General Precautions: Standard, fall     Orthopedic Precautions:LLE weight bearing as tolerated     Braces: N/A    Rehab Prognosis: Good; patient would benefit from acute skilled OT services to address these deficits and reach maximum level of function.      History:     Past Medical History:   Diagnosis Date    COPD (chronic obstructive pulmonary disease)     Hypertension      Past Surgical History:   Procedure Laterality Date    RETROGRADE INTRAMEDULLARY RODDING OF DISTAL FEMUR Left 4/9/2024    Procedure: INSERTION, INTRAMEDULLARY NAIL INTERTROCHENTERIC FRACTURE;  Surgeon: Guy Jaquez DO;  Location: [facility];  Service: Orthopedics;  Laterality: Left;  HANA TABLE, SYNTHES TFNA     Subjective     Orientation: Oriented x4    Chief Complaint: Pain in L leg when moving     Patient/Family Comments/goals: To get well so she can go home     Respiratory Status: Room air    Patients cultural, spiritual, Christianity conflicts given the current situation: no     Objective:     Patient found with bed in chair position with peripheral IV  upon OT entry to room.    Mobility   Patient completed:  Bed mobility: mod assist for LE  Sit to Stand Transfer with moderate assistance with rolling walker  Stand to Sit Transfer with moderate assistance with rolling walker  Bed to Chair Transfer using 3 step transfer technique with minimum assistance with rolling walker    Functional Mobility  Pt only able to perform 3 steps from bed to chair at  this time due to pain.     ADLs   Current Status   Eating 6   Oral Hygiene 6   Shower, Bathe Self 3 Sponge bath at EOB due to pain and activity tolerance.    Upper Body Dressing 5 set up    Lower Body Dressing 3 Patient provided with reacher to assist with dressing. Patient donned brief and pants using reacher, mod A in standing to assist with steadying and pulling over hips. Increased time required to thread legs into pants    Toileting Hygiene   Pt soiled in bed upon OT arrival to room. When donning pants, patient urinated again at EOB without notifying therapist of urge. Patient cleaned up and donned fresh brief.    Putting On, Taking Off Footwear 4 Patient doffed socks using reacher, donned shoes using shoe horn.    Patient put on makeup and brushed hair seated in wheelchair.     Limiting Factors for ADLs: motor, psychsocial, endurance, limited ROM, balance, weakness, cognition, safety awareness, and pain     Patient left up in chair with all lines intact, call button in reach, chair alarm on, and family member present.     Education provided: Roles and goals of OT, ADLs, transfer training, modified goals, sequencing, safety precautions, and fall prevention    Multidisciplinary Problems       Occupational Therapy Goals          Problem: Occupational Therapy    Goal Priority Disciplines Outcome Interventions   Occupational Therapy Goal     OT, PT/OT Ongoing, Progressing    Description: Pt to perform eating tasks with independence by re-eval.  Pt to perform oral hygiene tasks with min A standing at sink with RW by re-eval.  Pt to perform UB dressing with independence by re-eval.  Pt to perform LB dressing with mod A using AE PRN by re-eval.  Pt to perform donning/doffing footwear with max A using AE PRN by re-eval.  Pt to perform toileting hygiene with mod A by re-eval.  Pt to perform bathing tasks with mod A by re-eval.    Pt to perform toilet transfers with mod A using LRAD by re-eval.  Pt to perform WIS  transfers with mod A using LRAD by re-eval.    Pt to perform meal prep activity with mod A by re-eval.  Pt to perform laundry management task with mod A by re-eval.  Pt to perform light housekeeping activity with mod A by re-eval.    Balance, Strengthening, Endurance, Balance:  Pt to consistently demonstrate adherence to orthopedic precautions during all ADL's as instructed by OT.  Pt to demonstrate consistent adherence to breathing control and energy conservation techniques as educated by OT.                       Time Tracking     OT Received On: 04/13/24  Time In 0745     Time Out 0915  Total Time 90 min  Therapy Time: OT Individual: 90  Missed Time:    Missed Time Reason:      Billable Minutes: Self Care/Home Management 90    04/13/2024

## 2024-04-14 LAB
ANION GAP SERPL CALC-SCNC: 9 MEQ/L
APPEARANCE UR: CLEAR
BILIRUB UR QL STRIP.AUTO: NEGATIVE
BUN SERPL-MCNC: 21.1 MG/DL (ref 9.8–20.1)
CALCIUM SERPL-MCNC: 9 MG/DL (ref 8.4–10.2)
CHLORIDE SERPL-SCNC: 94 MMOL/L (ref 98–111)
CO2 SERPL-SCNC: 29 MMOL/L (ref 23–31)
COLOR UR AUTO: YELLOW
CREAT SERPL-MCNC: 0.8 MG/DL (ref 0.55–1.02)
CREAT/UREA NIT SERPL: 26
GFR SERPLBLD CREATININE-BSD FMLA CKD-EPI: >60 MLS/MIN/1.73/M2
GLUCOSE SERPL-MCNC: 108 MG/DL (ref 75–121)
GLUCOSE UR QL STRIP.AUTO: NEGATIVE
KETONES UR QL STRIP.AUTO: NEGATIVE
LEUKOCYTE ESTERASE UR QL STRIP.AUTO: NEGATIVE
MAGNESIUM SERPL-MCNC: 1.9 MG/DL (ref 1.6–2.6)
NITRITE UR QL STRIP.AUTO: NEGATIVE
PH UR STRIP.AUTO: 6 [PH]
POTASSIUM SERPL-SCNC: 4.2 MMOL/L (ref 3.5–5.1)
PROT UR QL STRIP.AUTO: NEGATIVE
RBC UR QL AUTO: NEGATIVE
SODIUM SERPL-SCNC: 132 MMOL/L (ref 132–146)
SP GR UR STRIP.AUTO: 1.01 (ref 1–1.03)
UROBILINOGEN UR STRIP-ACNC: 0.2

## 2024-04-14 PROCEDURE — 25000003 PHARM REV CODE 250: Performed by: NURSE PRACTITIONER

## 2024-04-14 PROCEDURE — 83735 ASSAY OF MAGNESIUM: CPT | Performed by: NURSE PRACTITIONER

## 2024-04-14 PROCEDURE — 99900035 HC TECH TIME PER 15 MIN (STAT)

## 2024-04-14 PROCEDURE — 99900031 HC PATIENT EDUCATION (STAT)

## 2024-04-14 PROCEDURE — 11800000 HC REHAB PRIVATE ROOM

## 2024-04-14 PROCEDURE — 63600175 PHARM REV CODE 636 W HCPCS: Performed by: NURSE PRACTITIONER

## 2024-04-14 PROCEDURE — 81003 URINALYSIS AUTO W/O SCOPE: CPT | Performed by: NURSE PRACTITIONER

## 2024-04-14 PROCEDURE — 80048 BASIC METABOLIC PNL TOTAL CA: CPT | Performed by: NURSE PRACTITIONER

## 2024-04-14 PROCEDURE — 94799 UNLISTED PULMONARY SVC/PX: CPT

## 2024-04-14 RX ORDER — FUROSEMIDE 40 MG/1
40 TABLET ORAL DAILY
Status: DISCONTINUED | OUTPATIENT
Start: 2024-04-15 | End: 2024-04-29 | Stop reason: HOSPADM

## 2024-04-14 RX ORDER — GABAPENTIN 100 MG/1
200 CAPSULE ORAL NIGHTLY
Status: DISCONTINUED | OUTPATIENT
Start: 2024-04-14 | End: 2024-04-15

## 2024-04-14 RX ORDER — TALC
6 POWDER (GRAM) TOPICAL NIGHTLY
Status: DISCONTINUED | OUTPATIENT
Start: 2024-04-14 | End: 2024-04-18

## 2024-04-14 RX ADMIN — ONDANSETRON 4 MG: 4 TABLET, ORALLY DISINTEGRATING ORAL at 05:04

## 2024-04-14 RX ADMIN — FERROUS SULFATE TAB 325 MG (65 MG ELEMENTAL FE) 1 EACH: 325 (65 FE) TAB at 07:04

## 2024-04-14 RX ADMIN — METHOCARBAMOL 500 MG: 500 TABLET ORAL at 02:04

## 2024-04-14 RX ADMIN — ENOXAPARIN SODIUM 30 MG: 30 INJECTION SUBCUTANEOUS at 05:04

## 2024-04-14 RX ADMIN — DOCUSATE SODIUM 100 MG: 100 CAPSULE, LIQUID FILLED ORAL at 08:04

## 2024-04-14 RX ADMIN — AMLODIPINE BESYLATE 5 MG: 5 TABLET ORAL at 07:04

## 2024-04-14 RX ADMIN — ACETAMINOPHEN 325MG 650 MG: 325 TABLET ORAL at 12:04

## 2024-04-14 RX ADMIN — OXYCODONE 10 MG: 5 TABLET ORAL at 10:04

## 2024-04-14 RX ADMIN — DOCUSATE SODIUM 100 MG: 100 CAPSULE, LIQUID FILLED ORAL at 07:04

## 2024-04-14 RX ADMIN — GABAPENTIN 100 MG: 100 CAPSULE ORAL at 02:04

## 2024-04-14 RX ADMIN — Medication 6 MG: at 08:04

## 2024-04-14 RX ADMIN — GABAPENTIN 200 MG: 100 CAPSULE ORAL at 08:04

## 2024-04-14 RX ADMIN — GABAPENTIN 100 MG: 100 CAPSULE ORAL at 08:04

## 2024-04-14 RX ADMIN — ACETAMINOPHEN 325MG 650 MG: 325 TABLET ORAL at 05:04

## 2024-04-14 RX ADMIN — CYANOCOBALAMIN TAB 500 MCG 500 MCG: 500 TAB at 07:04

## 2024-04-14 RX ADMIN — GABAPENTIN 100 MG: 100 CAPSULE ORAL at 06:04

## 2024-04-14 RX ADMIN — PROMETHAZINE HYDROCHLORIDE 25 MG: 25 TABLET ORAL at 11:04

## 2024-04-14 RX ADMIN — OXYCODONE 10 MG: 5 TABLET ORAL at 07:04

## 2024-04-14 RX ADMIN — OXYCODONE 10 MG: 5 TABLET ORAL at 03:04

## 2024-04-14 RX ADMIN — HYDROXYZINE PAMOATE 50 MG: 50 CAPSULE ORAL at 08:04

## 2024-04-14 RX ADMIN — METHOCARBAMOL 500 MG: 500 TABLET ORAL at 06:04

## 2024-04-14 RX ADMIN — METHOCARBAMOL 500 MG: 500 TABLET ORAL at 08:04

## 2024-04-14 RX ADMIN — OXYCODONE 10 MG: 5 TABLET ORAL at 05:04

## 2024-04-14 NOTE — PROGRESS NOTES
[facility] ([facility])  Rehab Progress Note    Patient Name: Safia Muñoz  MRN: [id number]  Age: 89 y.o. Sex: female  : 1931  Hospital Length of Stay: 2 days  Date of Service: 2024   Chief Complaint: Left hip fracture s/p left hip IM nailing on 2024     Subjective:     Basic Information  Admit Information: 89-year-old white female presented to [facility] ED on 2024 complaining of left hip pain after a ground level fall.  PMH significant for HTN and COPD not on home O2.  Workup significant for closed comminuted intertrochanteric left femur fracture.  Tolerated left hip IM nailing on  without perioperative complications.  Received 3 days Rocephin due to underlying UTI.  Urine culture appeared to be contaminated.  Orthopedic surgery recommended WBAT to LLE.  Tolerated transfer to [facility] inpatient rehab unit on  without incident.   Today's Information: No acute events overnight.  Laying in bed comfortably.  Reports good sleep and appetite.  Last BM .  Vital signs at goal with no recent recorded fevers.  Labs reviewed, BUN trended up slightly, otherwise unremarkable.  lower extremity edema improved slightly.  No imaging today.    Review of patient's allergies indicates:   Allergen Reactions    Adhesive tape-silicones Blisters    Ofloxacin     Penicillins     Sulfamethoxazole-trimethoprim      Other reaction(s): Unknown    Codeine Nausea Only        Current Facility-Administered Medications:     acetaminophen tablet 650 mg, 650 mg, Oral, Q6H, Freida, Augusto A, FNP, 650 mg at 24 0540    acetaminophen tablet 650 mg, 650 mg, Oral, Q8H PRN, Dian, Disha A, FNP    amLODIPine tablet 5 mg, 5 mg, Oral, Daily, Freida, Augusto A, FNP, 5 mg at 24 0740    benzonatate capsule 100 mg, 100 mg, Oral, TID PRN, Freida, Augusto A, FNP, 100 mg at 24    bisacodyL suppository 10 mg, 10 mg, Rectal, Daily PRN, Freida, Augusto A, FNP     cyanocobalamin tablet 500 mcg, 500 mcg, Oral, Daily, Freida, Augusto A, FNP, 500 mcg at 04/14/24 0740    docusate sodium capsule 100 mg, 100 mg, Oral, BID, Freida, Augusto A, FNP, 100 mg at 04/14/24 0740    enoxaparin injection 30 mg, 30 mg, Subcutaneous, Q24H (prophylaxis, 1700), Freida, Augusto A, FNP, 30 mg at 04/13/24 1739    [START ON 4/18/2024] estradiol 0.05 mg/24 hr td ptsw patch 1 patch, 1 patch, Transdermal, Every Thurs, Freida, Augusto A, FNP    ferrous sulfate tablet 1 each, 1 tablet, Oral, Daily, Freida, Augusto A, FNP, 1 each at 04/14/24 0740    gabapentin capsule 100 mg, 100 mg, Oral, TID, Freida, Augusto A, FNP, 100 mg at 04/14/24 0600    hydrALAZINE injection 10 mg, 10 mg, Intravenous, Q4H PRN, Freida, Augusto A, FNP    hydrOXYzine pamoate capsule 50 mg, 50 mg, Oral, Nightly PRN, Freida, Augusto A, FNP    labetalol 20 mg/4 mL (5 mg/mL) IV syring, 10 mg, Intravenous, Q4H PRN, Freida, Augusto A, FNP    methocarbamoL tablet 500 mg, 500 mg, Oral, TID, Freida, Augusto A, FNP, 500 mg at 04/14/24 0600    methocarbamoL tablet 500 mg, 500 mg, Oral, TID PRN, Dian Disha A, FNP, 500 mg at 04/12/24 1623    metoprolol injection 10 mg, 10 mg, Intravenous, Q2H PRN, Freida, Augusto A, FNP    nitroGLYCERIN SL tablet 0.4 mg, 0.4 mg, Sublingual, Q5 Min PRN, Freida, Augusto A, FNP    ondansetron disintegrating tablet 4 mg, 4 mg, Oral, Q6H PRN, Freida, Augusto A, FNP, 4 mg at 04/13/24 1036    oxyCODONE immediate release tablet 10 mg, 10 mg, Oral, Q4H PRN, Disha Biggs, FNP, 10 mg at 04/14/24 0739    oxyCODONE immediate release tablet 5 mg, 5 mg, Oral, Q4H PRN, Disha Biggs A, FNP, 5 mg at 04/12/24 1853    polyethylene glycol packet 17 g, 17 g, Oral, BID PRN, Freida, Augusto A, FNP    promethazine tablet 25 mg, 25 mg, Oral, Q6H PRN, Freida Augusto A, FNP     Review of Systems   Complete 12-point review of symptoms negative except for what's mentioned in HPI  "    Objective:     BP (!) 152/81   Pulse 93   Temp 98.9 °F (37.2 °C) (Oral)   Resp 18   Ht 5' 2.99" (1.6 m)   Wt 74.5 kg (164 lb 3.9 oz)   SpO2 98%   Breastfeeding No   BMI 29.10 kg/m²      Physical Exam  Constitutional:       Appearance: Normal appearance.   HENT:      Head: Normocephalic.      Mouth/Throat:      Mouth: Mucous membranes are moist.   Eyes:      Pupils: Pupils are equal, round, and reactive to light.   Cardiovascular:      Rate and Rhythm: Normal rate and regular rhythm.      Heart sounds: Normal heart sounds.   Pulmonary:      Effort: Pulmonary effort is normal.      Breath sounds: Normal breath sounds.   Abdominal:      General: Bowel sounds are normal.      Palpations: Abdomen is soft.   Musculoskeletal:      Cervical back: Neck supple.      Left lower le+ Edema present.      Comments: Left hip dressing clean and intact.  Diffuse muscle atrophy.    Skin:     General: Skin is warm and dry.   Neurological:      General: No focal deficit present.      Mental Status: She is alert and oriented to person, place, and time.   Psychiatric:         Mood and Affect: Mood normal.         Behavior: Behavior normal.         Thought Content: Thought content normal.         Judgment: Judgment normal.         Lines/Drains/Airways       Peripheral Intravenous Line  Duration                  Peripheral IV - Single Lumen 24 2159 20 G Anterior;Left;Proximal Forearm 5 days         Peripheral IV - Single Lumen 24 1210 18 G Right Forearm 4 days                    Labs  Admission on 2024   Component Date Value Ref Range Status    Sodium Level 2024 131 (L)  132 - 146 mmol/L Final    Potassium Level 2024 4.4  3.5 - 5.1 mmol/L Final    Chloride 2024 98  98 - 111 mmol/L Final    Carbon Dioxide 2024 26  23 - 31 mmol/L Final    Glucose Level 2024 99  75 - 121 mg/dL Final    Blood Urea Nitrogen 2024 14.9  9.8 - 20.1 mg/dL Final    Creatinine 2024 0.68  0.55 " - 1.02 mg/dL Final    Calcium Level Total 04/13/2024 8.6  8.4 - 10.2 mg/dL Final    Protein Total 04/13/2024 5.8  5.8 - 7.6 gm/dL Final    Albumin Level 04/13/2024 2.8 (L)  3.4 - 4.8 g/dL Final    Globulin 04/13/2024 3.0  2.4 - 3.5 gm/dL Final    Albumin/Globulin Ratio 04/13/2024 0.9 (L)  1.1 - 2.0 ratio Final    Bilirubin Total 04/13/2024 0.8  <=1.5 mg/dL Final    Alkaline Phosphatase 04/13/2024 62  40 - 150 unit/L Final    Alanine Aminotransferase 04/13/2024 11  0 - 55 unit/L Final    Aspartate Aminotransferase 04/13/2024 35 (H)  5 - 34 unit/L Final    eGFR 04/13/2024 >60  mls/min/1.73/m2 Final    Magnesium Level 04/13/2024 1.90  1.60 - 2.60 mg/dL Final    Phosphorus Level 04/13/2024 2.9  2.3 - 4.7 mg/dL Final    Ferritin Level 04/13/2024 110.93  4.63 - 204.00 ng/mL Final    Iron Binding Capacity Unsaturated 04/13/2024 177  70 - 310 ug/dL Final    Iron Level 04/13/2024 36 (L)  50 - 170 ug/dL Final    Transferrin 04/13/2024 187  mg/dL Final    Iron Binding Capacity Total 04/13/2024 213 (L)  250 - 450 ug/dL Final    Iron Saturation 04/13/2024 17 (L)  20 - 50 % Final    Prealbumin 04/13/2024 9.7 (L)  14.0 - 37.0 mg/dL Final    WBC 04/13/2024 11.14  4.50 - 11.50 x10(3)/mcL Final    RBC 04/13/2024 3.39 (L)  4.20 - 5.40 x10(6)/mcL Final    Hgb 04/13/2024 10.1 (L)  12.0 - 16.0 g/dL Final    Hct 04/13/2024 31.2 (L)  37.0 - 47.0 % Final    MCV 04/13/2024 92.0  80.0 - 94.0 fL Final    MCH 04/13/2024 29.8  27.0 - 31.0 pg Final    MCHC 04/13/2024 32.4 (L)  33.0 - 36.0 g/dL Final    RDW 04/13/2024 14.0  11.5 - 17.0 % Final    Platelet 04/13/2024 304  130 - 400 x10(3)/mcL Final    MPV 04/13/2024 10.0  7.4 - 10.4 fL Final    Neut % 04/13/2024 62.0  % Final    Lymph % 04/13/2024 20.6  % Final    Mono % 04/13/2024 10.7  % Final    Eos % 04/13/2024 5.2  % Final    Basophil % 04/13/2024 0.5  % Final    Lymph # 04/13/2024 2.29  0.6 - 4.6 x10(3)/mcL Final    Neut # 04/13/2024 6.91  2.1 - 9.2 x10(3)/mcL Final    Mono # 04/13/2024 1.19   0.1 - 1.3 x10(3)/mcL Final    Eos # 04/13/2024 0.58  0 - 0.9 x10(3)/mcL Final    Baso # 04/13/2024 0.06  <=0.2 x10(3)/mcL Final    IG# 04/13/2024 0.11 (H)  0 - 0.04 x10(3)/mcL Final    IG% 04/13/2024 1.0  % Final    NRBC% 04/13/2024 0.0  % Final    Sodium Level 04/14/2024 132  132 - 146 mmol/L Final    Potassium Level 04/14/2024 4.2  3.5 - 5.1 mmol/L Final    Chloride 04/14/2024 94 (L)  98 - 111 mmol/L Final    Carbon Dioxide 04/14/2024 29  23 - 31 mmol/L Final    Glucose Level 04/14/2024 108  75 - 121 mg/dL Final    Blood Urea Nitrogen 04/14/2024 21.1 (H)  9.8 - 20.1 mg/dL Final    Creatinine 04/14/2024 0.80  0.55 - 1.02 mg/dL Final    BUN/Creatinine Ratio 04/14/2024 26   Final    Calcium Level Total 04/14/2024 9.0  8.4 - 10.2 mg/dL Final    Anion Gap 04/14/2024 9.0  mEq/L Final    eGFR 04/14/2024 >60  mls/min/1.73/m2 Final    Magnesium Level 04/14/2024 1.90  1.60 - 2.60 mg/dL Final     Radiology  Left hip XR on 04/09/2024, IMPRESSION: Improved alignment following internal fixation of the femur.      Assessment/Plan:     89 y.o. WF admitted on 4/12/2024    Left hip fracture   - s/p left hip IM nailing on 04/09/2024  - WBAT to LLE  - discontinue staples on 04/30  - continue                Tylenol 650 mg every 6 hours                Calcium-vitamin D3 1 tablet b.i.d.                 Oxycodone 5 mg q.6 hours.                Robaxin 500 mg t.i.d.                Gabapentin 100 mg TID (initiated 4/12)   Gabapentin 200 mg at bedtime (initiated 4/14)  - defer to physiatry for rehab and pain management  - PT/OT/RT following     Osteoporosis  - stable  - vitamin-D level 42.1 on 04/10/2024  - continue.                Calcium-vitamin D3 1 tablet b.i.d.                 Estradiol 0.05 mg patch every Wednesday      HTN  - BP at goal!!  - continue                  Norvasc 5 mg daily (resumed 4/12)                Hydralazine 10 mg every 2 hours as needed for BP > 160/90                Labetalol 10 mg every 2 hours as needed for BP  > 160/90  - low sodium diet     COPD  - stable  - not on home 02  - keep 02 > 88%   - monitor closely     Normocytic anemia  - asymptomatic  - H/H trending down  - continue                Vitamin B12 tablet daily                    Ferrous sulfate 325 mg daily     - no evidence of active bleeds  - will closely monitor and transfuse if needed      Constipation  - stable   - continue  Colace 100 mg BID   Miralax 17 gm BID PRN    Bilateral Lower extremity edema  - current with improvement  - initiate   Lasix 40 mg IVP once now   Lasix 40 mg daily (to start 4/15)    Insomnia  - current  - initiate   Melatonin 6 mg at bedtime (initiated 4/14)     AB therapy  Rocephin 4/9-4/11     VTE Prophylaxis: Lovenox 30 mg daily   COVID-19 testing:  Unknown  COVID-19 vaccination status:  Vaccinated (Pfizer):  01/10/2021, 01/31/2021, 12/06/2022     POA: No  Living will: No  Contacts: Lacey Pizarro(Daughter) [phone]      CODE STATUS: DNR  Internal Medicine (attending): Pawan Myers MD  Physiatry (consulting):  Iglesia Tran MD     OUTPATIENT PROVIDERS  PCP:  Iglesia Steel MD  Orthopedic surgery:  Guy Jaquez D.O.     DISPOSITION:  Vital signs at goal.  Labs reviewed.  BUN trended up slightly.  Bilateral lower extremity edema improved slightly.  Initiate Lasix 40 mg IVP once now and repeat labs in a.m..  Plans to initiate p.o. Lasix on 4/15.  Reports slightly decreased sleep hygiene 2/2 pain overnight.  Initiate melatonin 6 mg at bedtime and gabapentin 200 mg at bedtime to equal 300 mg total.  Bowel management and appetite at goal.  Aggressively mobilize with therapies as tolerated.  Monitor closely.  Notify of any acute changes.     Total time spent on this encounter including chart review and direct 1-on-1 patient interaction: 51 minutes   Over 50% of this time was spent in counseling and coordination of care

## 2024-04-14 NOTE — PLAN OF CARE
Problem: Fall Injury Risk  Goal: Absence of Fall and Fall-Related Injury  Outcome: Ongoing, Progressing  Intervention: Promote Injury-Free Environment  Flowsheets (Taken 4/13/2024 5357)  Safety Promotion/Fall Prevention:   assistive device/personal item within reach   bed alarm set   Fall Risk signage in place   lighting adjusted   nonskid shoes/socks when out of bed   side rails raised x 3   supervised activity   Supervised toileting - stay within arms reach   instructed to call staff for mobility

## 2024-04-15 PROBLEM — R11.10 VOMITING: Status: ACTIVE | Noted: 2024-04-15

## 2024-04-15 LAB
ALBUMIN SERPL-MCNC: 2.8 G/DL (ref 3.4–4.8)
ALBUMIN/GLOB SERPL: 0.9 RATIO (ref 1.1–2)
ALP SERPL-CCNC: 71 UNIT/L (ref 40–150)
ALT SERPL-CCNC: 28 UNIT/L (ref 0–55)
APPEARANCE UR: CLEAR
AST SERPL-CCNC: 47 UNIT/L (ref 5–34)
BASOPHILS # BLD AUTO: 0.06 X10(3)/MCL
BASOPHILS NFR BLD AUTO: 0.5 %
BILIRUB SERPL-MCNC: 0.8 MG/DL
BILIRUB UR QL STRIP.AUTO: NEGATIVE
BUN SERPL-MCNC: 16.9 MG/DL (ref 9.8–20.1)
CALCIUM SERPL-MCNC: 8.9 MG/DL (ref 8.4–10.2)
CHLORIDE SERPL-SCNC: 94 MMOL/L (ref 98–111)
CO2 SERPL-SCNC: 31 MMOL/L (ref 23–31)
COLOR UR AUTO: NORMAL
CREAT SERPL-MCNC: 0.8 MG/DL (ref 0.55–1.02)
EOSINOPHIL # BLD AUTO: 0.11 X10(3)/MCL (ref 0–0.9)
EOSINOPHIL NFR BLD AUTO: 0.9 %
ERYTHROCYTE [DISTWIDTH] IN BLOOD BY AUTOMATED COUNT: 14.4 % (ref 11.5–17)
FLUAV AG UPPER RESP QL IA.RAPID: NOT DETECTED
FLUBV AG UPPER RESP QL IA.RAPID: NOT DETECTED
GFR SERPLBLD CREATININE-BSD FMLA CKD-EPI: >60 MLS/MIN/1.73/M2
GLOBULIN SER-MCNC: 3 GM/DL (ref 2.4–3.5)
GLUCOSE SERPL-MCNC: 121 MG/DL (ref 75–121)
GLUCOSE UR QL STRIP.AUTO: NEGATIVE
HCT VFR BLD AUTO: 34 % (ref 37–47)
HGB BLD-MCNC: 11 G/DL (ref 12–16)
IMM GRANULOCYTES # BLD AUTO: 0.22 X10(3)/MCL (ref 0–0.04)
IMM GRANULOCYTES NFR BLD AUTO: 1.9 %
KETONES UR QL STRIP.AUTO: NEGATIVE
LACTATE SERPL-SCNC: 1.4 MMOL/L (ref 0.5–2.2)
LEUKOCYTE ESTERASE UR QL STRIP.AUTO: NEGATIVE
LYMPHOCYTES # BLD AUTO: 1.71 X10(3)/MCL (ref 0.6–4.6)
LYMPHOCYTES NFR BLD AUTO: 14.7 %
MAGNESIUM SERPL-MCNC: 2.1 MG/DL (ref 1.6–2.6)
MCH RBC QN AUTO: 29.2 PG (ref 27–31)
MCHC RBC AUTO-ENTMCNC: 32.4 G/DL (ref 33–36)
MCV RBC AUTO: 90.2 FL (ref 80–94)
MONOCYTES # BLD AUTO: 1.11 X10(3)/MCL (ref 0.1–1.3)
MONOCYTES NFR BLD AUTO: 9.5 %
NEUTROPHILS # BLD AUTO: 8.46 X10(3)/MCL (ref 2.1–9.2)
NEUTROPHILS NFR BLD AUTO: 72.5 %
NITRITE UR QL STRIP.AUTO: NEGATIVE
NRBC BLD AUTO-RTO: 0 %
PH UR STRIP.AUTO: 7 [PH]
PHOSPHATE SERPL-MCNC: 4.2 MG/DL (ref 2.3–4.7)
PLATELET # BLD AUTO: 424 X10(3)/MCL (ref 130–400)
PMV BLD AUTO: 10.1 FL (ref 7.4–10.4)
POTASSIUM SERPL-SCNC: 4.8 MMOL/L (ref 3.5–5.1)
PREALB SERPL-MCNC: 13.5 MG/DL (ref 14–37)
PROT SERPL-MCNC: 5.8 GM/DL (ref 5.8–7.6)
PROT UR QL STRIP.AUTO: NEGATIVE
RBC # BLD AUTO: 3.77 X10(6)/MCL (ref 4.2–5.4)
RBC UR QL AUTO: NEGATIVE
SARS-COV-2 RNA RESP QL NAA+PROBE: NOT DETECTED
SODIUM SERPL-SCNC: 133 MMOL/L (ref 132–146)
SP GR UR STRIP.AUTO: 1.01 (ref 1–1.03)
UROBILINOGEN UR STRIP-ACNC: 0.2
WBC # SPEC AUTO: 11.67 X10(3)/MCL (ref 4.5–11.5)

## 2024-04-15 PROCEDURE — 51798 US URINE CAPACITY MEASURE: CPT

## 2024-04-15 PROCEDURE — 25000003 PHARM REV CODE 250: Performed by: NURSE PRACTITIONER

## 2024-04-15 PROCEDURE — 97530 THERAPEUTIC ACTIVITIES: CPT

## 2024-04-15 PROCEDURE — 94799 UNLISTED PULMONARY SVC/PX: CPT

## 2024-04-15 PROCEDURE — 97110 THERAPEUTIC EXERCISES: CPT

## 2024-04-15 PROCEDURE — 97535 SELF CARE MNGMENT TRAINING: CPT

## 2024-04-15 PROCEDURE — 63600175 PHARM REV CODE 636 W HCPCS: Performed by: NURSE PRACTITIONER

## 2024-04-15 PROCEDURE — 99900031 HC PATIENT EDUCATION (STAT)

## 2024-04-15 PROCEDURE — 97110 THERAPEUTIC EXERCISES: CPT | Mod: CQ

## 2024-04-15 PROCEDURE — 85025 COMPLETE CBC W/AUTO DIFF WBC: CPT | Performed by: NURSE PRACTITIONER

## 2024-04-15 PROCEDURE — 11800000 HC REHAB PRIVATE ROOM

## 2024-04-15 PROCEDURE — 83605 ASSAY OF LACTIC ACID: CPT | Performed by: NURSE PRACTITIONER

## 2024-04-15 PROCEDURE — 97542 WHEELCHAIR MNGMENT TRAINING: CPT

## 2024-04-15 PROCEDURE — 0240U COVID/FLU A&B PCR: CPT | Performed by: NURSE PRACTITIONER

## 2024-04-15 PROCEDURE — 84134 ASSAY OF PREALBUMIN: CPT | Performed by: NURSE PRACTITIONER

## 2024-04-15 PROCEDURE — 81003 URINALYSIS AUTO W/O SCOPE: CPT | Performed by: NURSE PRACTITIONER

## 2024-04-15 PROCEDURE — 80053 COMPREHEN METABOLIC PANEL: CPT | Performed by: NURSE PRACTITIONER

## 2024-04-15 PROCEDURE — 99900035 HC TECH TIME PER 15 MIN (STAT)

## 2024-04-15 PROCEDURE — 84100 ASSAY OF PHOSPHORUS: CPT | Performed by: NURSE PRACTITIONER

## 2024-04-15 PROCEDURE — 83735 ASSAY OF MAGNESIUM: CPT | Performed by: NURSE PRACTITIONER

## 2024-04-15 RX ORDER — BISACODYL 10 MG/1
10 SUPPOSITORY RECTAL ONCE
Status: DISCONTINUED | OUTPATIENT
Start: 2024-04-15 | End: 2024-04-29 | Stop reason: HOSPADM

## 2024-04-15 RX ADMIN — DOCUSATE SODIUM 100 MG: 100 CAPSULE, LIQUID FILLED ORAL at 08:04

## 2024-04-15 RX ADMIN — FUROSEMIDE 40 MG: 40 TABLET ORAL at 10:04

## 2024-04-15 RX ADMIN — DOCUSATE SODIUM 100 MG: 100 CAPSULE, LIQUID FILLED ORAL at 10:04

## 2024-04-15 RX ADMIN — METHOCARBAMOL 500 MG: 500 TABLET ORAL at 08:04

## 2024-04-15 RX ADMIN — ENOXAPARIN SODIUM 30 MG: 30 INJECTION SUBCUTANEOUS at 06:04

## 2024-04-15 RX ADMIN — METHOCARBAMOL 500 MG: 500 TABLET ORAL at 02:04

## 2024-04-15 RX ADMIN — BISACODYL 10 MG: 10 SUPPOSITORY RECTAL at 09:04

## 2024-04-15 RX ADMIN — AMLODIPINE BESYLATE 5 MG: 5 TABLET ORAL at 10:04

## 2024-04-15 RX ADMIN — ACETAMINOPHEN 325MG 650 MG: 325 TABLET ORAL at 06:04

## 2024-04-15 RX ADMIN — OXYCODONE 10 MG: 5 TABLET ORAL at 07:04

## 2024-04-15 RX ADMIN — ONDANSETRON 4 MG: 4 TABLET, ORALLY DISINTEGRATING ORAL at 07:04

## 2024-04-15 RX ADMIN — GABAPENTIN 100 MG: 100 CAPSULE ORAL at 02:04

## 2024-04-15 RX ADMIN — Medication 6 MG: at 08:04

## 2024-04-15 RX ADMIN — PROMETHAZINE HYDROCHLORIDE 25 MG: 25 TABLET ORAL at 09:04

## 2024-04-15 RX ADMIN — ACETAMINOPHEN 325MG 650 MG: 325 TABLET ORAL at 12:04

## 2024-04-15 RX ADMIN — CYANOCOBALAMIN TAB 500 MCG 500 MCG: 500 TAB at 12:04

## 2024-04-15 NOTE — PROGRESS NOTES
"   04/15/24 1330   Rec Therapy Time Calculation   Date of Treatment 04/15/24   Rec Start Time 1330   Rec Stop Time 1400   Rec Total Time (min) 30 min   Time   Treatment time 2 units   Charges   $Therapeutic Exercise 2 units   Precautions   General Precautions fall   Orthopedic Precautions  LLE weight bearing as tolerated   Braces N/A   Pain/Comfort   Pain Rating 1 no pain   OTHER   Treatment Diagnosis Fall, L intertrochanteric femur fx, IMN, COPD, HTN   Rehab identified problem list/impairments weakness;impaired endurance;impaired functional mobility;gait instability;impaired balance;impaired cognition;decreased coordination;decreased upper extremity function;decreased lower extremity function;decreased safety awareness;decreased ROM;impaired coordination;impaired fine motor   Values/Beliefs/Spiritual Care   Spiritual, Cultural Beliefs, Christian Practices, Values that Affect Care no   Prior Living/ADLs   Admit Date 04/12/24   People in Home alone   Living Arrangements assisted living   Patient responsibilites: Caregiver to pet;Community mobility;;Financial management;Health and wellness;Laundry;Leisure/play/hobbies;Retired;Shopping;Social participation   Number of Children 3   Occupation Retired: Secretarial work   Previous Hand Domiance Right   Current Hand Dominance Right  (Arnoldo UE weakness)   Overall Level of Functioning   Activity Tolerance Standby Assist   Dynamic Sitting Balance/Reaching Standby Assist   Dynamic Standing Balance/Reaching Does not occur   Right UE Coodination/Dexterity Min A   Left UE Coordination/Dexterity Min A   Problem Solving/Sequencing Skills Mod A   Memory Recall Mod A   R/L Neglect/Inattention Does not occur   Attention Span Mod A   Social Interaction Standby Assist   Patient Goals   Patient Goal 1 "To get to stand and walk again."   Recreational Therapy Short Term Goals   Short Term Goal 1 Will increase activity tolerance to supervision   Short Term Goal 1 Progression Initiated "   Short Term Goal 2 Will increase sit to stand to min   Short Term Goal 2 Progression Initiated   Short Term Goal 3 Will improve dynamic standing balance/reaching to min   Short Term Goal 3 Progression Initiated   Recreational Therapy Long Term Goals   Long Term Goal 1 Will increase standing tolerance to 5 minutes   Long Term Goal 1 Progression Initiated   Long Term Goal 2 Will improve dynamic standing balance/reaching to supervision   Long Term Goal 2 Progression Initiated   Plan   Patient to be seen Daily   Planned Duration 2 weeks   Treatments Planned Balance training;Cognitive training;Coordination;Energy conservation training;Fine motor;Safety education   Treatment plan/goals estblished with Patient/Caregiver Yes

## 2024-04-15 NOTE — PT/OT/SLP PROGRESS
Occupational Therapy Inpatient Rehab Treatment    Name: Safia Muñoz  MRN: [id number]    Assessment:  Safia Muñoz is a 89 y.o. female admitted with a medical diagnosis of Closed 2-part intertrochanteric fracture of proximal end of left femur.  She presents with the following impairments/functional limitations:  weakness, impaired endurance, impaired self care skills, impaired functional mobility, gait instability, impaired balance, decreased lower extremity function, decreased coordination, impaired cognition, edema, impaired skin, impaired coordination, decreased ROM, pain, decreased safety awareness, orthopedic precautions.    General Precautions: Standard, fall     Orthopedic Precautions:LLE weight bearing as tolerated     Braces: N/A    Rehab Prognosis: Fair; patient would benefit from acute skilled OT services to address these deficits and reach maximum level of function.      History:     Past Medical History:   Diagnosis Date    COPD (chronic obstructive pulmonary disease)     Hypertension        Past Surgical History:   Procedure Laterality Date    RETROGRADE INTRAMEDULLARY RODDING OF DISTAL FEMUR Left 4/9/2024    Procedure: INSERTION, INTRAMEDULLARY NAIL INTERTROCHENTERIC FRACTURE;  Surgeon: Guy Jaquez DO;  Location: [facility];  Service: Orthopedics;  Laterality: Left;  HANA TABLE, SYNTHES TFNA       Subjective     Orientation: Not oriented to situation and Not oriented to time    Chief Complaint: fatigue    Patient/Family Comments/goals: to get better    Respiratory Status: Room air    Patients cultural, spiritual, Shinto conflicts given the current situation: no       Objective:     Patient found up in chair with peripheral IV  upon OT entry to room.    ADLs   Current Status   Oral Hygiene 3 mod A needed for sequencing and coordination to place toothpaste on toothbrush, HOHA needed to place toothbrush into mouth   *While sitting in WC at sink, pt performed grooming activities - brushing  hair. Required an increased amount of time to complete grooming and oral hygiene.    Limiting Factors for ADLs: motor, psychsocial, endurance, limited ROM, balance, weakness, perceptual, coordination, cognition, safety awareness, and pain     Patient left up in chair with all lines intact, call button in reach, chair alarm on, and RT Corinna present.     Education provided: Roles and goals of OT, ADLs, wheelchair precautions, sequencing, safety precautions, fall prevention, and post-op precautions    Multidisciplinary Problems       Occupational Therapy Goals          Problem: Occupational Therapy    Goal Priority Disciplines Outcome Interventions   Occupational Therapy Goal     OT, PT/OT Ongoing, Progressing    Description: Pt to perform eating tasks with independence by re-eval.  Pt to perform oral hygiene tasks with min A standing at sink with RW by re-eval.  Pt to perform UB dressing with independence by re-eval.  Pt to perform LB dressing with mod A using AE PRN by re-eval.  Pt to perform donning/doffing footwear with max A using AE PRN by re-eval.  Pt to perform toileting hygiene with mod A by re-eval.  Pt to perform bathing tasks with mod A by re-eval.    Pt to perform toilet transfers with mod A using LRAD by re-eval.  Pt to perform WIS transfers with mod A using LRAD by re-eval.    Pt to perform meal prep activity with mod A by re-eval.  Pt to perform laundry management task with mod A by re-eval.  Pt to perform light housekeeping activity with mod A by re-eval.    Balance, Strengthening, Endurance, Balance:  Pt to consistently demonstrate adherence to orthopedic precautions during all ADL's as instructed by OT.  Pt to demonstrate consistent adherence to breathing control and energy conservation techniques as educated by OT.                         Time Tracking     OT Received On: 04/15/24  Time In 1300     Time Out 1330  Total Time 30 min  Therapy Time: OT Individual: 30      Billable Minutes: Self  Care/Home Management 30    04/15/2024

## 2024-04-15 NOTE — PT/OT/SLP PROGRESS
Occupational Therapy Inpatient Rehab Treatment    Name: Safia Muñoz  MRN: [id number]    Assessment:  Safia Muñoz is a 89 y.o. female admitted with a medical diagnosis of Closed 2-part intertrochanteric fracture of proximal end of left femur.  She presents with the following impairments/functional limitations:  weakness, impaired endurance, impaired self care skills, impaired functional mobility, gait instability, impaired balance, pain, decreased ROM, decreased safety awareness, decreased lower extremity function, impaired cognition, impaired muscle length, impaired joint extensibility, edema.    During session, patient speaking very low, mod verbal cues to increase volume. Also speaking nonsensical phrases, inappropriate to context.    General Precautions: Standard, fall     Orthopedic Precautions:LLE weight bearing as tolerated     Braces: N/A    Rehab Prognosis: Fair; patient would benefit from acute skilled OT services to address these deficits and reach maximum level of function.      History:     Past Medical History:   Diagnosis Date    COPD (chronic obstructive pulmonary disease)     Hypertension        Past Surgical History:   Procedure Laterality Date    RETROGRADE INTRAMEDULLARY RODDING OF DISTAL FEMUR Left 4/9/2024    Procedure: INSERTION, INTRAMEDULLARY NAIL INTERTROCHENTERIC FRACTURE;  Surgeon: Guy Jaquez DO;  Location: [facility];  Service: Orthopedics;  Laterality: Left;  HANA TABLE, SYNTHES TFNA       Subjective     Orientation: Oriented x4    Chief Complaint: fatigue, L hip pain    Patient/Family Comments/goals: to get better    Respiratory Status: Room air    Patients cultural, spiritual, Islam conflicts given the current situation: no       Objective:     Patient found up in chair with peripheral IV  upon OT entry to room.    Mobility   Patient completed:  Rolling/Turning to Left with moderate assistance  Rolling/Turning to Right with moderate assistance  Sit to Supine with  moderate assistance  Sit to Stand Transfer with maximal assistance with rolling walker  Stand to Sit Transfer with moderate assistance with rolling walker  WC to bed transfer with moderate assistance with rolling walker and max verbal cues    ADLs   Current Status   Toileting Hygiene 1 total A in bed with RN assist; pt receiving soap suds enema     Limiting Factors for ADLs: motor, psychsocial, endurance, limited ROM, balance, weakness, coordination, cognition, safety awareness, and pain       Patient left right sidelying with all lines intact, call button in reach, and OSCAR Moseley present.     Education provided: Roles and goals of OT, ADLs, transfer training, bed mobility, body mechanics, assistive device, wheelchair precautions, sequencing, safety precautions, fall prevention, and post-op precautions    Multidisciplinary Problems       Occupational Therapy Goals          Problem: Occupational Therapy    Goal Priority Disciplines Outcome Interventions   Occupational Therapy Goal     OT, PT/OT Ongoing, Progressing    Description: Pt to perform eating tasks with independence by re-eval.  Pt to perform oral hygiene tasks with min A standing at sink with RW by re-eval.  Pt to perform UB dressing with independence by re-eval.  Pt to perform LB dressing with mod A using AE PRN by re-eval.  Pt to perform donning/doffing footwear with max A using AE PRN by re-eval.  Pt to perform toileting hygiene with mod A by re-eval.  Pt to perform bathing tasks with mod A by re-eval.    Pt to perform toilet transfers with mod A using LRAD by re-eval.  Pt to perform WIS transfers with mod A using LRAD by re-eval.    Pt to perform meal prep activity with mod A by re-eval.  Pt to perform laundry management task with mod A by re-eval.  Pt to perform light housekeeping activity with mod A by re-eval.    Balance, Strengthening, Endurance, Balance:  Pt to consistently demonstrate adherence to orthopedic precautions during all ADL's as  instructed by OT.  Pt to demonstrate consistent adherence to breathing control and energy conservation techniques as educated by OT.                         Time Tracking     OT Received On: 04/15/24  Time In 1530     Time Out 1615  Total Time 45 min  Therapy Time: OT Individual: 45  Missed Time: 80 Minutes  Missed Time Reason: Patient ill (Comment), Patient fatigue, Nursing care    Billable Minutes: Self Care/Home Management 25 and Therapeutic Activity 20    04/15/2024

## 2024-04-15 NOTE — PROGRESS NOTES
[facility] ([facility])  Rehab Progress Note    Patient Name: Safia Muñoz  MRN: [id number]  Age: 89 y.o. Sex: female  : 1931  Hospital Length of Stay: 3 days  Date of Service: 4/15/2024   Chief Complaint: Left hip fracture s/p left hip IM nailing on 2024     Subjective:     Basic Information  Admit Information: 89-year-old white female presented to [facility] ED on 2024 complaining of left hip pain after a ground level fall.  PMH significant for HTN and COPD not on home O2.  Workup significant for closed comminuted intertrochanteric left femur fracture.  Tolerated left hip IM nailing on  without perioperative complications.  Received 3 days Rocephin due to underlying UTI.  Urine culture appeared to be contaminated.  Orthopedic surgery recommended WBAT to LLE.  Tolerated transfer to [facility] inpatient rehab unit on  without incident.   Today's Information: No acute events overnight.  Lying in bed.  Mildly confused this morning.  Staff reported emesis x1 on  and then again this morning.  Sleep fair.  Appetite poor due to nausea.  Vital signs at goal with no recorded fevers.  Lab work overall unremarkable.  Bladder scan significant for retention of greater than 900 mL.  KUB significant for residual feces throughout the colon.    Review of patient's allergies indicates:   Allergen Reactions    Adhesive tape-silicones Blisters    Ofloxacin     Penicillins     Sulfamethoxazole-trimethoprim      Other reaction(s): Unknown    Codeine Nausea Only        Current Facility-Administered Medications:     acetaminophen tablet 650 mg, 650 mg, Oral, Q6H, Freida, Augusto A, FNP, 650 mg at 24 0540    acetaminophen tablet 650 mg, 650 mg, Oral, Q8H PRN, [facility], Disha A, FNP    amLODIPine tablet 5 mg, 5 mg, Oral, Daily, Freida, Augusto A, FNP, 5 mg at 24 0740    benzonatate capsule 100 mg, 100 mg, Oral, TID PRN, Freida, Augusto A, FNP, 100 mg at 24  2025    bisacodyL suppository 10 mg, 10 mg, Rectal, Daily PRN, Freida Augusto A, FNP    cyanocobalamin tablet 500 mcg, 500 mcg, Oral, Daily, Freida, Augusto A, FNP, 500 mcg at 04/14/24 0740    docusate sodium capsule 100 mg, 100 mg, Oral, BID, Freida, Augusto A, FNP, 100 mg at 04/14/24 2000    enoxaparin injection 30 mg, 30 mg, Subcutaneous, Q24H (prophylaxis, 1700), Freida, Augusto A, FNP, 30 mg at 04/14/24 1714    [START ON 4/18/2024] estradiol 0.05 mg/24 hr td ptsw patch 1 patch, 1 patch, Transdermal, Every Thurs, Freida Augusto A, FNP    ferrous sulfate tablet 1 each, 1 tablet, Oral, Daily, Freida, Augusto A, FNP, 1 each at 04/14/24 0740    furosemide tablet 40 mg, 40 mg, Oral, Daily, Ирина Ness FNP    gabapentin capsule 100 mg, 100 mg, Oral, TID, Freida, Augusto A, FNP, 100 mg at 04/14/24 2010    gabapentin capsule 200 mg, 200 mg, Oral, QHS, Ирина Ness, ORLYP, 200 mg at 04/14/24 2011    hydrALAZINE injection 10 mg, 10 mg, Intravenous, Q4H PRN, Augusto Guan A, FNP    hydrOXYzine pamoate capsule 50 mg, 50 mg, Oral, Nightly PRN, Augusto Guan A, FNP, 50 mg at 04/14/24 2012    labetalol 20 mg/4 mL (5 mg/mL) IV syring, 10 mg, Intravenous, Q4H PRN, Augusto Guan A, FNP    melatonin tablet 6 mg, 6 mg, Oral, Nightly, Ирина Ness FNP, 6 mg at 04/14/24 2011    methocarbamoL tablet 500 mg, 500 mg, Oral, TID, Augusto Guan A, FNP, 500 mg at 04/14/24 2011    methocarbamoL tablet 500 mg, 500 mg, Oral, TID PRN, Disha Biggs FNP, 500 mg at 04/12/24 1623    metoprolol injection 10 mg, 10 mg, Intravenous, Q2H PRN, Augusto Guan FNP    nitroGLYCERIN SL tablet 0.4 mg, 0.4 mg, Sublingual, Q5 Min PRN, Augusto Guan FNP    ondansetron disintegrating tablet 4 mg, 4 mg, Oral, Q6H PRN, Augusto Guan FNP, 4 mg at 04/14/24 1715    oxyCODONE immediate release tablet 10 mg, 10 mg, Oral, Q4H PRN, Disha Biggs FNP, 10 mg at 04/14/24 6683     "oxyCODONE immediate release tablet 5 mg, 5 mg, Oral, Q4H PRN, Disha Biggs A, FNP, 5 mg at 24 5603    polyethylene glycol packet 17 g, 17 g, Oral, BID PRN, Freida, Augusto A, FNP    promethazine tablet 25 mg, 25 mg, Oral, Q6H PRN, Freida, Augusto A, FNP, 25 mg at 24 3211     Review of Systems   Complete 12-point review of symptoms negative except for what's mentioned in HPI     Objective:     /73   Pulse 104   Temp 98.8 °F (37.1 °C) (Oral)   Resp 20   Ht 5' 2.99" (1.6 m)   Wt 74.5 kg (164 lb 3.9 oz)   SpO2 (!) 93%   Breastfeeding No   BMI 29.10 kg/m²      Physical Exam  Constitutional:       Appearance: She is ill-appearing.   HENT:      Head: Normocephalic.      Mouth/Throat:      Mouth: Mucous membranes are moist.   Eyes:      Pupils: Pupils are equal, round, and reactive to light.   Cardiovascular:      Rate and Rhythm: Normal rate and regular rhythm.      Heart sounds: Normal heart sounds.   Pulmonary:      Effort: Pulmonary effort is normal.      Breath sounds: Normal breath sounds.   Abdominal:      General: Bowel sounds are normal.      Palpations: Abdomen is soft.   Musculoskeletal:      Cervical back: Neck supple.      Left lower le+ Edema present.      Comments: Left hip dressing clean and intact.  Diffuse muscle atrophy.    Skin:     General: Skin is warm and dry.   Neurological:      Motor: Weakness present.   Psychiatric:      Comments: Drowsy     *MD performed and documented physical examination       Lines/Drains/Airways       Peripheral Intravenous Line  Duration                  Peripheral IV - Single Lumen 24 1210 18 G Right Forearm 5 days                  Labs  Recent Results (from the past 24 hour(s))   Comprehensive Metabolic Panel    Collection Time: 04/15/24  5:08 AM   Result Value Ref Range    Sodium Level 133 132 - 146 mmol/L    Potassium Level 4.8 3.5 - 5.1 mmol/L    Chloride 94 (L) 98 - 111 mmol/L    Carbon Dioxide 31 23 - 31 mmol/L    Glucose " Level 121 75 - 121 mg/dL    Blood Urea Nitrogen 16.9 9.8 - 20.1 mg/dL    Creatinine 0.80 0.55 - 1.02 mg/dL    Calcium Level Total 8.9 8.4 - 10.2 mg/dL    Protein Total 5.8 5.8 - 7.6 gm/dL    Albumin Level 2.8 (L) 3.4 - 4.8 g/dL    Globulin 3.0 2.4 - 3.5 gm/dL    Albumin/Globulin Ratio 0.9 (L) 1.1 - 2.0 ratio    Bilirubin Total 0.8 <=1.5 mg/dL    Alkaline Phosphatase 71 40 - 150 unit/L    Alanine Aminotransferase 28 0 - 55 unit/L    Aspartate Aminotransferase 47 (H) 5 - 34 unit/L    eGFR >60 mls/min/1.73/m2   Magnesium    Collection Time: 04/15/24  5:08 AM   Result Value Ref Range    Magnesium Level 2.10 1.60 - 2.60 mg/dL   Phosphorus    Collection Time: 04/15/24  5:08 AM   Result Value Ref Range    Phosphorus Level 4.2 2.3 - 4.7 mg/dL   Prealbumin    Collection Time: 04/15/24  5:08 AM   Result Value Ref Range    Prealbumin 13.5 (L) 14.0 - 37.0 mg/dL   CBC with Differential    Collection Time: 04/15/24  5:08 AM   Result Value Ref Range    WBC 11.67 (H) 4.50 - 11.50 x10(3)/mcL    RBC 3.77 (L) 4.20 - 5.40 x10(6)/mcL    Hgb 11.0 (L) 12.0 - 16.0 g/dL    Hct 34.0 (L) 37.0 - 47.0 %    MCV 90.2 80.0 - 94.0 fL    MCH 29.2 27.0 - 31.0 pg    MCHC 32.4 (L) 33.0 - 36.0 g/dL    RDW 14.4 11.5 - 17.0 %    Platelet 424 (H) 130 - 400 x10(3)/mcL    MPV 10.1 7.4 - 10.4 fL    Neut % 72.5 %    Lymph % 14.7 %    Mono % 9.5 %    Eos % 0.9 %    Basophil % 0.5 %    Lymph # 1.71 0.6 - 4.6 x10(3)/mcL    Neut # 8.46 2.1 - 9.2 x10(3)/mcL    Mono # 1.11 0.1 - 1.3 x10(3)/mcL    Eos # 0.11 0 - 0.9 x10(3)/mcL    Baso # 0.06 <=0.2 x10(3)/mcL    IG# 0.22 (H) 0 - 0.04 x10(3)/mcL    IG% 1.9 %    NRBC% 0.0 %   COVID/FLU A&B PCR    Collection Time: 04/15/24  9:18 AM   Result Value Ref Range    Influenza A PCR Not Detected Not Detected    Influenza B PCR Not Detected Not Detected    SARS-CoV-2 PCR Not Detected Not Detected, Negative       Radiology  Left hip XR on 04/09/2024, IMPRESSION: Improved alignment following internal fixation of the  femur.  Radiology  Abdominal XR on 04/15/2024, IMPRESSION:  Residual feces nonspecific gas pattern.    Assessment/Plan:     89 y.o. WF admitted on 4/12/2024    Left hip fracture   - s/p left hip IM nailing on 04/09/2024  - WBAT to LLE  - discontinue staples on 04/30  - continue                Tylenol 650 mg every 6 hours                Calcium-vitamin D3 1 tablet b.i.d.                 Oxycodone 5 mg q.6 hours.                Robaxin 500 mg t.i.d.                Gabapentin 100 mg TID (initiated 4/12)    Gabapentin 200 mg at bedtime (initiated 4/14)  - defer to physiatry for rehab and pain management  - PT/OT/RT following     Osteoporosis  - stable  - vitamin-D level 42.1 on 04/10/2024  - continue.                Calcium-vitamin D3 1 tablet b.i.d.                 Estradiol 0.05 mg patch every Wednesday      HTN  - BP at goal!!  - continue                  Norvasc 5 mg daily (resumed 4/12)                Hydralazine 10 mg every 2 hours as needed for BP > 160/90                Labetalol 10 mg every 2 hours as needed for BP > 160/90  - low sodium diet     COPD  - stable  - not on home 02  - keep 02 > 88%   - monitor closely     Normocytic anemia  - asymptomatic  - H/H trending down  - continue                Vitamin B12 tablet daily                    Ferrous sulfate 325 mg daily     - no evidence of active bleeds  - will closely monitor and transfuse if needed      Constipation  - stable   - continue  Colace 100 mg BID   Miralax 17 gm BID PRN    Bilateral Lower extremity edema  - resolving  - continue   Lasix 40 mg daily (to start 4/15)    Insomnia  - improved  - continue   Melatonin 6 mg at bedtime (initiated 4/14)     AB therapy  Rocephin 4/9-4/11     VTE Prophylaxis: Lovenox 30 mg daily   COVID-19 testing:  Unknown  COVID-19 vaccination status:  Vaccinated (Pfizer):  01/10/2021, 01/31/2021, 12/06/2022     POA: No  Living will: No  Contacts: Lacey Pizarro(Daughter) [phone]      CODE STATUS: DNR  Internal  Medicine (attending): Pawan Myers MD  Physiatry (consulting):  Iglesia Tran MD     OUTPATIENT PROVIDERS  PCP:  Iglesia Steel MD  Orthopedic surgery:  Guy Jaquez D.O.     DISPOSITION:  Sleep hygiene and appetite fair.  Last BM 3 days prior.  Vital signs at goal with no recorded fevers.  Lab work unremarkable.  KUB significant for residual feces throughout the colon.  Bladder scan significant for greater than 900 mL.  Tolerated in and out catheterization.  Obtain UA.  Initiate Dulcolax suppository now.  May require soapsuds enema.  Continue bladder scan every 6 hours times 24 hours to evaluate for urinary retention.  Notify NP or MD if greater than 300 mL.  Plan for in and out catheterization x2.  If urinary retention continues, plan on indwelling catheter.  Continue aggressive mobilization as tolerated.  Float minutes if necessary.  Monitor closely.  Notify of acute changes.     Jacob Guan NP conducted independent physical examination and assisted with medical documentation.    Total time spent on this encounter including chart review and direct MD + NP 1-on-1 patient interaction: 57 minutes   Over 50% of this time was spent in counseling and coordination of care

## 2024-04-15 NOTE — PT/OT/SLP PROGRESS
"Physical Therapy Inpatient Rehab Treatment    Patient Name:  Safia Muñoz   MRN:  [id number]    Recommendations:     Discharge Recommendations:  Moderate Intensity Therapy   Discharge Equipment Recommendations: walker, rolling, bedside commode, shower chair   Barriers to discharge: Decreased caregiver support    Assessment:     Safia Muñoz is a 89 y.o. female admitted with a medical diagnosis of Closed 2-part intertrochanteric fracture of proximal end of left femur.  She presents with the following impairments/functional limitations:  weakness, impaired self care skills, impaired sensation, impaired endurance, impaired functional mobility, gait instability, impaired balance, decreased lower extremity function, decreased upper extremity function, decreased coordination, impaired cognition, decreased safety awareness, decreased ROM, impaired coordination, impaired fine motor, impaired joint extensibility, impaired muscle length, orthopedic precautions .    Rehab Diagnosis: medical diagnosis of Closed 2-part intertrochanteric fracture of proximal end of left femur    General Precautions: Standard, fall     Orthopedic Precautions:LLE weight bearing as tolerated     Braces: N/A    Rehab Prognosis: Fair; patient would benefit from acute skilled PT services to address these deficits and reach maximum level of function.      History:     Past Medical History:   Diagnosis Date    COPD (chronic obstructive pulmonary disease)     Hypertension        Past Surgical History:   Procedure Laterality Date    RETROGRADE INTRAMEDULLARY RODDING OF DISTAL FEMUR Left 4/9/2024    Procedure: INSERTION, INTRAMEDULLARY NAIL INTERTROCHENTERIC FRACTURE;  Surgeon: Guy Jaquez DO;  Location: [facility];  Service: Orthopedics;  Laterality: Left;  [employer], SYNTHES TFNA       Subjective     Chief Complaint: "I want to put my make on "    Respiratory Status: Room air    Patients cultural, spiritual, Hindu conflicts given the current " situation: no      Objective:     Communicated with tonya Moseley  prior to session.  Patient found up in chair with peripheral IV, Other (comments) (pt in wc with nursing Lorelei present)  upon PT entry to room.    Pt is Oriented to place and Alert, Cooperative, and Confused.      Therapeutic Activities and Exercises:  Pt performed dynamic sitting balance activities supervision with grooming activity to improve core strength and reaching away from midline   Pt performed BUE strengthening activity opening different containers for grooming activities with pt requiring assist for few of them focus on improving BUE hand strength to assist with using //bars/rw for standing     Activity Tolerance: Fair    Patient left up in chair with call button in reach, chair alarm on, PT Yevgeniy  present, and pt kenan tx pt still pleasantly confused during tx . Nursing stated pt given meds for nausea this am and pt was very tired/confused. Tx as tolerated  .    Education provided: strengthening exercises    Expected compliance: Moderate compliance    GOALS:   Multidisciplinary Problems       Physical Therapy Goals          Problem: Physical Therapy    Goal Priority Disciplines Outcome Goal Variances Interventions   Physical Therapy Goal     PT, PT/OT Ongoing, Progressing     Description: Bed Mobility:  Roll left and right with partial/moderate assist.   Sit to supine transfer with partial/moderate assist.   Supine to sit transfer with partial/moderate assist.     Transfers:  Sit to stand transfer with supervision/touching assist using RW.   Bed to chair transfer with supervision/touching assist Stand Step  using RW.     Mobility:  Ambulate 150 feet with supervision/touching assist using RW.                        Plan:     During this hospitalization, patient to be seen 6 x/week to address the identified rehab impairments via gait training, therapeutic activities, therapeutic exercises, neuromuscular re-education, wheelchair  management/training and progress toward the following goals:    Plan of Care Expires:  04/19/24  PT Next Visit Date: 04/19/24  Plan of Care reviewed with: patient    Additional Information:         Time Tracking:     Therapy Time  PT Received On: 04/15/24  PT Start Time: 1415  PT Stop Time: 1445  PT Total Time (min): 30 min   PT Individual: 30  Missed Time:    Time Missed due to:      Billable Minutes: Therapeutic Exercise 30min    04/15/2024

## 2024-04-15 NOTE — PT/OT/SLP PROGRESS
Physical Therapy Inpatient Rehab Treatment    Patient Name:  Safia Muñoz   MRN:  [id number]    Recommendations:     Discharge Recommendations:  Moderate Intensity Therapy   Discharge Equipment Recommendations:     Barriers to discharge: Increased level of assist, Ongoing medical treatment, Impaired functional mobility , and Severity of Deficits     Assessment:     Safia Muñoz is a 89 y.o. female admitted with a medical diagnosis of Closed 2-part intertrochanteric fracture of proximal end of left femur.  She presents with the following impairments/functional limitations:  weakness, impaired self care skills, impaired sensation, impaired endurance, impaired functional mobility, gait instability, impaired balance, decreased lower extremity function, decreased upper extremity function, decreased coordination, impaired cognition, decreased safety awareness, decreased ROM, impaired coordination, impaired fine motor, impaired joint extensibility, impaired muscle length, orthopedic precautions.    Rehab Diagnosis: medical diagnosis of Closed 2-part intertrochanteric fracture of proximal end of left femur    General Precautions: Standard, fall     Orthopedic Precautions:LLE weight bearing as tolerated     Braces: N/A    Rehab Prognosis: Fair; patient would benefit from acute skilled PT services to address these deficits and reach maximum level of function.      History:     Past Medical History:   Diagnosis Date    COPD (chronic obstructive pulmonary disease)     Hypertension        Past Surgical History:   Procedure Laterality Date    RETROGRADE INTRAMEDULLARY RODDING OF DISTAL FEMUR Left 4/9/2024    Procedure: INSERTION, INTRAMEDULLARY NAIL INTERTROCHENTERIC FRACTURE;  Surgeon: Guy Jaquez DO;  Location: [facility];  Service: Orthopedics;  Laterality: Left;  [employer], SYNTHES TFNA       Subjective     Patient comments: pt agreeable to participate with PT during both sessions, but required frequent redirection  "to task at hand. C/o feeling sleepy and also scared that she was going to fall or hurt herself. In first session, pt reported seeing some "water dripping from the ceiling" that was not there, and in the second session she reported that she could see a "orlando moving playing on the floor". NP Ana Laura and RN Lorelei notified and aware of pt reports; likely d/t nausea medication administered this morning 2/2 vomiting and will continue to monitor, per NP.    Respiratory Status: Room air    Patients cultural, spiritual, Alevism conflicts given the current situation: no    Objective:     Communicated with pt prior to start of both PT sessions.  Patient found supine in bed upon PT entry to room at start of AM session, and pt found up in w/c with PTA Alicia at start of PM session.    Pt is Oriented x3 and  pleasantly confused at times, lethargic/fatigued, and anxious/fearful of mobility .    Vitals   Pain Pain Rating 1: 9/10  Location - Side 1: Left  Location 1: leg  Pain Addressed 1: Reposition, Cessation of Activity, Nurse notified  Pain Rating Post-Intervention 1: 7/10       Functional Mobility:    Current   Status  Discharge   Goal   Functional Area: Care Score:    Roll Left and Right   Independent   Sit to Lying   Supervision or touching assistance   Lying to Sitting on Side of Bed 2  Max A required for assisting R LE and trunk during supine>sit transfer with a significant amount of increased time and encouragement. Supervision or touching assistance   Sit to Stand 2  Multiple completions of sit<>stand performed during both sessions, mostly max A required with RW (last completion required only mod A). Pt needed significant encouragement and increased time to perform task. Limited by pain and severe anxiety/fear once standing, leading to losses of balance and requiring CGA-mod A from PT to maintain standing position. Supervision or touching assistance   Chair/Bed-to-Chair Transfer 2  Mod A required for completion of just " pivot transfer once standing, however, bed<>chair scored as max assist d/t required inclusion of sit<>stand transfer. X3 completions performed during both sessions, with a significant amount of increased time and encouragement needed to complete. Supervision or touching assistance   Car Transfer   Not attempted due to medical/safety concerns   Walk 10 Feet 7  Attempted gait in PM session while standing with RW. Pt unable to complete, keeping her eyes closed while standing and needing frequent VC to open them. Despite constant encouragement and multiple attempts at performance, pt declined to attempt stating that she needed to sit down each time. Not attempted due to medical/safety concerns   Walk 50 Feet with Two Turns   Not attempted due to medical/safety concerns   Walk 150 Feet   Not attempted due to medical/safety concerns   Walk 10 Feet Uneven Surface   Not attempted due to medical/safety concerns   1 Step (Curb)   Not attempted due to medical/safety concerns   4 Steps   Not attempted due to medical/safety concerns   12 Steps   Not attempted due to medical/safety concerns   Picking Up Object   Not attempted due to medical/safety concerns   Wheel 50 Feet with Two Turns 2  Pt able to manually propel w/c with B UE ~50'. Required a significant amount of increased time to complete, with frequent VC, redirection to task, and repetitions of education on technique from PT required. Very slow, small pushes. Limited by B UE strength, coordination, and fatigue. Independent   Wheel 150 Feet   Not attempted due to medical/safety concerns       Therapeutic Activities and Exercises:  Patient educated on role of acute care PT and PT POC and safety while in hospital including calling nurse for mobility  Patient educated about importance of OOB mobility and remaining up in chair most of the day.    Toilet t/f to BSC + toileting performed in AM session. + JONATAN, OSCAR elise. X2 persons needed for hygiene d/t severe anxiety  with standing: mod-max A x1 for balance while standing with RW + SBA/CGA x1 for performing hygiene tasks including donning clean brief. Significant amount of increased time needed for completion.    In PM, pt became very fixated on doing her makeup. Required a lot of education, encouragement, and compromise for pt to agree to hold off on doing her makeup and participate with PT. Of note, pt exhibited a lot of difficulty with management of her makeup, having difficulty holding much of it, and especially having difficulty with managing the tops/caps of the different products. PT encouraged pt to perform management of her makeup herself providing assistance and VC when needed until pt agreed to move PT session into PT gym.      Activity Tolerance: Poor, limited by significant anxiety and fatigue.    Patient left up in chair with call button in reach and chair alarm on at end of AM session and PM session. NSG aware of pt status at completion of both.    Education provided: roles and goals of PT/PTA, transfer training, bed mob, balance training, safety awareness, body mechanics, assistive device, wheelchair management, fall prevention, and PT POC    Expected compliance: Moderate compliance    Plan:     During this hospitalization, patient to be seen 6 x/week to address the identified rehab impairments via gait training, therapeutic activities, therapeutic exercises, neuromuscular re-education, wheelchair management/training and progress toward the following goals:    GOALS:   Multidisciplinary Problems       Physical Therapy Goals          Problem: Physical Therapy    Goal Priority Disciplines Outcome Goal Variances Interventions   Physical Therapy Goal     PT, PT/OT Ongoing, Progressing     Description: Bed Mobility:  Roll left and right with partial/moderate assist.   Sit to supine transfer with partial/moderate assist.   Supine to sit transfer with partial/moderate assist.     Transfers:  Sit to stand transfer with  supervision/touching assist using RW.   Bed to chair transfer with supervision/touching assist Stand Step  using RW.   Bed to chair transfer with partial/moderate assist Slide Board  using Slide board.     Mobility:  Ambulate 150 feet with supervision/touching assist using RW.   Manual wheelchair 150 feet with partial/moderate assist using Bilateral upper extremity                       Plan of Care Expires:  04/19/24  PT Next Visit Date: 04/19/24  Plan of Care reviewed with: patient    Additional Information:     Pt seen by this PT from [id number] and [id number].    Time Tracking:     Therapy Time  PT Start Time:  (1100; 1445)  PT Stop Time:  (1205; 1530)   PT Individual: 110  Missed Time:    Time Missed due to:      Billable Minutes: Therapeutic Activity 80 min, Train/Wheelchair Management 15 min, and Self care/Home management 15 min    04/15/2024

## 2024-04-15 NOTE — PT/OT/SLP PROGRESS
"Occupational Therapy Inpatient Rehab Treatment    Name: Safia Muñoz  MRN: [id number]    Assessment:  Safia Muñoz is a 89 y.o. female admitted with a medical diagnosis of Closed 2-part intertrochanteric fracture of proximal end of left femur.  She presents with the following impairments/functional limitations:  weakness, impaired endurance, impaired self care skills, impaired functional mobility, gait instability, impaired balance, pain, decreased ROM, decreased safety awareness, decreased lower extremity function, impaired cognition, impaired muscle length, impaired joint extensibility, edema.    Upon OT entry, pt with OSCAR Moseley. Pt experiencing nausea and vomiting, constipation; RN draining bladder. OT assisted with bed mobility for suppository placement, advised to hold off on therapy and check back in 30 min. Pt was also being tested for flu/COVID. After checking back in, pt asleep and difficult to arouse secondary to fatigue - not appropriate for treatment at this time.     General Precautions: Standard, fall     Orthopedic Precautions:LLE weight bearing as tolerated     Braces: N/A    Rehab Prognosis: Fair; patient would benefit from acute skilled OT services to address these deficits and reach maximum level of function.      History:     Past Medical History:   Diagnosis Date    COPD (chronic obstructive pulmonary disease)     Hypertension        Past Surgical History:   Procedure Laterality Date    RETROGRADE INTRAMEDULLARY RODDING OF DISTAL FEMUR Left 4/9/2024    Procedure: INSERTION, INTRAMEDULLARY NAIL INTERTROCHENTERIC FRACTURE;  Surgeon: Guy Jaquez DO;  Location: [facility];  Service: Orthopedics;  Laterality: Left;  HANA TABLE, SYNTHES TFNA       Subjective     Orientation: Oriented x4    Chief Complaint: fatigue, vomiting    Patient/Family Comments/goals: "I don't think I can get up right now."    Respiratory Status: Room air    Patients cultural, spiritual, Shinto conflicts given the " current situation: no       Objective:     Patient found supine with peripheral IV  upon OT entry to room.    Mobility   Patient completed:  Rolling/Turning to Right with moderate assistance      Patient left supine with all lines intact, call button in reach, and bed alarm on.     Education provided: Roles and goals of OT, bed mobility, sequencing, safety precautions, and post-op precautions    Multidisciplinary Problems       Occupational Therapy Goals          Problem: Occupational Therapy    Goal Priority Disciplines Outcome Interventions   Occupational Therapy Goal     OT, PT/OT Ongoing, Progressing    Description: Pt to perform eating tasks with independence by re-eval.  Pt to perform oral hygiene tasks with min A standing at sink with RW by re-eval.  Pt to perform UB dressing with independence by re-eval.  Pt to perform LB dressing with mod A using AE PRN by re-eval.  Pt to perform donning/doffing footwear with max A using AE PRN by re-eval.  Pt to perform toileting hygiene with mod A by re-eval.  Pt to perform bathing tasks with mod A by re-eval.    Pt to perform toilet transfers with mod A using LRAD by re-eval.  Pt to perform WIS transfers with mod A using LRAD by re-eval.    Pt to perform meal prep activity with mod A by re-eval.  Pt to perform laundry management task with mod A by re-eval.  Pt to perform light housekeeping activity with mod A by re-eval.    Balance, Strengthening, Endurance, Balance:  Pt to consistently demonstrate adherence to orthopedic precautions during all ADL's as instructed by OT.  Pt to demonstrate consistent adherence to breathing control and energy conservation techniques as educated by OT.                         Time Tracking     OT Received On: 04/15/24  Time In 1530     Time Out 1615  Total Time 45 min  Therapy Time: OT Individual: 45  Missed Time: 80 Minutes  Missed Time Reason: Patient ill (Comment), Patient fatigue, Nursing care    Billable Minutes: Therapeutic Activity  10    04/15/2024

## 2024-04-15 NOTE — PROGRESS NOTES
[facility] - Rehab Inpatient Services  Wound Care    Patient Name:  Safia Muñoz   MRN:  [id number]  Date: 4/15/2024  Diagnosis: Closed 2-part intertrochanteric fracture of proximal end of left femur    History:     Past Medical History:   Diagnosis Date    COPD (chronic obstructive pulmonary disease)     Hypertension        Social History     Socioeconomic History    Marital status:    Tobacco Use    Smoking status: Never    Smokeless tobacco: Never     Social Determinants of Health     Transportation Needs: No Transportation Needs (4/12/2024)    PRAPARE - Transportation     Lack of Transportation (Medical): No     Lack of Transportation (Non-Medical): No       Precautions:     Allergies as of 04/12/2024 - Reviewed 04/12/2024   Allergen Reaction Noted    Adhesive tape-silicones Blisters 04/12/2024    Ofloxacin  06/09/2020    Penicillins  10/07/2023    Sulfamethoxazole-trimethoprim  02/03/2021    Codeine Nausea Only 02/03/2021       WOC Assessment Details/Treatment      04/15/24 1100        (Retired) Incision/Site 04/09/24 1238 Left Thigh lateral   Date First Assessed/Time First Assessed: 04/09/24 1238   Present Prior to Hospital Arrival?: No  Side: Left  Location: Thigh  Orientation: lateral  Additional Comments: surgical incisions closed, dressed with xeroform, island dressings   Dressing Appearance Moist drainage   Drainage Amount Small   Drainage Characteristics/Odor Serosanguineous   Periwound Area Blistered;Ecchymotic   Dressing Absorptive Pad;Reinforced  (paper tape)        (Retired) Incision/Site 04/09/24 1245 Left Leg   Date First Assessed/Time First Assessed: 04/09/24 1245   Present Prior to Hospital Arrival?: No  Side: Left  Location: Leg  Closure Method: Sutures;Staples  Additional Comments: island dressing   Dressing Appearance Intact;Clean;Dry   Dressing Gauze;Reinforced  (paper tape)   Safety   Safety Precautions emergency equipment at bedside   Safety Management   Safety  Promotion/Fall Prevention assistive device/personal item within reach   Patient Rounds bed in low position;bed wheels locked;call light in patient/parent reach;clutter free environment maintained;ID band on;placement of personal items at bedside;toileting offered;visualized patient   Safety Bands on Patient Fall Risk Band   Positioning   Body Position side-lying   Head of Bed (HOB) Positioning HOB at 30 degrees   Positioning/Transfer Devices pillows;wedge;in use     Left lateral hip incision: multiple tape blisters. Will leave open to air.  Ordered prep pad and paper tape daily and prn.  Somnolent and c/o pain to left thigh. Experiences pain on movement.  Ordered and applied pillow to turn q 2 hours and bilateral heel boots.  04/15/2024

## 2024-04-15 NOTE — PT/OT/SLP EVAL
Recreational Therapy Evaluation      Date of Treatment: 04/15/24  Start Time: 1330  Stop Time: 1400  Total Time: 30 min  Missed Time:    Assessment      Safia Muñoz is a 89 y.o. female admitted with a medical diagnosis of Closed 2-part intertrochanteric fracture of proximal end of left femur.  She presents with the following impairments/functional limitations:  weakness, impaired endurance, impaired functional mobility, gait instability, impaired balance, impaired cognition, decreased coordination, decreased upper extremity function, decreased lower extremity function, decreased safety awareness, decreased ROM, impaired coordination, impaired fine motor .    Rehab Diagnosis:       General Precautions: Standard, fall     Orthopedic Precautions:LLE weight bearing as tolerated     Braces: N/A    Rehab Prognosis: Good; patient would benefit from acute skilled Recreational Therapy services to address these deficits and reach maximum level of function.      Impairments: Balance deficits, Cognitive deficits, Coordination deficits, Decreased knowledge of condition, Endurance deficits, Mobility deficits, Range of motion deficits, Safety awareness deficits, Strength deficits, and Visual perceptual deficits  Rehab Potential: Good  Treatment Recommendations: Continue with Skill TR Service to facilitate functional independence and address impairments/limitations   Treatment Diagnosis: Fall, L intertrochanteric femur fx, IMN, COPD, HTN  Orientation: Identifies self  Affect/Behavior: Anxious and Distracted  Safety/Judgement: impaired   Basic Command Following: impaired  Spiritual Cultural: no        History     Past Medical History:   Diagnosis Date    COPD (chronic obstructive pulmonary disease)     Hypertension        Past Surgical History:   Procedure Laterality Date    RETROGRADE INTRAMEDULLARY RODDING OF DISTAL FEMUR Left 4/9/2024    Procedure: INSERTION, INTRAMEDULLARY NAIL INTERTROCHENTERIC FRACTURE;  Surgeon: Leonid  "Guy HASKINS DO;  Location: [facility];  Service: Orthopedics;  Laterality: Left;  HANA TABLE, SYNTHES TFNA       Home Environment     Admit Date: 04/12/24  Living Situation  People in Home: alone  Lives in: assisted living  Patients Responsibilities: Caregiver to pet, Community mobility, , Financial management, Health and wellness, Laundry, Leisure/play/hobbies, Retired, Shopping, Social participation  Number of Children: 3  Occupation:Retired: Secretarial work    Instrumental Activities of Daily Living     Previous Hand Dominance: Right Current Hand Dominance: Right (Arnoldo UE weakness)     Other iADL Information:        Cognitive Skills Building         Cognitive Observation Activity Assist Position Equipment Response            Comment:      Dynamic Activities      Activity Assist Position Equipment Response   Activity 1 Bean bag toos minimum assistance and moderate assistance Sitting [place] bags fair   Comment: This staff entered pts room and introduced self and stated that I was here for 30 minutes of Therapy.  Pt was attempting to brush her and apply make-up. She perseverated on brushing the same area then perseverated on putting her brush into her cosmetic bag.  She then attempted to open a container of her foundation but kept dropping this container and refused assistance from this staff. She was again told that she had 30 minutes of Therapy at this time and allowed staff to push her to Therapy area.      Sit to stand was attempted but pt was unable to follow commands to stand.  She sat for this treatment.  Dynamic sitting balance/reaching was min. Sitting tolerance was less than 30 seconds.  Constant verbal cues were needed to sit away from back of w/c. UE dexterity was min and was unable to hold bean bags to throw them at target that was on the floor.  As she looked at floor, she stated that "there are children playing in a [place]."  She was becoming very sleepy and needed verbal cues to keep her eyes open. At " "this time, she stated that she thought she needed to have a BM. She was taken back to her room and Nursing staff was notified.  Nursing was also notified of pt seeing children playing on the floor. .      Fine Motor Activities      Activity Assist Position Equipment Response           Comment:        Goals     Patient Goals  Patient Goal 1: "To get to stand and walk again."    Short Term Goals    Goal  Goal Status   Will increase activity tolerance to supervision Initiated   Will increase sit to stand to min Initiated   Will improve dynamic standing balance/reaching to min Initiated           Long Term Goals    Goal Goal Status   Will increase standing tolerance to 5 minutes Initiated   Will improve dynamic standing balance/reaching to supervision Initiated                     Plan       Patient to be seen: Daily  Duration: 2 weeks  Treatments planned: Balance training, Cognitive training, Coordination, Energy conservation training, Fine motor, Safety education  Treatment plan/goals established with Patient/Caregiver: Yes     "

## 2024-04-15 NOTE — PLAN OF CARE
Problem: Fall Injury Risk  Goal: Absence of Fall and Fall-Related Injury  Outcome: Ongoing, Progressing  Intervention: Promote Injury-Free Environment  Flowsheets (Taken 4/14/2024 2113)  Safety Promotion/Fall Prevention:   assistive device/personal item within reach   bed alarm set   Fall Risk signage in place   lighting adjusted   nonskid shoes/socks when out of bed   side rails raised x 3   supervised activity   instructed to call staff for mobility

## 2024-04-16 PROCEDURE — 97530 THERAPEUTIC ACTIVITIES: CPT | Mod: CQ

## 2024-04-16 PROCEDURE — 97530 THERAPEUTIC ACTIVITIES: CPT

## 2024-04-16 PROCEDURE — 99233 SBSQ HOSP IP/OBS HIGH 50: CPT | Mod: ,,, | Performed by: NURSE PRACTITIONER

## 2024-04-16 PROCEDURE — 63600175 PHARM REV CODE 636 W HCPCS: Performed by: NURSE PRACTITIONER

## 2024-04-16 PROCEDURE — 94799 UNLISTED PULMONARY SVC/PX: CPT

## 2024-04-16 PROCEDURE — 25000003 PHARM REV CODE 250: Performed by: NURSE PRACTITIONER

## 2024-04-16 PROCEDURE — 97535 SELF CARE MNGMENT TRAINING: CPT

## 2024-04-16 PROCEDURE — 11800000 HC REHAB PRIVATE ROOM

## 2024-04-16 PROCEDURE — 99900031 HC PATIENT EDUCATION (STAT)

## 2024-04-16 PROCEDURE — 97110 THERAPEUTIC EXERCISES: CPT | Mod: CQ

## 2024-04-16 PROCEDURE — 97116 GAIT TRAINING THERAPY: CPT | Mod: CQ

## 2024-04-16 PROCEDURE — 99900035 HC TECH TIME PER 15 MIN (STAT)

## 2024-04-16 RX ORDER — BISACODYL 10 MG/1
10 SUPPOSITORY RECTAL ONCE
Status: COMPLETED | OUTPATIENT
Start: 2024-04-16 | End: 2024-04-16

## 2024-04-16 RX ORDER — MUPIROCIN 20 MG/G
OINTMENT TOPICAL 2 TIMES DAILY
Status: DISPENSED | OUTPATIENT
Start: 2024-04-16 | End: 2024-04-21

## 2024-04-16 RX ORDER — TAMSULOSIN HYDROCHLORIDE 0.4 MG/1
0.4 CAPSULE ORAL NIGHTLY
Status: DISCONTINUED | OUTPATIENT
Start: 2024-04-16 | End: 2024-04-29 | Stop reason: HOSPADM

## 2024-04-16 RX ORDER — LIDOCAINE 50 MG/G
1 PATCH TOPICAL
Status: DISCONTINUED | OUTPATIENT
Start: 2024-04-16 | End: 2024-04-29 | Stop reason: HOSPADM

## 2024-04-16 RX ORDER — METHOCARBAMOL 500 MG/1
500 TABLET, FILM COATED ORAL EVERY 6 HOURS
Status: DISCONTINUED | OUTPATIENT
Start: 2024-04-16 | End: 2024-04-29 | Stop reason: HOSPADM

## 2024-04-16 RX ORDER — MUPIROCIN 20 MG/G
OINTMENT TOPICAL 2 TIMES DAILY
Status: CANCELLED | OUTPATIENT
Start: 2024-04-17 | End: 2024-04-21

## 2024-04-16 RX ORDER — HALOPERIDOL 5 MG/ML
5 INJECTION INTRAMUSCULAR ONCE
Status: DISCONTINUED | OUTPATIENT
Start: 2024-04-16 | End: 2024-04-16

## 2024-04-16 RX ORDER — POLYETHYLENE GLYCOL 3350 17 G/17G
17 POWDER, FOR SOLUTION ORAL 2 TIMES DAILY
Status: DISCONTINUED | OUTPATIENT
Start: 2024-04-16 | End: 2024-04-17

## 2024-04-16 RX ORDER — METHOCARBAMOL 500 MG/1
500 TABLET, FILM COATED ORAL 4 TIMES DAILY
Status: DISCONTINUED | OUTPATIENT
Start: 2024-04-16 | End: 2024-04-16

## 2024-04-16 RX ORDER — HALOPERIDOL 5 MG/ML
5 INJECTION INTRAMUSCULAR ONCE
Status: COMPLETED | OUTPATIENT
Start: 2024-04-16 | End: 2024-04-16

## 2024-04-16 RX ADMIN — BISACODYL 10 MG: 10 SUPPOSITORY RECTAL at 05:04

## 2024-04-16 RX ADMIN — CYANOCOBALAMIN TAB 500 MCG 500 MCG: 500 TAB at 08:04

## 2024-04-16 RX ADMIN — ENOXAPARIN SODIUM 30 MG: 30 INJECTION SUBCUTANEOUS at 05:04

## 2024-04-16 RX ADMIN — ACETAMINOPHEN 325MG 650 MG: 325 TABLET ORAL at 05:04

## 2024-04-16 RX ADMIN — AMLODIPINE BESYLATE 5 MG: 5 TABLET ORAL at 08:04

## 2024-04-16 RX ADMIN — METHOCARBAMOL 500 MG: 500 TABLET ORAL at 02:04

## 2024-04-16 RX ADMIN — DOCUSATE SODIUM 100 MG: 100 CAPSULE, LIQUID FILLED ORAL at 07:04

## 2024-04-16 RX ADMIN — DOCUSATE SODIUM 100 MG: 100 CAPSULE, LIQUID FILLED ORAL at 08:04

## 2024-04-16 RX ADMIN — TAMSULOSIN HYDROCHLORIDE 0.4 MG: 0.4 CAPSULE ORAL at 08:04

## 2024-04-16 RX ADMIN — NALOXEGOL OXALATE 25 MG: 25 TABLET, FILM COATED ORAL at 08:04

## 2024-04-16 RX ADMIN — HALOPERIDOL LACTATE 5 MG: 5 INJECTION, SOLUTION INTRAMUSCULAR at 05:04

## 2024-04-16 RX ADMIN — BENZONATATE 100 MG: 100 CAPSULE ORAL at 05:04

## 2024-04-16 RX ADMIN — POLYETHYLENE GLYCOL 3350 17 G: 17 POWDER, FOR SOLUTION ORAL at 07:04

## 2024-04-16 RX ADMIN — METHOCARBAMOL 500 MG: 500 TABLET ORAL at 03:04

## 2024-04-16 RX ADMIN — FERROUS SULFATE TAB 325 MG (65 MG ELEMENTAL FE) 1 EACH: 325 (65 FE) TAB at 08:04

## 2024-04-16 RX ADMIN — POLYETHYLENE GLYCOL 3350 17 G: 17 POWDER, FOR SOLUTION ORAL at 08:04

## 2024-04-16 RX ADMIN — FUROSEMIDE 40 MG: 40 TABLET ORAL at 08:04

## 2024-04-16 RX ADMIN — ACETAMINOPHEN 325MG 650 MG: 325 TABLET ORAL at 12:04

## 2024-04-16 RX ADMIN — Medication 6 MG: at 07:04

## 2024-04-16 RX ADMIN — LIDOCAINE 5% 1 PATCH: 700 PATCH TOPICAL at 05:04

## 2024-04-16 NOTE — PROGRESS NOTES
04/16/24 1131   Rec Therapy Time Calculation   Date of Treatment 04/16/24   Rec Start Time 1131   Rec Stop Time 1200   Rec Total Time (min) 29 min   Time   Treatment time 2 units   Charges   $Therapeutic Activity 1unit   Precautions   General Precautions fall   Orthopedic Precautions  LLE weight bearing as tolerated   Braces N/A   Pain/Comfort   Pain Rating 1 no pain   OTHER   Rehab identified problem list/impairments weakness;impaired endurance;impaired functional mobility;gait instability;decreased coordination;decreased upper extremity function;decreased lower extremity function;impaired cognition;decreased safety awareness   Values/Beliefs/Spiritual Care   Spiritual, Cultural Beliefs, Taoist Practices, Values that Affect Care no   Overall Level of Functioning   Activity Tolerance Mod Indep   Dynamic Sitting Balance/Reaching Mod Indep   Dynamic Standing Balance/Reaching Mod A   Right UE Coodination/Dexterity Mod Indep   Left UE Coordination/Dexterity Mod Indep   Problem Solving/Sequencing Skills Standby Assist   Memory Recall Standby Assist   R/L Neglect/Inattention Does not occur   Attention Span Standby Assist   Social Interaction Mod Indep   Recreational Therapy Short Term Goals   Short Term Goal 1 Progression Met   Short Term Goal 2 Progression Progressing   Short Term Goal 3 Progression Progressing   Recreational Therapy Long Term Goals   Long Term Goal 1 Progression Progressing   Long Term Goal 2 Progression Progressing   Plan   Patient to be seen Daily   Planned Duration 2 weeks   Treatments Planned Cognitive training;Coordination;Energy conservation training;Safety education   Treatment plan/goals estblished with Patient/Caregiver Yes

## 2024-04-16 NOTE — PROGRESS NOTES
Dos 4/16/24  Patient seen in PT gym today  Participation and progress toward goals noted  Continues working on strength, mobility, balance and increasing endurance  Progress and problems discussed with patient and therapists  Questions answered  Chart reviewed and discussed with Dr higginbotham and medicine team as well as Ana Laura Biggs, my FNP  Continue present management  Subjective  HPI: 89 yo WF with a PMH of COPD and hypertension presented to the ED at [facility] on 4/8/24 with complaint of left hip pain after a ground level fall where she tripped on a rug and fell. X-ray show intertrochanteric left femur fracture. Orthopedic surgery consulted and referred to hospital medicine for admission. Chest x-ray show no airspace disease. EKG sinus rhythm with PACs. On 4/9, orthopedic surgeon recommended surgical intervention. Patient underwent IM nailing, and was placed WBAT to LLE. On 4/10, patient placed on Lovenox for DVT ppx, and can change on ASA 81mg BID at discharge. Patient was started on Ceftriazone for UTI. Patient gets frequent UTIs. Labs showed low H&H of 10.4 & 33.5, low Iron of 26, low TIBC of 218, elevated B12 of 1,025, low Iron sat of 12, elevated glucose of 135, low calcium of 7.8, low protein of 5.4, low albumin of 3.3, elevated AST of 35. PT/OT evals completed with deficits noted with recommendation for high intensity therapy needed. On 4/11, patient complained of nausea so zofran was given. Labs showed low RBC of 3.53, low H&H Of 10.5 & 32.8, low calcium of 7.8, low albumin of 3.3. Dry dressing changes started. No BM reported since admission. On 4/12, labs showed elevated WBC of 12.68, low RBC of 3.30, low H&H of 9.9 & 30.4, low MCHC of 32.6, low chloride of 97, and elevated glucose of 126. Patient will need appointment with Carmen AMAYA (Ortho) in 3 weeks for wound check and repeat Xrays. Patient complained of pain to LLE rating at 8-9/10 with movement, but controlled with rest. Patient is AAOx4.   Participating with therapy. Functional status includes setup/supervision needed for eating, moderate assist for transfers with RW, max assist for toileting, moderate assist bed mobility, minimal assist for walking 26ft with RW, minimal assist for grooming, and max assist for lower body dressing. Patient was evaluated, accepted, and admitted to inpatient rehab to improve functional status. Transferred to [facility] on 4/12 without incident.  4/16: Seen with PT, mobilizing in WC. Cognition appears much improved this morning from yesterday. No further hallucinations noted. Gabapentin discontinued. Urinary retention and constipation treated. UA negative. N/V resolved. Given Phenergan yesterday. Patient reports good sleep initially, but then PT reminds her of her previous comment with nursing waking her up for medication. Noted PRN Robaxin given around 0300am. Increase scheduled Robaxin to QID. Nursing relays Lyman catheter placed around midnight. Haldol IM ordered for tonight. Give after dinner to prevent hangover effect in AM. No Oxycodone given today. Tolerating therapy, but requiring some encouragement for participation. VSSAF with noted SpO2 92-95%. IM following.             Review of Systems  Psychiatric: Does not have any confirmed mental health history or diagnoses. Per daughter, pt. seems to be depressed and anxious since her spouse passed away. She is a former smoker.      Depression/Anxiety: no current complaints. Therapy notes Anxiety     Pain: left hip with movement and weight bearing (spasms)  ADD LIDOcaine 5 % patch q24hr, 1800, low back  acetaminophen tablet 650 mg q6h  DC gabapentin capsule 200 mg qHS  methocarbamoL tablet 500 mg TID. q6h     methocarbamoL tablet 500 mg TID PRN muscle spasm  acetaminophen tablet 650 mg q8h PRN mild pain  oxyCODONE immediate release tablet 5 mg q4h PRN mod pain  oxyCODONE immediate release tablet 10 mg q4h PRN severe pain  Bowels/Bladder: last BM 4/16 x 2. 4/15- Suppository  given. Previous BM 4/12 following suppository, and prior to admission (4/8)   Appetite: slowly improving     Sleep: good              Physical Exam  General: well-developed, well-nourished, in no acute distress  Respiratory: equal chest rise, no SOB, no audible wheeze  Cardiovascular: regular rate and rhythm, no edema  Gastrointestinal: soft, non-tender, non-distended   Musculoskeletal: decreased ROM/strength to LLE  Integumentary: bruising,  LLE incisions x 3-staples, dressing-c/d/I, surrounding ecchymosis and tape blisters-use paper tape  Neurologic: cranial nerves intact, signs of peripheral neurological deficit- numbness to fingertips  *MD performed and documented physical examination         Labs:   Latest Reference Range & Units 04/15/24 09:18 04/15/24 12:35 04/15/24 13:06   Lactic Acid Level 0.5 - 2.2 mmol/L   1.4   Influenza A, Molecular Not Detected  Not Detected     Influenza B, Molecular Not Detected  Not Detected     SARS-CoV2 (COVID-19) Qualitative PCR Not Detected, Negative  Not Detected     Color, UA Yellow, Light-Yellow, Dark Yellow, Sharon, Straw   Straw    Appearance, UA Clear   Clear    Specific Gravity,UA 1.005 - 1.030   1.010    pH, UA 5.0 - 8.5   7.0    Protein, UA Negative   Negative    Glucose, UA Negative, Normal   Negative    Ketones, UA Negative   Negative    Blood, UA Negative   Negative    NITRITE UA Negative   Negative    Bilirubin, UA Negative   Negative    Urobilinogen, UA 0.2, 1.0, Normal   0.2    Leukocyte Esterase, UA Negative   Negative                Diagnostics:  XR ABDOMEN AP 1 VIEW   FINDINGS:  Examination reveals some residual feces throughout the colon the gas pattern is nonspecific with no clear evidence of ileus or obstruction no abnormal masses are identified.  There are degenerative changes of the lumbosacral spine  Impression:  Residual feces nonspecific gas pattern  Date:                                            04/15/2024  Time:                                            09:39          Assessment/Plan  Hospital   Closed 2-part intertrochanteric fracture of proximal end of left femur s/p repair   Fall     Non-Hospital   COPD (chronic obstructive pulmonary disease)   Hypertension   Frequent UTI   Osteoporosis       Wounds: LLE incisions x 3-staples, dressing-c/d/I, surrounding ecchymosis and tape blisters-use paper tape  S/p IM nail of intertrochanteric left femur fracture on 4/9 (Leonid)  Precautions: WBAT LLE  Bracing/AD: RW  Swallowing: Regular Diet  Function: Tolerating therapy. Continue PT/OT  VTE Prophylaxis:   enoxaparin injection 30 mg SubQ q24hr  Code Status: FULL CODE   Discharge: Lives alone in [place] in an independent living [facility] with no steps to enter. Completed high school and 1 year of college. Denies  history. Pt. Is retired from secretarial work.  Is  approximately 3 years ago.  Was independent without use of assistive devices. The plan is to return to [facility]. Date pending.                 Disha Biggs NP, conducted additional independent physical examination and assisted with medical documentation.

## 2024-04-16 NOTE — PT/OT/SLP PROGRESS
Recreational Therapy Treatment    Date of Treatment: 04/16/24  Start Time: 1131  Stop Time: 1200  Total Time: 29 min  Missed Time:     General Precautions: fall  Ortho Precautions: LLE weight bearing as tolerated  Braces: N/A    Vitals   Vitals at Rest  BP    HR    O2 Sat    Pain      Vitals With Activity  BP    HR    O2 Sat    Pain        Treatment     Cognitive Skills Building   Cognitive Observation Activity Assist Position Equipment Response            Comment:          Dynamic Activities   Activity Assist Position Equipment Response   Activity 1 Washer toss minimum assistance and moderate assistance Standing Rolling walker and Metal washers fair   Comment: Sit to stand was mod.  Dynamic standing balance/reaching was mod/min. She was less anxious with sit to stand.  Standing tolerance increased to 4 minutes.  UE coordination improved to setup as did dexterity. Memory and problem solving skills along with safety awareness were min/supervision.  She was less confused and more engaged with task at hand. She even said Washer Toss activity was fun and that she enjoyed it.  Her attention span increased to 5 minutes.  There were no perseverating behaviors noted this AM.         Fine Motor Activities   Activity Assist Position Equipment Response           Comment:          Additional Info: This was a co-treat with PTA    Pt progress, plan of care and discharge plans were discussed during staffing at 0930    Goals     Short Term Goals    Goal  Goal Status   Will increase activity tolerance to supervision Met   Will increase sit to stand to min Progressing   Will improve dynamic standing balance/reaching to min Progressing           Long Term Goals    Goal Goal Status   Will increase standing tolerance to 5 minutes Progressing   Will improve dynamic standing balance/reaching to supervision Progressing

## 2024-04-16 NOTE — PT/OT/SLP PROGRESS
"Physical Therapy Inpatient Rehab Treatment    Patient Name:  Safia Muñoz   MRN:  [id number]    Recommendations:     Discharge Recommendations:  Moderate Intensity Therapy   Discharge Equipment Recommendations: walker, rolling, bedside commode, shower chair   Barriers to discharge: Decreased caregiver support    Assessment:     Safia Muñoz is a 89 y.o. female admitted with a medical diagnosis of Closed 2-part intertrochanteric fracture of proximal end of left femur.  She presents with the following impairments/functional limitations:  weakness, impaired endurance, impaired self care skills, impaired functional mobility, gait instability, impaired balance, decreased coordination, impaired cognition, decreased lower extremity function, pain, decreased ROM, impaired coordination, edema, impaired skin, impaired fine motor, decreased safety awareness, impaired muscle length, orthopedic precautions .    Rehab Diagnosis: medical diagnosis of Closed 2-part intertrochanteric fracture of proximal end of left femur    General Precautions: Standard, fall     Orthopedic Precautions:LLE weight bearing as tolerated     Braces: N/A    Rehab Prognosis: Good; patient would benefit from acute skilled PT services to address these deficits and reach maximum level of function.      History:     Past Medical History:   Diagnosis Date    COPD (chronic obstructive pulmonary disease)     Hypertension        Past Surgical History:   Procedure Laterality Date    RETROGRADE INTRAMEDULLARY RODDING OF DISTAL FEMUR Left 4/9/2024    Procedure: INSERTION, INTRAMEDULLARY NAIL INTERTROCHENTERIC FRACTURE;  Surgeon: Guy Jaquez DO;  Location: [facility];  Service: Orthopedics;  Laterality: Left;  TIGRE TABLE, SYNTHES TFNA       Subjective     Chief Complaint: "I'm tired now if not a good time for therapy" " I have a stomach ache"      Respiratory Status: Room air    Patients cultural, spiritual, Sikh conflicts given the current situation: " no      Objective:     Communicated with nursing  prior to session.  Patient found up in chair with sarmiento catheter, peripheral IV, Other (comments) (pt in wc)  upon PT entry to room.    Pt is Oriented to place and  required motivation for participation  and Alert.      Functional Mobility:   Transfers:     Toilet Transfer: Supervision or touching assistance  with  rolling walker  using  Stand Pivot and increased time slow transition t/f to BSC     Current   Status  Discharge   Goal   Functional Area: Care Score:    Roll Left and Right   Independent   Sit to Lying 3  Assist with lle into bed use/education lle leg  slow transition  Supervision or touching assistance   Lying to Sitting on Side of Bed   Supervision or touching assistance   Sit to Stand 3  Min a use of rw LLE wbat multiple times during txs. Increased time slow transition multiple  times.  pt requiring cgaat times   Supervision or touching assistance   Chair/Bed-to-Chair Transfer   Supervision or touching assistance   Car Transfer   Not attempted due to medical/safety concerns   Walk 10 Feet 4  Use of rw cga 20ft lle wbat slow antalgic gt pattern increased time  Not attempted due to medical/safety concerns   Walk 50 Feet with Two Turns 88 Not attempted due to medical/safety concerns   Walk 150 Feet 88 Not attempted due to medical/safety concerns   Walk 10 Feet Uneven Surface   Not attempted due to medical/safety concerns   1 Step (Curb)   Not attempted due to medical/safety concerns   4 Steps   Not attempted due to medical/safety concerns   12 Steps   Not attempted due to medical/safety concerns   Picking Up Object   Not attempted due to medical/safety concerns   Wheel 50 Feet with Two Turns 4  BUE only 80ft with vc for wc management increased time required  Independent   Wheel 150 Feet 88  fatigue Not attempted due to medical/safety concerns       Therapeutic Activities and Exercises:  In am :   Dynamic standing trials use of rw cga with kriss  toss activity alternating UE toss one seated rest break wc close behind for safety and pt afraid of falling focus on UE strengthening in standing    Pregait activity in //bars ambulated 5ft cga  to min a vc for standing posture improved as pt advanced in //bars wc close behind  Education on wc mob tech and managing wc parts   In pm:    Pt performed supine HEP 2 sets 15reps with mod a with lle slr min a with lle hip adb/add/heel slides  rest breaks required  Educated pt on use of lle leg  to assist in self managing lle for bed mob    Activity Tolerance: Good    Patient left  on bsc cna aware  with call button in reach, CNS Sheldon  present, and pt kenan tx well improved with motivation with persuasion in am tx. Pt cognition improved from yesterday tx. Able to stand with use of rw .in pm tx pt returned to bed after tx with cold pack during exercises to assist in decrease c/o pain   .    Education provided: transfer training, bed mob, gait training, balance training, safety awareness, body mechanics, assistive device, wheelchair management, strengthening exercises, and fall prevention    Expected compliance: High compliance    GOALS:   Multidisciplinary Problems       Physical Therapy Goals          Problem: Physical Therapy    Goal Priority Disciplines Outcome Goal Variances Interventions   Physical Therapy Goal     PT, PT/OT Ongoing, Progressing     Description: Bed Mobility:  Roll left and right with partial/moderate assist.   Sit to supine transfer with partial/moderate assist.   Supine to sit transfer with partial/moderate assist.     Transfers:  Sit to stand transfer with supervision/touching assist using RW.   Bed to chair transfer with supervision/touching assist Stand Step  using RW.   Bed to chair transfer with partial/moderate assist Slide Board  using Slide board.     Mobility:  Ambulate 150 feet with supervision/touching assist using RW.   Manual wheelchair 150 feet with partial/moderate assist using  Bilateral upper extremity                       Plan:     During this hospitalization, patient to be seen 6 x/week to address the identified rehab impairments via gait training, therapeutic activities, therapeutic exercises, neuromuscular re-education, wheelchair management/training and progress toward the following goals:    Plan of Care Expires:  04/19/24  PT Next Visit Date: 04/19/24  Plan of Care reviewed with: patient    Additional Information:         Time Tracking:     Therapy Time  PT Received On: 04/16/24  PT Start Time: 1100  PT Stop Time: 1200  PT Total Time (min): 60 min (1964-7136) +  60min (4678-1226 ) = 120min total   PT Individual: 60min +  60min = 120 min total tx time   Missed Time:    Time Missed due to:      Billable Minutes: Gait Training 15min, Therapeutic Activity 45min, and Therapeutic Exercise 60min    04/16/2024

## 2024-04-16 NOTE — PROGRESS NOTES
[facility] ([facility])  Rehab Progress Note    Patient Name: Safia Muñoz  MRN: [id number]  Age: 89 y.o. Sex: female  : 1931  Hospital Length of Stay: 4 days  Date of Service: 2024   Chief Complaint: Left hip fracture s/p left hip IM nailing on 2024     Subjective:     Basic Information  Admit Information: 89-year-old white female presented to [facility] ED on 2024 complaining of left hip pain after a ground level fall.  PMH significant for HTN and COPD not on home O2.  Workup significant for closed comminuted intertrochanteric left femur fracture.  Tolerated left hip IM nailing on  without perioperative complications.  Received 3 days Rocephin due to underlying UTI.  Urine culture appeared to be contaminated.  Orthopedic surgery recommended WBAT to LLE.  Tolerated transfer to [facility] inpatient rehab unit on  without incident.   Today's Information: No acute events overnight.  Laying in bed comfortably resting.  Reports good sleep hygiene at the beginning of the night.  Then required indwelling catheter initiation overnight.  Flu and COVID negative.  UA unremarkable.  Vital signs at goal with no recent recorded fevers.  Reports good appetite.  KUB significant for residual feces throughout the colon on 4/15.  Last BM .  No labs or imaging today.    Review of patient's allergies indicates:   Allergen Reactions    Adhesive tape-silicones Blisters    Ofloxacin     Penicillins     Sulfamethoxazole-trimethoprim      Other reaction(s): Unknown    Codeine Nausea Only        Current Facility-Administered Medications:     acetaminophen tablet 650 mg, 650 mg, Oral, Q6H, Augusto Guan, ORLYP, 650 mg at 24 0542    acetaminophen tablet 650 mg, 650 mg, Oral, Q8H PRN, Disha Biggs A, FNP    amLODIPine tablet 5 mg, 5 mg, Oral, Daily, Augusto Guan FNP, 5 mg at 24 0848    benzonatate capsule 100 mg, 100 mg, Oral, TID PRN, Augusto Guan  A, FNP, 100 mg at 04/16/24 0548    bisacodyL suppository 10 mg, 10 mg, Rectal, Daily PRN, Freida Augusto A, FNP, 10 mg at 04/15/24 0900    bisacodyL suppository 10 mg, 10 mg, Rectal, Once, Freida, Augusto A, FNP    bisacodyL suppository 10 mg, 10 mg, Rectal, Once, Ирина Ness FNP    cyanocobalamin tablet 500 mcg, 500 mcg, Oral, Daily, Freida, Augusto A, FNP, 500 mcg at 04/16/24 0848    docusate sodium capsule 100 mg, 100 mg, Oral, BID, Freida, Augusto A, FNP, 100 mg at 04/16/24 0847    enoxaparin injection 30 mg, 30 mg, Subcutaneous, Q24H (prophylaxis, 1700), Freida, Augusto A, FNP, 30 mg at 04/15/24 1810    [START ON 4/18/2024] estradiol 0.05 mg/24 hr td ptsw patch 1 patch, 1 patch, Transdermal, Every Thurs, Freida Augusto A, FNP    ferrous sulfate tablet 1 each, 1 tablet, Oral, Daily, Freida, Augusto A, FNP, 1 each at 04/16/24 0848    furosemide tablet 40 mg, 40 mg, Oral, Daily, Ирина Ness, FNP, 40 mg at 04/16/24 0847    haloperidol lactate injection 5 mg, 5 mg, Intramuscular, Once, Ирина Ness FNP    hydrALAZINE injection 10 mg, 10 mg, Intravenous, Q4H PRN, Freida Augusto A, FNP    hydrOXYzine pamoate capsule 50 mg, 50 mg, Oral, Nightly PRN, Freida Augusto A, FNP, 50 mg at 04/14/24 2012    labetalol 20 mg/4 mL (5 mg/mL) IV syring, 10 mg, Intravenous, Q4H PRN, Freida, Augusto A, FNP    melatonin tablet 6 mg, 6 mg, Oral, Nightly, Ирина Ness FNP, 6 mg at 04/15/24 2037    methocarbamoL tablet 500 mg, 500 mg, Oral, TID, Augusto Guan, FNP, 500 mg at 04/15/24 2037    methocarbamoL tablet 500 mg, 500 mg, Oral, TID PRN, Disha Biggs A, FNP, 500 mg at 04/16/24 0325    metoprolol injection 10 mg, 10 mg, Intravenous, Q2H PRN, Freida Augusto A, FNP    naloxegoL (MOVANTIK) tablet 25 mg, 25 mg, Oral, Daily, Augusto Guan A, FNP, 25 mg at 04/16/24 0847    nitroGLYCERIN SL tablet 0.4 mg, 0.4 mg, Sublingual, Q5 Min PRN, Augusto Guan A, FNP     "ondansetron disintegrating tablet 4 mg, 4 mg, Oral, Q6H PRN, Freida, Augusto A, FNP, 4 mg at 04/15/24 0705    oxyCODONE immediate release tablet 10 mg, 10 mg, Oral, Q4H PRN, Jami Biggsyn A, FNP, 10 mg at 04/15/24 0706    oxyCODONE immediate release tablet 5 mg, 5 mg, Oral, Q4H PRN, Jami Biggsyn A, FNP, 5 mg at 24 1855    polyethylene glycol packet 17 g, 17 g, Oral, BID PRN, Freida, Augusto A, FNP    polyethylene glycol packet 17 g, 17 g, Oral, BID, Ирина Ness, FNP, 17 g at 24 0847    promethazine tablet 25 mg, 25 mg, Oral, Q6H PRN, Freida, Augusto A, FNP, 25 mg at 04/15/24 0948    tamsulosin 24 hr capsule 0.4 mg, 0.4 mg, Oral, QHS, Ирина Ness, FNP     Review of Systems   Complete 12-point review of symptoms negative except for what's mentioned in HPI     Objective:     /77   Pulse 83   Temp 97.6 °F (36.4 °C) (Oral)   Resp 18   Ht 5' 2.99" (1.6 m)   Wt 74.5 kg (164 lb 3.9 oz)   SpO2 (!) 92%   Breastfeeding No   BMI 29.10 kg/m²      Physical Exam  Constitutional:       Appearance: She is ill-appearing.   HENT:      Head: Normocephalic.      Mouth/Throat:      Mouth: Mucous membranes are moist.   Eyes:      Pupils: Pupils are equal, round, and reactive to light.   Cardiovascular:      Rate and Rhythm: Normal rate and regular rhythm.      Heart sounds: Normal heart sounds.   Pulmonary:      Effort: Pulmonary effort is normal.      Breath sounds: Normal breath sounds.   Abdominal:      General: Bowel sounds are normal.      Palpations: Abdomen is soft.   Musculoskeletal:      Cervical back: Neck supple.      Left lower le+ Edema present.      Comments: Left hip dressing clean and intact.  Diffuse muscle atrophy.    Skin:     General: Skin is warm and dry.   Neurological:      Motor: Weakness present.   Psychiatric:      Comments: Drowsy     *MD performed and documented physical examination       Lines/Drains/Airways       Drain  Duration                  Urethral " Catheter 04/16/24 0300 16 Fr. <1 day              Peripheral Intravenous Line  Duration                  Peripheral IV - Single Lumen 04/09/24 1210 18 G Right Forearm 6 days                  Labs  Recent Results (from the past 24 hour(s))   Urinalysis, Reflex to Urine Culture    Collection Time: 04/15/24 12:35 PM    Specimen: Urine   Result Value Ref Range    Color, UA Straw Yellow, Light-Yellow, Dark Yellow, Sharon, Straw    Appearance, UA Clear Clear    Specific Gravity, UA 1.010 1.005 - 1.030    pH, UA 7.0 5.0 - 8.5    Protein, UA Negative Negative    Glucose, UA Negative Negative, Normal    Ketones, UA Negative Negative    Blood, UA Negative Negative    Bilirubin, UA Negative Negative    Urobilinogen, UA 0.2 0.2, 1.0, Normal    Nitrites, UA Negative Negative    Leukocyte Esterase, UA Negative Negative   Lactic Acid, Plasma    Collection Time: 04/15/24  1:06 PM   Result Value Ref Range    Lactic Acid Level 1.4 0.5 - 2.2 mmol/L       Radiology  Left hip XR on 04/09/2024, IMPRESSION: Improved alignment following internal fixation of the femur.  Radiology  Abdominal XR on 04/15/2024, IMPRESSION:  Residual feces nonspecific gas pattern.    Assessment/Plan:     89 y.o. WF admitted on 4/12/2024    Left hip fracture   - s/p left hip IM nailing on 04/09/2024  - WBAT to LLE  - discontinue staples on 04/30  - discontinue Gabapentin 100 mg TID (initiated 4/12), Gabapentin 200 mg at bedtime (initiated 4/14) on 04/15 2/2 confusion  - continue                Tylenol 650 mg every 6 hours                Calcium-vitamin D3 1 tablet b.i.d.                 Oxycodone 5 mg q.6 hours.                Robaxin 500 mg t.i.d.  - defer to physiatry for rehab and pain management  - PT/OT/RT following     Osteoporosis  - stable  - vitamin-D level 42.1 on 04/10/2024  - continue.                Calcium-vitamin D3 1 tablet b.i.d.                 Estradiol 0.05 mg patch every Wednesday      HTN  - BP at goal!!  - continue                  Norvasc  5 mg daily (resumed 4/12)                Hydralazine 10 mg every 2 hours as needed for BP > 160/90                Labetalol 10 mg every 2 hours as needed for BP > 160/90  - low sodium diet     COPD  - stable  - not on home 02  - keep 02 > 88%   - monitor closely     Normocytic anemia  - asymptomatic  - H/H trending down  - continue                Vitamin B12 tablet daily                    Ferrous sulfate 325 mg daily     - no evidence of active bleeds  - will closely monitor and transfuse if needed      Constipation  - stable   - continue  Colace 100 mg BID   Miralax 17 gm BID PRN  Movantik 25 mg daily (initiated 4/16)    Bilateral Lower extremity edema  - resolving  - continue   Lasix 40 mg daily (to start 4/15)    Insomnia with associated delirium  - improved  - continue   Haldol 5 mg IM x1 tonight at 8:00 p.m.   Melatonin 6 mg at bedtime (initiated 4/14)    Urinary retention  - current  - required indwelling catheter insertion on 04/15  - continue   Flomax 0.4 mg at bedtime (initiated 4/16)     AB therapy  Rocephin 4/9-4/11     VTE Prophylaxis: Lovenox 30 mg daily   COVID-19 testing:  Unknown  COVID-19 vaccination status:  Vaccinated (Pfizer):  01/10/2021, 01/31/2021, 12/06/2022     POA: No  Living will: No  Contacts: Lacey Pizarro(Daughter) [phone]      CODE STATUS: DNR  Internal Medicine (attending): Pawan Myers MD  Physiatry (consulting):  Iglesia Tran MD     OUTPATIENT PROVIDERS  PCP:  Iglesia Steel MD  Orthopedic surgery:  Guy Jaquez D.O.     DISPOSITION:  Vital signs at goal with no recent recorded fevers.  No labs or imaging today.  KUB on 4/15 significant for residual feces.  Initiate Dulcolax suppository once now.  Initiate MiraLax 17 g b.i.d. and Movantik 25 mg daily.  Sleep hygiene slight decrease 2/2 requiring indwelling catheter overnight.  Initiate Flomax 0.4 mg at bedtime.  Initiate Haldol 5 mg IM once at 8:00 p.m. tonight.  Flu and COVID negative.  UA unremarkable.  Did  discontinue gabapentin on 04/15.  Appetite at goal.  Continue aggressive mobilization as tolerated.  Float minutes if necessary.  Monitor closely.  Notify of acute changes.  Staffing documented below.      Staffing 4/16:  Overall continent of bowel and bladder.  Good appetite.  PT reported overall partial/mod assist with bed mobility.  Ambulating 150 ft with supervision/touch assist using rolling walker.  OT reported overall independent to moderate assistance with toileting and bathing.  Moderate assistance with transfers.  Projected discharge pending.     Ирина Ness NP conducted independent physical examination and assisted with medical documentation.    Total time spent on this encounter including chart review and direct MD + NP 1-on-1 patient interaction: 52 minutes   Over 50% of this time was spent in counseling and coordination of care

## 2024-04-16 NOTE — PLAN OF CARE
Met with pt to provide an update from team conference today.  Reviewed the plan.  Relayed my plan to contact dtr Linda with an update.  Pt agreed.    Left voice message for Linda Pizarro at [phone] and will await call back.      1427:  Had lengthy discussion with dtr Linda.  Discussed concerns about pt's fx'l deficits and medical issues (she had also spoken with Dr. Tran re: medical issues currently).  She has already placed a call for Marisol Vizcarra at [facility] to discuss upgrading services to assisted living, which patient has thus far resisted.  Linda relayed that pt has always been very focused on appearances and not wanting to seem old or incapable, but the family has noticed some concerning patterns and cog decline over the last 6 months.  They had already been concerned about dementia and have noticed that her apartment is unkempt and that pt has been more isolative over the last few months.  She hires a friend to bring her to the beauty shop and to the [employer], etc., but that person is NOT capable of assisting pt physically.  When they mentioned that to pt last night, she dismissed their concerns and said she will be fine when she leaves and can pay for assistance [place] [facility] [place].  They will not be returning pt's car to her (she was still occasionally driving and refused to get a driving eval done when [place] recommended it).  Linda relayed that pt CAN afford to pay for assisted living and even a short-term sitter, though they feel pt overestimates how long her finances will last with her growing needs.  Linda will be in contact with Marisol Vizcarra at [facility] to begin preparations for her to go to assisted living with HH/OP tx for now, though we plan to speak throughout pt's stay here to better determine her needs/strengths/deficits once medical issues resolve.

## 2024-04-16 NOTE — PT/OT/SLP PROGRESS
"Occupational Therapy Inpatient Rehab Treatment    Name: Safia Muñoz  MRN: [id number]    Assessment:  Safia Muñoz is a 89 y.o. female admitted with a medical diagnosis of Closed 2-part intertrochanteric fracture of proximal end of left femur.  She presents with the following impairments/functional limitations:  impaired self care skills, impaired sensation, impaired functional mobility, decreased lower extremity function, impaired fine motor, orthopedic precautions Pt. Is a RED STAR FALL RISK. .    General Precautions: Standard, fall     Orthopedic Precautions:LLE weight bearing as tolerated     Braces: N/A    Rehab Prognosis: Good and Fair; patient would benefit from acute skilled OT services to address these deficits and reach maximum level of function.      History:     Past Medical History:   Diagnosis Date    COPD (chronic obstructive pulmonary disease)     Hypertension        Past Surgical History:   Procedure Laterality Date    RETROGRADE INTRAMEDULLARY RODDING OF DISTAL FEMUR Left 4/9/2024    Procedure: INSERTION, INTRAMEDULLARY NAIL INTERTROCHENTERIC FRACTURE;  Surgeon: Guy Jaquez DO;  Location: [facility];  Service: Orthopedics;  Laterality: Left;  HANA TABLE, SYNTHES TFNA       Subjective     Orientation: Oriented x4    Chief Complaint: "I just finished bathing and dressing; I need to put on my makeup and brush teeth."     Patient/Family Comments/goals: "I miss my dog and hope I will go home soon to see him"    Vitals   Vitals at Rest  BP    HR    O2 Sat    Pain 1/10         Respiratory Status: Room air    Patients cultural, spiritual, Scientologist conflicts given the current situation: no       Objective:     Patient found up in chair with sarmiento catheter, peripheral IV  upon OT entry to room.        Functional Mobility  Pt. Informed OT that "My leg only hurts when I move; I just finished bathing/dressing". Pt. Did not want to stand or transfer as she had not eaten nor performed oral " care/grooming.    ADLs   Current Status   Eating 5   Oral Hygiene 4   Shower, Bathe Self     Upper Body Dressing     Lower Body Dressing     Toileting Hygiene     Toilet Transfer     Putting On, Taking Off Footwear       Limiting Factors for ADLs: sensory, endurance, weakness, cognition, pain, and Pt. Fearful of increasing pain c movement.           Therapeutic Activities  Pt. C/o fingertips being numb. Pt. Required assistance opening make up containers, mouthwash and packages to eat breakfast.       Additional Treatments: Pt. Agreed to functional transfers planned for tomorrow to determine TTB vs. SC for D/C to home.     LifeStyle Change and Education:             Patient left up in chair with call button in reach and chair alarm on.     Education provided: Roles and goals of OT, ADLs, assistive device, sequencing, and equipment recommendations    Multidisciplinary Problems       Occupational Therapy Goals          Problem: Occupational Therapy    Goal Priority Disciplines Outcome Interventions   Occupational Therapy Goal     OT, PT/OT Ongoing, Progressing    Description: Pt to perform eating tasks with independence by re-eval.  Pt to perform oral hygiene tasks with min A standing at sink with RW by re-eval.  Pt to perform UB dressing with independence by re-eval.  Pt to perform LB dressing with mod A using AE PRN by re-eval.  Pt to perform donning/doffing footwear with max A using AE PRN by re-eval.  Pt to perform toileting hygiene with mod A by re-eval.  Pt to perform bathing tasks with mod A by re-eval.    Pt to perform toilet transfers with mod A using LRAD by re-eval.  Pt to perform WIS transfers with mod A using LRAD by re-eval.    Pt to perform meal prep activity with mod A by re-eval.  Pt to perform laundry management task with mod A by re-eval.  Pt to perform light housekeeping activity with mod A by re-eval.    Balance, Strengthening, Endurance, Balance:  Pt to consistently demonstrate adherence to  orthopedic precautions during all ADL's as instructed by OT.  Pt to demonstrate consistent adherence to breathing control and energy conservation techniques as educated by OT.                         Time Tracking     OT Received On: 04/16/24  Time In 0900     Time Out 1000  Total Time 60 min  Therapy Time: OT Individual: 60  Missed Time:    Missed Time Reason:      Billable Minutes: Self Care/Home Management 60    04/16/2024

## 2024-04-17 LAB
ANION GAP SERPL CALC-SCNC: 8 MEQ/L
BASOPHILS # BLD AUTO: 0.07 X10(3)/MCL
BASOPHILS NFR BLD AUTO: 0.5 %
BUN SERPL-MCNC: 18.1 MG/DL (ref 9.8–20.1)
CALCIUM SERPL-MCNC: 8.2 MG/DL (ref 8.4–10.2)
CHLORIDE SERPL-SCNC: 99 MMOL/L (ref 98–111)
CO2 SERPL-SCNC: 28 MMOL/L (ref 23–31)
CREAT SERPL-MCNC: 0.71 MG/DL (ref 0.55–1.02)
CREAT/UREA NIT SERPL: 25
EOSINOPHIL # BLD AUTO: 0.33 X10(3)/MCL (ref 0–0.9)
EOSINOPHIL NFR BLD AUTO: 2.6 %
ERYTHROCYTE [DISTWIDTH] IN BLOOD BY AUTOMATED COUNT: 14.7 % (ref 11.5–17)
GFR SERPLBLD CREATININE-BSD FMLA CKD-EPI: >60 MLS/MIN/1.73/M2
GLUCOSE SERPL-MCNC: 123 MG/DL (ref 75–121)
HCT VFR BLD AUTO: 33.8 % (ref 37–47)
HGB BLD-MCNC: 11 G/DL (ref 12–16)
IMM GRANULOCYTES # BLD AUTO: 0.14 X10(3)/MCL (ref 0–0.04)
IMM GRANULOCYTES NFR BLD AUTO: 1.1 %
LYMPHOCYTES # BLD AUTO: 1.9 X10(3)/MCL (ref 0.6–4.6)
LYMPHOCYTES NFR BLD AUTO: 14.9 %
MCH RBC QN AUTO: 30.1 PG (ref 27–31)
MCHC RBC AUTO-ENTMCNC: 32.5 G/DL (ref 33–36)
MCV RBC AUTO: 92.6 FL (ref 80–94)
MONOCYTES # BLD AUTO: 1.29 X10(3)/MCL (ref 0.1–1.3)
MONOCYTES NFR BLD AUTO: 10.1 %
NEUTROPHILS # BLD AUTO: 9.01 X10(3)/MCL (ref 2.1–9.2)
NEUTROPHILS NFR BLD AUTO: 70.8 %
NRBC BLD AUTO-RTO: 0 %
PLATELET # BLD AUTO: 412 X10(3)/MCL (ref 130–400)
PMV BLD AUTO: 9.4 FL (ref 7.4–10.4)
POTASSIUM SERPL-SCNC: 3.8 MMOL/L (ref 3.5–5.1)
RBC # BLD AUTO: 3.65 X10(6)/MCL (ref 4.2–5.4)
SODIUM SERPL-SCNC: 135 MMOL/L (ref 132–146)
WBC # SPEC AUTO: 12.74 X10(3)/MCL (ref 4.5–11.5)

## 2024-04-17 PROCEDURE — 99233 SBSQ HOSP IP/OBS HIGH 50: CPT | Mod: ,,, | Performed by: NURSE PRACTITIONER

## 2024-04-17 PROCEDURE — 25000003 PHARM REV CODE 250: Performed by: NURSE PRACTITIONER

## 2024-04-17 PROCEDURE — 93010 ELECTROCARDIOGRAM REPORT: CPT | Mod: ,,, | Performed by: INTERNAL MEDICINE

## 2024-04-17 PROCEDURE — 94799 UNLISTED PULMONARY SVC/PX: CPT

## 2024-04-17 PROCEDURE — 85025 COMPLETE CBC W/AUTO DIFF WBC: CPT | Performed by: NURSE PRACTITIONER

## 2024-04-17 PROCEDURE — 25000003 PHARM REV CODE 250: Performed by: INTERNAL MEDICINE

## 2024-04-17 PROCEDURE — 93005 ELECTROCARDIOGRAM TRACING: CPT

## 2024-04-17 PROCEDURE — 63600175 PHARM REV CODE 636 W HCPCS: Performed by: NURSE PRACTITIONER

## 2024-04-17 PROCEDURE — 97535 SELF CARE MNGMENT TRAINING: CPT

## 2024-04-17 PROCEDURE — 99900031 HC PATIENT EDUCATION (STAT)

## 2024-04-17 PROCEDURE — 94761 N-INVAS EAR/PLS OXIMETRY MLT: CPT

## 2024-04-17 PROCEDURE — 97110 THERAPEUTIC EXERCISES: CPT

## 2024-04-17 PROCEDURE — 97530 THERAPEUTIC ACTIVITIES: CPT

## 2024-04-17 PROCEDURE — 11800000 HC REHAB PRIVATE ROOM

## 2024-04-17 PROCEDURE — 80048 BASIC METABOLIC PNL TOTAL CA: CPT | Performed by: NURSE PRACTITIONER

## 2024-04-17 PROCEDURE — 97116 GAIT TRAINING THERAPY: CPT

## 2024-04-17 RX ORDER — SODIUM CHLORIDE 9 MG/ML
INJECTION, SOLUTION INTRAVENOUS CONTINUOUS
Status: DISCONTINUED | OUTPATIENT
Start: 2024-04-17 | End: 2024-04-29 | Stop reason: HOSPADM

## 2024-04-17 RX ORDER — ESTRADIOL 0.05 MG/D
1 FILM, EXTENDED RELEASE TRANSDERMAL
Status: DISCONTINUED | OUTPATIENT
Start: 2024-04-21 | End: 2024-04-29 | Stop reason: HOSPADM

## 2024-04-17 RX ORDER — METOPROLOL TARTRATE 25 MG/1
12.5 TABLET ORAL 2 TIMES DAILY
Status: DISCONTINUED | OUTPATIENT
Start: 2024-04-17 | End: 2024-04-17

## 2024-04-17 RX ORDER — ESTRADIOL 0.05 MG/D
1 FILM, EXTENDED RELEASE TRANSDERMAL
Status: DISCONTINUED | OUTPATIENT
Start: 2024-04-17 | End: 2024-04-17

## 2024-04-17 RX ORDER — SODIUM CHLORIDE 9 MG/ML
INJECTION, SOLUTION INTRAVENOUS ONCE
Status: COMPLETED | OUTPATIENT
Start: 2024-04-17 | End: 2024-04-17

## 2024-04-17 RX ORDER — METOPROLOL TARTRATE 25 MG/1
25 TABLET, FILM COATED ORAL 2 TIMES DAILY
Status: DISCONTINUED | OUTPATIENT
Start: 2024-04-17 | End: 2024-04-29

## 2024-04-17 RX ORDER — ESTRADIOL 0.05 MG/D
1 FILM, EXTENDED RELEASE TRANSDERMAL
Status: DISCONTINUED | OUTPATIENT
Start: 2024-04-17 | End: 2024-04-29 | Stop reason: HOSPADM

## 2024-04-17 RX ORDER — ESTRADIOL 0.05 MG/D
1 FILM, EXTENDED RELEASE TRANSDERMAL
Status: DISCONTINUED | OUTPATIENT
Start: 2024-04-18 | End: 2024-04-17

## 2024-04-17 RX ADMIN — BENZONATATE 100 MG: 100 CAPSULE ORAL at 05:04

## 2024-04-17 RX ADMIN — ACETAMINOPHEN 325MG 650 MG: 325 TABLET ORAL at 12:04

## 2024-04-17 RX ADMIN — METHOCARBAMOL 500 MG: 500 TABLET ORAL at 12:04

## 2024-04-17 RX ADMIN — ENOXAPARIN SODIUM 30 MG: 30 INJECTION SUBCUTANEOUS at 05:04

## 2024-04-17 RX ADMIN — SODIUM CHLORIDE: 9 INJECTION, SOLUTION INTRAVENOUS at 09:04

## 2024-04-17 RX ADMIN — FERROUS SULFATE TAB 325 MG (65 MG ELEMENTAL FE) 1 EACH: 325 (65 FE) TAB at 07:04

## 2024-04-17 RX ADMIN — ACETAMINOPHEN 325MG 650 MG: 325 TABLET ORAL at 05:04

## 2024-04-17 RX ADMIN — LIDOCAINE 5% 1 PATCH: 700 PATCH TOPICAL at 05:04

## 2024-04-17 RX ADMIN — METHOCARBAMOL 500 MG: 500 TABLET ORAL at 05:04

## 2024-04-17 RX ADMIN — ESTRADIOL 1 PATCH: 0.05 PATCH TRANSDERMAL at 08:04

## 2024-04-17 RX ADMIN — METOPROLOL TARTRATE 25 MG: 25 TABLET, FILM COATED ORAL at 08:04

## 2024-04-17 RX ADMIN — Medication 6 MG: at 08:04

## 2024-04-17 RX ADMIN — CYANOCOBALAMIN TAB 500 MCG 500 MCG: 500 TAB at 07:04

## 2024-04-17 RX ADMIN — TAMSULOSIN HYDROCHLORIDE 0.4 MG: 0.4 CAPSULE ORAL at 08:04

## 2024-04-17 RX ADMIN — FUROSEMIDE 40 MG: 40 TABLET ORAL at 07:04

## 2024-04-17 RX ADMIN — OXYCODONE 10 MG: 5 TABLET ORAL at 10:04

## 2024-04-17 RX ADMIN — AMLODIPINE BESYLATE 5 MG: 5 TABLET ORAL at 07:04

## 2024-04-17 RX ADMIN — METOPROLOL TARTRATE 10 MG: 1 INJECTION, SOLUTION INTRAVENOUS at 12:04

## 2024-04-17 RX ADMIN — NALOXEGOL OXALATE 25 MG: 25 TABLET, FILM COATED ORAL at 07:04

## 2024-04-17 RX ADMIN — SODIUM CHLORIDE: 9 INJECTION, SOLUTION INTRAVENOUS at 05:04

## 2024-04-17 NOTE — PT/OT/SLP PROGRESS
Recreational Therapy Treatment    Date of Treatment: 04/17/24  Start Time: 1100  Stop Time: 1130  Total Time: 30 min  Missed Time:     General Precautions: fall  Ortho Precautions: LLE weight bearing as tolerated  Braces: N/A    Vitals   Vitals at Rest  /67      O2 Sat    Pain      Vitals With Activity  /79      O2 Sat    Pain        Treatment     Cognitive Skills Building   Cognitive Observation Activity Assist Position Equipment Response            Comment:          Dynamic Activities   Activity Assist Position Equipment Response   Activity 1 Bocce ball minimum assistance Sitting Bocce balls fair   Comment: Pt was in bed upon entering room.  Her BP (108/67) was taken while in bed  She agreed to get out of bed and go to Therapy.  BP was taken while sitting at edge of bed  (130/79)  Bed to w/c transfer was min. She was pushed to Therapy Sheikh but our time for RT was up.  She passed to PT.  Will attempt to see a full treatment tomorrow 4/18/2024       Fine Motor Activities   Activity Assist Position Equipment Response           Comment:          Additional Info: Bed to w/c transfer improved to min    Goals     Short Term Goals    Goal  Goal Status   Will increase activity tolerance to supervision Met   Will increase sit to stand to min Progressing   Will improve dynamic standing balance/reaching to min Progressing           Long Term Goals    Goal Goal Status   Will increase standing tolerance to 5 minutes Progressing   Will improve dynamic standing balance/reaching to supervision Progressing

## 2024-04-17 NOTE — PT/OT/SLP PROGRESS
Occupational Therapy      Patient Name:  Safia Muñoz   MRN:  [id number]    Patient not seen today secondary to Patient fatigue, Patient unwilling to participate.     Amount of therapy minutes missed:  30 Minutes.    Will follow-up tomorrow when time permits.    4/17/2024

## 2024-04-17 NOTE — PT/OT/SLP PROGRESS
Physical Therapy Inpatient Rehab Treatment    Patient Name:  Safia Muñoz   MRN:  [id number]    Recommendations:     Discharge Recommendations:  Moderate Intensity Therapy   Discharge Equipment Recommendations:     Barriers to discharge: Impaired functional mobility     Assessment:     Safia Muñoz is a 89 y.o. female admitted with a medical diagnosis of Closed 2-part intertrochanteric fracture of proximal end of left femur.  She presents with the following impairments/functional limitations:  weakness, impaired endurance, impaired functional mobility, gait instability, impaired balance, impaired cognition, decreased lower extremity function, decreased safety awareness.    PT Assessment: EKG ordered at start of first session d/t observed fluctuating HR, but pt cleared to continue to participate with PT to tolerance until EKG staff arrived per STEVE Du. Discussed pts new onset A-fib with RVR with PT supervisor and STEVE Du at start of second session, and pt again cleared to participate with PT to tolerance per NP. Pt cooperative and pleasant throughout both sessions, exhibiting improved cognition and decreased pain vs previous session with this PT.    Rehab Diagnosis: medical diagnosis of Closed 2-part intertrochanteric fracture of proximal end of left femur    General Precautions: Standard, fall     Orthopedic Precautions:LLE weight bearing as tolerated     Braces: N/A    Rehab Prognosis: Fair; patient would benefit from acute skilled PT services to address these deficits and reach maximum level of function.      History:     Past Medical History:   Diagnosis Date    COPD (chronic obstructive pulmonary disease)     Hypertension        Past Surgical History:   Procedure Laterality Date    RETROGRADE INTRAMEDULLARY RODDING OF DISTAL FEMUR Left 4/9/2024    Procedure: INSERTION, INTRAMEDULLARY NAIL INTERTROCHENTERIC FRACTURE;  Surgeon: Guy Jaquez DO;  Location: [facility];  Service: Orthopedics;  Laterality:  Left;  HANA TABLE, SYNTHES TFNA       Subjective     Patient comments: pt agreeable to participate with PT; reported feeling very tired during PM session.    Respiratory Status: Room air    Patients cultural, spiritual, Tenriism conflicts given the current situation: no    Objective:     Communicated with pt prior to session.  Patient found up in chair upon PT entry to room at start of both sessions.    Pt is Oriented x3 and Alert, Cooperative, and Motivated.    Vitals   Pain Pain Rating 1: 7/10  Location - Side 1: Left  Location 1: leg  Pain Addressed 1: Reposition, Cessation of Activity  Pain Rating Post-Intervention 1: 6/10     1130: HR fluctuating from  bpm via pulse ox. STEVE Du notified and observed.    1320: 88/60, 90 bpm - taken in sitting  1323: 78/50, vitals machine unable to give HR reading, assessed via pulse ox: fluctuated between  bpm - taken in sitting. STEVE Du observed.  1330: 94/62, 130 - taken in supine    Functional Mobility:    Current   Status  Discharge   Goal   Functional Area: Care Score:    Roll Left and Right   Independent   Sit to Lying 3  Min assist needed for L LE Supervision or touching assistance   Lying to Sitting on Side of Bed   Supervision or touching assistance   Sit to Stand 4  CGA provided with use of RW Supervision or touching assistance   Chair/Bed-to-Chair Transfer 3  Min A for balance during stand-pivot with use of RW. Supervision or touching assistance   Car Transfer   Not attempted due to medical/safety concerns   Walk 10 Feet 4  Pt amb ~20' with RW and CGA. Very slow speed with small B step sizes. Increased time required with encouragement for increased distance. Not attempted due to medical/safety concerns   Walk 50 Feet with Two Turns 88 Not attempted due to medical/safety concerns   Walk 150 Feet 88 Not attempted due to medical/safety concerns   Walk 10 Feet Uneven Surface   Not attempted due to medical/safety concerns   1 Step (Curb)   Not attempted due  to medical/safety concerns   4 Steps   Not attempted due to medical/safety concerns   12 Steps   Not attempted due to medical/safety concerns   Picking Up Object   Not attempted due to medical/safety concerns   Wheel 50 Feet with Two Turns   Independent   Wheel 150 Feet   Not attempted due to medical/safety concerns       Therapeutic Activities and Exercises:  Patient educated on role of acute care PT and PT POC and safety while in hospital including calling nurse for mobility  Patient educated about importance of OOB mobility and remaining up in chair most of the day.    Toilet t/f completed with CGA-min A and use of RW + toileting completed with CGA from PT for balance and hygiene assistance provided by NSG staff. After completion of toileting, pt reported feeling significantly increased fatigue. Vitals assessed and documented above. Pt returned to supine position in bed and vitals again assessed as documented above. NP Ana Laura aware of pt status and RN Lorelei notified.    Activity Tolerance: Fair    Patient left up in chair with all lines intact, call button in reach, and NSG staff + EKG staff present at end of first session, and pt left supine in bed with all lines intact, call button in reach, and NSG/NP staff notified at end of second session.    Education provided: roles and goals of PT/PTA, transfer training, bed mob, gait training, safety awareness, body mechanics, assistive device, and fall prevention    Expected compliance: Moderate compliance    Plan:     During this hospitalization, patient to be seen 6 x/week to address the identified rehab impairments via gait training, therapeutic activities, therapeutic exercises, neuromuscular re-education, wheelchair management/training and progress toward the following goals:    GOALS:   Multidisciplinary Problems       Physical Therapy Goals          Problem: Physical Therapy    Goal Priority Disciplines Outcome Goal Variances Interventions   Physical Therapy  Goal     PT, PT/OT Ongoing, Progressing     Description: Bed Mobility:  Roll left and right with partial/moderate assist.   Sit to supine transfer with partial/moderate assist.   Supine to sit transfer with partial/moderate assist.     Transfers:  Sit to stand transfer with supervision/touching assist using RW.   Bed to chair transfer with supervision/touching assist Stand Step  using RW.   Bed to chair transfer with partial/moderate assist Slide Board  using Slide board.     Mobility:  Ambulate 150 feet with supervision/touching assist using RW.   Manual wheelchair 150 feet with partial/moderate assist using Bilateral upper extremity                       Plan of Care Expires:  04/19/24  PT Next Visit Date: 04/19/24  Plan of Care reviewed with: patient    Additional Information:     Pt scheduled for PT 0705-5729 and [id number]. 10 minutes missed 6866-5966 d/t STAT EKG, and 30 minutes missed 7490-6669 d/t low BP + pt symptomatic.    Time Tracking:     Therapy Time  PT Start Time:  (1130; 1300)  PT Stop Time:  (1150; 1330)  PT Total Time (min): 30 min   PT Individual: 50  Missed Time: 40 Minutes  Time Missed due to: Testing/imaging (xray/CT/MRI), Therapist assessment, Other (Comment) (low BP + symptomatic)    Billable Minutes: Gait Training 15 min and Therapeutic Activity 35 min    04/17/2024

## 2024-04-17 NOTE — PROGRESS NOTES
Subjective  HPI: 89 yo WF with a PMH of COPD and hypertension presented to the ED at [facility] on 4/8/24 with complaint of left hip pain after a ground level fall where she tripped on a rug and fell. X-ray show intertrochanteric left femur fracture. Orthopedic surgery consulted and referred to hospital medicine for admission. Chest x-ray show no airspace disease. EKG sinus rhythm with PACs. On 4/9, orthopedic surgeon recommended surgical intervention. Patient underwent IM nailing, and was placed WBAT to LLE. On 4/10, patient placed on Lovenox for DVT ppx, and can change on ASA 81mg BID at discharge. Patient was started on Ceftriazone for UTI. Patient gets frequent UTIs. Labs showed low H&H of 10.4 & 33.5, low Iron of 26, low TIBC of 218, elevated B12 of 1,025, low Iron sat of 12, elevated glucose of 135, low calcium of 7.8, low protein of 5.4, low albumin of 3.3, elevated AST of 35. PT/OT evals completed with deficits noted with recommendation for high intensity therapy needed. On 4/11, patient complained of nausea so zofran was given. Labs showed low RBC of 3.53, low H&H Of 10.5 & 32.8, low calcium of 7.8, low albumin of 3.3. Dry dressing changes started. No BM reported since admission. On 4/12, labs showed elevated WBC of 12.68, low RBC of 3.30, low H&H of 9.9 & 30.4, low MCHC of 32.6, low chloride of 97, and elevated glucose of 126. Patient will need appointment with Carmen AMAYA (Ortho) in 3 weeks for wound check and repeat Xrays. Patient complained of pain to LLE rating at 8-9/10 with movement, but controlled with rest. Patient is AAOx4.  Participating with therapy. Functional status includes setup/supervision needed for eating, moderate assist for transfers with RW, max assist for toileting, moderate assist bed mobility, minimal assist for walking 26ft with RW, minimal assist for grooming, and max assist for lower body dressing. Patient was evaluated, accepted, and admitted to inpatient rehab to improve  functional status. [facility] to [facility] on 4/12 without incident.  4/17: Seen with OT, seated in WC. Reports patient having large amounts of loose stools about an hour prior. Staff relays patient slept well overnight. She was able to eat breakfast which consisted of cereal and 0.5 a banana. Vitals checked and patient hypotensive and tachycardic. Patient admits to feeling weak, dizzy, and very tired. Cognition appears intact. AAO. Rechecked BP 80s/50s, HR 120s. Patient states that she is so tired because she didn't sleep good and was up at 230 and 400. States that she just woke up, but it might be connected to her estrogen patches that she has not had. Says that she will ask her daughter to bring them. Rechecked BP and dropped again to 66/44. Transferred back to bed using RW. Sat edge of bed and vitals rechecked. /65. HR 81. States that she is still so tired and can't continue doing therapy right now. Discussed with IM NP. IVF Bolus ordered. Monitor for orthostatic hypotension and consider DC of Flomax. Continue therapy as tolerated. Seen again with PT. HR sporadic. IM informed. EKG shows A-Fib. Metoprolol IV given. Cardiology consulted. Rate improved. IVF running. Reports having eaten about half of her lunch. Patient remains tired. Unable to participate in therapy this afternoon. Returned to bed.             Review of Systems  Psychiatric: Does not have any confirmed mental health history or diagnoses. Per daughter, pt. seems to be depressed and anxious since her spouse passed away. She is a former smoker.      Depression/Anxiety: no current complaints. Therapy notes Anxiety     Pain: left hip with movement and weight bearing (spasms)-improved  LIDOcaine 5 % patch q24hr, 1800, low back  acetaminophen tablet 650 mg q6h  methocarbamoL tablet 500 mg TID. q6h     methocarbamoL tablet 500 mg TID PRN muscle spasm  acetaminophen tablet 650 mg q8h PRN mild pain  oxyCODONE immediate release tablet 5 mg q4h PRN mod  pain  oxyCODONE immediate release tablet 10 mg q4h PRN severe pain  Bowels/Bladder: last BM 4/17 x 2 (large amount, loose) 4/16 x 2. 4/15- Suppository given. Previous BM 4/12 following suppository, and prior to admission (4/8)  naloxegoL (MOVANTIK) tablet 25 mg qd   Appetite: improving     Sleep: decreased- good overnight per staff after Haldol             Physical Exam  General: well-developed, well-nourished, weak, tired, dizzy w/BP drop  Respiratory: equal chest rise, no SOB, no audible wheeze  Cardiovascular: regular rate and rhythm, LE edema  Gastrointestinal: soft, non-tender, non-distended   Musculoskeletal: decreased ROM/strength to LLE  Integumentary: bruising,  LLE incisions x 3-staples, dressing-c/d/I, surrounding ecchymosis and tape blisters-use paper tape  Neurologic: cranial nerves intact, signs of peripheral neurological deficit- numbness to fingertips, AAO          Labs:   Latest Reference Range & Units 04/17/24 05:26   WBC 4.50 - 11.50 x10(3)/mcL 12.74 (H)   RBC 4.20 - 5.40 x10(6)/mcL 3.65 (L)   Hemoglobin 12.0 - 16.0 g/dL 11.0 (L)   Hematocrit 37.0 - 47.0 % 33.8 (L)   MCV 80.0 - 94.0 fL 92.6   MCH 27.0 - 31.0 pg 30.1   MCHC 33.0 - 36.0 g/dL 32.5 (L)   RDW 11.5 - 17.0 % 14.7   Platelet Count 130 - 400 x10(3)/mcL 412 (H)   MPV 7.4 - 10.4 fL 9.4   Neut % % 70.8   LYMPH % % 14.9   Mono % % 10.1   Eos % % 2.6   Basophil % % 0.5   Immature Granulocytes % 1.1   Neut # 2.1 - 9.2 x10(3)/mcL 9.01   Lymph # 0.6 - 4.6 x10(3)/mcL 1.90   Mono # 0.1 - 1.3 x10(3)/mcL 1.29   Eos # 0 - 0.9 x10(3)/mcL 0.33   Baso # <=0.2 x10(3)/mcL 0.07   Immature Grans (Abs) 0 - 0.04 x10(3)/mcL 0.14 (H)   nRBC % 0.0   Sodium 132 - 146 mmol/L 135   Potassium 3.5 - 5.1 mmol/L 3.8   Chloride 98 - 111 mmol/L 99   CO2 23 - 31 mmol/L 28   Anion Gap mEq/L 8.0   BUN 9.8 - 20.1 mg/dL 18.1   Creatinine 0.55 - 1.02 mg/dL 0.71   BUN/CREAT RATIO  25   eGFR mls/min/1.73/m2 >60   Glucose 75 - 121 mg/dL 123 (H)   Calcium 8.4 - 10.2 mg/dL 8.2 (L)    (H): Data is abnormally high  (L): Data is abnormally low                Assessment/Plan  Hospital   Closed 2-part intertrochanteric fracture of proximal end of left femur s/p repair   Fall     Non-Hospital   COPD (chronic obstructive pulmonary disease)   Hypertension   Frequent UTI   Osteoporosis       Wounds: LLE incisions x 3-staples, dressing-c/d/I, surrounding ecchymosis and tape blisters-use paper tape  S/p IM nail of intertrochanteric left femur fracture on 4/9 (Leonid)  Precautions: WBAT LLE  Bracing/AD: RW  Swallowing: Regular Diet  Function: Tolerating therapy. Continue PT/OT  VTE Prophylaxis:   enoxaparin injection 30 mg SubQ q24hr  Code Status: FULL CODE   Discharge: Lives alone in [place] in an independent living [facility] with no steps to enter. Completed high school and 1 year of college. Denies  history. Pt. Is retired from secretarial work.  Is  approximately 3 years ago.  Was independent without use of assistive devices. The plan is to return to [facility]. Date pending.

## 2024-04-17 NOTE — PROGRESS NOTES
04/17/24 1100   Rec Therapy Time Calculation   Date of Treatment 04/17/24   Rec Start Time 1100   Rec Stop Time 1130   Rec Total Time (min) 30 min   Time   Treatment time 2 units   Charges   $Therapeutic Exercise 2 units   Precautions   General Precautions fall   Orthopedic Precautions  LLE weight bearing as tolerated   Braces N/A   Pain/Comfort   Pain Rating 1 no pain   Vital Signs   Pulse (!) 115   /67   OTHER   Rehab identified problem list/impairments weakness;impaired endurance;impaired functional mobility;gait instability;impaired balance;impaired cognition;decreased lower extremity function;decreased safety awareness   Values/Beliefs/Spiritual Care   Spiritual, Cultural Beliefs, Zoroastrianism Practices, Values that Affect Care no   Overall Level of Functioning   Activity Tolerance Mod Indep   Dynamic Sitting Balance/Reaching Mod Indep   Dynamic Standing Balance/Reaching Does not occur   Right UE Coodination/Dexterity Mod Indep   Left UE Coordination/Dexterity Mod Indep   Problem Solving/Sequencing Skills Standby Assist   Memory Recall Standby Assist   R/L Neglect/Inattention Does not occur   Attention Span Mod Indep   Social Interaction Mod Indep   Recreational Therapy Short Term Goals   Short Term Goal 1 Progression Met   Short Term Goal 2 Progression Progressing   Short Term Goal 3 Progression Progressing   Recreational Therapy Long Term Goals   Long Term Goal 1 Progression Progressing   Long Term Goal 2 Progression Progressing   Plan   Patient to be seen Daily   Planned Duration 2 weeks   Treatments Planned Energy conservation training;Safety education;Cognitive training   Treatment plan/goals estblished with Patient/Caregiver Yes

## 2024-04-17 NOTE — PROGRESS NOTES
[facility]  Orthopedics  Progress Note    Patient Name: Safia Muñoz  MRN: [id number]  Admission Date: 4/12/2024  Hospital Length of Stay: 5 days  Attending Provider: Pawan Myers MD  Primary Care Provider: Iglesia Steel MD  Follow-up For: Procedure(s) (LRB):  INSERTION, INTRAMEDULLARY NAIL INTERTROCHENTERIC FRACTURE (Left)    Sx date: 04/09/2024  Subjective:     Principal Problem:Left IT fracture    Principal Orthopedic Problem: * No surgery found *   S/P IMN Left Femur    Interval History: Patient resting comfortably this morning. She is doing well this morning. [place], Her grandson is present with her. States she has had some confusion, constipation, and urinary trouble which has  held up her therapy some. She has some resolving bruising which remains to the thigh. Otherwise no complaints.     Review of patient's allergies indicates:   Allergen Reactions    Adhesive tape-silicones Blisters    Ofloxacin     Penicillins     Sulfamethoxazole-trimethoprim      Other reaction(s): Unknown    Codeine Nausea Only       Current Facility-Administered Medications   Medication Dose Route Frequency Provider Last Rate Last Admin    acetaminophen tablet 650 mg  650 mg Oral Q6H Freida, Augusto A, FNP   650 mg at 04/17/24 0521    acetaminophen tablet 650 mg  650 mg Oral Q8H PRN Disha Biggs A, FNP        amLODIPine tablet 5 mg  5 mg Oral Daily Freida, Augusto A, FNP   5 mg at 04/16/24 0848    benzonatate capsule 100 mg  100 mg Oral TID PRN Augusto Guan A, FNP   100 mg at 04/17/24 0525    bisacodyL suppository 10 mg  10 mg Rectal Daily PRN Freida, Augusto A, FNP   10 mg at 04/15/24 0900    bisacodyL suppository 10 mg  10 mg Rectal Once Freida, Augusto A, FNP        cyanocobalamin tablet 500 mcg  500 mcg Oral Daily Freida, Augusto A, FNP   500 mcg at 04/16/24 0848    docusate sodium capsule 100 mg  100 mg Oral BID Freida, Augusto A, FNP   100 mg at 04/16/24 1939     enoxaparin injection 30 mg  30 mg Subcutaneous Q24H (prophylaxis, 1700) Augusto Guan FNP   30 mg at 04/16/24 1743    [START ON 4/18/2024] estradiol 0.05 mg/24 hr td ptsw patch 1 patch  1 patch Transdermal Every Thurs Augusto Guan FNP        ferrous sulfate tablet 1 each  1 tablet Oral Daily Augusto Guan, FNP   1 each at 04/16/24 0848    furosemide tablet 40 mg  40 mg Oral Daily Ирина Ness FNP   40 mg at 04/16/24 0847    hydrALAZINE injection 10 mg  10 mg Intravenous Q4H PRN Augusto Guan FNP        hydrOXYzine pamoate capsule 50 mg  50 mg Oral Nightly PRN Augusto Guan FNP   50 mg at 04/14/24 2012    labetalol 20 mg/4 mL (5 mg/mL) IV syring  10 mg Intravenous Q4H PRN Augusto Guan FNP        LIDOcaine 5 % patch 1 patch  1 patch Transdermal Q24H Disha Biggs FNP   1 patch at 04/16/24 1743    melatonin tablet 6 mg  6 mg Oral Nightly Ирина Ness FNP   6 mg at 04/16/24 1939    methocarbamoL tablet 500 mg  500 mg Oral TID PRN Disha Biggs FNP   500 mg at 04/16/24 0325    methocarbamoL tablet 500 mg  500 mg Oral Q6H Disha Biggs FNP   500 mg at 04/17/24 0521    metoprolol injection 10 mg  10 mg Intravenous Q2H PRN Augusto Guan FNP        mupirocin 2 % ointment   Nasal BID Pawan Myers MD        naloxegoL (MOVANTIK) tablet 25 mg  25 mg Oral Daily Augusto Guan FNP   25 mg at 04/16/24 0847    nitroGLYCERIN SL tablet 0.4 mg  0.4 mg Sublingual Q5 Min PRN Augusto Guan FNP        ondansetron disintegrating tablet 4 mg  4 mg Oral Q6H PRN Augusto Guan FNP   4 mg at 04/15/24 0705    oxyCODONE immediate release tablet 10 mg  10 mg Oral Q4H PRN Disha Biggs FNP   10 mg at 04/15/24 0706    oxyCODONE immediate release tablet 5 mg  5 mg Oral Q4H PRN Disha Biggs FNP   5 mg at 04/12/24 1859    polyethylene glycol packet 17 g  17 g Oral BID PRN Augusto Guan FNP        polyethylene glycol packet 17 g  " 17 g Oral BID Ирина Ness, FNP   17 g at 04/16/24 1939    promethazine tablet 25 mg  25 mg Oral Q6H PRN Augusto Guan FNP   25 mg at 04/15/24 0948    tamsulosin 24 hr capsule 0.4 mg  0.4 mg Oral QHS Ирина Ness, FNP   0.4 mg at 04/16/24 2025     Objective:     Vital Signs (Most Recent):  Temp: 98 °F (36.7 °C) (04/17/24 0455)  Pulse: 62 (04/17/24 0500)  Resp: 20 (04/17/24 0455)  BP: 119/74 (04/17/24 0455)  SpO2: 95 % (04/17/24 0500) Vital Signs (24h Range):  Temp:  [97.6 °F (36.4 °C)-98 °F (36.7 °C)] 98 °F (36.7 °C)  Pulse:  [] 62  Resp:  [18-20] 20  SpO2:  [91 %-95 %] 95 %  BP: (119)/(69-74) 119/74     Weight: 74.5 kg (164 lb 3.9 oz)  Height: 5' 2.99" (160 cm)  Body mass index is 29.1 kg/m².      Intake/Output Summary (Last 24 hours) at 4/17/2024 0751  Last data filed at 4/17/2024 0238  Gross per 24 hour   Intake 840 ml   Output 2200 ml   Net -1360 ml       Physical Exam:   General the patient is alert and oriented x3 no acute distress nontoxic-appearing appropriate affect.    Constitutional: Vital signs are examined and stable.  Resp: No signs of labored breathing               LLE: -Skin: Dressing with minimal amount of serosanguinous drainage           -MSK: +EHL/FHL, Gastroc/Tib anterior            -Neuro:  Sensation intact to light touch L3-S1 dermatomes            -CV: Capillary refill is less than 2 seconds. + DP  Compartments soft and compressible      Diagnostic Findings:   Significant Labs:   Recent Lab Results  (Last 5 results in the past 72 hours)        04/17/24  0526   04/15/24  1306   04/15/24  1235   04/15/24  0918   04/15/24  0508        Influenza A, Molecular       Not Detected         Influenza B, Molecular       Not Detected         Albumin/Globulin Ratio         0.9       Albumin         2.8       ALP         71       ALT         28       Anion Gap 8.0               Appearance, UA     Clear           AST         47       Baso # 0.07         0.06       Basophil % 0.5    "      0.5       BILIRUBIN TOTAL         0.8       Bilirubin, UA     Negative           BUN 18.1         16.9       BUN/CREAT RATIO 25               Calcium 8.2         8.9       Chloride 99         94       CO2 28         31       Color, UA     Straw           Creatinine 0.71         0.80       eGFR >60         >60       Eos # 0.33         0.11       Eos % 2.6         0.9       Globulin, Total         3.0       Glucose 123         121       Glucose, UA     Negative           Hematocrit 33.8         34.0       Hemoglobin 11.0         11.0       Immature Grans (Abs) 0.14         0.22       Immature Granulocytes 1.1         1.9       Ketones, UA     Negative           Lactic Acid Level   1.4             Leukocyte Esterase, UA     Negative           Lymph # 1.90         1.71       LYMPH % 14.9         14.7       Magnesium          2.10       MCH 30.1         29.2       MCHC 32.5         32.4       MCV 92.6         90.2       Mono # 1.29         1.11       Mono % 10.1         9.5       MPV 9.4         10.1       Neut # 9.01         8.46       Neut % 70.8         72.5       NITRITE UA     Negative           nRBC 0.0         0.0       Blood, UA     Negative           pH, UA     7.0           Phosphorus Level         4.2       Platelet Count 412         424       Potassium 3.8         4.8       Prealbumin         13.5       PROTEIN TOTAL         5.8       Protein, UA     Negative           RBC 3.65         3.77       RDW 14.7         14.4       SARS-CoV2 (COVID-19) Qualitative PCR       Not Detected         Sodium 135         133       Specific Gravity,UA     1.010           Urobilinogen, UA     0.2           WBC 12.74         11.67                               Significant Imaging: I have reviewed all pertinent imaging results/findings.     Assessment/Plan:     Active Diagnoses:    Diagnosis Date Noted POA    PRINCIPAL PROBLEM:  Closed 2-part intertrochanteric fracture of proximal end of left femur s/p repair [S72.142A]  04/08/2024 Yes    Vomiting [R11.10] 04/15/2024 Unknown    Fall [W19.XXXA] 04/12/2024 Yes      Problems Resolved During this Admission:   89YO F here following a fall  POD #8 S/P IMN left IT fracture  Daily dry dressing changes. May discontinue xeroform to incision sites.  Diet: as tolerated  Pain: multimodal PRN  DVT: continue while in house. Okay for aspirin 81 mg BID at DC.   PT/OT: continue daily mobility, Range of motion if she is not able to mobilize  Activity: WBAT LLE  She will need follow up with us in 2 weeks for wound check and staple removal.  Please call with any questions or concerns.     The above findings, diagnostics, and treatment plan were discussed with Dr Jaquez who is in agreement with the plan of care except as stated in additional documentation.      Carmen Porter PA-C   Orthopedic Trauma Surgery  [facility]

## 2024-04-17 NOTE — CONSULTS
Inpatient consult to Cardiology  Consult performed by: Yang Campbell AGACNP-BC  Consult ordered by: Disha Biggs FNP  Reason for consult: afib          [facility] - Rehab Inpatient Services  Cardiology  Consult Note    Patient Name: Safia Muñoz  MRN: [id number]  Admission Date: 4/12/2024  Hospital Length of Stay: 5 days  Code Status: DNR   Attending Provider: Pawan Myers MD   Consulting Provider: DINESH MoralesBC  Primary Care Physician: Iglesia Steel MD  Principal Problem:Closed 2-part intertrochanteric fracture of proximal end of left femur    Patient information was obtained from patient, past medical records, and ER records.     Subjective:     Chief Complaint:  Consulted for Afib     HPI:   This is a 89-year-old female, who is unknown to [facility], with history of HTN, COPD, anemia, osteoporosis, left hip fracture/left hip IM nailing.  She initially presented to [facility] ER on 4.8.24 complaining of left hip pain after ground-level fall.  She was found to have a closed comminuted interotrochanteric left femur fracture.  She underwent left hip IM nailing on 4.9.24.  She was transferred to inpatient rehab on 4.12.24.  On 4.17.24 was noted she was having increased variability with her heart rate during therapy and initially sustained greater than 120 bpm therefore an EKG was obtained.  EKG revealed A-fib with RVR.  She was given 10 mg of IV Lopressor which improved her heart rate.  [facility] has been consulted for A-fib with RVR.      PMH: HTN, COPD, anemia, osteoporosis, left hip fracture/left hip IM nailing  PSH: left hip IM nailing  Social History: Former tobacco use quit 1994, denies EtOH and illicit drug use  Family History: Father-heart disease    Previous Cardiac Diagnostics:   No previous cardiac diagnostics available for review      Review of patient's allergies indicates:   Allergen Reactions    Adhesive tape-silicones Blisters    Ofloxacin     Penicillins      Sulfamethoxazole-trimethoprim      Other reaction(s): Unknown    Codeine Nausea Only       Current Facility-Administered Medications   Medication Dose Route Frequency Provider Last Rate Last Admin    0.9%  NaCl infusion   Intravenous Continuous Disha Biggs FNP        acetaminophen tablet 650 mg  650 mg Oral Q6H Augusto Guan A, FNP   650 mg at 04/17/24 1217    acetaminophen tablet 650 mg  650 mg Oral Q8H PRN Disha Biggs FNP        amLODIPine tablet 5 mg  5 mg Oral Daily FreidaAugusto simons, FNP   5 mg at 04/17/24 0757    benzonatate capsule 100 mg  100 mg Oral TID PRN Augusto Guan A, FNP   100 mg at 04/17/24 0525    bisacodyL suppository 10 mg  10 mg Rectal Daily PRN Augusto Guan A, FNP   10 mg at 04/15/24 0900    bisacodyL suppository 10 mg  10 mg Rectal Once Augusto Guan, FNP        cyanocobalamin tablet 500 mcg  500 mcg Oral Daily Augusto Guan, FNP   500 mcg at 04/17/24 0757    enoxaparin injection 30 mg  30 mg Subcutaneous Q24H (prophylaxis, 1700) Augusto Guan, FNP   30 mg at 04/16/24 1743    [START ON 4/18/2024] estradiol 0.05 mg/24 hr td ptsw patch 1 patch  1 patch Transdermal Twice Weekly Ирина Ness FNP        ferrous sulfate tablet 1 each  1 tablet Oral Daily Augusto Guan, FNP   1 each at 04/17/24 0757    furosemide tablet 40 mg  40 mg Oral Daily Ирина Ness FNP   40 mg at 04/17/24 0757    hydrALAZINE injection 10 mg  10 mg Intravenous Q4H PRN Augusto Guan, FNP        hydrOXYzine pamoate capsule 50 mg  50 mg Oral Nightly PRN Augusto Guan, FNP   50 mg at 04/14/24 2012    labetalol 20 mg/4 mL (5 mg/mL) IV syring  10 mg Intravenous Q4H PRN Augusto Guan A, FNP        LIDOcaine 5 % patch 1 patch  1 patch Transdermal Q24H Disha Biggs FNP   1 patch at 04/16/24 1743    melatonin tablet 6 mg  6 mg Oral Nightly Ирина Ness, FNP   6 mg at 04/16/24 1939    methocarbamoL tablet 500 mg  500 mg Oral TID PRN  Disha Biggs FNP   500 mg at 04/16/24 0325    methocarbamoL tablet 500 mg  500 mg Oral Q6H Disha Biggs FNP   500 mg at 04/17/24 1217    metoprolol injection 10 mg  10 mg Intravenous Q2H PRN Augusto Guan FNP   10 mg at 04/17/24 1207    metoprolol tartrate (LOPRESSOR) split tablet 12.5 mg  12.5 mg Oral BID Ирина Ness FNP        mupirocin 2 % ointment   Nasal BID Pawan Myers MD        naloxegoL (MOVANTIK) tablet 25 mg  25 mg Oral Daily FreidaAugusto jimenez, FNP   25 mg at 04/17/24 0757    nitroGLYCERIN SL tablet 0.4 mg  0.4 mg Sublingual Q5 Min PRN Augusto Guan, FNP        ondansetron disintegrating tablet 4 mg  4 mg Oral Q6H PRN Augusto Guan, FNP   4 mg at 04/15/24 0705    oxyCODONE immediate release tablet 10 mg  10 mg Oral Q4H PRN Disha Biggs, FNP   10 mg at 04/15/24 0706    oxyCODONE immediate release tablet 5 mg  5 mg Oral Q4H PRN Disha Biggs, FNP   5 mg at 04/12/24 1855    polyethylene glycol packet 17 g  17 g Oral BID PRN Augusto Guan, FNP        promethazine tablet 25 mg  25 mg Oral Q6H PRN Augusto Guan, FNP   25 mg at 04/15/24 0948    tamsulosin 24 hr capsule 0.4 mg  0.4 mg Oral QHS Ирина Ness FNP   0.4 mg at 04/16/24 2025       Review of Systems:  Review of Systems   Constitutional: Negative.    HENT: Negative.     Eyes: Negative.    Respiratory: Negative.     Cardiovascular: Negative.    Gastrointestinal: Negative.    Endocrine: Negative.    Genitourinary: Negative.    Musculoskeletal: Negative.    Skin: Negative.    Allergic/Immunologic: Negative.    Neurological: Negative.    Hematological: Negative.    Psychiatric/Behavioral: Negative.         Objective:     Vital Signs (Most Recent):  Temp: 98 °F (36.7 °C) (04/17/24 0455)  Pulse: 60 (04/17/24 1225)  Resp: 20 (04/17/24 0455)  BP: 90/68 (04/17/24 1025)  SpO2: 95 % (04/17/24 0500) Vital Signs (24h Range):  Temp:  [97.6 °F (36.4 °C)-98 °F (36.7 °C)] 98 °F (36.7 °C)  Pulse:   [] 60  Resp:  [18-20] 20  SpO2:  [91 %-95 %] 95 %  BP: ()/(44-74) 90/68     Weight: 74.5 kg (164 lb 3.9 oz)  Body mass index is 29.1 kg/m².    SpO2: 95 %         Intake/Output Summary (Last 24 hours) at 4/17/2024 1314  Last data filed at 4/17/2024 1304  Gross per 24 hour   Intake 600 ml   Output 2225 ml   Net -1625 ml       Lines/Drains/Airways       Drain  Duration                  Urethral Catheter 04/16/24 0300 16 Fr. 1 day              Peripheral Intravenous Line  Duration                  Peripheral IV - Single Lumen 04/09/24 1210 18 G Right Forearm 8 days                      Significant Labs: CMP   Recent Labs   Lab 04/17/24  0526      K 3.8   CO2 28   BUN 18.1   CREATININE 0.71   CALCIUM 8.2*    and CBC   Recent Labs   Lab 04/17/24  0526   WBC 12.74*   HGB 11.0*   HCT 33.8*   *         Significant Imaging:         EKG:          Telemetry:     Physical Exam:  Physical Exam  Constitutional:       Appearance: Normal appearance.   HENT:      Head: Atraumatic.   Eyes:      Extraocular Movements: Extraocular movements intact.      Conjunctiva/sclera: Conjunctivae normal.   Cardiovascular:      Rate and Rhythm: Normal rate and regular rhythm.      Pulses: Normal pulses.      Heart sounds: Normal heart sounds.   Pulmonary:      Effort: Pulmonary effort is normal.      Breath sounds: Normal breath sounds.   Abdominal:      Palpations: Abdomen is soft.   Musculoskeletal:      Cervical back: Neck supple.   Skin:     General: Skin is warm and dry.   Neurological:      General: No focal deficit present.      Mental Status: She is alert.         Home Medications:   No current facility-administered medications on file prior to encounter.     Current Outpatient Medications on File Prior to Encounter   Medication Sig Dispense Refill    amLODIPine (NORVASC) 5 MG tablet Take 5 mg by mouth.      calcium-vitamin D3-vitamin K 500-100-40 mg-unit-mcg Chew once daily.      cefUROXime (CEFTIN) 250 MG tablet  Take 250 mg by mouth every 12 (twelve) hours.      cyanocobalamin, vitamin B-12, 5,000 mcg Cap once daily.      estradiol 0.05 mg/24 hr td ptsw (VIVELLE-DOT) 0.05 mg/24 hr 1 patch twice a week.         Current Inpatient Medications:    Current Facility-Administered Medications:     0.9%  NaCl infusion, , Intravenous, Continuous, Dian, Disha A, FNP    acetaminophen tablet 650 mg, 650 mg, Oral, Q6H, Freida, Augusto A, FNP, 650 mg at 04/17/24 1217    acetaminophen tablet 650 mg, 650 mg, Oral, Q8H PRN, Dian, Disha A, FNP    amLODIPine tablet 5 mg, 5 mg, Oral, Daily, Freida, Augusto A, FNP, 5 mg at 04/17/24 0757    benzonatate capsule 100 mg, 100 mg, Oral, TID PRN, Freida, Augusto A, FNP, 100 mg at 04/17/24 0525    bisacodyL suppository 10 mg, 10 mg, Rectal, Daily PRN, Freida, Augusto A, FNP, 10 mg at 04/15/24 0900    bisacodyL suppository 10 mg, 10 mg, Rectal, Once, Freida, Augusto A, FNP    cyanocobalamin tablet 500 mcg, 500 mcg, Oral, Daily, Freida, Augusto A, FNP, 500 mcg at 04/17/24 0757    enoxaparin injection 30 mg, 30 mg, Subcutaneous, Q24H (prophylaxis, 1700), Freida, Augusto A, FNP, 30 mg at 04/16/24 1743    [START ON 4/18/2024] estradiol 0.05 mg/24 hr td ptsw patch 1 patch, 1 patch, Transdermal, Twice Weekly, Ирина Ness FNP    ferrous sulfate tablet 1 each, 1 tablet, Oral, Daily, Freida, Augusto A, FNP, 1 each at 04/17/24 0757    furosemide tablet 40 mg, 40 mg, Oral, Daily, Bang Nessen B, FNP, 40 mg at 04/17/24 0757    hydrALAZINE injection 10 mg, 10 mg, Intravenous, Q4H PRN, Freida, Augusto A, FNP    hydrOXYzine pamoate capsule 50 mg, 50 mg, Oral, Nightly PRN, Augusto Guan, FNP, 50 mg at 04/14/24 2012    labetalol 20 mg/4 mL (5 mg/mL) IV syring, 10 mg, Intravenous, Q4H PRN, Augusto Guan, FNP    LIDOcaine 5 % patch 1 patch, 1 patch, Transdermal, Q24H, Disha Biggs FNP, 1 patch at 04/16/24 1743    melatonin tablet 6 mg, 6 mg, Oral, Nightly,  Ирина Ness FNP, 6 mg at 04/16/24 1939    methocarbamoL tablet 500 mg, 500 mg, Oral, TID PRN, Jami Biggsyn A, FNP, 500 mg at 04/16/24 0325    methocarbamoL tablet 500 mg, 500 mg, Oral, Q6H, Jami Biggsyn A, FNP, 500 mg at 04/17/24 1217    metoprolol injection 10 mg, 10 mg, Intravenous, Q2H PRN, Augusto Guan A, FNP, 10 mg at 04/17/24 1207    metoprolol tartrate (LOPRESSOR) split tablet 12.5 mg, 12.5 mg, Oral, BID, Ирина Ness FNP    mupirocin 2 % ointment, , Nasal, BID, Pawan Myers MD    naloxegoL (MOVANTIK) tablet 25 mg, 25 mg, Oral, Daily, Freida, Augusto A, FNP, 25 mg at 04/17/24 0757    nitroGLYCERIN SL tablet 0.4 mg, 0.4 mg, Sublingual, Q5 Min PRN, Freida, Augusto A, FNP    ondansetron disintegrating tablet 4 mg, 4 mg, Oral, Q6H PRN, Freida, Augusto A, FNP, 4 mg at 04/15/24 0705    oxyCODONE immediate release tablet 10 mg, 10 mg, Oral, Q4H PRN, Arlyn Biggshryn A, FNP, 10 mg at 04/15/24 0706    oxyCODONE immediate release tablet 5 mg, 5 mg, Oral, Q4H PRN, Arlyn Biggshryn A, FNP, 5 mg at 04/12/24 1855    polyethylene glycol packet 17 g, 17 g, Oral, BID PRN, Freida, Augusto A, FNP    promethazine tablet 25 mg, 25 mg, Oral, Q6H PRN, Freida, Augusto A, FNP, 25 mg at 04/15/24 0948    tamsulosin 24 hr capsule 0.4 mg, 0.4 mg, Oral, QHS, Ирина Ness, FNP, 0.4 mg at 04/16/24 2025           Assessment:     IMPRESSION:  Newly diagnosed Afib with RVR  -[id number]  Left interotrochanteric femur fracture/left hip IM nailing (4.9.24)  HTN  COPD   Anemia  Osteoporosis      PLAN:     PLAN:  Start Lopressor 25mg BID, increase as tolerated for rate control.  Risks outweigh benefits of OAC, recommend ASA 81mg daily for CVA prophylaxis  Agree with IV Lopressor PRN sustained HR >120  Obtain Echo as outpatient  F/U with Dr. Molina in 7-10 days  Will sign off. Thank you for allowing us to participate in the care of this patient. Please call us with any questions.      Thank you for  your consult.     Yang Campbell [facility]  Cardiology  [facility] Orthopaedics - Rehab Inpatient Services  04/17/2024 1:14 PM

## 2024-04-17 NOTE — PT/OT/SLP PROGRESS
Physical Therapy      Patient Name:  Safia Muñoz   MRN:  [id number]    Patient scheduled for PT [id number] this morning. Not seen this morning secondary to Other (Comment) (Patient toileting all morning, just starting to eat breakfast at time of scheduled PT.). Will reschedule missed PT time to later this morning.

## 2024-04-17 NOTE — PT/OT/SLP PROGRESS
"Occupational Therapy Inpatient Rehab Treatment    Name: Safia Muñoz  MRN: [id number]    Assessment:  Safia Muñoz is a 89 y.o. female admitted with a medical diagnosis of Closed 2-part intertrochanteric fracture of proximal end of left femur.  She presents with the following impairments/functional limitations:  weakness, impaired endurance, impaired sensation, impaired self care skills, impaired functional mobility, impaired cognition, decreased lower extremity function, decreased ROM, edema, orthopedic precautions (pain c movement) Pt. Is a RED STAR FALL RISK.    General Precautions: Standard, fall     Orthopedic Precautions:LLE weight bearing as tolerated     Braces: N/A    Rehab Prognosis: Fair; patient would benefit from acute skilled OT services to address these deficits and reach maximum level of function.      History:     Past Medical History:   Diagnosis Date    COPD (chronic obstructive pulmonary disease)     Hypertension        Past Surgical History:   Procedure Laterality Date    RETROGRADE INTRAMEDULLARY RODDING OF DISTAL FEMUR Left 4/9/2024    Procedure: INSERTION, INTRAMEDULLARY NAIL INTERTROCHENTERIC FRACTURE;  Surgeon: Guy Jaquez DO;  Location: [facility];  Service: Orthopedics;  Laterality: Left;  HANA TABLE, SYNTHES TFNA       Subjective     Orientation: Oriented x4    Chief Complaint: "I'm so tired; I feel weak."     Patient/Family Comments/goals: "I want to put on my make-up"     Vitals   Vitals at Rest  BP 66/44                84/50                        98   O2 Sat    Pain 0/10     Vitals With Activity  BP 98/49    112/65                       90/68                81        91                O2 Sat    Pain 2/10     Respiratory Status: Room air    Patients cultural, spiritual, Anglican conflicts given the current situation: no       Objective:     Patient found up in chair with sarmiento catheter, peripheral IV, chair check  upon OT entry to room.    Mobility   Patient " "completed:  Sit to Stand Transfer with moderate assistance with rolling walker  Stand to Sit Transfer with minimum assistance with rolling walker and verbal/tactile cues  Bed to Chair Transfer using Stand Pivot technique with maximal assistance with rolling walker and leg   Toilet Transfer Stand Pivot technique with total assistance and of 2 persons with  rolling walker, bedside commode, and Mod x1 and Min x1 for safety and extra time. Pt. Attempts to sit before properly aligned and LBP  warranted 2 person assist    Functional Mobility  Pt. C LBP c/o weakness and wanted to try to stay up in chair     ADLs   Current Status   Eating     Oral Hygiene 5 sitting in w/c at sink; also make-up   Shower, Bathe Self     Upper Body Dressing 5 sitting EOB   Lower Body Dressing 1 sitting EOB c AE   Toileting Hygiene 1 sm. BM x's 2    Toilet Transfer 2 Mod A x1; manage BSC x1    Putting On, Taking Off Footwear       Limiting Factors for ADLs: motor, sensory, endurance, limited ROM, weakness, pain, and low BP        Additional Treatments: Pt. Motivated to participate however feeling "weak/tired". Pt . C LBP that improved after repositioning from w/c >stand via RE >sit EOB. Pt. Agreed to work EOB c dressing tasks then requested toilet for BM. Pt. Performed stand-pivot T/F EOB >BSC >W/C after sm. BM. Pt. To sink ("I want to sit up for a while/brush my teeth") then requested BSC again (gas only). At end of Tx Pt. Again c LBP and OSCAR Moseley alerted. Pt. Returned to bed.     LifeStyle Change and Education:             Patient left supine with call button in reach and OSCAR Moseley  present.     Education provided: ADLs, transfer training, bed mobility, body mechanics, assistive device, wheelchair precautions, safety precautions, and post-op precautions    Multidisciplinary Problems       Occupational Therapy Goals          Problem: Occupational Therapy    Goal Priority Disciplines Outcome Interventions   Occupational Therapy Goal "     OT, PT/OT Ongoing, Progressing    Description: Pt to perform eating tasks with independence by re-eval.  Pt to perform oral hygiene tasks with min A standing at sink with RW by re-eval.  Pt to perform UB dressing with independence by re-eval.  Pt to perform LB dressing with mod A using AE PRN by re-eval.  Pt to perform donning/doffing footwear with max A using AE PRN by re-eval.  Pt to perform toileting hygiene with mod A by re-eval.  Pt to perform bathing tasks with mod A by re-eval.    Pt to perform toilet transfers with mod A using LRAD by re-eval.  Pt to perform WIS transfers with mod A using LRAD by re-eval.    Pt to perform meal prep activity with mod A by re-eval.  Pt to perform laundry management task with mod A by re-eval.  Pt to perform light housekeeping activity with mod A by re-eval.    Balance, Strengthening, Endurance, Balance:  Pt to consistently demonstrate adherence to orthopedic precautions during all ADL's as instructed by OT.  Pt to demonstrate consistent adherence to breathing control and energy conservation techniques as educated by OT.                         Time Tracking     OT Received On: 04/17/24  Time In 0900     Time Out 1040  Total Time 100 min  Therapy Time: OT Individual: 100  Missed Time:    Missed Time Reason:      Billable Minutes: Self Care/Home Management 90 and Therapeutic Activity 10    04/17/2024

## 2024-04-17 NOTE — PROGRESS NOTES
[facility] ([facility])  Rehab Progress Note    Patient Name: Safia Muñoz  MRN: [id number]  Age: 89 y.o. Sex: female  : 1931  Hospital Length of Stay: 5 days  Date of Service: 2024   Chief Complaint: Left hip fracture s/p left hip IM nailing on 2024     Subjective:     Basic Information  Admit Information: 89-year-old white female presented to [facility] ED on 2024 complaining of left hip pain after a ground level fall.  PMH significant for HTN and COPD not on home O2.  Workup significant for closed comminuted intertrochanteric left femur fracture.  Tolerated left hip IM nailing on  without perioperative complications.  Received 3 days Rocephin due to underlying UTI.  Urine culture appeared to be contaminated.  Orthopedic surgery recommended WBAT to LLE.  Tolerated transfer to [facility] inpatient rehab unit on  without incident.   Today's Information: No acute events overnight.  Sitting in chair comfortably.  Nursing reports great improvement in sleep hygiene.  Appetite at goal.  Last BM  with continued loose stools throughout this a.m..  Orthostatic hypotension this a.m. with therapies.  Resolved once sitting.  Likely 2/2 volume depletion.  Vital signs otherwise at goal with no recent recorded fevers.  Labs reviewed, leukocytosis overall stable with no associated fevers, H&H stable, BMP unremarkable, all labs otherwise unremarkable.  No imaging today.    Review of patient's allergies indicates:   Allergen Reactions    Adhesive tape-silicones Blisters    Ofloxacin     Penicillins     Sulfamethoxazole-trimethoprim      Other reaction(s): Unknown    Codeine Nausea Only        Current Facility-Administered Medications:     acetaminophen tablet 650 mg, 650 mg, Oral, Q6H, Augusto Guan FNP, 650 mg at 24 0521    acetaminophen tablet 650 mg, 650 mg, Oral, Q8H PRN, Disha Biggs FNP    amLODIPine tablet 5 mg, 5 mg, Oral, Daily, Freida  Augusto A, FNP, 5 mg at 04/17/24 0757    benzonatate capsule 100 mg, 100 mg, Oral, TID PRN, Freida, Augusto A, FNP, 100 mg at 04/17/24 0525    bisacodyL suppository 10 mg, 10 mg, Rectal, Daily PRN, Freida, Augusto A, FNP, 10 mg at 04/15/24 0900    bisacodyL suppository 10 mg, 10 mg, Rectal, Once, Freida, Augusto A, FNP    cyanocobalamin tablet 500 mcg, 500 mcg, Oral, Daily, Freida, Augusto A, FNP, 500 mcg at 04/17/24 0757    docusate sodium capsule 100 mg, 100 mg, Oral, BID, Freida, Augusto A, FNP, 100 mg at 04/17/24 0757    enoxaparin injection 30 mg, 30 mg, Subcutaneous, Q24H (prophylaxis, 1700), Freida, Augusto A, FNP, 30 mg at 04/16/24 1743    [START ON 4/18/2024] estradiol 0.05 mg/24 hr td ptsw patch 1 patch, 1 patch, Transdermal, Every Thurs, Freida, Augusto A, FNP    ferrous sulfate tablet 1 each, 1 tablet, Oral, Daily, Freida, Augusto A, FNP, 1 each at 04/17/24 0757    furosemide tablet 40 mg, 40 mg, Oral, Daily, Ирина Ness, ORLYP, 40 mg at 04/17/24 0757    hydrALAZINE injection 10 mg, 10 mg, Intravenous, Q4H PRN, Freida, Augusto A, FNP    hydrOXYzine pamoate capsule 50 mg, 50 mg, Oral, Nightly PRN, Freida, Augusto A, FNP, 50 mg at 04/14/24 2012    labetalol 20 mg/4 mL (5 mg/mL) IV syring, 10 mg, Intravenous, Q4H PRN, Freida, Augusto A, FNP    LIDOcaine 5 % patch 1 patch, 1 patch, Transdermal, Q24H, Disha Biggs A, FNP, 1 patch at 04/16/24 1743    melatonin tablet 6 mg, 6 mg, Oral, Nightly, Ирина Ness, ORLYP, 6 mg at 04/16/24 1939    methocarbamoL tablet 500 mg, 500 mg, Oral, TID PRN, Disha Biggs FNP, 500 mg at 04/16/24 0325    methocarbamoL tablet 500 mg, 500 mg, Oral, Q6H, Disha Biggs FNP, 500 mg at 04/17/24 0521    metoprolol injection 10 mg, 10 mg, Intravenous, Q2H PRN, Augusto Guan FNP    mupirocin 2 % ointment, , Nasal, BID, Pawan Myers MD    naloxegoL (MOVANTIK) tablet 25 mg, 25 mg, Oral, Daily, Augusto Guan FNP, 25 mg at  "24 0757    nitroGLYCERIN SL tablet 0.4 mg, 0.4 mg, Sublingual, Q5 Min PRN, Freida, Augusto A, FNP    ondansetron disintegrating tablet 4 mg, 4 mg, Oral, Q6H PRN, Freida, Augusto A, FNP, 4 mg at 04/15/24 0705    oxyCODONE immediate release tablet 10 mg, 10 mg, Oral, Q4H PRN, Dian, Disha A, FNP, 10 mg at 04/15/24 0706    oxyCODONE immediate release tablet 5 mg, 5 mg, Oral, Q4H PRN, Dian, Disha A, FNP, 5 mg at 24 1855    polyethylene glycol packet 17 g, 17 g, Oral, BID PRN, Freida, Augusto A, FNP    promethazine tablet 25 mg, 25 mg, Oral, Q6H PRN, Freida, Augusto A, FNP, 25 mg at 04/15/24 0948    tamsulosin 24 hr capsule 0.4 mg, 0.4 mg, Oral, QHS, Ирина Ness, FNP, 0.4 mg at 24     Review of Systems   Complete 12-point review of symptoms negative except for what's mentioned in HPI     Objective:     /74   Pulse 62   Temp 98 °F (36.7 °C) (Oral)   Resp 20   Ht 5' 2.99" (1.6 m)   Wt 74.5 kg (164 lb 3.9 oz)   SpO2 95%   Breastfeeding No   BMI 29.10 kg/m²      Physical Exam  Constitutional:       Appearance: She is ill-appearing.   HENT:      Head: Normocephalic.      Mouth/Throat:      Mouth: Mucous membranes are moist.   Eyes:      Pupils: Pupils are equal, round, and reactive to light.   Cardiovascular:      Rate and Rhythm: Normal rate and regular rhythm.      Heart sounds: Normal heart sounds.   Pulmonary:      Effort: Pulmonary effort is normal.      Breath sounds: Normal breath sounds.   Abdominal:      General: Bowel sounds are normal.      Palpations: Abdomen is soft.   Musculoskeletal:      Cervical back: Neck supple.      Left lower le+ Edema present.      Comments: Left hip dressing clean and intact.  Diffuse muscle atrophy.    Skin:     General: Skin is warm and dry.   Neurological:      Motor: Weakness present.   Psychiatric:      Comments: Drowsy     *MD performed and documented physical examination       Lines/Drains/Airways       Drain  " Duration                  Urethral Catheter 04/16/24 0300 16 Fr. 1 day              Peripheral Intravenous Line  Duration                  Peripheral IV - Single Lumen 04/09/24 1210 18 G Right Forearm 7 days                  Labs  Recent Results (from the past 24 hour(s))   Basic Metabolic Panel    Collection Time: 04/17/24  5:26 AM   Result Value Ref Range    Sodium Level 135 132 - 146 mmol/L    Potassium Level 3.8 3.5 - 5.1 mmol/L    Chloride 99 98 - 111 mmol/L    Carbon Dioxide 28 23 - 31 mmol/L    Glucose Level 123 (H) 75 - 121 mg/dL    Blood Urea Nitrogen 18.1 9.8 - 20.1 mg/dL    Creatinine 0.71 0.55 - 1.02 mg/dL    BUN/Creatinine Ratio 25     Calcium Level Total 8.2 (L) 8.4 - 10.2 mg/dL    Anion Gap 8.0 mEq/L    eGFR >60 mls/min/1.73/m2   CBC with Differential    Collection Time: 04/17/24  5:26 AM   Result Value Ref Range    WBC 12.74 (H) 4.50 - 11.50 x10(3)/mcL    RBC 3.65 (L) 4.20 - 5.40 x10(6)/mcL    Hgb 11.0 (L) 12.0 - 16.0 g/dL    Hct 33.8 (L) 37.0 - 47.0 %    MCV 92.6 80.0 - 94.0 fL    MCH 30.1 27.0 - 31.0 pg    MCHC 32.5 (L) 33.0 - 36.0 g/dL    RDW 14.7 11.5 - 17.0 %    Platelet 412 (H) 130 - 400 x10(3)/mcL    MPV 9.4 7.4 - 10.4 fL    Neut % 70.8 %    Lymph % 14.9 %    Mono % 10.1 %    Eos % 2.6 %    Basophil % 0.5 %    Lymph # 1.90 0.6 - 4.6 x10(3)/mcL    Neut # 9.01 2.1 - 9.2 x10(3)/mcL    Mono # 1.29 0.1 - 1.3 x10(3)/mcL    Eos # 0.33 0 - 0.9 x10(3)/mcL    Baso # 0.07 <=0.2 x10(3)/mcL    IG# 0.14 (H) 0 - 0.04 x10(3)/mcL    IG% 1.1 %    NRBC% 0.0 %       Radiology  Left hip XR on 04/09/2024, IMPRESSION: Improved alignment following internal fixation of the femur.  Radiology  Abdominal XR on 04/15/2024, IMPRESSION:  Residual feces nonspecific gas pattern.    Assessment/Plan:     89 y.o. WF admitted on 4/12/2024    Left hip fracture   - s/p left hip IM nailing on 04/09/2024  - WBAT to LLE  - discontinue staples on 04/30  - discontinue Gabapentin 100 mg TID (initiated 4/12), Gabapentin 200 mg at bedtime  (initiated 4/14) on 04/15 2/2 confusion  - continue                Tylenol 650 mg every 6 hours                Calcium-vitamin D3 1 tablet b.i.d.                 Oxycodone 5 mg q.6 hours.                Robaxin 500 mg t.i.d.  - defer to physiatry for rehab and pain management  - PT/OT/RT following     Osteoporosis  - stable  - vitamin-D level 42.1 on 04/10/2024  - continue.                Calcium-vitamin D3 1 tablet b.i.d.                 Estradiol 0.05 mg patch every Wednesday      HTN  - BP at goal!!  - continue                  Norvasc 5 mg daily (resumed 4/12)                Hydralazine 10 mg every 2 hours as needed for BP > 160/90                Labetalol 10 mg every 2 hours as needed for BP > 160/90  - low sodium diet     COPD  - stable  - not on home 02  - keep 02 > 88%   - monitor closely     Normocytic anemia  - asymptomatic  - H/H trending down  - continue                Vitamin B12 tablet daily                    Ferrous sulfate 325 mg daily     - no evidence of active bleeds  - will closely monitor and transfuse if needed      Constipation  - stable   - discontinue Colace 100 mg b.i.d. and MiraLax 17 g daily 2/2 loose stools on 04/17  - continue  Movantik 25 mg daily (initiated 4/16)    Bilateral Lower extremity edema  - resolving  - continue   Lasix 40 mg daily (to start 4/15)    Insomnia with associated delirium  - improved  - s/p Haldol 5 mg IM x1 on 04/16  - continue   Melatonin 6 mg at bedtime (initiated 4/14)    Urinary retention  - current  - required indwelling catheter insertion on 04/15  - continue   Flomax 0.4 mg at bedtime (initiated 4/16)     AB therapy  Rocephin 4/9-4/11     VTE Prophylaxis: Lovenox 30 mg daily   COVID-19 testing:  Unknown  COVID-19 vaccination status:  Vaccinated (Pfizer):  01/10/2021, 01/31/2021, 12/06/2022     POA: No  Living will: No  Contacts: Lacey Pizarro(Daughter) [phone]      CODE STATUS: DNR  Internal Medicine (attending): Pawan Myers MD  Physiatry  (consulting):  Iglesia Tran MD     OUTPATIENT PROVIDERS  PCP:  Iglesia Steel MD  Orthopedic surgery:  Guy Jaquez D.O.     DISPOSITION:  Vital signs overall at goal with no recent recorded fevers.  Reported orthostatic hypotension which resolved once sitting back down this a.m..  Initiate 500 normal saline bolus.  Likely 2/2 volume loss s/p continued loose stools this a.m..  Discontinue Colace and MiraLax.  Continue Movantik at this time 2/2 recent constipation.  Hold Lasix today and reassess in a.m..  In addition may need to discontinue Flomax.  Monitor blood pressures closely at this time.  Labs reviewed.  Leukocytosis without associated fevers also likely associated with volume loss.  BMP unremarkable.  H&H stable.  Sleep hygiene improved greatly s/p Haldol given on 04/16.  Appetite at goal.  Continue aggressive mobilization as tolerated.  Float minutes if necessary.  Monitor closely.  Notify of acute changes.      Reported tachycardia.  EKG significant for AFib with RVR.  Initiated 10 mg metoprolol IVP x 1 and heart rate controlled ventricular rate.  Consult Cardiology for new onset atrial fibrillation.  Initiate metoprolol tartrate 12.5 mg b.i.d..  Initiate another normal saline 500 mL once now.    Staffing 4/16:  Overall continent of bowel and bladder.  Good appetite.  PT reported overall partial/mod assist with bed mobility.  Ambulating 150 ft with supervision/touch assist using rolling walker.  OT reported overall independent to moderate assistance with toileting and bathing.  Moderate assistance with transfers.  Projected discharge pending.     Ирина Ness NP conducted independent physical examination and assisted with medical documentation.    Total time spent on this encounter including chart review and direct MD + NP 1-on-1 patient interaction: 53 minutes   Over 50% of this time was spent in counseling and coordination of care

## 2024-04-18 LAB
OHS QRS DURATION: 76 MS
OHS QTC CALCULATION: 464 MS

## 2024-04-18 PROCEDURE — 99233 SBSQ HOSP IP/OBS HIGH 50: CPT | Mod: ,,, | Performed by: NURSE PRACTITIONER

## 2024-04-18 PROCEDURE — 97530 THERAPEUTIC ACTIVITIES: CPT | Mod: CQ

## 2024-04-18 PROCEDURE — 99900031 HC PATIENT EDUCATION (STAT)

## 2024-04-18 PROCEDURE — 94761 N-INVAS EAR/PLS OXIMETRY MLT: CPT

## 2024-04-18 PROCEDURE — 97110 THERAPEUTIC EXERCISES: CPT

## 2024-04-18 PROCEDURE — 11800000 HC REHAB PRIVATE ROOM

## 2024-04-18 PROCEDURE — 25000003 PHARM REV CODE 250: Performed by: NURSE PRACTITIONER

## 2024-04-18 PROCEDURE — 25000003 PHARM REV CODE 250: Performed by: INTERNAL MEDICINE

## 2024-04-18 PROCEDURE — 63600175 PHARM REV CODE 636 W HCPCS: Performed by: NURSE PRACTITIONER

## 2024-04-18 PROCEDURE — 97535 SELF CARE MNGMENT TRAINING: CPT

## 2024-04-18 PROCEDURE — 94799 UNLISTED PULMONARY SVC/PX: CPT

## 2024-04-18 PROCEDURE — 97530 THERAPEUTIC ACTIVITIES: CPT

## 2024-04-18 RX ORDER — ASPIRIN 81 MG/1
81 TABLET ORAL DAILY
Status: DISCONTINUED | OUTPATIENT
Start: 2024-04-18 | End: 2024-04-29 | Stop reason: HOSPADM

## 2024-04-18 RX ORDER — HYDROXYZINE PAMOATE 50 MG/1
50 CAPSULE ORAL
Status: DISCONTINUED | OUTPATIENT
Start: 2024-04-18 | End: 2024-04-29 | Stop reason: HOSPADM

## 2024-04-18 RX ADMIN — MUPIROCIN: 20 OINTMENT TOPICAL at 08:04

## 2024-04-18 RX ADMIN — HYDROXYZINE PAMOATE 50 MG: 50 CAPSULE ORAL at 05:04

## 2024-04-18 RX ADMIN — OXYCODONE 10 MG: 5 TABLET ORAL at 01:04

## 2024-04-18 RX ADMIN — OXYCODONE 10 MG: 5 TABLET ORAL at 08:04

## 2024-04-18 RX ADMIN — LIDOCAINE 5% 1 PATCH: 700 PATCH TOPICAL at 05:04

## 2024-04-18 RX ADMIN — FERROUS SULFATE TAB 325 MG (65 MG ELEMENTAL FE) 1 EACH: 325 (65 FE) TAB at 08:04

## 2024-04-18 RX ADMIN — FUROSEMIDE 40 MG: 40 TABLET ORAL at 08:04

## 2024-04-18 RX ADMIN — METHOCARBAMOL 500 MG: 500 TABLET ORAL at 11:04

## 2024-04-18 RX ADMIN — OXYCODONE 5 MG: 5 TABLET ORAL at 08:04

## 2024-04-18 RX ADMIN — METOPROLOL TARTRATE 25 MG: 25 TABLET, FILM COATED ORAL at 08:04

## 2024-04-18 RX ADMIN — TAMSULOSIN HYDROCHLORIDE 0.4 MG: 0.4 CAPSULE ORAL at 08:04

## 2024-04-18 RX ADMIN — ACETAMINOPHEN 325MG 650 MG: 325 TABLET ORAL at 05:04

## 2024-04-18 RX ADMIN — ACETAMINOPHEN 325MG 650 MG: 325 TABLET ORAL at 06:04

## 2024-04-18 RX ADMIN — METHOCARBAMOL 500 MG: 500 TABLET ORAL at 12:04

## 2024-04-18 RX ADMIN — CYANOCOBALAMIN TAB 500 MCG 500 MCG: 500 TAB at 08:04

## 2024-04-18 RX ADMIN — ACETAMINOPHEN 325MG 650 MG: 325 TABLET ORAL at 11:04

## 2024-04-18 RX ADMIN — ASPIRIN 81 MG: 81 TABLET, COATED ORAL at 10:04

## 2024-04-18 RX ADMIN — AMLODIPINE BESYLATE 5 MG: 5 TABLET ORAL at 08:04

## 2024-04-18 RX ADMIN — NALOXEGOL OXALATE 25 MG: 25 TABLET, FILM COATED ORAL at 08:04

## 2024-04-18 RX ADMIN — ENOXAPARIN SODIUM 30 MG: 30 INJECTION SUBCUTANEOUS at 05:04

## 2024-04-18 RX ADMIN — METHOCARBAMOL 500 MG: 500 TABLET ORAL at 05:04

## 2024-04-18 RX ADMIN — ACETAMINOPHEN 325MG 650 MG: 325 TABLET ORAL at 12:04

## 2024-04-18 RX ADMIN — METHOCARBAMOL 500 MG: 500 TABLET ORAL at 06:04

## 2024-04-18 NOTE — PROGRESS NOTES
Dos 4/18/20  Patient seen and evaluated in OT today  Continues to participate and make progress toward goals  Working on ADL's and IADL's  Discussed with patient and OT  Reviewed chart and discussed with nursing, Dr Myers's primary medicine team, as well as Ana Laura Biggs, my NP  Agree with present POC  Subjective  HPI: 89 yo WF with a PMH of COPD and hypertension presented to the ED at [facility] on 4/8/24 with complaint of left hip pain after a ground level fall where she tripped on a rug and fell. X-ray show intertrochanteric left femur fracture. Orthopedic surgery consulted and referred to hospital medicine for admission. Chest x-ray show no airspace disease. EKG sinus rhythm with PACs. On 4/9, orthopedic surgeon recommended surgical intervention. Patient underwent IM nailing, and was placed WBAT to LLE. On 4/10, patient placed on Lovenox for DVT ppx, and can change on ASA 81mg BID at discharge. Patient was started on Ceftriazone for UTI. Patient gets frequent UTIs. Labs showed low H&H of 10.4 & 33.5, low Iron of 26, low TIBC of 218, elevated B12 of 1,025, low Iron sat of 12, elevated glucose of 135, low calcium of 7.8, low protein of 5.4, low albumin of 3.3, elevated AST of 35. PT/OT evals completed with deficits noted with recommendation for high intensity therapy needed. On 4/11, patient complained of nausea so zofran was given. Labs showed low RBC of 3.53, low H&H Of 10.5 & 32.8, low calcium of 7.8, low albumin of 3.3. Dry dressing changes started. No BM reported since admission. On 4/12, labs showed elevated WBC of 12.68, low RBC of 3.30, low H&H of 9.9 & 30.4, low MCHC of 32.6, low chloride of 97, and elevated glucose of 126. Patient will need appointment with Carmen AMAYA (Ortho) in 3 weeks for wound check and repeat Xrays. Patient complained of pain to LLE rating at 8-9/10 with movement, but controlled with rest. Patient is AAOx4.  Participating with therapy. Functional status includes setup/supervision  needed for eating, moderate assist for transfers with RW, max assist for toileting, moderate assist bed mobility, minimal assist for walking 26ft with RW, minimal assist for grooming, and max assist for lower body dressing. Patient was evaluated, accepted, and admitted to inpatient rehab to improve functional status. Transferred to [facility] on 4/12 without incident.  4/18: Seen with PT, mobilizing in WC. Reports poor sleep, and waking often. Tells me that Melatonin doesn't work for her, but Benadryl always makes her sleep. Discussed using Vistaril, but earlier in the evening to help her fall asleep. Agreeable. AAO. Had a ADL and BM with OT and was very tired following. VSSAF. No major drops in BP or Tachycardia (Afib) today.               Review of Systems  Psychiatric: Does not have any confirmed mental health history or diagnoses. Per daughter, pt. seems to be depressed and anxious since her spouse passed away. She is a former smoker.      Depression/Anxiety: no current complaints. Therapy notes Anxiety     Pain: left hip with movement and weight bearing (spasms)-improved  LIDOcaine 5 % patch q24hr, 1800, low back  acetaminophen tablet 650 mg q6h  methocarbamoL tablet 500 mg TID. q6h     methocarbamoL tablet 500 mg TID PRN muscle spasm  acetaminophen tablet 650 mg q8h PRN mild pain  oxyCODONE immediate release tablet 5 mg q4h PRN mod pain  oxyCODONE immediate release tablet 10 mg q4h PRN severe pain  Bowels/Bladder: last BM 4/18. 4/17 x 2 (large amount, loose) 4/16 x 2. 4/15- Suppository given. Previous BM 4/12 following suppository, and prior to admission (4/8)  naloxegoL (MOVANTIK) tablet 25 mg qd   Appetite: improving     Sleep: decreased with watching the clock   DC Melatonin 6mg qHS  ADD hydrOXYzine pamoate capsule 50 mg, qPM 1800        Physical Exam  General: well-developed, well-nourished, no acute distress  Respiratory: equal chest rise, no SOB, no audible wheeze  Cardiovascular: regular rate and rhythm, LE  edema  Gastrointestinal: soft, non-tender, non-distended   Musculoskeletal: decreased ROM/strength to LLE  Integumentary: bruising,  LLE incisions x 3-staples, dressing-c/d/I, surrounding ecchymosis and tape blisters-use paper tape  Neurologic: cranial nerves intact, signs of peripheral neurological deficit- numbness to fingertips, AAO  *MD performed and documented physical examination           Diagnostics:  XR CHEST 1 VIEW   Impression:  No acute abnormality of the chest.  Date:                                            04/17/2024  Time:                                           15:08      EKG 12-lead   Test Reason : R00.0,   Vent. Rate : 152 BPM     Atrial Rate : 086 BPM      P-R Int : 000 ms          QRS Dur : 076 ms       QT Int : 292 ms       P-R-T Axes : 000 006 016 degrees      QTc Int : 464 ms   Atrial fibrillation with rapid ventricular response   Abnormal ECG   Confirmed by Meena CARMONA Frandy ([id number]) on 4/18/2024 9:48:49 AM             Assessment/Plan  Hospital   Closed 2-part intertrochanteric fracture of proximal end of left femur s/p repair   Fall     Non-Hospital   COPD (chronic obstructive pulmonary disease)   Hypertension   Frequent UTI   Osteoporosis       Wounds: LLE incisions x 3-staples, dressing-c/d/I, surrounding ecchymosis and tape blisters-use paper tape  S/p IM nail of intertrochanteric left femur fracture on 4/9 (Leonid)  Precautions: WBAT LLE  Bracing/AD: RW  Swallowing: Regular Diet  Function: Tolerating therapy. Continue PT/OT  VTE Prophylaxis:   enoxaparin injection 30 mg SubQ q24hr  Code Status: FULL CODE   Discharge: Lives alone in [place] in [place] independent [place] [facility] with no steps to enter. Completed high school and 1 year of college. Denies  history. Pt. Is retired from secretarial work.  Is  approximately 3 years ago.  Was independent without use of assistive devices. The plan is to return to [facility]. Date pending.                Disha Biggs NP, conducted additional independent physical examination and assisted with medical documentation.

## 2024-04-18 NOTE — PT/OT/SLP PROGRESS
"Occupational Therapy Inpatient Rehab Treatment    Name: Safia Muñoz  MRN: [id number]    Assessment:  Safia Muñoz is a 89 y.o. female admitted with a medical diagnosis of Closed 2-part intertrochanteric fracture of proximal end of left femur.  She presents with the following impairments/functional limitations:  weakness, impaired endurance, impaired sensation, impaired self care skills, impaired functional mobility, gait instability, decreased lower extremity function, pain, decreased ROM, edema, orthopedic precautions Pt. Is a RED STAR FALL RISK.    General Precautions: Standard, fall     Orthopedic Precautions:LLE weight bearing as tolerated     Braces: N/A    Rehab Prognosis: Good and Fair; patient would benefit from acute skilled OT services to address these deficits and reach maximum level of function.      History:     Past Medical History:   Diagnosis Date    COPD (chronic obstructive pulmonary disease)     Hypertension        Past Surgical History:   Procedure Laterality Date    RETROGRADE INTRAMEDULLARY RODDING OF DISTAL FEMUR Left 4/9/2024    Procedure: INSERTION, INTRAMEDULLARY NAIL INTERTROCHENTERIC FRACTURE;  Surgeon: Guy Jaquez DO;  Location: [facility];  Service: Orthopedics;  Laterality: Left;  HANA TABLE, SYNTHES TFNA       Subjective     Orientation: Oriented x4    Chief Complaint: "I need the toilet" Later, p.m. "I like standing activities in the kitchen."     Patient/Family Comments/goals: "I will [place] a caregiver at [place] cottage"    Vitals   Vitals at Rest  BP    HR    O2 Sat    Pain      Vitals With Activity  /65   HR 66   O2 Sat 95   Pain 8/10 in the afternoon     Respiratory Status: Room air    Patients cultural, spiritual, Lutheran conflicts given the current situation: no       Objective:     Patient found  sitting on TTB c Alicia, PTA in preparation for ADL  with sarmiento catheter, peripheral IV  upon OT entry to room for a.m. Tx; In the p.m., Pt. Found up in w/c.     Mobility "   Patient completed:  Sit to Supine with moderate assistance  Sit to Stand Transfer with minimum assistance with rolling walker  Stand to Sit Transfer with contact guard assistance with rolling walker and V/C's to reach for armrests  Bed to Chair Transfer using Stand Pivot technique with minimum assistance with rolling walker  Toilet Transfer Stand Pivot technique with moderate assistance with  rolling walker and bedside commode  Tub Transfer Stand Pivot technique with moderate assistance with rolling walker, grab bars, and TTB    Functional Mobility  Pt. C increased time required and v/c's for FM very short distances via RW     ADLs   Current Status   Eating     Oral Hygiene 5   Shower, Bathe Self 3 wash buttocks/B LE's and dry    Upper Body Dressing 4 bra    Lower Body Dressing 2 c reacher to thread B LE's/sarmiento and manage pants over L hip.    Toileting Hygiene 1 Pt. Unable to wipe after BM or manage pants/briefs up /down   Toilet Transfer 3   Putting On, Taking Off Footwear 1 Pt. C/o fatigue. May benefit from sock aid.      Limiting Factors for ADLs: motor, endurance, limited ROM, weakness, and pain     IADLs: During p.m. session, Pt. Tolerated static standing in kitchen and removed/replaced coffee mugs from cabinet and simulated pouring coffee from carafe to cups. Pt tolerated standing c one UE support c functional R UE. X's 7 min. Brief rest break requiring v/c's to reach back and feel for chair on back of legs. Pt. Then stood c Min A and performed AROM/FM pinch clips through anterior/vertical planes c Touch A x's 9 min. Pt. C/o increased pain to L hip. Pt. Transferred BTB at end of Tx c RNJulianne alerted to increased edema L LE as well as pain increased to 10/10. OT placed ice pack and heel boots c Pt. In supine c HOB up.     Additional Treatments: Leg  for BTB unable to lift L LE to surface     LifeStyle Change and Education:             Patient left up in chair with call button in reach and chair alarm  on after a.m. session and in supine c HOB elevated after p.m. session.     Education provided: ADLs, transfer training, bed mobility, body mechanics, assistive device, wheelchair precautions, sequencing, safety precautions, post-op precautions, and equipment recommendations    Multidisciplinary Problems       Occupational Therapy Goals          Problem: Occupational Therapy    Goal Priority Disciplines Outcome Interventions   Occupational Therapy Goal     OT, PT/OT Ongoing, Progressing    Description: Pt to perform eating tasks with independence by re-eval.  Pt to perform oral hygiene tasks with min A standing at sink with RW by re-eval.  Pt to perform UB dressing with independence by re-eval.  Pt to perform LB dressing with mod A using AE PRN by re-eval.  Pt to perform donning/doffing footwear with max A using AE PRN by re-eval.  Pt to perform toileting hygiene with mod A by re-eval.  Pt to perform bathing tasks with mod A by re-eval.    Pt to perform toilet transfers with mod A using LRAD by re-eval.  Pt to perform WIS transfers with mod A using LRAD by re-eval.    Pt to perform meal prep activity with mod A by re-eval.  Pt to perform laundry management task with mod A by re-eval.  Pt to perform light housekeeping activity with mod A by re-eval.    Balance, Strengthening, Endurance, Balance:  Pt to consistently demonstrate adherence to orthopedic precautions during all ADL's as instructed by OT.  Pt to demonstrate consistent adherence to breathing control and energy conservation techniques as educated by OT.                         Time Tracking     OT Received On: 04/18/24  Time In  (0930 & 1300)     Time Out  (1030 & 1330)  Total Time    Therapy Time: OT Individual: 90  Missed Time:    Missed Time Reason:      Billable Minutes: Self Care/Home Management 60 and Therapeutic Activity 30    04/18/2024

## 2024-04-18 NOTE — PROGRESS NOTES
[facility] ([facility])  Rehab Progress Note    Patient Name: Safia Muñoz  MRN: [id number]  Age: 89 y.o. Sex: female  : 1931  Hospital Length of Stay: 6 days  Date of Service: 2024   Chief Complaint: Left hip fracture s/p left hip IM nailing on 2024     Subjective:     Basic Information  Admit Information: 89-year-old white female presented to [facility] ED on 2024 complaining of left hip pain after a ground level fall.  PMH significant for HTN and COPD not on home O2.  Workup significant for closed comminuted intertrochanteric left femur fracture.  Tolerated left hip IM nailing on  without perioperative complications.  Received 3 days Rocephin due to underlying UTI.  Urine culture appeared to be contaminated.  Orthopedic surgery recommended WBAT to LLE.  Tolerated transfer to [facility] inpatient rehab unit on  without incident.   Today's Information: No acute events overnight.  Sitting in chair comfortably.  Reports good sleep and appetite.  Last BM .  Vital signs at goal with no recent recorded fevers.  Chest x-ray on  unremarkable.  No labs or imaging today.  New onset atrial fibrillation with controlled ventricular rate s/p Lopressor IV when on .    Review of patient's allergies indicates:   Allergen Reactions    Adhesive tape-silicones Blisters    Ofloxacin     Penicillins     Sulfamethoxazole-trimethoprim      Other reaction(s): Unknown    Codeine Nausea Only        Current Facility-Administered Medications:     0.9%  NaCl infusion, , Intravenous, Continuous, Jami Biggsyn A, FNP, Last Rate: 125 mL/hr at 24 1723, New Bag at 24 1723    acetaminophen tablet 650 mg, 650 mg, Oral, Q6H, Freida, Augusto A, FNP, 650 mg at 24 0600    acetaminophen tablet 650 mg, 650 mg, Oral, Q8H PRN, Dian Disha A, FNP    benzonatate capsule 100 mg, 100 mg, Oral, TID PRN, Freida, Augusto A, FNP, 100 mg at 24 0525    bisacodyL  suppository 10 mg, 10 mg, Rectal, Daily PRN, Freida, Augusto A, FNP, 10 mg at 04/15/24 0900    bisacodyL suppository 10 mg, 10 mg, Rectal, Once, Freida, Augusto A, FNP    cyanocobalamin tablet 500 mcg, 500 mcg, Oral, Daily, Freida, Augusto A, FNP, 500 mcg at 04/18/24 0812    enoxaparin injection 30 mg, 30 mg, Subcutaneous, Q24H (prophylaxis, 1700), Freida, Augusto A, FNP, 30 mg at 04/17/24 1740    estradiol 0.05 mg/24 hr td ptsw patch 1 patch, 1 patch, Transdermal, Every Wed, 1 patch at 04/17/24 2050 **AND** [START ON 4/21/2024] estradiol 0.05 mg/24 hr td ptsw patch 1 patch, 1 patch, Transdermal, Every Sun, Pawan Meyrs MD    ferrous sulfate tablet 1 each, 1 tablet, Oral, Daily, Freida, Augusto A, FNP, 1 each at 04/18/24 0812    furosemide tablet 40 mg, 40 mg, Oral, Daily, Ирина Ness, FNP, 40 mg at 04/18/24 0813    hydrALAZINE injection 10 mg, 10 mg, Intravenous, Q4H PRN, Freida, Augusto A, FNP    hydrOXYzine pamoate capsule 50 mg, 50 mg, Oral, Nightly PRN, Freida, Augusto A, FNP, 50 mg at 04/14/24 2012    labetalol 20 mg/4 mL (5 mg/mL) IV syring, 10 mg, Intravenous, Q4H PRN, Freida, Augusto A, FNP    LIDOcaine 5 % patch 1 patch, 1 patch, Transdermal, Q24H, Disha Biggs FNP, 1 patch at 04/17/24 1741    melatonin tablet 6 mg, 6 mg, Oral, Nightly, Ирина Ness, FNP, 6 mg at 04/17/24 2049    methocarbamoL tablet 500 mg, 500 mg, Oral, TID PRN, Disha Biggs FNP, 500 mg at 04/16/24 0325    methocarbamoL tablet 500 mg, 500 mg, Oral, Q6H, Disha Biggs FNP, 500 mg at 04/18/24 0600    metoprolol injection 10 mg, 10 mg, Intravenous, Q2H PRN, Augusto Guan FNP, 10 mg at 04/17/24 1207    metoprolol tartrate (LOPRESSOR) tablet 25 mg, 25 mg, Oral, BID, Yang Campbell, AGAP-BC, 25 mg at 04/18/24 0813    mupirocin 2 % ointment, , Nasal, BID, Pawan Myers MD, Given at 04/18/24 0846    naloxegoL (MOVANTIK) tablet 25 mg, 25 mg, Oral, Daily, Augusto Guan,  "FNP, 25 mg at 24 0812    nitroGLYCERIN SL tablet 0.4 mg, 0.4 mg, Sublingual, Q5 Min PRN, Freida, Augusto A, FNP    ondansetron disintegrating tablet 4 mg, 4 mg, Oral, Q6H PRN, Freida, Augusto A, FNP, 4 mg at 04/15/24 0705    oxyCODONE immediate release tablet 10 mg, 10 mg, Oral, Q4H PRN, Jami Biggsyn A, FNP, 10 mg at 24 2259    oxyCODONE immediate release tablet 5 mg, 5 mg, Oral, Q4H PRN, Dian, Disha A, FNP, 5 mg at 24 0813    polyethylene glycol packet 17 g, 17 g, Oral, BID PRN, Freida, Augusto A, FNP    promethazine tablet 25 mg, 25 mg, Oral, Q6H PRN, Freida, Augusto A, FNP, 25 mg at 04/15/24 0948    tamsulosin 24 hr capsule 0.4 mg, 0.4 mg, Oral, QHS, Ирина Ness, FNP, 0.4 mg at 24     Review of Systems   Complete 12-point review of symptoms negative except for what's mentioned in HPI     Objective:     /63   Pulse 71   Temp 97.7 °F (36.5 °C) (Oral)   Resp 18   Ht 5' 2.99" (1.6 m)   Wt 74.5 kg (164 lb 3.9 oz)   SpO2 95%   Breastfeeding No   BMI 29.10 kg/m²      Physical Exam  Constitutional:       Appearance: She is ill-appearing.   HENT:      Head: Normocephalic.      Mouth/Throat:      Mouth: Mucous membranes are moist.   Eyes:      Pupils: Pupils are equal, round, and reactive to light.   Cardiovascular:      Rate and Rhythm: Normal rate and regular rhythm.      Heart sounds: Normal heart sounds.   Pulmonary:      Effort: Pulmonary effort is normal.      Breath sounds: Normal breath sounds.   Abdominal:      General: Bowel sounds are normal.      Palpations: Abdomen is soft.   Musculoskeletal:      Cervical back: Neck supple.      Left lower le+ Edema present.      Comments: Left hip dressing clean and intact.  Diffuse muscle atrophy.    Skin:     General: Skin is warm and dry.   Neurological:      Motor: Weakness present.   Psychiatric:         Mood and Affect: Mood normal.     *MD performed and documented physical examination   "     Lines/Drains/Airways       Drain  Duration                  Urethral Catheter 04/16/24 0300 16 Fr. 2 days              Peripheral Intravenous Line  Duration                  Peripheral IV - Single Lumen 04/09/24 1210 18 G Right Forearm 8 days                  Labs  No results found for this or any previous visit (from the past 24 hour(s)).      Radiology  Left hip XR on 04/09/2024, IMPRESSION: Improved alignment following internal fixation of the femur.  Radiology  Abdominal XR on 04/15/2024, IMPRESSION:  Residual feces nonspecific gas pattern.    Assessment/Plan:     89 y.o. WF admitted on 4/12/2024    Left hip fracture   - s/p left hip IM nailing on 04/09/2024  - WBAT to LLE  - discontinue staples on 04/30  - discontinue Gabapentin 100 mg TID (initiated 4/12), Gabapentin 200 mg at bedtime (initiated 4/14) on 04/15 2/2 confusion  - continue                Tylenol 650 mg every 6 hours                Calcium-vitamin D3 1 tablet b.i.d.                 Oxycodone 5 mg q.6 hours.                Robaxin 500 mg t.i.d.  - defer to physiatry for rehab and pain management  - PT/OT/RT following     Osteoporosis  - stable  - vitamin-D level 42.1 on 04/10/2024  - continue.                Calcium-vitamin D3 1 tablet b.i.d.                 Estradiol 0.05 mg patch every Wednesday      HTN  - BP at goal!!  - discontinue Norvasc 5 mg daily on 04/18  - continue                  Metoprolol tartrate 25 mg b.i.d.                Hydralazine 10 mg every 2 hours as needed for BP > 160/90                Labetalol 10 mg every 2 hours as needed for BP > 160/90  - low sodium diet     COPD  - stable  - not on home 02  - keep 02 > 88%   - monitor closely     Normocytic anemia  - asymptomatic  - H/H trending down  - continue                Vitamin B12 tablet daily                    Ferrous sulfate 325 mg daily     - no evidence of active bleeds  - will closely monitor and transfuse if needed      Constipation  - stable   - discontinue Colace  100 mg b.i.d. and MiraLax 17 g daily 2/2 loose stools on 04/17  - continue  Movantik 25 mg daily (initiated 4/16)    Bilateral Lower extremity edema  - resolving  - continue   Lasix 40 mg daily (to start 4/15)    Insomnia with associated delirium  - improved  - s/p Haldol 5 mg IM x1 on 04/16  - continue   Melatonin 6 mg at bedtime (initiated 4/14)    Urinary retention  - current  - required indwelling catheter insertion on 04/15  - continue   Flomax 0.4 mg at bedtime (initiated 4/16)    New onset atrial fibrillation  - controlled ventricular rate  - continue                  Metoprolol tartrate 25 mg b.i.d   Aspirin 81 mg daily  - cardiology does not recommend OAC 2/2 risks outweigh benefits, recommends aspirin 81 mg daily  - follow-up with cardiology outpatient for transthoracic echocardiogram     AB therapy  Rocephin 4/9-4/11     VTE Prophylaxis: Lovenox 30 mg daily   COVID-19 testing:  Unknown  COVID-19 vaccination status:  Vaccinated (Pfizer):  01/10/2021, 01/31/2021, 12/06/2022     POA: No  Living will: No  Contacts: Lacey Pizarro(Daughter) [phone]      CODE STATUS: DNR  Internal Medicine (attending): Pawan Myers MD  Physiatry (consulting):  Iglesia Tran MD     OUTPATIENT PROVIDERS  PCP:  Iglesia Steel MD  Orthopedic surgery:  Guy Jaquez D.O.  Cardiology: Matthew Molina MD     DISPOSITION:  Vital signs at goal.  Discontinue Norvasc today.  Heart rate with controlled ventricular rate.  New onset atrial fibrillation diagnosed on 04/17.  Appreciate Cardiology recommendations.  Cardiology initiated metoprolol tartrate 25 mg b.i.d..  Recommend no OAC 2/2 risks outweigh benefits, recommend aspirin 81 mg daily for CVA prophylaxis.  Follow-up outpatient for transthoracic echocardiogram.  No labs or imaging today.  Chest x-ray on 04/17 unremarkable.  Bowel management, sleep hygiene, and appetite at goal.  Continue to aggressively mobilize with therapies as tolerated.  Monitor closely.  Notify of any  acute changes.    Staffing 4/16:  Overall continent of bowel and bladder.  Good appetite.  PT reported overall partial/mod assist with bed mobility.  Ambulating 150 ft with supervision/touch assist using rolling walker.  OT reported overall independent to moderate assistance with toileting and bathing.  Moderate assistance with transfers.  Projected discharge pending.     Ирина Ness NP conducted independent physical examination and assisted with medical documentation.    Total time spent on this encounter including chart review and direct MD + NP 1-on-1 patient interaction: 51 minutes   Over 50% of this time was spent in counseling and coordination of care

## 2024-04-18 NOTE — PT/OT/SLP PROGRESS
Recreational Therapy Treatment    Date of Treatment: 04/18/24  Start Time: 1101  Stop Time: 1130  Total Time: 29 min  Missed Time:    General Precautions: fall  Ortho Precautions: LLE weight bearing as tolerated  Braces: N/A    Vitals   Vitals at Rest  BP    HR    O2 Sat    Pain 7/10     Vitals With Activity  BP    HR    O2 Sat    Pain 7/10       Treatment     Cognitive Skills Building   Cognitive Observation Activity Assist Position Equipment Response            Comment:          Dynamic Activities   Activity Assist Position Equipment Response   Activity 1 Golf contact guard assistance and minimum assistance Standing Rolling walker and Golf balls, golf club good   Comment: Sit to stand increased to min/contact guard as was dynamic standing balance/reaching. Standing tolerance increased to 4 minutes.  Used both UE to swing golf club while standing . UE coordination was setup/I.  Sequencing skills and problem solving skills increased to setup.  More alert and attentive to task at hand       Fine Motor Activities   Activity Assist Position Equipment Response           Comment:          Additional Info: This was a co-treat with PT    Goals     Short Term Goals    Goal  Goal Status   Will increase activity tolerance to supervision Met   Will increase sit to stand to min Met   Will improve dynamic standing balance/reaching to min Met           Long Term Goals    Goal Goal Status   Will increase standing tolerance to 5 minutes Progressing   Will improve dynamic standing balance/reaching to supervision Progressing

## 2024-04-18 NOTE — PROGRESS NOTES
04/18/24 1101   Rec Therapy Time Calculation   Date of Treatment 04/18/24   Rec Start Time 1101   Rec Stop Time 1130   Rec Total Time (min) 29 min   Time   Treatment time 2 units   Charges   $Therapeutic Activity 1unit   Precautions   General Precautions fall   Orthopedic Precautions  LLE weight bearing as tolerated   Braces N/A   OTHER   Rehab identified problem list/impairments weakness;impaired endurance;impaired functional mobility;gait instability;impaired cognition;decreased coordination;decreased upper extremity function;decreased lower extremity function;decreased safety awareness;pain   Values/Beliefs/Spiritual Care   Spiritual, Cultural Beliefs, Christian Practices, Values that Affect Care no   Overall Level of Functioning   Activity Tolerance Mod Indep   Dynamic Sitting Balance/Reaching Mod Indep   Dynamic Standing Balance/Reaching Min A   Right UE Coodination/Dexterity Mod Indep   Left UE Coordination/Dexterity Mod Indep   Problem Solving/Sequencing Skills Mod Indep   Memory Recall Mod Indep   R/L Neglect/Inattention Does not occur   Attention Span Mod Indep   Social Interaction Independent   Recreational Therapy Short Term Goals   Short Term Goal 1 Progression Met   Short Term Goal 2 Progression Met   Short Term Goal 3 Progression Met   Recreational Therapy Long Term Goals   Long Term Goal 1 Progression Progressing   Long Term Goal 2 Progression Progressing   Plan   Patient to be seen Daily   Planned Duration 2 weeks   Treatments Planned Cognitive training;Coordination;Energy conservation training;Safety education   Treatment plan/goals estblished with Patient/Caregiver Yes

## 2024-04-18 NOTE — PT/OT/SLP PROGRESS
Physical Therapy Inpatient Rehab Treatment    Patient Name:  Safia Muñoz   MRN:  [id number]    Recommendations:     Discharge Recommendations:  Moderate Intensity Therapy   Discharge Equipment Recommendations:     Barriers to discharge: Impaired functional mobility     Assessment:     Safia Muñoz is a 89 y.o. female admitted with a medical diagnosis of Closed 2-part intertrochanteric fracture of proximal end of left femur.  She presents with the following impairments/functional limitations:  weakness, impaired endurance, impaired self care skills, impaired functional mobility, gait instability, impaired balance, decreased lower extremity function, decreased coordination, decreased safety awareness, decreased ROM, pain, impaired coordination, edema, impaired skin, impaired fine motor, impaired muscle length, orthopedic precautions, impaired cardiopulmonary response to activity.    Rehab Diagnosis: medical diagnosis of Closed 2-part intertrochanteric fracture of proximal end of left femur    General Precautions: Standard, fall     Orthopedic Precautions:LLE weight bearing as tolerated     Braces: N/A    Rehab Prognosis: Fair; patient would benefit from acute skilled PT services to address these deficits and reach maximum level of function.      History:     Past Medical History:   Diagnosis Date    COPD (chronic obstructive pulmonary disease)     Hypertension        Past Surgical History:   Procedure Laterality Date    RETROGRADE INTRAMEDULLARY RODDING OF DISTAL FEMUR Left 4/9/2024    Procedure: INSERTION, INTRAMEDULLARY NAIL INTERTROCHENTERIC FRACTURE;  Surgeon: Guy Jaquez DO;  Location: [facility];  Service: Orthopedics;  Laterality: Left;  TIGRE MARIEE, SYNTHES TFNA       Subjective     Patient comments: pt reluctantly agreeable to participate with PT. Stated that she was too tired to do anything today, and required significant encouragement to participate.    Respiratory Status: Room air    Patients  cultural, spiritual, Jain conflicts given the current situation: no    Objective:     Communicated with pt prior to session.  Patient found up in chair upon PT entry to room.    Pt is Oriented x3 and Alert and Cooperative.    Vitals   Pain Pain Rating 1: 6/10  Location - Side 1: Left  Location 1: leg  Pain Addressed 1: Reposition, Cessation of Activity  Pain Rating Post-Intervention 1: 0/10       Functional Mobility:    Current   Status  Discharge   Goal   Functional Area: Care Score:    Roll Left and Right   Independent   Sit to Lying   Supervision or touching assistance   Lying to Sitting on Side of Bed   Supervision or touching assistance   Sit to Stand 3  Min A with RW and increased time. Increased assist needed today d/t pain and fatigue. Supervision or touching assistance   Chair/Bed-to-Chair Transfer 3  Min A with RW and increased time. Increased assist needed today d/t pain and fatigue. Supervision or touching assistance   Car Transfer   Not attempted due to medical/safety concerns   Walk 10 Feet  Not attempted due to medical/safety concerns   Walk 50 Feet with Two Turns  Not attempted due to medical/safety concerns   Walk 150 Feet  Not attempted due to medical/safety concerns   Walk 10 Feet Uneven Surface   Not attempted due to medical/safety concerns   1 Step (Curb)   Not attempted due to medical/safety concerns   4 Steps   Not attempted due to medical/safety concerns   12 Steps   Not attempted due to medical/safety concerns   Picking Up Object   Not attempted due to medical/safety concerns   Wheel 50 Feet with Two Turns  Independent   Wheel 150 Feet  Not attempted due to medical/safety concerns       Therapeutic Activities and Exercises:  Patient educated on role of acute care PT and PT POC and safety while in hospital including calling nurse for mobility  Patient educated about importance of OOB mobility and remaining up in chair most of the day.    Toilet t/f completed with use of RW and min A for  balance. Increased time needed to complete. Max A required for toileting/lower body dressing. + BM at completion, documented on I/O sheet.    Co-treat with RAMA Weinstein for golfing activity. Performed in standing, initially with one hand holding onto RW and one hand manipulating golf club, but then progressed to both hands on golf club and pt not holding onto RW.    Activity Tolerance: Fair    Patient left up in chair with call button in reach and chair alarm on.    Education provided: roles and goals of PT/PTA, transfer training, balance training, safety awareness, body mechanics, assistive device, strengthening exercises, and fall prevention    Expected compliance: Moderate compliance    Plan:     During this hospitalization, patient to be seen 6 x/week to address the identified rehab impairments via gait training, therapeutic activities, therapeutic exercises, neuromuscular re-education, wheelchair management/training and progress toward the following goals:    GOALS:   Multidisciplinary Problems       Physical Therapy Goals          Problem: Physical Therapy    Goal Priority Disciplines Outcome Goal Variances Interventions   Physical Therapy Goal     PT, PT/OT Ongoing, Progressing     Description: Bed Mobility:  Roll left and right with partial/moderate assist.   Sit to supine transfer with partial/moderate assist.   Supine to sit transfer with partial/moderate assist.     Transfers:  Sit to stand transfer with supervision/touching assist using RW.   Bed to chair transfer with supervision/touching assist Stand Step  using RW.   Bed to chair transfer with partial/moderate assist Slide Board  using Slide board.     Mobility:  Ambulate 150 feet with supervision/touching assist using RW.   Manual wheelchair 150 feet with partial/moderate assist using Bilateral upper extremity                       Plan of Care Expires:  04/19/24  PT Next Visit Date: 04/19/24  Plan of Care reviewed with: patient    Additional  Information:         Time Tracking:     Therapy Time  PT Start Time: 1030  PT Stop Time: 1130  PT Total Time (min): 60 min   PT Individual: 60  Missed Time:    Time Missed due to:      Billable Minutes: Therapeutic Activity 30 min and Therapeutic Exercise 30 min    04/18/2024

## 2024-04-18 NOTE — PT/OT/SLP PROGRESS
"Physical Therapy Inpatient Rehab Treatment    Patient Name:  Safia Muñoz   MRN:  [id number]    Recommendations:     Discharge Recommendations:  Moderate Intensity Therapy   Discharge Equipment Recommendations: walker, rolling, bedside commode, shower chair   Barriers to discharge: Decreased caregiver support    Assessment:     Safia Muñoz is a 89 y.o. female admitted with a medical diagnosis of Closed 2-part intertrochanteric fracture of proximal end of left femur.  She presents with the following impairments/functional limitations:  weakness, impaired endurance, impaired self care skills, impaired functional mobility, gait instability, impaired balance, decreased lower extremity function, decreased coordination, decreased safety awareness, decreased ROM, pain, impaired coordination, edema, impaired skin, impaired fine motor, impaired muscle length, orthopedic precautions, impaired cardiopulmonary response to activity .    Rehab Diagnosis: medical diagnosis of Closed 2-part intertrochanteric fracture of proximal end of left femur    General Precautions: Standard, fall     Orthopedic Precautions:LLE weight bearing as tolerated , hr 120 or above notify nursing     Braces: N/A    Rehab Prognosis: Good; patient would benefit from acute skilled PT services to address these deficits and reach maximum level of function.      History:     Past Medical History:   Diagnosis Date    COPD (chronic obstructive pulmonary disease)     Hypertension        Past Surgical History:   Procedure Laterality Date    RETROGRADE INTRAMEDULLARY RODDING OF DISTAL FEMUR Left 4/9/2024    Procedure: INSERTION, INTRAMEDULLARY NAIL INTERTROCHENTERIC FRACTURE;  Surgeon: Guy Jaquez DO;  Location: [facility];  Service: Orthopedics;  Laterality: Left;  TIGRE TABLE, SYNTHES TFNA       Subjective     Chief Complaint: "I feel 100% better today"     Respiratory Status: Room air    Patients cultural, spiritual, Restoration conflicts given the " current situation: no      Objective:     Communicated with nursing  prior to session.  Patient found up in chair with sarmiento catheter, peripheral IV, Other (comments) (in wc)  upon PT entry to room.    Pt is Oriented x3 and Alert and Cooperative.    Vitals   Vitals at Rest  /63   HR 71   O2 Sat    Pain        Functional Mobility:   Transfers:     Toilet Transfer: min a  with  rolling walker  using  Step Transfer and ambulated 10ft into restroom + bm increased time toilet hygiene with sba increased time      Current   Status  Discharge   Goal   Functional Area: Care Score:    Roll Left and Right   Independent   Sit to Lying   Supervision or touching assistance   Lying to Sitting on Side of Bed   Supervision or touching assistance   Sit to Stand 3  Hayden use of rw slow transition vc for hand placement few times during tx  Supervision or touching assistance   Chair/Bed-to-Chair Transfer   Supervision or touching assistance   Car Transfer   Not attempted due to medical/safety concerns   Walk 10 Feet 4  Use of rw lle wbat slow katie 10ft  Not attempted due to medical/safety concerns   Walk 50 Feet with Two Turns 88 Not attempted due to medical/safety concerns   Walk 150 Feet 88 Not attempted due to medical/safety concerns   Walk 10 Feet Uneven Surface   Not attempted due to medical/safety concerns   1 Step (Curb)   Not attempted due to medical/safety concerns   4 Steps   Not attempted due to medical/safety concerns   12 Steps   Not attempted due to medical/safety concerns   Picking Up Object   Not attempted due to medical/safety concerns   Wheel 50 Feet with Two Turns 4 Independent   Wheel 150 Feet 4  BUE only 200ft with vc for tech with turning and wc management   Not attempted due to medical/safety concerns       Therapeutic Activities and Exercises:  T/f toilet>TTB use of rw cga slow transition increased time     Activity Tolerance: Good    Patient left  with OT Yoli on TTB   with  OT Yoli present and pt kenan  tx well pt just having pain meds focus on tx wc management and sit<>stand also t/fs use of rw pt requiring overall min a with use of rw for fall prevention with HR not over 80 during tx  .    Education provided: transfer training, balance training, safety awareness, body mechanics, and wheelchair management    Expected compliance: High compliance    GOALS:   Multidisciplinary Problems       Physical Therapy Goals          Problem: Physical Therapy    Goal Priority Disciplines Outcome Goal Variances Interventions   Physical Therapy Goal     PT, PT/OT Ongoing, Progressing     Description: Bed Mobility:  Roll left and right with partial/moderate assist.   Sit to supine transfer with partial/moderate assist.   Supine to sit transfer with partial/moderate assist.     Transfers:  Sit to stand transfer with supervision/touching assist using RW.   Bed to chair transfer with supervision/touching assist Stand Step  using RW.   Bed to chair transfer with partial/moderate assist Slide Board  using Slide board.     Mobility:  Ambulate 150 feet with supervision/touching assist using RW.   Manual wheelchair 150 feet with partial/moderate assist using Bilateral upper extremity                       Plan:     During this hospitalization, patient to be seen 6 x/week to address the identified rehab impairments via gait training, therapeutic activities, therapeutic exercises, neuromuscular re-education, wheelchair management/training and progress toward the following goals:    Plan of Care Expires:  04/19/24  PT Next Visit Date: 04/19/24  Plan of Care reviewed with: patient    Additional Information:         Time Tracking:     Therapy Time  PT Received On: 04/18/24  PT Start Time: 0845  PT Stop Time: 0930  PT Total Time (min): 45 min   PT Individual: 45  Missed Time:    Time Missed due to:      Billable Minutes: Therapeutic Activity 45min    04/18/2024

## 2024-04-18 NOTE — PLAN OF CARE
Problem: Rehabilitation (IRF) Plan of Care  Goal: Plan of Care Review  Outcome: Ongoing, Progressing  Goal: Patient-Specific Goal (Individualized)  Outcome: Ongoing, Progressing  Goal: Absence of New-Onset Illness or Injury  Outcome: Ongoing, Progressing

## 2024-04-18 NOTE — PROGRESS NOTES
Inpatient Nutrition Assessment    Admit Date: 4/12/2024   Total duration of encounter: 6 days   Patient Age: 89 y.o.    Nutrition Recommendation/Prescription     Continue Regular diet as tolerated.   Continue Ross, BID, to assist with healing.   Monitor intake, weight, and labs.     RD following and available as needed. Thank you.    Communication of Recommendations:  EMR.    Nutrition Assessment     Malnutrition Assessment/Nutrition-Focused Physical Exam    Does not meet criteria                                                                A minimum of two characteristics is recommended for diagnosis of either severe or non-severe malnutrition.    Chart Review    Reason Seen: follow-up    Malnutrition Screening Tool Results   Have you recently lost weight without trying?: No  Have you been eating poorly because of a decreased appetite?: No   MST Score: 0   Diagnosis:  Left hip fracture s/p left hip IM nailing on 4/9/2024.    Relevant Medical History:   COPD.  HTN.    Scheduled Medications:  acetaminophen, 650 mg, Q6H  aspirin, 81 mg, Daily  bisacodyL, 10 mg, Once  cyanocobalamin, 500 mcg, Daily  enoxparin, 30 mg, Q24H (prophylaxis, 1700)  estradiol 0.05 mg/24 hr td ptsw, 1 patch, Every Wed   And  [START ON 4/21/2024] estradiol 0.05 mg/24 hr td ptsw, 1 patch, Every Sun  ferrous sulfate, 1 tablet, Daily  furosemide, 40 mg, Daily  hydrOXYzine pamoate, 50 mg, After dinner  LIDOcaine, 1 patch, Q24H  methocarbamoL, 500 mg, Q6H  metoprolol tartrate, 25 mg, BID  mupirocin, , BID  naloxegoL, 25 mg, Daily  tamsulosin, 0.4 mg, QHS    Continuous Infusions:  sodium chloride 0.9%, Last Rate: 125 mL/hr at 04/17/24 1723    PRN Medications:     Current Facility-Administered Medications:     acetaminophen, 650 mg, Oral, Q8H PRN    benzonatate, 100 mg, Oral, TID PRN    bisacodyL, 10 mg, Rectal, Daily PRN    hydrALAZINE, 10 mg, Intravenous, Q4H PRN    labetalol, 10 mg, Intravenous, Q4H PRN    methocarbamoL, 500 mg, Oral, TID PRN     "metoprolol, 10 mg, Intravenous, Q2H PRN    nitroGLYCERIN, 0.4 mg, Sublingual, Q5 Min PRN    ondansetron, 4 mg, Oral, Q6H PRN    oxyCODONE, 10 mg, Oral, Q4H PRN    oxyCODONE, 5 mg, Oral, Q4H PRN    polyethylene glycol, 17 g, Oral, BID PRN    promethazine, 25 mg, Oral, Q6H PRN      Calorie Containing IV Medications: no significant kcals from medications at this time    Recent Labs   Lab 04/12/24  0343 04/13/24  0518 04/14/24  0515 04/15/24  0508 04/17/24  0526    131* 132 133 135   K 4.5 4.4 4.2 4.8 3.8   CALCIUM 8.8 8.6 9.0 8.9 8.2*   PHOS  --  2.9  --  4.2  --    MG  --  1.90 1.90 2.10  --    CHLORIDE 97* 98 94* 94* 99   CO2 26 26 29 31 28   BUN 13.1 14.9 21.1* 16.9 18.1   CREATININE 0.76 0.68 0.80 0.80 0.71   EGFRNORACEVR >60 >60 >60 >60 >60   GLUCOSE 126* 99 108 121 123*   BILITOT  --  0.8  --  0.8  --    ALKPHOS  --  62  --  71  --    ALT  --  11  --  28  --    AST  --  35*  --  47*  --    ALBUMIN  --  2.8*  --  2.8*  --    PREALB  --  9.7*  --  13.5*  --    WBC 12.68* 11.14  --  11.67* 12.74*   HGB 9.9* 10.1*  --  11.0* 11.0*   HCT 30.4* 31.2*  --  34.0* 33.8*     Nutrition Orders:  Diet Adult Regular      Appetite/Oral Intake: good/50-75% of meals  Factors Affecting Nutritional Intake: none identified  Social Needs Impacting Access to Food: none identified  Food/Sikh/Cultural Preferences: none reported  Food Allergies: none reported  Last Bowel Movement: 04/18/24  Wound(s):  Intact    Comments    4/13: Pt with good appetite and intake. No recent weight loss noted/reported on nursing admit screen. Labs and meds reviewed. Will continue to monitor during stay.     4/18: Pt with fair to good appetite and intake. Stated she prefers soup with crackers at night. Denied N/V/C/D. No chewing or swallowing difficulties. No recent weight changes noted. Med/labs reviewed.     Anthropometrics    Height: 5' 2.99" (160 cm), Height Method: Stated  Last Weight: 74.5 kg (164 lb 3.9 oz) (04/13/24 0700), Weight Method: " Bed Scale  BMI (Calculated): 29.1  BMI Classification: overweight (BMI 25-29.9)     Ideal Body Weight (IBW), Female: 114.95 lb     % Ideal Body Weight, Female (lb): 142.05 %                    Usual Body Weight (UBW), k.4 kg  % Usual Body Weight: 121.59     Usual Weight Provided By: EMR weight history    Wt Readings from Last 5 Encounters:   24 74.5 kg (164 lb 3.9 oz)   24 68.2 kg (150 lb 5.7 oz)   10/07/23 64.9 kg (143 lb)   18 68.4 kg (150 lb 12.7 oz)     Weight Change(s) Since Admission:   Wt Readings from Last 1 Encounters:   24 0700 74.5 kg (164 lb 3.9 oz)   24 1310 74.1 kg (163 lb 5.8 oz)   Admit Weight: 74.1 kg (163 lb 5.8 oz) (24 1310), Weight Method: Standard Scale    Estimated Needs    Weight Used For Calorie Calculations: 74.5 kg (164 lb 3.9 oz)  Energy Calorie Requirements (kcal): 1490-25950 kcal (20-25 kcal/kg/BW)  Energy Need Method: Kcal/kg  Weight Used For Protein Calculations: 74.5 kg (164 lb 3.9 oz)  Protein Requirements: 75 to 90 g/day (1.0-1.2 g/kg/CBW)  Fluid Requirements (mL): 1500 to 1850 kcals/day (1mL/kcal) or per MD Guidance.        Enteral Nutrition     Patient not receiving enteral nutrition at this time.    Parenteral Nutrition     Patient not receiving parenteral nutrition support at this time.    Evaluation of Received Nutrient Intake    Calories: meeting estimated needs  Protein: meeting estimated needs    Patient Education     Not applicable.    Nutrition Diagnosis     PES: Increased nutrient needs (protein and wound healing vitamins ) related to increased protein energy demand for wound healing as evidenced by S/P IM nailing. (active)       Nutrition Interventions     Intervention(s): multivitamin/mineral supplement therapy and collaboration with other providers    Goal: Meet greater than 80% of nutritional needs by follow-up. (goal progressing)      Nutrition Goals & Monitoring     Dietitian will monitor: food and beverage intake, energy  intake, weight, weight change, electrolyte/renal panel, glucose/endocrine profile, and gastrointestinal profile  Discharge planning: resume home regimen  Nutrition Risk/Follow-Up: low (follow-up in 5-7 days)   Please consult if re-assessment needed sooner.

## 2024-04-18 NOTE — PLAN OF CARE
Problem: Fall Injury Risk  Goal: Absence of Fall and Fall-Related Injury  Outcome: Ongoing, Progressing  Intervention: Promote Injury-Free Environment  Flowsheets (Taken 4/17/2024 2132)  Safety Promotion/Fall Prevention:   assistive device/personal item within reach   bed alarm set   Fall Risk signage in place   lighting adjusted   nonskid shoes/socks when out of bed   side rails raised x 3   instructed to call staff for mobility

## 2024-04-19 PROCEDURE — 99900031 HC PATIENT EDUCATION (STAT)

## 2024-04-19 PROCEDURE — 94761 N-INVAS EAR/PLS OXIMETRY MLT: CPT

## 2024-04-19 PROCEDURE — 97110 THERAPEUTIC EXERCISES: CPT

## 2024-04-19 PROCEDURE — 97530 THERAPEUTIC ACTIVITIES: CPT

## 2024-04-19 PROCEDURE — 97542 WHEELCHAIR MNGMENT TRAINING: CPT

## 2024-04-19 PROCEDURE — 25000003 PHARM REV CODE 250: Performed by: NURSE PRACTITIONER

## 2024-04-19 PROCEDURE — 25000003 PHARM REV CODE 250: Performed by: INTERNAL MEDICINE

## 2024-04-19 PROCEDURE — 97164 PT RE-EVAL EST PLAN CARE: CPT

## 2024-04-19 PROCEDURE — 97116 GAIT TRAINING THERAPY: CPT

## 2024-04-19 PROCEDURE — 99900035 HC TECH TIME PER 15 MIN (STAT)

## 2024-04-19 PROCEDURE — 63600175 PHARM REV CODE 636 W HCPCS: Performed by: NURSE PRACTITIONER

## 2024-04-19 PROCEDURE — 97535 SELF CARE MNGMENT TRAINING: CPT

## 2024-04-19 PROCEDURE — 11800000 HC REHAB PRIVATE ROOM

## 2024-04-19 PROCEDURE — 99233 SBSQ HOSP IP/OBS HIGH 50: CPT | Mod: ,,, | Performed by: NURSE PRACTITIONER

## 2024-04-19 PROCEDURE — 94799 UNLISTED PULMONARY SVC/PX: CPT

## 2024-04-19 RX ADMIN — ACETAMINOPHEN 325MG 650 MG: 325 TABLET ORAL at 05:04

## 2024-04-19 RX ADMIN — HYDROXYZINE PAMOATE 50 MG: 50 CAPSULE ORAL at 05:04

## 2024-04-19 RX ADMIN — FUROSEMIDE 40 MG: 40 TABLET ORAL at 08:04

## 2024-04-19 RX ADMIN — METHOCARBAMOL 500 MG: 500 TABLET ORAL at 04:04

## 2024-04-19 RX ADMIN — TAMSULOSIN HYDROCHLORIDE 0.4 MG: 0.4 CAPSULE ORAL at 08:04

## 2024-04-19 RX ADMIN — FERROUS SULFATE TAB 325 MG (65 MG ELEMENTAL FE) 1 EACH: 325 (65 FE) TAB at 08:04

## 2024-04-19 RX ADMIN — ASPIRIN 81 MG: 81 TABLET, COATED ORAL at 08:04

## 2024-04-19 RX ADMIN — ENOXAPARIN SODIUM 30 MG: 30 INJECTION SUBCUTANEOUS at 04:04

## 2024-04-19 RX ADMIN — LIDOCAINE 5% 1 PATCH: 700 PATCH TOPICAL at 04:04

## 2024-04-19 RX ADMIN — METHOCARBAMOL 500 MG: 500 TABLET ORAL at 12:04

## 2024-04-19 RX ADMIN — NALOXEGOL OXALATE 25 MG: 25 TABLET, FILM COATED ORAL at 08:04

## 2024-04-19 RX ADMIN — ACETAMINOPHEN 325MG 650 MG: 325 TABLET ORAL at 12:04

## 2024-04-19 RX ADMIN — ACETAMINOPHEN 325MG 650 MG: 325 TABLET ORAL at 04:04

## 2024-04-19 RX ADMIN — CYANOCOBALAMIN TAB 500 MCG 500 MCG: 500 TAB at 08:04

## 2024-04-19 RX ADMIN — MUPIROCIN: 20 OINTMENT TOPICAL at 08:04

## 2024-04-19 RX ADMIN — METOPROLOL TARTRATE 25 MG: 25 TABLET, FILM COATED ORAL at 08:04

## 2024-04-19 RX ADMIN — OXYCODONE 10 MG: 5 TABLET ORAL at 11:04

## 2024-04-19 RX ADMIN — METHOCARBAMOL 500 MG: 500 TABLET ORAL at 05:04

## 2024-04-19 RX ADMIN — OXYCODONE 10 MG: 5 TABLET ORAL at 08:04

## 2024-04-19 NOTE — PLAN OF CARE
Problem: Occupational Therapy  Goal: Occupational Therapy Goal  Description: Pt to perform eating tasks with independence by re-eval. Pt. C numb fingertips; unable to manage packages.   Pt to perform oral hygiene tasks with min A standing at sink with RW by re-eval. Sits in w/c -Pt. C new onset A-Fib/RVR c fatigue; by Monday 4/22  Pt to perform UB dressing with independence by re-eval.Touch A c bra  Pt to perform LB dressing with mod A using AE PRN by re-eval.  Pt to perform donning/doffing footwear with max A using AE PRN by re-eval.  Pt to perform toileting hygiene with mod A by re-eval. Pt. Continues c Lyman   Pt to perform bathing tasks with mod A by re-eval.    Pt to perform toilet transfers with mod A using LRAD by re-eval.  Pt to perform WIS transfers with mod A using LRAD by re-eval.    Pt to perform meal prep activity with mod A by re-eval. MET   Pt to perform laundry management task with mod A by re-eval.  Pt to perform light housekeeping activity with mod A by re-eval.  Pt. To prepare water/food for dog by Re-Eval  Balance, Strengthening, Endurance, Balance:  Pt to consistently demonstrate adherence to orthopedic precautions during all ADL's as instructed by OT.  Pt to demonstrate consistent adherence to breathing control and energy conservation techniques as educated by OT.    Outcome: Ongoing, Progressing

## 2024-04-19 NOTE — PLAN OF CARE
Problem: Fall Injury Risk  Goal: Absence of Fall and Fall-Related Injury  Outcome: Ongoing, Progressing  Intervention: Promote Injury-Free Environment  Flowsheets (Taken 4/18/2024 2125)  Safety Promotion/Fall Prevention:   assistive device/personal item within reach   bed alarm set   Fall Risk signage in place   lighting adjusted   nonskid shoes/socks when out of bed   side rails raised x 3   supervised activity   instructed to call staff for mobility

## 2024-04-19 NOTE — PT/OT/SLP RE-EVAL
Physical Therapy Rehab Re-Evaluation    Patient Name:  Safia Muñoz   MRN:  [id number]    Recommendations:     Discharge Recommendations:  Moderate Intensity Therapy   Discharge Equipment Recommendations: walker, rolling, bedside commode, shower chair   Barriers to discharge: Decreased caregiver support and Impaired functional mobility     Assessment:     Safia Muñoz is a 89 y.o. female admitted with a medical diagnosis of Closed 2-part intertrochanteric fracture of proximal end of left femur.  She presents with the following impairments/functional limitations:  weakness, impaired endurance, impaired functional mobility, gait instability, impaired cognition, decreased coordination, decreased upper extremity function, decreased lower extremity function, decreased safety awareness, pain.    Rehab Diagnosis: medical diagnosis of Closed 2-part intertrochanteric fracture of proximal end of left femur    General Precautions: Standard, fall     Orthopedic Precautions: LLE weight bearing as tolerated     Braces: N/A    Rehab Prognosis: Good; patient would benefit from acute skilled PT services to address these deficits and reach maximum level of function.      History:     Past Medical History:   Diagnosis Date    COPD (chronic obstructive pulmonary disease)     Hypertension        Past Surgical History:   Procedure Laterality Date    RETROGRADE INTRAMEDULLARY RODDING OF DISTAL FEMUR Left 4/9/2024    Procedure: INSERTION, INTRAMEDULLARY NAIL INTERTROCHENTERIC FRACTURE;  Surgeon: Guy Jaquez DO;  Location: [facility];  Service: Orthopedics;  Laterality: Left;  HANA TABLE, SYNTHES TFNA       Subjective     Patient Comments: pt agreeable to participate with PT. Verbalized feeling tired from all of the therapy she's had. C/o increased swelling in L LE, SYED hose applied per OK from [employer].    Patients cultural, spiritual, Mosque conflicts given the current situation: no       Living Environment  People in Home:  alone  Living Arrangements: other (see comments) (lives in single level home)  Home Accessibility: wheelchair accessible  Number of Stairs, Main Entrance: none (small threshold)  Home Layout: Able to live on 1st floor  Transportation Anticipated: family or friend will provide  Equipment Currently Used at Home: other (see comments) (has RW, but does not use it. grab bar and HHS in WIS.)    Prior Level of Function  Ambulation Skills: independent  Stairs:  (has small threshold into home no steps)  Transfer Skills: independent  ADL Skills: independent  Cognitive Communication: independent    Equipment used at home: other (see comments) (has RW, but does not use it. grab bar and HHS in WIS.).     Objective:     Communicated with pt prior to session.  Patient found up in chair with sarmiento catheter upon PT entry to room.    Vitals   Pain Pain Rating 1: 7/10  Location - Side 1: Left  Location 1: leg  Pain Addressed 1: Pre-medicate for activity, Reposition, Cessation of Activity  Pain Rating Post-Intervention 1: 5/10     Respiratory Status: Room air    Exams  Cognitive Exam:  Patient is oriented to Person, Place, Time, and Situation    Functional Mobility   Admit Current   Status Goal   Functional Area: Care Score: Care Score: Care Score:   Roll Left and Right 3 4  With increased time and bed rail Independent   Sit to Lying 1 3  Assist with L LE + increased time Supervision or touching assistance   Lying to Sitting on Side of Bed 1 3  Assist with L LE + increased time Supervision or touching assistance   Sit to Stand 2 4  SBA with RW Supervision or touching assistance   Chair/Bed-to-Chair Transfer 2 4  SBA with RW and increased time. Pt prefers to pivot and slide her L foot to move it vs  picking it up Supervision or touching assistance   Car Transfer 88 88 Not attempted due to medical/safety concerns   Walk 10 Feet 88 4 Not attempted due to medical/safety concerns   Walk 50 Feet with Two Turns 88 4  Pt amb 25' with RW and  CGA/SBA, slow speed with small B step sizes. Noted that pts RW was too tall for her; PT adjusted to appropriate size and pt then amb 50' with CGA/SBA. Speed and step size improved, with pt reporting decreased pain in L LE d/t improved ability to use B UE for support with RW. Not attempted due to medical/safety concerns   Walk 150 Feet 88 88 Not attempted due to medical/safety concerns   Walk 10 Feet Uneven Surface 88 88 Not attempted due to medical/safety concerns   1 Step (Curb) 88 88 Not attempted due to medical/safety concerns   4 Steps 88 88 Not attempted due to medical/safety concerns   12 Steps 88 88 Not attempted due to medical/safety concerns   Picking Up Object 88 88 Not attempted due to medical/safety concerns   Wheel 50 Feet with Two Turns 3 4 Independent   Wheel 150 Feet 88 3  Pt manually propelled w/c 125' with B UE. Slow speed, distance limited by fatigue. Not attempted due to medical/safety concerns     Therapeutic Activities and Exercises:  Patient educated on role of acute care PT and PT POC and safety while in hospital including calling nurse for mobility  Patient educated about importance of OOB mobility and remaining up in chair most of the day.    Co-treat with CTRCHINO Weinstein for bowling activity. Performed in standing with use of RW to work on standing tolerance, B UE and LE strengthening, and balance.     Activity Tolerance: Good    Patient left up in chair with call button in reach and chair alarm on.    Education Provided: roles and goals of PT/PTA, transfer training, bed mob, gait training, balance training, safety awareness, body mechanics, assistive device, wheelchair management, strengthening exercises, and fall prevention    Expected compliance: High compliance      Plan:     During this hospitalization, patient to be seen 6 x/week to address the identified rehab impairments via gait training, therapeutic activities, therapeutic exercises, neuromuscular re-education, wheelchair  management/training and progress toward the following goals:    GOALS:   Multidisciplinary Problems       Physical Therapy Goals          Problem: Physical Therapy    Goal Priority Disciplines Outcome Goal Variances Interventions   Physical Therapy Goal     PT, PT/OT Ongoing, Progressing     Description: Bed Mobility:  MET - Roll left and right with partial/moderate assist.   MET - Sit to supine transfer with partial/moderate assist.   MET - Supine to sit transfer with partial/moderate assist.   Roll left and right independently  Sit to supine transfer with setup/clean-up assist assist  Supine to sit transfer with setup/clean-up assist assist    Transfers:  MET - Sit to stand transfer with supervision/touching assist using RW.   MET - Bed to chair transfer with supervision/touching assist Stand Step  using RW.   DISCONTINUE - Bed to chair transfer with partial/moderate assist Slide Board  using Slide board.   Sit to stand transfer with setup/clean-up assist using RW  Bed to chair transfer with setup/clean-up assist Stand Step  using RW  Car transfer with supervision/touching assist using RW   an object from the ground in standing position with supervision/touching assist using RW and reacher    Mobility:  Ambulate 150 feet with supervision/touching assist using RW.   Pt ascended/descended a 4 inch curb with partial/moderate assist using RW  Ascend/descend 4 stairs with partial/moderate assist using bilateral handrails  Ambulate 10 feet on uneven surfaces/ramps with partial/moderate assist using RW  Manual wheelchair 150 feet with partial/moderate assist using Bilateral upper extremity                         Plan of Care Expires:  04/19/24  PT Next Visit Date: 04/25/24  Plan of Care reviewed with: patient      PT Care conference held with PTA. Short term goals updated appropriately. Plan of care updates discussed and reviewed with PTA Alicia Macias    Additional Infomation:           Time Tracking:     Therapy  Time   PT Start Time: 1030  PT Stop Time: 1200  PT Total Time (min): 90 min  PT Individual: 90  Missed Time:    Time Missed due to:      Billable Minutes: Re-eval 30 min, Gait Training 15 min, Therapeutic Exercise 30 min, and Train/Wheelchair Management 15 min    04/19/2024

## 2024-04-19 NOTE — PT/OT/SLP RE-EVAL
Recreational Therapy Re-Evaluation      Date of Treatment: 04/19/24  Start Time: 1101  Stop Time: 1130  Total Time: 29 min  Missed Time:    Assessment      Safia Muñoz is a 89 y.o. female admitted with a medical diagnosis of Closed 2-part intertrochanteric fracture of proximal end of left femur.  She presents with the following impairments/functional limitations:  weakness, impaired endurance, impaired functional mobility, gait instability, decreased lower extremity function, decreased safety awareness  .    Rehab Diagnosis:      Recent Surgery:    General Precautions: Standard, fall     Orthopedic Precautions:LLE weight bearing as tolerated     Braces: N/A    Rehab Prognosis: Good; patient would benefit from acute skilled Recreational Therapy services to address these deficits and reach maximum level of function.      Impairments: Endurance deficits, Mobility deficits, Pain, Safety awareness deficits, and Strength deficits  Rehab Potential: Good  Treatment Recommendations: Continue with current plan of care   Treatment Diagnosis: Fall, L intertrochanteric femur fx, IMN, COPD, HTN  Orientation: Oriented x4  Affect/Behavior: Appropriate and Cooperative  Safety/Judgement: intact   Basic Command Following: intact  Spiritual Cultural: no        History     Past Medical History:   Diagnosis Date    COPD (chronic obstructive pulmonary disease)     Hypertension        Past Surgical History:   Procedure Laterality Date    RETROGRADE INTRAMEDULLARY RODDING OF DISTAL FEMUR Left 4/9/2024    Procedure: INSERTION, INTRAMEDULLARY NAIL INTERTROCHENTERIC FRACTURE;  Surgeon: Guy Jaquez DO;  Location: [facility];  Service: Orthopedics;  Laterality: Left;  [employer] TABLE, SYNTHES TFNA       Home Environment     Admit Date: 04/12/24  Living Situation  People in Home: alone  Lives in: assisted living  Patients Responsibilities: Caregiver to pet, Community mobility, , Financial management, Health and wellness, Laundry,  "Leisure/play/hobbies, Retired, Shopping, Social participation  Number of Children: 3  Occupation:Retired: Secretarial work    Instrumental Activities of Daily Living     Previous Hand Dominance: Right Current Hand Dominance: Right (Arnoldo UE weakness)     Other iADL Information:        Cognitive Skills Building         Cognitive Observation Activity Assist Position Equipment Response            Comment:      Dynamic Activities      Activity Assist Position Equipment Response   Activity 1 Bowling supervision Standing Rolling walker and Rubber bowling balls good   Comment: This was a co-treat with PT  Sit to stand was supervision as was dynamic standing balance/reaching. Standing tolerance increased to 5 minutes.  UE coordination was setup as were sequencing skills and problem solving skills.  Said she enjoyed bowling       Fine Motor Activities      Activity Assist Position Equipment Response           Comment:        Goals     Patient Goals  Patient Goal 1: "To get to stand and walk again."    Short Term Goals    Goal  Goal Status   Will increase activity tolerance to supervision Met   Will increase sit to stand to min Met   Will improve dynamic standing balance/reaching to min Met           Long Term Goals    Goal Goal Status   Will increase standing tolerance to 5 minutes Met   Will improve dynamic standing balance/reaching to supervision Met                     Plan       Patient to be seen: Daily  Duration: 2 weeks  Treatments planned: Energy conservation training, Safety education  Treatment plan/goals established with Patient/Caregiver: Yes     "

## 2024-04-19 NOTE — PROGRESS NOTES
04/19/24 1101   Rec Therapy Time Calculation   Date of Treatment 04/19/24   Rec Start Time 1101   Rec Stop Time 1130   Rec Total Time (min) 29 min   Time   Treatment time 2 units   Charges   $Therapeutic Activity 1unit   Precautions   General Precautions fall   Orthopedic Precautions  LLE weight bearing as tolerated   Braces N/A   Pain/Comfort   Pain Rating 1 5/10   Location - Side 1 Left   Location - Orientation 1 lower   Location 1 knee   Pain Addressed 1 Reposition;Other (see comments)  ([employer])   OTHER   Rehab identified problem list/impairments weakness;impaired endurance;impaired functional mobility;gait instability;decreased lower extremity function;decreased safety awareness   Values/Beliefs/Spiritual Care   Spiritual, Cultural Beliefs, Jehovah's witness Practices, Values that Affect Care no   Overall Level of Functioning   Activity Tolerance Independent   Dynamic Sitting Balance/Reaching Independent   Dynamic Standing Balance/Reaching Standby Assist   Right UE Coodination/Dexterity Mod Indep   Left UE Coordination/Dexterity Mod Indep   Problem Solving/Sequencing Skills Mod Indep   Memory Recall Mod Indep   R/L Neglect/Inattention Does not occur   Attention Span Mod Indep   Social Interaction Independent   Recreational Therapy Short Term Goals   Short Term Goal 1 Progression Met   Short Term Goal 2 Progression Met   Short Term Goal 3 Progression Met   Recreational Therapy Long Term Goals   Long Term Goal 1 Progression Met   Long Term Goal 2 Progression Met   Plan   Patient to be seen Daily   Planned Duration 2 weeks   Treatments Planned Energy conservation training;Safety education   Treatment plan/goals estblished with Patient/Caregiver Yes

## 2024-04-19 NOTE — PLAN OF CARE
"Received call from dtr Lindaarmida Pizarro ([phone]).  She has been in touch with [facility] Marisol Vizcar[facility], and they have met with patient and as a family about planning for pt's release.  They have jointly decided for pt to return to her [facility] independent living quarters WITH 24-hr care provided by [facility] sitter agency for now.  They will then see how that plan goes, rather than abruptly moving pt immediately into assisted living, as that would affect pt's ability to keep her dog with her.  They have reviewed with pt her finances so she will understand the limitations of her caregiving expenditures.      Discussed pt's cottage.  Linda will email me pics of pt's bathroom area.  She is concerned about pt's small ("closet") bathroom where she will toilet.  She is not sure that the bathroom is large enough for a wheelchair, so pt may need a BSC.    Also discussed HH vs OP tx.  Linda would like to see pt treated by PT Melinda on site in the gym at [facility] but knows that HH may be more appropriate initially.  Will wait to see how pt is doing closer to discharge.    Linda is also requesting discharge on Mon, 4/29 instead of Fri, 4/26, if possible, as it makes her nervous for pt to discharge right before a weekend with new caregivers on board.  Will discuss closer to discharge date.    1127: Linda called back and requested hospital privileges for pt over weekend for her to bring pt downstairs to visit her beloved dog.  STEVE Du, agreed to enter hospital privilege order for them to do so.  "

## 2024-04-19 NOTE — PROGRESS NOTES
Dos 4/19/24  Patient seen in PT gym today  Participation and progress toward goals noted  Continues working on strength, mobility, balance and increasing endurance  Progress and problems discussed with patient and therapists  Questions answered  Chart reviewed and discussed with Dr higginbotham and medicine team as well as Ana Laura Biggs, my FNP  Continue present management  Subjective  HPI: 89 yo WF with a PMH of COPD and hypertension presented to the ED at [facility] on 4/8/24 with complaint of left hip pain after a ground level fall where she tripped on a rug and fell. X-ray show intertrochanteric left femur fracture. Orthopedic surgery consulted and referred to hospital medicine for admission. Chest x-ray show no airspace disease. EKG sinus rhythm with PACs. On 4/9, orthopedic surgeon recommended surgical intervention. Patient underwent IM nailing, and was placed WBAT to LLE. On 4/10, patient placed on Lovenox for DVT ppx, and can change on ASA 81mg BID at discharge. Patient was started on Ceftriazone for UTI. Patient gets frequent UTIs. Labs showed low H&H of 10.4 & 33.5, low Iron of 26, low TIBC of 218, elevated B12 of 1,025, low Iron sat of 12, elevated glucose of 135, low calcium of 7.8, low protein of 5.4, low albumin of 3.3, elevated AST of 35. PT/OT evals completed with deficits noted with recommendation for high intensity therapy needed. On 4/11, patient complained of nausea so zofran was given. Labs showed low RBC of 3.53, low H&H Of 10.5 & 32.8, low calcium of 7.8, low albumin of 3.3. Dry dressing changes started. No BM reported since admission. On 4/12, labs showed elevated WBC of 12.68, low RBC of 3.30, low H&H of 9.9 & 30.4, low MCHC of 32.6, low chloride of 97, and elevated glucose of 126. Patient will need appointment with Carmen AMAYA (Ortho) in 3 weeks for wound check and repeat Xrays. Patient complained of pain to LLE rating at 8-9/10 with movement, but controlled with rest. Patient is AAOx4.   Participating with therapy. Functional status includes setup/supervision needed for eating, moderate assist for transfers with RW, max assist for toileting, moderate assist bed mobility, minimal assist for walking 26ft with RW, minimal assist for grooming, and max assist for lower body dressing. Patient was evaluated, accepted, and admitted to inpatient rehab to improve functional status. Transferred to [facility] on 4/12 without incident.  4/19: Seen with PT, Isidra w/RW at [facility] for 50ft. Progressing well with therapy. Cognition appears intact and back to baseline. No current complaints. She would like hospital privileges to allow her to go outside with family to visit her dog. Ok in WC. VSSAF.               Review of Systems  Psychiatric: Does not have any confirmed mental health history or diagnoses. Per daughter, pt. seems to be depressed and anxious since her spouse passed away. She is a former smoker.      Depression/Anxiety: no current complaints. Therapy notes Anxiety     Pain: left hip with movement and weight bearing (spasms)-improved  LIDOcaine 5 % patch q24hr, 1800, low back  acetaminophen tablet 650 mg q6h  methocarbamoL tablet 500 mg TID. q6h     methocarbamoL tablet 500 mg TID PRN muscle spasm  acetaminophen tablet 650 mg q8h PRN mild pain  oxyCODONE immediate release tablet 5 mg q4h PRN mod pain  oxyCODONE immediate release tablet 10 mg q4h PRN severe pain  Bowels/Bladder: last BM 4/19. 4/18 x 2. 4/17 x 2 (large amount, loose) 4/16 x 2. 4/15- Suppository given. Previous BM 4/12 following suppository, and prior to admission (4/8)  naloxegoL (MOVANTIK) tablet 25 mg qd   Appetite: improving     Sleep: decreased with watching the clock-improving  hydrOXYzine pamoate capsule 50 mg, qPM 1800        Physical Exam  General: well-developed, well-nourished, no acute distress  Respiratory: equal chest rise, no SOB, no audible wheeze  Cardiovascular: regular rate and rhythm, LE edema  Gastrointestinal: soft, non-tender,  non-distended   Musculoskeletal: decreased ROM/strength to LLE  Integumentary: bruising,  LLE incisions x 3-staples, dressing-c/d/I, surrounding ecchymosis and tape blisters-use paper tape  Neurologic: cranial nerves intact, signs of peripheral neurological deficit- numbness to fingertips, AAO  *MD performed and documented physical examination                 Assessment/Plan  Hospital   Closed 2-part intertrochanteric fracture of proximal end of left femur s/p repair   Fall     Non-Hospital   COPD (chronic obstructive pulmonary disease)   Hypertension   Frequent UTI   Osteoporosis       Wounds: LLE incisions x 3-staples, dressing-c/d/I, surrounding ecchymosis and tape blisters-use paper tape  S/p IM nail of intertrochanteric left femur fracture on 4/9 (Leonid)  Precautions: WBAT LLE  Bracing/AD: RW  Swallowing: Regular Diet  Function: Tolerating therapy. Continue PT/OT  VTE Prophylaxis:   enoxaparin injection 30 mg SubQ q24hr  Code Status: FULL CODE   Discharge: Lives [place] in [place] in an independent living [facility] with no steps to enter. Completed high school and 1 year of college. Denies  history. Pt. Is retired from secretarial work.  Is  approximately 3 years ago.  Was independent without use of assistive devices. The plan is to return to [facility]. Date pending.               Disha Biggs NP, conducted additional independent physical examination and assisted with medical documentation.

## 2024-04-19 NOTE — PT/OT/SLP PROGRESS
"Occupational Therapy Inpatient Rehab Treatment    Name: Safia Muñoz  MRN: [id number]    Assessment:  Safia Muñoz is a 89 y.o. female admitted with a medical diagnosis of Closed 2-part intertrochanteric fracture of proximal end of left femur.  She presents with the following impairments/functional limitations:  weakness, impaired endurance, impaired self care skills, impaired sensation, impaired functional mobility, decreased upper extremity function, decreased lower extremity function, impaired fine motor, orthopedic precautions Pt. Is a RED STAR FALL RISK. .    General Precautions: Standard, fall     Orthopedic Precautions:LLE weight bearing as tolerated     Braces: N/A    Rehab Prognosis: Good; patient would benefit from acute skilled OT services to address these deficits and reach maximum level of function.      History:     Past Medical History:   Diagnosis Date    COPD (chronic obstructive pulmonary disease)     Hypertension        Past Surgical History:   Procedure Laterality Date    RETROGRADE INTRAMEDULLARY RODDING OF DISTAL FEMUR Left 4/9/2024    Procedure: INSERTION, INTRAMEDULLARY NAIL INTERTROCHENTERIC FRACTURE;  Surgeon: Guy Jaquez DO;  Location: [facility];  Service: Orthopedics;  Laterality: Left;  [facility] AlphaA TABLE, SYNTHES TFNA       Subjective     Orientation: Oriented x4    Chief Complaint: "My fingertips are numb; I need to work c my hands."     Patient/Family Comments/goals: "I hope I can go back to my cottage"     Vitals   Vitals at Rest  /69               119/61   HR 86                           85   O2 Sat    Pain 5/10     Vitals With Activity  BP 13:30  116/55   HR    O2 Sat    Pain 69     Respiratory Status: Room air    Patients cultural, spiritual, Muslim conflicts given the current situation: no       Objective:     Patient found up in chair with  (SYED stockings)  upon OT entry to room.    Mobility   Patient completed:  Sit to Supine with moderate assistance  Sit to Stand " "Transfer with contact guard assistance with rolling walker  Stand to Sit Transfer with supervision with rolling walker    Functional Mobility  Pt. Fatigued at end of day and required Mod A to bring B LE's to surface of bed. Pt. Unsuccessful c leg .         Limiting Factors for ADLs: sensory, limited ROM, and pain     IADLs: Pt. Tolerated static standing and FM in ADL kitchen Pt. States she enjoys baking. Pt. Removed/replaced kitchen utensils from drawer and removed a tub c 1 # weight from freezer and placed in microwave for simulated meal prep. Pt. C difficulty opening OUR microwave however states she can open hers. Pt. Scooted tub along counter while taking 5 steps. Pt. Fatigued and required V/C's to reach back for w/c. Pt. C one rest break during IADL.     Therapeutic Activities  Pt. Requested seated Ax after standing 12 min. Pt. At tabletop performed B UE AROM/functional grasp c large pegs into foam pegboard. Pt. Then c small clips able to grasp/pinch and place on small wheel. Pt. Provided c a stylus to use c Ipad as Pt. States difficulty. Pt. Would benefit from safety scissors to open packages.During p.m. session, Pt. C/o fatigue but agreed to participate. Pt. Performed B UE AROM through vertical/frontal plane c rings on horizontal dowels crossing midline and graded down to smaller rings. Pt. Struggled c smaller rings, dropping 3 /20.     Therapeutic Exercise  Pt. In standing performed B UE AROM on UBE at 1.5 umana x's 5 min forward; rest break and then 5 additional minutes backward . Pt. C 3 # dowel performed 15 reps B UE AROM through all joints/planes c brief rest breaks. Pt. Unable to remain sitting away from backrest of w/c stating it "pulled on L thigh". Pt. Then c 2 # free weights performed 2 x's 15 reps B shoulder abd/duction c one rest break. Pt. Encouraged to perform PLB.       Additional Treatments: Pt. Requested BTB at end of 13:00 Tx. Pt. Had been up all day in w/c. Pt. Required Mod A to bring " B LE's to surface of bed.     LifeStyle Change and Education:             Patient left HOB elevated with call button in reach and bed alarm on.     Education provided: ADLs, transfer training, bed mobility, body mechanics, wheelchair precautions, sequencing, and home safety    Multidisciplinary Problems       Occupational Therapy Goals          Problem: Occupational Therapy    Goal Priority Disciplines Outcome Interventions   Occupational Therapy Goal     OT, PT/OT Ongoing, Progressing    Description: Pt to perform eating tasks with independence by re-eval. Pt. C numb fingertips; unable to manage packages.   Pt to perform oral hygiene tasks with min A standing at sink with RW by re-eval. Sits in w/c -Pt. C new onset A-Fib/RVR c fatigue; by Monday 4/22  Pt to perform UB dressing with independence by re-eval.Touch A c bra  Pt to perform LB dressing with mod A using AE PRN by re-eval.  Pt to perform donning/doffing footwear with max A using AE PRN by re-eval.  Pt to perform toileting hygiene with mod A by re-eval. Pt. Continues c Lyman   Pt to perform bathing tasks with mod A by re-eval.    Pt to perform toilet transfers with mod A using LRAD by re-eval.  Pt to perform WIS transfers with mod A using LRAD by re-eval.    Pt to perform meal prep activity with mod A by re-eval. MET   Pt to perform laundry management task with mod A by re-eval.  Pt to perform light housekeeping activity with mod A by re-eval.  Pt. To prepare water/food for dog by Re-Eval  Balance, Strengthening, Endurance, Balance:  Pt to consistently demonstrate adherence to orthopedic precautions during all ADL's as instructed by OT.  Pt to demonstrate consistent adherence to breathing control and energy conservation techniques as educated by OT.                         Time Tracking     OT Received On: 04/19/24  Time In  (0900 & 1300)     Time Out  (1000 & 1330)  Total Time    Therapy Time:    Missed Time:    Missed Time Reason:      Billable Minutes:  Self Care/Home Management 25, Therapeutic Activity 45, and Therapeutic Exercise 20    04/19/2024

## 2024-04-19 NOTE — PROGRESS NOTES
[facility] ([facility])  Rehab Progress Note    Patient Name: Safia Muñoz  MRN: [id number]  Age: 89 y.o. Sex: female  : 1931  Hospital Length of Stay: 7 days  Date of Service: 2024   Chief Complaint: Left hip fracture s/p left hip IM nailing on 2024     Subjective:     Basic Information  Admit Information: 89-year-old white female presented to [facility] ED on 2024 complaining of left hip pain after a ground level fall.  PMH significant for HTN and COPD not on home O2.  Workup significant for closed comminuted intertrochanteric left femur fracture.  Tolerated left hip IM nailing on  without perioperative complications.  Received 3 days Rocephin due to underlying UTI.  Urine culture appeared to be contaminated.  Orthopedic surgery recommended WBAT to LLE.  Tolerated transfer to [facility] inpatient rehab unit on  without incident.   Today's Information: No acute events overnight.  Sitting in chair comfortably.  Reports good sleep and appetite.  Last BM .  Vital signs at goal with no recent recorded fevers.  No labs or imaging today.      Review of patient's allergies indicates:   Allergen Reactions    Adhesive tape-silicones Blisters    Ofloxacin     Penicillins     Sulfamethoxazole-trimethoprim      Other reaction(s): Unknown    Codeine Nausea Only        Current Facility-Administered Medications:     0.9%  NaCl infusion, , Intravenous, Continuous, Disha Biggs, FNP, Last Rate: 125 mL/hr at 24 1723, New Bag at 24 1723    acetaminophen tablet 650 mg, 650 mg, Oral, Q6H, Augusto Guan, FNP, 650 mg at 24 0527    acetaminophen tablet 650 mg, 650 mg, Oral, Q8H PRN, Disha Biggs, FNP    aspirin EC tablet 81 mg, 81 mg, Oral, Daily, Ирина Ness, FNP, 81 mg at 24 0816    benzonatate capsule 100 mg, 100 mg, Oral, TID PRN, Augusto Guan, FNP, 100 mg at 24 0525    bisacodyL suppository 10 mg, 10 mg, Rectal,  Daily PRN, Freida, Augusto A, FNP, 10 mg at 04/15/24 0900    bisacodyL suppository 10 mg, 10 mg, Rectal, Once, Freida, Augusto A, FNP    cyanocobalamin tablet 500 mcg, 500 mcg, Oral, Daily, Freida, Augusto A, FNP, 500 mcg at 04/19/24 0816    enoxaparin injection 30 mg, 30 mg, Subcutaneous, Q24H (prophylaxis, 1700), Freida, Augusto A, FNP, 30 mg at 04/18/24 1712    estradiol 0.05 mg/24 hr td ptsw patch 1 patch, 1 patch, Transdermal, Every Wed, 1 patch at 04/17/24 2050 **AND** [START ON 4/21/2024] estradiol 0.05 mg/24 hr td ptsw patch 1 patch, 1 patch, Transdermal, Every Sun, Pawan Myers MD    ferrous sulfate tablet 1 each, 1 tablet, Oral, Daily, Freida, Augusto A, FNP, 1 each at 04/19/24 0816    furosemide tablet 40 mg, 40 mg, Oral, Daily, Ирина Ness, FNP, 40 mg at 04/19/24 0815    hydrALAZINE injection 10 mg, 10 mg, Intravenous, Q4H PRN, Freida, Augusto A, FNP    hydrOXYzine pamoate capsule 50 mg, 50 mg, Oral, After dinner, Dian Disha A, FNP, 50 mg at 04/18/24 1711    labetalol 20 mg/4 mL (5 mg/mL) IV syring, 10 mg, Intravenous, Q4H PRN, Freida, Augusto A, FNP    LIDOcaine 5 % patch 1 patch, 1 patch, Transdermal, Q24H, Dian, Disha A, FNP, 1 patch at 04/18/24 1712    methocarbamoL tablet 500 mg, 500 mg, Oral, TID PRN, [place], Disha A, FNP, 500 mg at 04/16/24 0325    methocarbamoL tablet 500 mg, 500 mg, Oral, Q6H, Dian, Disha A, FNP, 500 mg at 04/19/24 0525    metoprolol injection 10 mg, 10 mg, Intravenous, Q2H PRN, Augusto Guan FNP, 10 mg at 04/17/24 1207    metoprolol tartrate (LOPRESSOR) tablet 25 mg, 25 mg, Oral, BID, Yang Campbell, AGADEBORAHP-BC, 25 mg at 04/19/24 0815    mupirocin 2 % ointment, , Nasal, BID, Pawan Myers MD, Given at 04/19/24 0815    naloxegoL (MOVANTIK) tablet 25 mg, 25 mg, Oral, Daily, Augusto Guan FNP, 25 mg at 04/19/24 0816    nitroGLYCERIN SL tablet 0.4 mg, 0.4 mg, Sublingual, Q5 Min PRN, Augusto Guan FNP     "ondansetron disintegrating tablet 4 mg, 4 mg, Oral, Q6H PRN, Freida, Augusto A, FNP, 4 mg at 04/15/24 0705    oxyCODONE immediate release tablet 10 mg, 10 mg, Oral, Q4H PRN, Dian, Disha A, FNP, 10 mg at 24 0816    oxyCODONE immediate release tablet 5 mg, 5 mg, Oral, Q4H PRN, Dian, Disha A, FNP, 5 mg at 24 0813    polyethylene glycol packet 17 g, 17 g, Oral, BID PRN, Freida, Augusto A, FNP    promethazine tablet 25 mg, 25 mg, Oral, Q6H PRN, Freida, Augusto A, FNP, 25 mg at 04/15/24 0948    tamsulosin 24 hr capsule 0.4 mg, 0.4 mg, Oral, QHS, Ирина Ness, FNP, 0.4 mg at 24     Review of Systems   Complete 12-point review of symptoms negative except for what's mentioned in HPI     Objective:     /76   Pulse 64   Temp 97.9 °F (36.6 °C) (Oral)   Resp 20   Ht 5' 2.99" (1.6 m)   Wt 74.5 kg (164 lb 3.9 oz)   SpO2 (!) 94%   Breastfeeding No   BMI 29.10 kg/m²      Physical Exam  Constitutional:       Appearance: She is ill-appearing.   HENT:      Head: Normocephalic.      Mouth/Throat:      Mouth: Mucous membranes are moist.   Eyes:      Pupils: Pupils are equal, round, and reactive to light.   Cardiovascular:      Rate and Rhythm: Normal rate and regular rhythm.      Heart sounds: Normal heart sounds.   Pulmonary:      Effort: Pulmonary effort is normal.      Breath sounds: Normal breath sounds.   Abdominal:      General: Bowel sounds are normal.      Palpations: Abdomen is soft.   Musculoskeletal:      Cervical back: Neck supple.      Left lower le+ Edema present.      Comments: Left hip dressing clean and intact.  Diffuse muscle atrophy.    Skin:     General: Skin is warm and dry.   Neurological:      Motor: Weakness present.   Psychiatric:         Mood and Affect: Mood normal.     *MD performed and documented physical examination       Lines/Drains/Airways       Drain  Duration                  Urethral Catheter 24 0300 16 Fr. 3 days              " Peripheral Intravenous Line  Duration                  Peripheral IV - Single Lumen 04/09/24 1210 18 G Right Forearm 9 days                  Labs  No results found for this or any previous visit (from the past 24 hour(s)).      Radiology  Left hip XR on 04/09/2024, IMPRESSION: Improved alignment following internal fixation of the femur.  Radiology  Abdominal XR on 04/15/2024, IMPRESSION:  Residual feces nonspecific gas pattern.    Assessment/Plan:     89 y.o. WF admitted on 4/12/2024    Left hip fracture   - s/p left hip IM nailing on 04/09/2024  - WBAT to LLE  - discontinue staples on 04/30  - discontinue Gabapentin 100 mg TID (initiated 4/12), Gabapentin 200 mg at bedtime (initiated 4/14) on 04/15 2/2 confusion  - continue                Tylenol 650 mg every 6 hours                Calcium-vitamin D3 1 tablet b.i.d.                 Oxycodone 5 mg q.6 hours.                Robaxin 500 mg t.i.d.  - defer to physiatry for rehab and pain management  - PT/OT/RT following     Osteoporosis  - stable  - vitamin-D level 42.1 on 04/10/2024  - continue.                Calcium-vitamin D3 1 tablet b.i.d.                 Estradiol 0.05 mg patch every Wednesday      HTN  - BP at goal!!  - discontinue Norvasc 5 mg daily on 04/18  - continue                  Metoprolol tartrate 25 mg b.i.d.                Hydralazine 10 mg every 2 hours as needed for BP > 160/90                Labetalol 10 mg every 2 hours as needed for BP > 160/90  - low sodium diet     COPD  - stable  - not on home 02  - keep 02 > 88%   - monitor closely     Normocytic anemia  - asymptomatic  - H/H trending down  - continue                Vitamin B12 tablet daily                    Ferrous sulfate 325 mg daily     - no evidence of active bleeds  - will closely monitor and transfuse if needed      Constipation  - stable   - discontinue Colace 100 mg b.i.d. and MiraLax 17 g daily 2/2 loose stools on 04/17  - continue  Movantik 25 mg daily (initiated  4/16)    Bilateral Lower extremity edema  - resolving  - continue   Lasix 40 mg daily (to start 4/15)    Insomnia with associated delirium  - improved  - s/p Haldol 5 mg IM x1 on 04/16  - continue   Melatonin 6 mg at bedtime (initiated 4/14)    Urinary retention  - current  - required indwelling catheter insertion on 04/15  - continue   Flomax 0.4 mg at bedtime (initiated 4/16)    New onset atrial fibrillation  - controlled ventricular rate  - continue                  Metoprolol tartrate 25 mg b.i.d   Aspirin 81 mg daily  - cardiology does not recommend OAC 2/2 risks outweigh benefits, recommends aspirin 81 mg daily  - follow-up with cardiology outpatient for transthoracic echocardiogram     AB therapy  Rocephin 4/9-4/11     VTE Prophylaxis: Lovenox 30 mg daily   COVID-19 testing:  Unknown  COVID-19 vaccination status:  Vaccinated (Pfizer):  01/10/2021, 01/31/2021, 12/06/2022     POA: No  Living will: No  Contacts: Lacey Pizarro(Daughter) [phone]      CODE STATUS: DNR  Internal Medicine (attending): Pawan Myers MD  Physiatry (consulting):  Iglesia Tran MD     OUTPATIENT PROVIDERS  PCP:  Iglesia Steel MD  Orthopedic surgery:  Guy Jaquez D.O.  Cardiology: Matthew Molina MD     DISPOSITION:  Vital signs at goal.  Heart rate remains controlled ventricular rate.  No labs or imaging today.  Bowel management, sleep hygiene, and appetite at goal.  Continue to aggressively mobilize with therapies as tolerated.  Monitor closely.  Notify of any acute changes.    Staffing 4/16:  Overall continent of bowel and bladder.  Good appetite.  PT reported overall partial/mod assist with bed mobility.  Ambulating 150 ft with supervision/touch assist using rolling walker.  OT reported overall independent to moderate assistance with toileting and bathing.  Moderate assistance with transfers.  Projected discharge pending.     Ирина Ness NP conducted independent physical examination and assisted with medical  documentation.

## 2024-04-20 PROCEDURE — 97530 THERAPEUTIC ACTIVITIES: CPT

## 2024-04-20 PROCEDURE — 63600175 PHARM REV CODE 636 W HCPCS: Performed by: NURSE PRACTITIONER

## 2024-04-20 PROCEDURE — 25000003 PHARM REV CODE 250: Performed by: NURSE PRACTITIONER

## 2024-04-20 PROCEDURE — 94799 UNLISTED PULMONARY SVC/PX: CPT

## 2024-04-20 PROCEDURE — 99900031 HC PATIENT EDUCATION (STAT)

## 2024-04-20 PROCEDURE — 97168 OT RE-EVAL EST PLAN CARE: CPT

## 2024-04-20 PROCEDURE — 25000003 PHARM REV CODE 250: Performed by: INTERNAL MEDICINE

## 2024-04-20 PROCEDURE — 94761 N-INVAS EAR/PLS OXIMETRY MLT: CPT

## 2024-04-20 PROCEDURE — 99900035 HC TECH TIME PER 15 MIN (STAT)

## 2024-04-20 PROCEDURE — 11800000 HC REHAB PRIVATE ROOM

## 2024-04-20 PROCEDURE — 97535 SELF CARE MNGMENT TRAINING: CPT

## 2024-04-20 RX ADMIN — HYDROXYZINE PAMOATE 50 MG: 50 CAPSULE ORAL at 05:04

## 2024-04-20 RX ADMIN — CYANOCOBALAMIN TAB 500 MCG 500 MCG: 500 TAB at 08:04

## 2024-04-20 RX ADMIN — ACETAMINOPHEN 325MG 650 MG: 325 TABLET ORAL at 12:04

## 2024-04-20 RX ADMIN — ACETAMINOPHEN 325MG 650 MG: 325 TABLET ORAL at 04:04

## 2024-04-20 RX ADMIN — METOPROLOL TARTRATE 25 MG: 25 TABLET, FILM COATED ORAL at 09:04

## 2024-04-20 RX ADMIN — FERROUS SULFATE TAB 325 MG (65 MG ELEMENTAL FE) 1 EACH: 325 (65 FE) TAB at 08:04

## 2024-04-20 RX ADMIN — MUPIROCIN: 20 OINTMENT TOPICAL at 08:04

## 2024-04-20 RX ADMIN — ASPIRIN 81 MG: 81 TABLET, COATED ORAL at 08:04

## 2024-04-20 RX ADMIN — METHOCARBAMOL 500 MG: 500 TABLET ORAL at 04:04

## 2024-04-20 RX ADMIN — TAMSULOSIN HYDROCHLORIDE 0.4 MG: 0.4 CAPSULE ORAL at 09:04

## 2024-04-20 RX ADMIN — NALOXEGOL OXALATE 25 MG: 25 TABLET, FILM COATED ORAL at 08:04

## 2024-04-20 RX ADMIN — METHOCARBAMOL 500 MG: 500 TABLET ORAL at 12:04

## 2024-04-20 RX ADMIN — LIDOCAINE 5% 1 PATCH: 700 PATCH TOPICAL at 05:04

## 2024-04-20 RX ADMIN — FUROSEMIDE 40 MG: 40 TABLET ORAL at 08:04

## 2024-04-20 RX ADMIN — OXYCODONE 5 MG: 5 TABLET ORAL at 08:04

## 2024-04-20 RX ADMIN — ENOXAPARIN SODIUM 30 MG: 30 INJECTION SUBCUTANEOUS at 04:04

## 2024-04-20 RX ADMIN — OXYCODONE 5 MG: 5 TABLET ORAL at 09:04

## 2024-04-20 RX ADMIN — METOPROLOL TARTRATE 25 MG: 25 TABLET, FILM COATED ORAL at 08:04

## 2024-04-20 RX ADMIN — METHOCARBAMOL 500 MG: 500 TABLET ORAL at 05:04

## 2024-04-20 RX ADMIN — ACETAMINOPHEN 325MG 650 MG: 325 TABLET ORAL at 05:04

## 2024-04-20 NOTE — PROGRESS NOTES
[facility] ([facility])  Rehab Progress Note    Patient Name: Safia Muñoz  MRN: [id number]  Age: 89 y.o. Sex: female  : 1931  Hospital Length of Stay: 8 days  Date of Service: 2024   Chief Complaint: Left hip fracture s/p left hip IM nailing on 2024     Subjective:     Basic Information  Admit Information: 89-year-old white female presented to [facility] ED on 2024 complaining of left hip pain after a ground level fall.  PMH significant for HTN and COPD not on home O2.  Workup significant for closed comminuted intertrochanteric left femur fracture.  Tolerated left hip IM nailing on  without perioperative complications.  Received 3 days Rocephin due to underlying UTI.  Urine culture appeared to be contaminated.  Orthopedic surgery recommended WBAT to LLE.  Tolerated transfer to [facility] inpatient rehab unit on  without incident.   Today's Information: No acute events overnight.  Sitting in chair comfortably.  Reports good sleep and appetite.  Last BM .  Vital signs at goal with no recent recorded fevers.  No labs or imaging today.      Review of patient's allergies indicates:   Allergen Reactions    Adhesive tape-silicones Blisters    Ofloxacin     Penicillins     Sulfamethoxazole-trimethoprim      Other reaction(s): Unknown    Codeine Nausea Only        Current Facility-Administered Medications:     0.9%  NaCl infusion, , Intravenous, Continuous, Disha Biggs FNP, Last Rate: 125 mL/hr at 24 1723, New Bag at 24 1723    acetaminophen tablet 650 mg, 650 mg, Oral, Q6H, Augusto Guan, FNP, 650 mg at 24 0512    acetaminophen tablet 650 mg, 650 mg, Oral, Q8H PRN, Disha Biggs, FNP    aspirin EC tablet 81 mg, 81 mg, Oral, Daily, Ирина Ness, FNP, 81 mg at 24 0809    benzonatate capsule 100 mg, 100 mg, Oral, TID PRN, Augusto Guan, FNP, 100 mg at 24 0525    bisacodyL suppository 10 mg, 10 mg, Rectal,  Daily PRN, Freida, Augusto A, FNP, 10 mg at 04/15/24 0900    bisacodyL suppository 10 mg, 10 mg, Rectal, Once, Freida, Augusto A, FNP    cyanocobalamin tablet 500 mcg, 500 mcg, Oral, Daily, Freida, Augusto A, FNP, 500 mcg at 04/20/24 0809    enoxaparin injection 30 mg, 30 mg, Subcutaneous, Q24H (prophylaxis, 1700), Freida, Augusto A, FNP, 30 mg at 04/19/24 1640    estradiol 0.05 mg/24 hr td ptsw patch 1 patch, 1 patch, Transdermal, Every Wed, 1 patch at 04/17/24 2050 **AND** [START ON 4/21/2024] estradiol 0.05 mg/24 hr td ptsw patch 1 patch, 1 patch, Transdermal, Every Sun, Pawan Myers MD    ferrous sulfate tablet 1 each, 1 tablet, Oral, Daily, Freida, Augusto A, FNP, 1 each at 04/20/24 0809    furosemide tablet 40 mg, 40 mg, Oral, Daily, Ирина Ness, FNP, 40 mg at 04/20/24 0809    hydrALAZINE injection 10 mg, 10 mg, Intravenous, Q4H PRN, Freida, Augusto A, FNP    hydrOXYzine pamoate capsule 50 mg, 50 mg, Oral, After dinner, Dian Disha A, FNP, 50 mg at 04/19/24 1702    labetalol 20 mg/4 mL (5 mg/mL) IV syring, 10 mg, Intravenous, Q4H PRN, Freida, Augusto A, FNP    LIDOcaine 5 % patch 1 patch, 1 patch, Transdermal, Q24H, Dian, Disha A, FNP, 1 patch at 04/19/24 1640    methocarbamoL tablet 500 mg, 500 mg, Oral, TID PRN, [place], Disha A, FNP, 500 mg at 04/16/24 0325    methocarbamoL tablet 500 mg, 500 mg, Oral, Q6H, Dian, Disha A, FNP, 500 mg at 04/20/24 0512    metoprolol injection 10 mg, 10 mg, Intravenous, Q2H PRN, Freida, Augusto A, FNP, 10 mg at 04/17/24 1207    metoprolol tartrate (LOPRESSOR) tablet 25 mg, 25 mg, Oral, BID, Yang Campbell, GURVINDERP-BC, 25 mg at 04/20/24 0809    mupirocin 2 % ointment, , Nasal, BID, Pawan Myers MD, Given at 04/20/24 0810    naloxegoL (MOVANTIK) tablet 25 mg, 25 mg, Oral, Daily, Augusto Guan FNP, 25 mg at 04/20/24 0809    nitroGLYCERIN SL tablet 0.4 mg, 0.4 mg, Sublingual, Q5 Min PRN, Augusto Guan FNP     "ondansetron disintegrating tablet 4 mg, 4 mg, Oral, Q6H PRN, Freida, Augusto A, FNP, 4 mg at 04/15/24 0705    oxyCODONE immediate release tablet 10 mg, 10 mg, Oral, Q4H PRN, Dian, Disha A, FNP, 10 mg at 24 2303    oxyCODONE immediate release tablet 5 mg, 5 mg, Oral, Q4H PRN, Dian, Disha A, FNP, 5 mg at 24 0809    polyethylene glycol packet 17 g, 17 g, Oral, BID PRN, Freida, Augusto A, FNP    promethazine tablet 25 mg, 25 mg, Oral, Q6H PRN, Freida, Augusto A, FNP, 25 mg at 04/15/24 0948    tamsulosin 24 hr capsule 0.4 mg, 0.4 mg, Oral, QHS, Ирина Ness, FNP, 0.4 mg at 24     Review of Systems   Complete 12-point review of symptoms negative except for what's mentioned in HPI     Objective:     /75   Pulse 81   Temp 97.7 °F (36.5 °C) (Oral)   Resp 20   Ht 5' 2.99" (1.6 m)   Wt 74.5 kg (164 lb 3.9 oz)   SpO2 (!) 93%   Breastfeeding No   BMI 29.10 kg/m²      Physical Exam  Constitutional:       Appearance: She is ill-appearing.   HENT:      Head: Normocephalic.      Mouth/Throat:      Mouth: Mucous membranes are moist.   Eyes:      Pupils: Pupils are equal, round, and reactive to light.   Cardiovascular:      Rate and Rhythm: Normal rate and regular rhythm.      Heart sounds: Normal heart sounds.   Pulmonary:      Effort: Pulmonary effort is normal.      Breath sounds: Normal breath sounds.   Abdominal:      General: Bowel sounds are normal.      Palpations: Abdomen is soft.   Musculoskeletal:      Cervical back: Neck supple.      Left lower le+ Edema present.      Comments: Left hip dressing clean and intact.  Diffuse muscle atrophy.    Skin:     General: Skin is warm and dry.   Neurological:      Motor: Weakness present.   Psychiatric:         Mood and Affect: Mood normal.          Lines/Drains/Airways       Drain  Duration                  Urethral Catheter 24 0300 16 Fr. 4 days              Peripheral Intravenous Line  Duration                  " Peripheral IV - Single Lumen 04/09/24 1210 18 G Right Forearm 10 days                  Labs  No results found for this or any previous visit (from the past 24 hour(s)).      Radiology  Left hip XR on 04/09/2024, IMPRESSION: Improved alignment following internal fixation of the femur.  Radiology  Abdominal XR on 04/15/2024, IMPRESSION:  Residual feces nonspecific gas pattern.    Assessment/Plan:     89 y.o. WF admitted on 4/12/2024    Left hip fracture   - s/p left hip IM nailing on 04/09/2024  - WBAT to LLE  - discontinue staples on 04/30  - discontinue Gabapentin 100 mg TID (initiated 4/12), Gabapentin 200 mg at bedtime (initiated 4/14) on 04/15 2/2 confusion  - continue                Tylenol 650 mg every 6 hours                Calcium-vitamin D3 1 tablet b.i.d.                 Oxycodone 5 mg q.6 hours.                Robaxin 500 mg t.i.d.  - defer to physiatry for rehab and pain management  - PT/OT/RT following     Osteoporosis  - stable  - vitamin-D level 42.1 on 04/10/2024  - continue.                Calcium-vitamin D3 1 tablet b.i.d.                 Estradiol 0.05 mg patch every Wednesday      HTN  - BP at goal!!  - discontinue Norvasc 5 mg daily on 04/18  - continue                  Metoprolol tartrate 25 mg b.i.d.                Hydralazine 10 mg every 2 hours as needed for BP > 160/90                Labetalol 10 mg every 2 hours as needed for BP > 160/90  - low sodium diet     COPD  - stable  - not on home 02  - keep 02 > 88%   - monitor closely     Normocytic anemia  - asymptomatic  - H/H trending down  - continue                Vitamin B12 tablet daily                    Ferrous sulfate 325 mg daily     - no evidence of active bleeds  - will closely monitor and transfuse if needed      Constipation  - stable   - discontinue Colace 100 mg b.i.d. and MiraLax 17 g daily 2/2 loose stools on 04/17  - continue  Movantik 25 mg daily (initiated 4/16)    Bilateral Lower extremity edema  - resolving  -  continue   Lasix 40 mg daily (to start 4/15)    Insomnia with associated delirium  - improved  - s/p Haldol 5 mg IM x1 on 04/16  - continue   Melatonin 6 mg at bedtime (initiated 4/14)    Urinary retention  - current  - required indwelling catheter insertion on 04/15  - continue   Flomax 0.4 mg at bedtime (initiated 4/16)    New onset atrial fibrillation  - controlled ventricular rate  - continue                  Metoprolol tartrate 25 mg b.i.d   Aspirin 81 mg daily  - cardiology does not recommend OAC 2/2 risks outweigh benefits, recommends aspirin 81 mg daily  - follow-up with cardiology outpatient for transthoracic echocardiogram     AB therapy  Rocephin 4/9-4/11     VTE Prophylaxis: Lovenox 30 mg daily   COVID-19 testing:  Unknown  COVID-19 vaccination status:  Vaccinated (Pfizer):  01/10/2021, 01/31/2021, 12/06/2022     POA: No  Living will: No  Contacts: Lacey Pizarro(Daughter) [phone]      CODE STATUS: DNR  Internal Medicine (attending): Pawan Myers MD  Physiatry (consulting):  Iglesia Tran MD     OUTPATIENT PROVIDERS  PCP:  Iglesia Steel MD  Orthopedic surgery:  Guy Jaquez D.O.  Cardiology: Matthew Molina MD     DISPOSITION:  Vital signs at goal.  No labs or imaging today.  Bowel management, sleep hygiene, and appetite at goal.  Continue to aggressively mobilize with therapies as tolerated.  Monitor closely.  Notify of any acute changes.    Staffing 4/16:  Overall continent of bowel and bladder.  Good appetite.  PT reported overall partial/mod assist with bed mobility.  Ambulating 150 ft with supervision/touch assist using rolling walker.  OT reported overall independent to moderate assistance with toileting and bathing.  Moderate assistance with transfers.  Projected discharge pending.

## 2024-04-20 NOTE — PLAN OF CARE
Problem: Occupational Therapy  Goal: Occupational Therapy Goal  Description: Pt to perform eating tasks with independence by re-eval. Pt. C numb fingertips; unable to manage packages. Discussed c son getting safety scissors     Pt to perform oral hygiene tasks with min A standing at sink with RW by re-eval. Ongoing Sits in w/c -Pt. C new onset A-Fib/RVR c fatigue; by Monday 4/22     Pt to perform UB dressing with independence by re-eval.Touch A c bra Numb fingertips/maybe front hook bra ?    Pt to perform LB dressing with mod A using AE PRN by re-eval. MET   Pt to perform donning/doffing footwear with max A using AE PRN by re-eval. MET   Pt to perform toileting hygiene with mod A by re-eval.  Pt. Continues c Lyman . Pt. Able to wipe self after BM 4/20/2024 c touch A to steady while standing c RE. Pt. Managed underwear up /down.   Pt to perform bathing tasks with mod A by re-eval. MET (sponge bath)     Pt to perform toilet transfers with mod A using LRAD by re-eval. MET  Pt to perform WIS transfers with mod A using LRAD by re-eval.  Pt. Unable to elevate L LE high enough to clear shower ledge. Son will measure shower door opening width vs. Tub accessibility.   Pt to perform meal prep activity with mod A by re-eval. MET   Pt to perform laundry management task with mod A by re-eval.  Pt to perform light housekeeping activity with mod A by re-eval.  Pt. To prepare water/food for dog by Re-Eval  Balance, Strengthening, Endurance, Balance:  Pt to consistently demonstrate adherence to orthopedic precautions during all ADL's as instructed by OT.  Pt to demonstrate consistent adherence to breathing control and energy conservation techniques as educated by OT.  Pt. To remember safety techniques for functional transfers S v/c's by D/C. !!  Outcome: Ongoing, Progressing

## 2024-04-20 NOTE — PT/OT/SLP RE-EVAL
"Occupational Therapy Inpatient Rehab Re-Evaluation    Name: Safia Muñoz  MRN: [id number]    Recommendations:     Discharge Recommendations:  Low Intensity Therapy   Discharge Equipment Recommendations: bedside commode, bath bench (Vs. Shower chair)   Barriers to discharge:  PT. LOC/decreased FM and ADL    Assessment:  Safia Muñoz is a 89 y.o. female admitted with a medical diagnosis of Closed 2-part intertrochanteric fracture of proximal end of left femur.  She presents with the following impairments/functional limitations:  impaired endurance, impaired sensation, impaired self care skills, impaired functional mobility, decreased upper extremity function, pain, decreased ROM, impaired fine motor, edema Pt. Is a RED STAR FALL RISK. .    General Precautions: Standard, fall     Orthopedic Precautions:LLE weight bearing as tolerated     Braces: N/A    Rehab Prognosis: Good; patient would benefit from acute skilled OT services to address these deficits and reach maximum level of function.      History:     Past Medical History:   Diagnosis Date    COPD (chronic obstructive pulmonary disease)     Hypertension        Past Surgical History:   Procedure Laterality Date    RETROGRADE INTRAMEDULLARY RODDING OF DISTAL FEMUR Left 4/9/2024    Procedure: INSERTION, INTRAMEDULLARY NAIL INTERTROCHENTERIC FRACTURE;  Surgeon: Guy Jaquez DO;  Location: [facility];  Service: Orthopedics;  Laterality: Left;  HANA TABLE, SYNTHES TFNA       Subjective     Orientation: Oriented x4    Chief Complaint: "I didn't get any dessert" at 12:15    Patient/Family Comments/goals: Pt.'s son Jeremy present at 12:15 for Pt. To visit c dog. Pt. Still eating lunch. OT educated Jeremy on LOC, Re-Eval performed today. Pt. C functional T/F's recliner <> w/c c CGA and V/C's for technique and safety. Discussed measuring opening to shower/door vs bathtub as Pt. Struggling to weight shift/lift L LE to step over ledge of shower. TTB in tub may be safer " /more comfortable at this time but OT will work to step over transfer for shower.           Pain Pain Rating 1: 2/10  Location - Side 1: Left  Location - Orientation 1: upper  Location 1: leg  Pain Addressed 1: Reposition, Nurse notified, Pre-medicate for activity (ice pack)  Pain Rating Post-Intervention 1:  (Pt. c/o 7/10 c movement)     Respiratory Status: Room air    Patients cultural, spiritual, Lutheran conflicts given the current situation:         Living Environment   Living Environment  People in Home: alone  Living Arrangements: assisted living  Home Accessibility: wheelchair accessible  Number of Stairs, Main Entrance: none  Home Layout: Able to live on 1st floor  Transportation Anticipated: family or friend will provide  Equipment Currently Used at Home: other (see comments) (has RW, but does not use it. grab bar and HHS in WIS.)  Shower Setup: Tub/Shower combo and Walk-in shower    Prior Level of Function  BADL: Independent    IADL: Modified Independent    Equipment used at home: other (see comments) (has RW, but does not use it. grab bar and HHS in WIS.).  DME owned (not currently used): none.      Upon discharge, patient will have assistance from family and hired sitters .    Objective:     Patient found up in chair with chair check, sarmiento catheter, peripheral IV (RN Matilde)  upon OT entry to room.    Mobility   Patient completed:  Sit to Stand Transfer with stand by assistance with rolling walker  Stand to Sit Transfer with contact guard assistance with rolling walker and V/C's to reach back   Toilet Transfer Stand Pivot technique with contact guard assistance with  rolling walker and bedside commode  Shower Transfer step over ledge  technique with inability to step over ledge at this time with rolling walker    Functional Mobility   Pt. C limited steps for ADL in Room via RW    ADLs     Current Status   Eating 5   Oral Hygiene 5 sitting in w/c    Shower, Bathe Self 3 sponge    Upper Body Dressing  "4 hook bra   Lower Body Dressing 4 educated on AE   Toileting Hygiene 4 steadying    Toilet Transfer 4   Putting On, Taking Off Footwear 4 AE      Limiting Factors for ADLs: motor, endurance, limited ROM, safety awareness, pain, and learning curve c AE ; Pt.'s cognition appears improved though Pt. "Forgets" to reach back/safety c FM      Exams     ROM:          -       WFL    Hand Dominance: Right    ROM Hand  Left Hand: WFL  Right Hand: WFL    Strength  Overall Strength:          -       WFL      Pinch Strength:   Pt. C decreased sensation in all fingertips /decreased functional pinch     Sensation  Left:          -       WNL, except fingertips  Right:          -       WNL, except fingertips    Coordination:      -       Intact    Tone  Left: WNL  Right: WNL    Visual/Perceptual  Intact and wears reading glasses    Cognition:   WFL, except decreased STM/safety awareness    Balance    Sitting  Sitting Surface: BSC  Static: One UE Extremity, Good (+) c B LE's on footstool   Dynamic: One UE Extremity, Fair (+)    Standing  Static: One UE Extremity, Good  Dynamic: One UE Extremity, Good (-)    Righting Reaction:   WNL    Posture/Deviations  WNL    Additional Treatments   Pt. And her son in room for informal ADL/FT [id number]. Pt. And son again in room at 13:30 and son requested mom be transferred w/c >recliner as Pt. Wanted B LE's elevated. Pt. CGA to T/F. (NC)    LifeStyle Change and Education     Patient left  up in recliner   with call button in reach, chair alarm on, and B LE's elevated  .    Education provided: Roles and goals of OT, ADLs, transfer training, body mechanics, assistive device, wheelchair precautions, modified goals, sequencing, safety precautions, fall prevention, post-op precautions, equipment recommendations, and home safety    Multidisciplinary Problems       Occupational Therapy Goals          Problem: Occupational Therapy    Goal Priority Disciplines Outcome Interventions   Occupational Therapy " Goal     OT, PT/OT Ongoing, Progressing    Description: Pt to perform eating tasks with independence by re-eval. Pt. C numb fingertips; unable to manage packages. Discussed c son getting safety scissors     Pt to perform oral hygiene tasks with min A standing at sink with RW by re-eval. Ongoing Sits in w/c -Pt. C new onset A-Fib/RVR c fatigue; by Monday 4/22     Pt to perform UB dressing with independence by re-eval.Touch A c bra Numb fingertips/maybe front hook bra ?    Pt to perform LB dressing with mod A using AE PRN by re-eval. MET   Pt to perform donning/doffing footwear with max A using AE PRN by re-eval. MET   Pt to perform toileting hygiene with mod A by re-eval.  Pt. Continues c Lyman . Pt. Able to wipe self after BM 4/20/2024 c touch A to steady while standing c RE. Pt. Managed underwear up /down.   Pt to perform bathing tasks with mod A by re-eval. MET (sponge bath)     Pt to perform toilet transfers with mod A using LRAD by re-eval. MET  Pt to perform WIS transfers with mod A using LRAD by re-eval.  Pt. Unable to elevate L LE high enough to clear shower ledge. Son will measure shower door opening width vs. Tub accessibility.   Pt to perform meal prep activity with mod A by re-eval. MET   Pt to perform laundry management task with mod A by re-eval.  Pt to perform light housekeeping activity with mod A by re-eval.  Pt. To prepare water/food for dog by Re-Eval  Balance, Strengthening, Endurance, Balance:  Pt to consistently demonstrate adherence to orthopedic precautions during all ADL's as instructed by OT.  Pt to demonstrate consistent adherence to breathing control and energy conservation techniques as educated by OT.  Pt. To remember safety techniques for functional transfers S v/c's by D/C. !!                       Plan     During this hospitalization, patient to be seen  (5-7 x's weekly) to address the identified rehab impairments via self-care/home management, therapeutic activities, therapeutic  exercises, wheelchair management/training (FT) and progress toward the following goals:    Plan of Care Expires:  04/26/24    Time Tracking     OT Received On: 04/20/24  Time In  (0900 & 12:15)     Time Out  (1000 & 1300)  Total Time    Therapy Time: OT Individual: 105  Missed Time:    Missed Time Reason:      Billable Minutes: Re-eval 15, Self Care/Home Management 60, and Therapeutic Activity 30    04/20/2024

## 2024-04-21 PROCEDURE — 25000003 PHARM REV CODE 250: Performed by: NURSE PRACTITIONER

## 2024-04-21 PROCEDURE — 25000003 PHARM REV CODE 250: Performed by: INTERNAL MEDICINE

## 2024-04-21 PROCEDURE — 94799 UNLISTED PULMONARY SVC/PX: CPT

## 2024-04-21 PROCEDURE — 99900031 HC PATIENT EDUCATION (STAT)

## 2024-04-21 PROCEDURE — 97530 THERAPEUTIC ACTIVITIES: CPT

## 2024-04-21 PROCEDURE — 97116 GAIT TRAINING THERAPY: CPT

## 2024-04-21 PROCEDURE — 11800000 HC REHAB PRIVATE ROOM

## 2024-04-21 PROCEDURE — 63600175 PHARM REV CODE 636 W HCPCS: Performed by: NURSE PRACTITIONER

## 2024-04-21 PROCEDURE — 97535 SELF CARE MNGMENT TRAINING: CPT

## 2024-04-21 PROCEDURE — 94761 N-INVAS EAR/PLS OXIMETRY MLT: CPT

## 2024-04-21 PROCEDURE — 97110 THERAPEUTIC EXERCISES: CPT

## 2024-04-21 RX ADMIN — ASPIRIN 81 MG: 81 TABLET, COATED ORAL at 08:04

## 2024-04-21 RX ADMIN — ENOXAPARIN SODIUM 30 MG: 30 INJECTION SUBCUTANEOUS at 05:04

## 2024-04-21 RX ADMIN — CYANOCOBALAMIN TAB 500 MCG 500 MCG: 500 TAB at 08:04

## 2024-04-21 RX ADMIN — FERROUS SULFATE TAB 325 MG (65 MG ELEMENTAL FE) 1 EACH: 325 (65 FE) TAB at 08:04

## 2024-04-21 RX ADMIN — FUROSEMIDE 40 MG: 40 TABLET ORAL at 08:04

## 2024-04-21 RX ADMIN — METOPROLOL TARTRATE 25 MG: 25 TABLET, FILM COATED ORAL at 08:04

## 2024-04-21 RX ADMIN — ACETAMINOPHEN 325MG 650 MG: 325 TABLET ORAL at 05:04

## 2024-04-21 RX ADMIN — METHOCARBAMOL 500 MG: 500 TABLET ORAL at 02:04

## 2024-04-21 RX ADMIN — METHOCARBAMOL 500 MG: 500 TABLET ORAL at 11:04

## 2024-04-21 RX ADMIN — OXYCODONE 5 MG: 5 TABLET ORAL at 04:04

## 2024-04-21 RX ADMIN — ACETAMINOPHEN 325MG 650 MG: 325 TABLET ORAL at 11:04

## 2024-04-21 RX ADMIN — METHOCARBAMOL 500 MG: 500 TABLET ORAL at 05:04

## 2024-04-21 RX ADMIN — MUPIROCIN: 20 OINTMENT TOPICAL at 08:04

## 2024-04-21 RX ADMIN — LIDOCAINE 5% 1 PATCH: 700 PATCH TOPICAL at 05:04

## 2024-04-21 RX ADMIN — ESTRADIOL 1 PATCH: 0.05 PATCH TRANSDERMAL at 05:04

## 2024-04-21 RX ADMIN — TAMSULOSIN HYDROCHLORIDE 0.4 MG: 0.4 CAPSULE ORAL at 08:04

## 2024-04-21 RX ADMIN — HYDROXYZINE PAMOATE 50 MG: 50 CAPSULE ORAL at 05:04

## 2024-04-21 RX ADMIN — NALOXEGOL OXALATE 25 MG: 25 TABLET, FILM COATED ORAL at 08:04

## 2024-04-21 RX ADMIN — OXYCODONE 5 MG: 5 TABLET ORAL at 08:04

## 2024-04-21 RX ADMIN — OXYCODONE 5 MG: 5 TABLET ORAL at 01:04

## 2024-04-21 NOTE — PT/OT/SLP PROGRESS
Physical Therapy Inpatient Rehab Treatment    Patient Name:  Safia Muñoz   MRN:  [id number]    Recommendations:     Discharge Recommendations:  Moderate Intensity Therapy   Discharge Equipment Recommendations:     Barriers to discharge: Decreased caregiver support and Impaired functional mobility     Assessment:     Safia Muñoz is a 89 y.o. female admitted with a medical diagnosis of Closed 2-part intertrochanteric fracture of proximal end of left femur.  She presents with the following impairments/functional limitations:  impaired endurance, impaired sensation, impaired self care skills, impaired functional mobility, decreased upper extremity function, pain, decreased ROM, impaired fine motor, edema.    Rehab Diagnosis: medical diagnosis of Closed 2-part intertrochanteric fracture of proximal end of left femur    General Precautions: Standard, fall     Orthopedic Precautions:LLE weight bearing as tolerated     Braces: N/A    Rehab Prognosis: Good; patient would benefit from acute skilled PT services to address these deficits and reach maximum level of function.      History:     Past Medical History:   Diagnosis Date    COPD (chronic obstructive pulmonary disease)     Hypertension        Past Surgical History:   Procedure Laterality Date    RETROGRADE INTRAMEDULLARY RODDING OF DISTAL FEMUR Left 4/9/2024    Procedure: INSERTION, INTRAMEDULLARY NAIL INTERTROCHENTERIC FRACTURE;  Surgeon: Guy Jaquez DO;  Location: [facility];  Service: Orthopedics;  Laterality: Left;  HANA TABLE, SYNTHES TFNA       Subjective     Patient comments: feeling a little drowsy this morning; LLE pain with mobility    Respiratory Status: Room air    Patients cultural, spiritual, Sabianist conflicts given the current situation: no    Objective:     Communicated with RN prior to session.  Patient found HOB elevated with bed alarm, sarmiento catheter  upon PT entry to room.    Pt is Alert, Cooperative, and Motivated.    Vitals   BP   142/65   HR  73   Pain Pain Rating 1: 8/10 (with mobility)  Location - Side 1: Left  Location - Orientation 1: upper  Location 1: leg  Pain Addressed 1: Reposition, Distraction, Nurse notified       Functional Mobility:      Current   Status  Discharge   Goal   Functional Area: Care Score:    Lying to Sitting on Side of Bed 3  Min A for LLE assist; increased time to perform Supervision or touching assistance   Sit to Stand 4  CGA/SBA with RW Supervision or touching assistance   Chair/Bed-to-Chair Transfer 4  CGA with RW using stand-step t/f with RW; wheelchair > recliner. Increased time to perform Supervision or touching assistance   Walk 10 Feet 4 Not attempted due to medical/safety concerns   Walk 50 Feet with Two Turns 4  The pt ambulated ~50 ft with RW and CGA/SBA. Slow gait speed, and decreased step length. VC for use of BUE support on RW when in stance phase of LLE for pain management.  Not attempted due to medical/safety concerns   Wheel 50 Feet with Two Turns 4  ~70 ft (distance limited by PT) with BUE propulsion and CGA Independent       Therapeutic Activities and Exercises:  Patient educated on role of acute care PT and PT POC, safety while in hospital including calling nurse for mobility, and call light usage  Patient educated about importance of OOB mobility and remaining up in chair most of the day.  - The pt performed 2x5 sit<>stands with RW and CGA/SBA. Increased time to perform and with rest breaks.     Activity Tolerance: Good    Patient left up in chair with all lines intact, call button in reach, and chair alarm on.    Education provided: roles and goals of PT/PTA, transfer training, bed mob, gait training, safety awareness, body mechanics, assistive device, wheelchair management, strengthening exercises, and fall prevention    Expected compliance: High compliance    Plan:     During this hospitalization, patient to be seen 6 x/week to address the identified rehab impairments via gait training,  therapeutic activities, therapeutic exercises, neuromuscular re-education, wheelchair management/training and progress toward the following goals:    GOALS:   Multidisciplinary Problems       Physical Therapy Goals          Problem: Physical Therapy    Goal Priority Disciplines Outcome Goal Variances Interventions   Physical Therapy Goal     PT, PT/OT Ongoing, Progressing     Description: Bed Mobility:  MET - Roll left and right with partial/moderate assist.   MET - Sit to supine transfer with partial/moderate assist.   MET - Supine to sit transfer with partial/moderate assist.   Roll left and right independently  Sit to supine transfer with setup/clean-up assist assist  Supine to sit transfer with setup/clean-up assist assist    Transfers:  MET - Sit to stand transfer with supervision/touching assist using RW.   MET - Bed to chair transfer with supervision/touching assist Stand Step  using RW.   DISCONTINUE - Bed to chair transfer with partial/moderate assist Slide Board  using Slide board.   Sit to stand transfer with setup/clean-up assist using RW  Bed to chair transfer with setup/clean-up assist Stand Step  using RW  Car transfer with supervision/touching assist using RW   an object from the ground in standing position with supervision/touching assist using RW and reacher    Mobility:  Ambulate 150 feet with supervision/touching assist using RW.   Pt ascended/descended a 4 inch curb with partial/moderate assist using RW  Ascend/descend 4 stairs with partial/moderate assist using bilateral handrails  Ambulate 10 feet on uneven surfaces/ramps with partial/moderate assist using RW  Manual wheelchair 150 feet with partial/moderate assist using Bilateral upper extremity                         Plan of Care Expires:  04/19/24  PT Next Visit Date: 04/25/24  Plan of Care reviewed with: patient    Additional Information:         Time Tracking:     Therapy Time  PT Received On: 04/21/24  PT Start Time: 0740  PT  Stop Time: 0835  PT Total Time (min): 55 min   PT Individual: 55  Missed Time:    Time Missed due to:      Billable Minutes: Gait Training 15 and Therapeutic Activity 40    04/21/2024

## 2024-04-21 NOTE — PLAN OF CARE
Problem: Fall Injury Risk  Goal: Absence of Fall and Fall-Related Injury  Outcome: Ongoing, Not Progressing  Intervention: Promote Injury-Free Environment  Flowsheets (Taken 4/21/2024 1010)  Safety Promotion/Fall Prevention:   assistive device/personal item within reach   nonskid shoes/socks when out of bed   room near unit station   side rails raised x 3

## 2024-04-21 NOTE — PT/OT/SLP PROGRESS
Physical Therapy      Patient Name:  Safia Muñoz   MRN:  [id number]    Patient not seen today secondary to Patient fatigue.     Amount of therapy minutes missed:  30 Minutes.    Will follow-up as schedule permits.    4/21/2024

## 2024-04-21 NOTE — PT/OT/SLP PROGRESS
Occupational Therapy Inpatient Rehab Treatment    Name: Safia Muñoz  MRN: [id number]    Assessment:  Safia Muñoz is a 89 y.o. female admitted with a medical diagnosis of Closed 2-part intertrochanteric fracture of proximal end of left femur s/p IMN. She presents with the following impairments/functional limitations: weakness, impaired endurance, impaired self care skills, impaired functional mobility, gait instability, impaired balance, decreased lower extremity function, pain.    General Precautions: Standard, fall     Orthopedic Precautions:LLE weight bearing as tolerated     Braces: N/A    Rehab Prognosis: Good; patient would benefit from acute skilled OT services to address these deficits and reach maximum level of function.      History:     Past Medical History:   Diagnosis Date    COPD (chronic obstructive pulmonary disease)     Hypertension        Past Surgical History:   Procedure Laterality Date    RETROGRADE INTRAMEDULLARY RODDING OF DISTAL FEMUR Left 4/9/2024    Procedure: INSERTION, INTRAMEDULLARY NAIL INTERTROCHENTERIC FRACTURE;  Surgeon: Guy Jaquez DO;  Location: [facility];  Service: Orthopedics;  Laterality: Left;  HANA TABLE, SYNTHES TFNA       Subjective     Orientation: Oriented x4    Patient with command following and safety awareness intact.     Vitals   Vitals at Rest  /72   HR 60   O2 Sat 98%   Pain 7/10 - L Leg - Pre-Medicate, Nsg Notified, Rest, Reposition, Distraction     Vitals With Activity  BP    HR 80   O2 Sat 97%   Pain      Respiratory Status: Room air    Patients cultural, spiritual, Taoism conflicts given the current situation: no       Objective:     Patient found HOB elevated with bed alarm, sarmiento catheter, peripheral IV  upon OT entry to room.    Mobility   Patient completed:  Supine to Sit with minimum assistance  Sit to Stand Transfer with contact guard assistance with rolling walker and verbal cues required for proper hand placement   Stand to Sit  Transfer with contact guard assistance with rolling walker and verbal cues required for proper hand placement   Bed to Chair Transfer using Step Transfer technique with contact guard assistance with rolling walker  Toilet Transfer Step Transfer technique with contact guard assistance with  rolling walker and BSC over toilet     Functional Mobility  Patient performed functional ambulation with RW to<>from bathroom with CGA and slow pace.     ADLs   Current Status       Grooming 5 - Patient performed grooming to wash hands, apply make-up, and comb hair seated in w/c at sink due to patient with fatigue after toileting.                Toileting Hygiene 3 - Min A - (+void urine & small BM) - Assistance was needed for posterior perineal hygiene. CGA required for dynamic standing balance with clothing management.    Toilet Transfer 4         Limiting Factors for ADLs: motor, endurance, balance, weakness, and pain     IADLs:     Patient performed IADL activity to fold towels and clothes in standing with focus on dynamic standing balance with no UE support on RW, functional standing tolerance x ~ 10 min, and B UE functional reach. Patient performed with SBA. Patient tolerated very well with positive response to treatment.     Therapeutic Exercise    Patient performed B UE strengthening exercises with red theraband for 2 sets x 10 reps including shoulder horizontal abduction/adduction, chest press, bicep curls, and tricep extensions. Rest breaks needed. B UE strengthening performed to improve B UE strength needed for increased independence with ADL tasks and functional transfers.       LifeStyle Change and Education:             Patient left up in chair with all lines intact, call button in reach, chair alarm on, and CNA notified.     Education provided: Roles and goals of OT, ADLs, transfer training, bed mobility, body mechanics, safety precautions, fall prevention, and home safety    Multidisciplinary Problems        Occupational Therapy Goals          Problem: Occupational Therapy    Goal Priority Disciplines Outcome Interventions   Occupational Therapy Goal     OT, PT/OT Ongoing, Progressing    Description: Pt to perform eating tasks with independence by re-eval. Pt. C numb fingertips; unable to manage packages. Discussed c son getting safety scissors     Pt to perform oral hygiene tasks with min A standing at sink with RW by re-eval. Ongoing Sits in w/c -Pt. C new onset A-Fib/RVR c fatigue; by Monday 4/22     Pt to perform UB dressing with independence by re-eval.Touch A c bra Numb fingertips/maybe front hook bra ?    Pt to perform LB dressing with mod A using AE PRN by re-eval. MET   Pt to perform donning/doffing footwear with max A using AE PRN by re-eval. MET   Pt to perform toileting hygiene with mod A by re-eval.  Pt. Continues c Lyman . Pt. Able to wipe self after BM 4/20/2024 c touch A to steady while standing c RE. Pt. Managed underwear up /down.   Pt to perform bathing tasks with mod A by re-eval. MET (sponge bath)     Pt to perform toilet transfers with mod A using LRAD by re-eval. MET  Pt to perform WIS transfers with mod A using LRAD by re-eval.  Pt. Unable to elevate L LE high enough to clear shower ledge. Son will measure shower door opening width vs. Tub accessibility.   Pt to perform meal prep activity with mod A by re-eval. MET   Pt to perform laundry management task with mod A by re-eval.  Pt to perform light housekeeping activity with mod A by re-eval.  Pt. To prepare water/food for dog by Re-Eval  Balance, Strengthening, Endurance, Balance:  Pt to consistently demonstrate adherence to orthopedic precautions during all ADL's as instructed by OT.  Pt to demonstrate consistent adherence to breathing control and energy conservation techniques as educated by OT.  Pt. To remember safety techniques for functional transfers S v/c's by D/C. !!                       Time Tracking     OT Received On: 04/21/24  Time  In 1000     Time Out 1100  Total Time 60 min  Therapy Time: OT Individual: 60  Missed Time:    Missed Time Reason:      Billable Minutes: Self Care/Home Management 30, Therapeutic Activity 15, and Therapeutic Exercise 15    04/21/2024

## 2024-04-22 LAB
ALBUMIN SERPL-MCNC: 2.7 G/DL (ref 3.4–4.8)
ALBUMIN/GLOB SERPL: 1.1 RATIO (ref 1.1–2)
ALP SERPL-CCNC: 108 UNIT/L (ref 40–150)
ALT SERPL-CCNC: 17 UNIT/L (ref 0–55)
AST SERPL-CCNC: 19 UNIT/L (ref 5–34)
BASOPHILS # BLD AUTO: 0.05 X10(3)/MCL
BASOPHILS NFR BLD AUTO: 0.5 %
BILIRUB SERPL-MCNC: 0.5 MG/DL
BUN SERPL-MCNC: 17.5 MG/DL (ref 9.8–20.1)
CALCIUM SERPL-MCNC: 8.4 MG/DL (ref 8.4–10.2)
CHLORIDE SERPL-SCNC: 104 MMOL/L (ref 98–111)
CO2 SERPL-SCNC: 27 MMOL/L (ref 23–31)
CREAT SERPL-MCNC: 0.78 MG/DL (ref 0.55–1.02)
EOSINOPHIL # BLD AUTO: 0.52 X10(3)/MCL (ref 0–0.9)
EOSINOPHIL NFR BLD AUTO: 5.7 %
ERYTHROCYTE [DISTWIDTH] IN BLOOD BY AUTOMATED COUNT: 15.5 % (ref 11.5–17)
GFR SERPLBLD CREATININE-BSD FMLA CKD-EPI: >60 MLS/MIN/1.73/M2
GLOBULIN SER-MCNC: 2.5 GM/DL (ref 2.4–3.5)
GLUCOSE SERPL-MCNC: 88 MG/DL (ref 75–121)
HCT VFR BLD AUTO: 31 % (ref 37–47)
HGB BLD-MCNC: 10.1 G/DL (ref 12–16)
IMM GRANULOCYTES # BLD AUTO: 0.12 X10(3)/MCL (ref 0–0.04)
IMM GRANULOCYTES NFR BLD AUTO: 1.3 %
LYMPHOCYTES # BLD AUTO: 1.95 X10(3)/MCL (ref 0.6–4.6)
LYMPHOCYTES NFR BLD AUTO: 21.4 %
MAGNESIUM SERPL-MCNC: 2 MG/DL (ref 1.6–2.6)
MCH RBC QN AUTO: 30.3 PG (ref 27–31)
MCHC RBC AUTO-ENTMCNC: 32.6 G/DL (ref 33–36)
MCV RBC AUTO: 93.1 FL (ref 80–94)
MONOCYTES # BLD AUTO: 0.79 X10(3)/MCL (ref 0.1–1.3)
MONOCYTES NFR BLD AUTO: 8.7 %
NEUTROPHILS # BLD AUTO: 5.7 X10(3)/MCL (ref 2.1–9.2)
NEUTROPHILS NFR BLD AUTO: 62.4 %
NRBC BLD AUTO-RTO: 0 %
PHOSPHATE SERPL-MCNC: 3.1 MG/DL (ref 2.3–4.7)
PLATELET # BLD AUTO: 455 X10(3)/MCL (ref 130–400)
PMV BLD AUTO: 9.7 FL (ref 7.4–10.4)
POTASSIUM SERPL-SCNC: 3.8 MMOL/L (ref 3.5–5.1)
PREALB SERPL-MCNC: 15.2 MG/DL (ref 14–37)
PROT SERPL-MCNC: 5.2 GM/DL (ref 5.8–7.6)
RBC # BLD AUTO: 3.33 X10(6)/MCL (ref 4.2–5.4)
SODIUM SERPL-SCNC: 138 MMOL/L (ref 132–146)
WBC # SPEC AUTO: 9.13 X10(3)/MCL (ref 4.5–11.5)

## 2024-04-22 PROCEDURE — 85025 COMPLETE CBC W/AUTO DIFF WBC: CPT | Performed by: NURSE PRACTITIONER

## 2024-04-22 PROCEDURE — 80053 COMPREHEN METABOLIC PANEL: CPT | Performed by: NURSE PRACTITIONER

## 2024-04-22 PROCEDURE — 97530 THERAPEUTIC ACTIVITIES: CPT

## 2024-04-22 PROCEDURE — 84100 ASSAY OF PHOSPHORUS: CPT | Performed by: NURSE PRACTITIONER

## 2024-04-22 PROCEDURE — 97110 THERAPEUTIC EXERCISES: CPT

## 2024-04-22 PROCEDURE — 11800000 HC REHAB PRIVATE ROOM

## 2024-04-22 PROCEDURE — 97110 THERAPEUTIC EXERCISES: CPT | Mod: CQ

## 2024-04-22 PROCEDURE — 97530 THERAPEUTIC ACTIVITIES: CPT | Mod: CQ

## 2024-04-22 PROCEDURE — 63600175 PHARM REV CODE 636 W HCPCS: Performed by: NURSE PRACTITIONER

## 2024-04-22 PROCEDURE — 25000003 PHARM REV CODE 250: Performed by: NURSE PRACTITIONER

## 2024-04-22 PROCEDURE — 99233 SBSQ HOSP IP/OBS HIGH 50: CPT | Mod: ,,, | Performed by: NURSE PRACTITIONER

## 2024-04-22 PROCEDURE — 83735 ASSAY OF MAGNESIUM: CPT | Performed by: NURSE PRACTITIONER

## 2024-04-22 PROCEDURE — 97535 SELF CARE MNGMENT TRAINING: CPT

## 2024-04-22 PROCEDURE — 94799 UNLISTED PULMONARY SVC/PX: CPT

## 2024-04-22 PROCEDURE — 97116 GAIT TRAINING THERAPY: CPT

## 2024-04-22 PROCEDURE — 99900035 HC TECH TIME PER 15 MIN (STAT)

## 2024-04-22 PROCEDURE — 84134 ASSAY OF PREALBUMIN: CPT | Performed by: NURSE PRACTITIONER

## 2024-04-22 PROCEDURE — 99900031 HC PATIENT EDUCATION (STAT)

## 2024-04-22 RX ORDER — BISACODYL 10 MG/1
10 SUPPOSITORY RECTAL ONCE
Status: COMPLETED | OUTPATIENT
Start: 2024-04-22 | End: 2024-04-22

## 2024-04-22 RX ADMIN — OXYCODONE 5 MG: 5 TABLET ORAL at 08:04

## 2024-04-22 RX ADMIN — NALOXEGOL OXALATE 25 MG: 25 TABLET, FILM COATED ORAL at 08:04

## 2024-04-22 RX ADMIN — ACETAMINOPHEN 325MG 650 MG: 325 TABLET ORAL at 05:04

## 2024-04-22 RX ADMIN — BISACODYL 10 MG: 10 SUPPOSITORY RECTAL at 08:04

## 2024-04-22 RX ADMIN — METHOCARBAMOL 500 MG: 500 TABLET ORAL at 05:04

## 2024-04-22 RX ADMIN — TAMSULOSIN HYDROCHLORIDE 0.4 MG: 0.4 CAPSULE ORAL at 08:04

## 2024-04-22 RX ADMIN — LIDOCAINE 5% 1 PATCH: 700 PATCH TOPICAL at 05:04

## 2024-04-22 RX ADMIN — HYDROXYZINE PAMOATE 50 MG: 50 CAPSULE ORAL at 06:04

## 2024-04-22 RX ADMIN — FUROSEMIDE 40 MG: 40 TABLET ORAL at 08:04

## 2024-04-22 RX ADMIN — ACETAMINOPHEN 325MG 650 MG: 325 TABLET ORAL at 12:04

## 2024-04-22 RX ADMIN — METOPROLOL TARTRATE 25 MG: 25 TABLET, FILM COATED ORAL at 08:04

## 2024-04-22 RX ADMIN — ASPIRIN 81 MG: 81 TABLET, COATED ORAL at 08:04

## 2024-04-22 RX ADMIN — CYANOCOBALAMIN TAB 500 MCG 500 MCG: 500 TAB at 08:04

## 2024-04-22 RX ADMIN — METHOCARBAMOL 500 MG: 500 TABLET ORAL at 11:04

## 2024-04-22 RX ADMIN — FERROUS SULFATE TAB 325 MG (65 MG ELEMENTAL FE) 1 EACH: 325 (65 FE) TAB at 08:04

## 2024-04-22 RX ADMIN — BISACODYL 10 MG: 10 SUPPOSITORY RECTAL at 02:04

## 2024-04-22 RX ADMIN — METHOCARBAMOL 500 MG: 500 TABLET ORAL at 12:04

## 2024-04-22 RX ADMIN — ENOXAPARIN SODIUM 30 MG: 30 INJECTION SUBCUTANEOUS at 05:04

## 2024-04-22 RX ADMIN — ACETAMINOPHEN 325MG 650 MG: 325 TABLET ORAL at 11:04

## 2024-04-22 NOTE — PLAN OF CARE
Forwarded pics/measurements taken by la Mckeon of pt's [facility] bathroom to OT(s) Suleiman for review.

## 2024-04-22 NOTE — PROGRESS NOTES
04/22/24 1101   Rec Therapy Time Calculation   Date of Treatment 04/22/24   Rec Start Time 1101   Rec Stop Time 1130   Rec Total Time (min) 29 min   Time   Treatment time 2 units   Charges   $Therapeutic Activity 1unit   Precautions   General Precautions fall   Orthopedic Precautions  LLE weight bearing as tolerated   Braces N/A   Pain/Comfort   Pain Rating 1 5/10   Location - Side 1 Left   Location - Orientation 1 lower   Location 1 hip   Pain Addressed 1 Reposition;Nurse notified   OTHER   Rehab identified problem list/impairments weakness;impaired endurance;impaired functional mobility;gait instability;decreased lower extremity function;decreased safety awareness;pain   Values/Beliefs/Spiritual Care   Spiritual, Cultural Beliefs, Oriental orthodox Practices, Values that Affect Care no   Overall Level of Functioning   Activity Tolerance Independent   Dynamic Sitting Balance/Reaching Independent   Dynamic Standing Balance/Reaching Standby Assist   Right UE Coodination/Dexterity Independent   Left UE Coordination/Dexterity Independent   Problem Solving/Sequencing Skills Mod Indep   Memory Recall Mod Indep   R/L Neglect/Inattention Does not occur   Attention Span Mod Indep   Social Interaction Independent   Recreational Therapy Short Term Goals   Short Term Goal 1 Progression Met   Short Term Goal 2 Progression Met   Short Term Goal 3 Progression Met   Recreational Therapy Long Term Goals   Long Term Goal 1 Progression Met   Long Term Goal 2 Progression Met   Plan   Patient to be seen Daily   Planned Duration 1 week   Treatments Planned Energy conservation training;Safety education   Treatment plan/goals estblished with Patient/Caregiver Yes

## 2024-04-22 NOTE — PLAN OF CARE
Problem: Rehabilitation ([facility]) Plan of Care  Goal: Plan of Care Review  Outcome: Ongoing, Progressing  Goal: Absence of New-Onset Illness or Injury  Outcome: Ongoing, Progressing  Goal: Optimal Comfort and Wellbeing  Outcome: Ongoing, Progressing  Goal: Readiness for Transition of Care  Outcome: Ongoing, Progressing     Problem: Fall Injury Risk  Goal: Absence of Fall and Fall-Related Injury  Outcome: Ongoing, Progressing     Problem: Skin Injury Risk Increased  Goal: Skin Health and Integrity  Outcome: Ongoing, Progressing     Problem: Pain Acute  Goal: Acceptable Pain Control and Functional Ability  Outcome: Ongoing, Progressing

## 2024-04-22 NOTE — PT/OT/SLP PROGRESS
Recreational Therapy Treatment    Date of Treatment: 04/22/24  Start Time: 1101  Stop Time: 1130  Total Time: 29 min  Missed Time    General Precautions: fall  Ortho Precautions: LLE weight bearing as tolerated  Braces: N/A    Vitals   Vitals at Rest  BP    HR    O2 Sat    Pain 5/10     Vitals With Activity  BP    HR    O2 Sat    Pain 5/10       Treatment     Cognitive Skills Building   Cognitive Observation Activity Assist Position Equipment Response            Comment:          Dynamic Activities   Activity Assist Position Equipment Response   Activity 1 Bean bag toos supervision Standing Rolling walker and Bean bags good   Comment: Sit to stand was supervision as was dynamic standing balance/reaching.  Standing tolerance increased to 5 minutes.  UE coordination was I.  Memory and problem solving skills were setup.  More alert and attentive to task.  Did c/o of L hip pain.  Reported to Nursing       Fine Motor Activities                  Comment:          Additional Info: This was a co-treat with PTA    Goals     Short Term Goals    Goal  Goal Status   Will increase activity tolerance to supervision Met   Will increase sit to stand to min Met   Will improve dynamic standing balance/reaching to min Met           Long Term Goals    Goal Goal Status   Will increase standing tolerance to 5 minutes Met   Will improve dynamic standing balance/reaching to supervision Met

## 2024-04-22 NOTE — PT/OT/SLP PROGRESS
Physical Therapy Inpatient Rehab Treatment    Patient Name:  Safia Muñoz   MRN:  [id number]    Recommendations:     Discharge Recommendations:  Moderate Intensity Therapy   Discharge Equipment Recommendations: walker, rolling, bedside commode, shower chair   Barriers to discharge: Decreased caregiver support    Assessment:     Safia Muñoz is a 89 y.o. female admitted with a medical diagnosis of Closed 2-part intertrochanteric fracture of proximal end of left femur.  She presents with the following impairments/functional limitations:  weakness, impaired endurance, impaired self care skills, impaired functional mobility, gait instability, impaired balance, decreased lower extremity function, decreased safety awareness, pain, decreased ROM, impaired coordination, edema, impaired skin, impaired muscle length, orthopedic precautions, impaired cardiopulmonary response to activity .    Rehab Diagnosis: medical diagnosis of Closed 2-part intertrochanteric fracture of proximal end of left femur    General Precautions: Standard, fall     Orthopedic Precautions:LLE weight bearing as tolerated     Braces: N/A    Rehab Prognosis: Good; patient would benefit from acute skilled PT services to address these deficits and reach maximum level of function.      History:     Past Medical History:   Diagnosis Date    COPD (chronic obstructive pulmonary disease)     Hypertension        Past Surgical History:   Procedure Laterality Date    RETROGRADE INTRAMEDULLARY RODDING OF DISTAL FEMUR Left 4/9/2024    Procedure: INSERTION, INTRAMEDULLARY NAIL INTERTROCHENTERIC FRACTURE;  Surgeon: Guy Jaquez DO;  Location: [facility];  Service: Orthopedics;  Laterality: Left;  TIGRE TABLE, SYNTHES TFNA       Subjective     Chief Complaint: sleepy     Respiratory Status: Room air    Patients cultural, spiritual, Spiritism conflicts given the current situation: no      Objective:     Communicated with nursing  prior to session.  Patient found  HOB elevated with chair check, sarmiento catheter, peripheral IV, Other (comments) (pt in bed)  upon PT entry to room.    Pt is Oriented x3 and  sleepy stated didn't want to get up today , Alert, and Cooperative.    Functional Mobility:   Transfers:     Toilet Transfer: Supervision or touching assistance  with  rolling walker  using  Step Transfer and + large BM sba with toilet hygiene increased time      Current   Status  Discharge   Goal   Functional Area: Care Score:    Roll Left and Right   Independent   Sit to Lying   Supervision or touching assistance   Lying to Sitting on Side of Bed 4  Use of lle leg  with increased time sba use of bed rail  Supervision or touching assistance   Sit to Stand 4  Sba slow transition increased time  Supervision or touching assistance   Chair/Bed-to-Chair Transfer 4  Use of rw lle wbat cga  Supervision or touching assistance   Car Transfer   Not attempted due to medical/safety concerns   Walk 10 Feet 4  Cga use of rw slow katie increased time 10ft to restroom lle wbat  Not attempted due to medical/safety concerns   Walk 50 Feet with Two Turns   Not attempted due to medical/safety concerns   Walk 150 Feet   Not attempted due to medical/safety concerns   Walk 10 Feet Uneven Surface   Not attempted due to medical/safety concerns   1 Step (Curb)   Not attempted due to medical/safety concerns   4 Steps   Not attempted due to medical/safety concerns   12 Steps   Not attempted due to medical/safety concerns   Picking Up Object   Not attempted due to medical/safety concerns   Wheel 50 Feet with Two Turns   Independent   Wheel 150 Feet   Not attempted due to medical/safety concerns       Therapeutic Activities and Exercises:  Pt performed dynamic unsupported sitting balance for dressing and cleaning after toileting     Activity Tolerance: Good    Patient left  up ambulating with CNA Alyson to    with  Alyson present and pt kenan tx improved motivation with activities  .    Education  provided: transfer training, bed mob, gait training, balance training, safety awareness, assistive device, strengthening exercises, and fall prevention    Expected compliance: High compliance    GOALS:   Multidisciplinary Problems       Physical Therapy Goals          Problem: Physical Therapy    Goal Priority Disciplines Outcome Goal Variances Interventions   Physical Therapy Goal     PT, PT/OT Ongoing, Progressing     Description: Bed Mobility:  MET - Roll left and right with partial/moderate assist.   MET - Sit to supine transfer with partial/moderate assist.   MET - Supine to sit transfer with partial/moderate assist.   Roll left and right independently  Sit to supine transfer with setup/clean-up assist assist  Supine to sit transfer with setup/clean-up assist assist    Transfers:  MET - Sit to stand transfer with supervision/touching assist using RW.   MET - Bed to chair transfer with supervision/touching assist Stand Step  using RW.   DISCONTINUE - Bed to chair transfer with partial/moderate assist Slide Board  using Slide board.   Sit to stand transfer with setup/clean-up assist using RW  Bed to chair transfer with setup/clean-up assist Stand Step  using RW  Car transfer with supervision/touching assist using RW   an object from the ground in standing position with supervision/touching assist using RW and reacher    Mobility:  Ambulate 150 feet with supervision/touching assist using RW.   Pt ascended/descended a 4 inch curb with partial/moderate assist using RW  Ascend/descend 4 stairs with partial/moderate assist using bilateral handrails  Ambulate 10 feet on uneven surfaces/ramps with partial/moderate assist using RW  Manual wheelchair 150 feet with partial/moderate assist using Bilateral upper extremity                         Plan:     During this hospitalization, patient to be seen 6 x/week to address the identified rehab impairments via gait training, therapeutic activities, therapeutic  exercises, neuromuscular re-education, wheelchair management/training and progress toward the following goals:    Plan of Care Expires:  04/19/24  PT Next Visit Date: 04/25/24  Plan of Care reviewed with: patient    Additional Information:         Time Tracking:     Therapy Time  PT Received On: 04/22/24  PT Start Time: 0830  PT Stop Time: 0915  PT Total Time (min): 45 min   PT Individual: 45  Missed Time:    Time Missed due to:      Billable Minutes: Therapeutic Activity 45min    04/22/2024

## 2024-04-22 NOTE — PT/OT/SLP PROGRESS
Physical Therapy Inpatient Rehab Treatment    Patient Name:  Safia Muñoz   MRN:  [id number]    Recommendations:     Discharge Recommendations:  Moderate Intensity Therapy   Discharge Equipment Recommendations:     Barriers to discharge: Impaired functional mobility  and pain    Assessment:     Safia Muñoz is a 89 y.o. female admitted with a medical diagnosis of Closed 2-part intertrochanteric fracture of proximal end of left femur.  She presents with the following impairments/functional limitations:  weakness, impaired endurance, impaired functional mobility, gait instability, decreased lower extremity function, decreased safety awareness, pain.    Rehab Diagnosis: medical diagnosis of Closed 2-part intertrochanteric fracture of proximal end of left femur    General Precautions: Standard, fall     Orthopedic Precautions:LLE weight bearing as tolerated     Braces: N/A    Rehab Prognosis: Good; patient would benefit from acute skilled PT services to address these deficits and reach maximum level of function.      History:     Past Medical History:   Diagnosis Date    COPD (chronic obstructive pulmonary disease)     Hypertension        Past Surgical History:   Procedure Laterality Date    RETROGRADE INTRAMEDULLARY RODDING OF DISTAL FEMUR Left 4/9/2024    Procedure: INSERTION, INTRAMEDULLARY NAIL INTERTROCHENTERIC FRACTURE;  Surgeon: Guy Jaquez DO;  Location: [facility];  Service: Orthopedics;  Laterality: Left;  HANA TABLE, SYNTHES TFNA       Subjective     Patient comments: pt agreeable to participate at start of AM therapy session, but after ambulation c/o being in too much pain and unable to continue. Additionally, pt c/o feeling very tired. Discussed required therapy time in IRF setting with pt, and educated pt that she had more therapy time scheduled today than required per schedule board. Pt politely declined scheduled extra therapy time, requesting to rest d/t pain and fatigue.    During lunch, PT  noted schedule board was incorrect. PT returned to pts room at 1300 and explained. Pt was very understanding and agreeable to participate for what was needed.    Respiratory Status: Room air    Patients cultural, spiritual, Sikh conflicts given the current situation: no    Objective:     Communicated with pt prior to session.  Patient found up in chair upon PT entry to room at start of both sessions.    Pt is Oriented x3 and Alert and Cooperative.    Vitals   Pain Pain Rating 1: 7/10  Location - Side 1: Left  Location 1: leg  Pain Addressed 1: Reposition, Cessation of Activity, Other (see comments) (Ice pack applied and NP Ana Laura notified of pts pain complaints.)  Pain Rating Post-Intervention 1: 5/10   Ice applied at end of both AM and PM sessions to promote pain relief.      Functional Mobility:    Current   Status  Discharge   Goal   Functional Area: Care Score:    Roll Left and Right   Independent   Sit to Lying   Supervision or touching assistance   Lying to Sitting on Side of Bed 4 Supervision or touching assistance   Sit to Stand 4  SBA with RW Supervision or touching assistance   Chair/Bed-to-Chair Transfer 4  SBA with RW Supervision or touching assistance   Car Transfer   Not attempted due to medical/safety concerns   Walk 10 Feet 4  AM session: pt amb 30' with RW; initially SBA provided but CGA required towards end of distance d/t increasing pain and fatigue. Significant encouragement needed for last ~10'. Speed and step sizes decreased as distance increased.    PM session: pt amb another 30' and then 60' with RW and SBA throughout. Decreased pain reported vs AM session, with improved gait quality noted. Not attempted due to medical/safety concerns   Walk 50 Feet with Two Turns   Not attempted due to medical/safety concerns   Walk 150 Feet   Not attempted due to medical/safety concerns   Walk 10 Feet Uneven Surface   Not attempted due to medical/safety concerns   1 Step (Curb)   Not attempted due to  medical/safety concerns   4 Steps   Not attempted due to medical/safety concerns   12 Steps   Not attempted due to medical/safety concerns   Picking Up Object   Not attempted due to medical/safety concerns   Wheel 50 Feet with Two Turns   Independent   Wheel 150 Feet   Not attempted due to medical/safety concerns       Therapeutic Activities and Exercises:  Patient educated on role of acute care PT and PT POC and safety while in hospital including calling nurse for mobility  Patient educated about importance of OOB mobility and remaining up in chair most of the day.    Activity Tolerance: Fair    Patient left up in chair with call button in reach, chair alarm on, and CNA present.    Education provided: roles and goals of PT/PTA, transfer training, gait training, safety awareness, body mechanics, assistive device, and fall prevention    Expected compliance: High compliance    Plan:     During this hospitalization, patient to be seen 6 x/week to address the identified rehab impairments via gait training, therapeutic activities, therapeutic exercises, neuromuscular re-education, wheelchair management/training and progress toward the following goals:    GOALS:   Multidisciplinary Problems       Physical Therapy Goals          Problem: Physical Therapy    Goal Priority Disciplines Outcome Goal Variances Interventions   Physical Therapy Goal     PT, PT/OT Ongoing, Progressing     Description: Bed Mobility:  MET - Roll left and right with partial/moderate assist.   MET - Sit to supine transfer with partial/moderate assist.   MET - Supine to sit transfer with partial/moderate assist.   Roll left and right independently  Sit to supine transfer with setup/clean-up assist assist  Supine to sit transfer with setup/clean-up assist assist    Transfers:  MET - Sit to stand transfer with supervision/touching assist using RW.   MET - Bed to chair transfer with supervision/touching assist Stand Step  using RW.   DISCONTINUE - Bed to  chair transfer with partial/moderate assist Slide Board  using Slide board.   Sit to stand transfer with setup/clean-up assist using RW  Bed to chair transfer with setup/clean-up assist Stand Step  using RW  Car transfer with supervision/touching assist using RW   an object from the ground in standing position with supervision/touching assist using RW and reacher    Mobility:  Ambulate 150 feet with supervision/touching assist using RW.   Pt ascended/descended a 4 inch curb with partial/moderate assist using RW  Ascend/descend 4 stairs with partial/moderate assist using bilateral handrails  Ambulate 10 feet on uneven surfaces/ramps with partial/moderate assist using RW  Manual wheelchair 150 feet with partial/moderate assist using Bilateral upper extremity                         Plan of Care Expires:  04/19/24  PT Next Visit Date: 04/25/24  Plan of Care reviewed with: patient    Additional Information:     Pt seen for PT [id number] and [id number].    Time Tracking:     Therapy Time  PT Start Time:  (1130; 1300)  PT Stop Time:  (1200; 1330)   PT Individual: 45  Missed Time: 15 Minutes  Time Missed due to: Patient fatigue, Patient unwilling to participate, Pain    Billable Minutes: Gait Training 45 min    04/22/2024

## 2024-04-22 NOTE — PROGRESS NOTES
Dos 4/22/24  Patient seen and evaluated in OT today  Continues to participate and make progress toward goals  Working on ADL's and IADL's  Discussed with patient and OT  Reviewed chart and discussed with nursing, Dr Myers's primary medicine team, as well as Ana Laura Biggs, my NP  Agree with present POC  Subjective  HPI: 89 yo WF with a PMH of COPD and hypertension presented to the ED at [facility] on 4/8/24 with complaint of left hip pain after a ground level fall where she tripped on a rug and fell. X-ray show intertrochanteric left femur fracture. Orthopedic surgery consulted and referred to hospital medicine for admission. Chest x-ray show no airspace disease. EKG sinus rhythm with PACs. On 4/9, orthopedic surgeon recommended surgical intervention. Patient underwent IM nailing, and was placed WBAT to LLE. On 4/10, patient placed on Lovenox for DVT ppx, and can change on ASA 81mg BID at discharge. Patient was started on Ceftriazone for UTI. Patient gets frequent UTIs. Labs showed low H&H of 10.4 & 33.5, low Iron of 26, low TIBC of 218, elevated B12 of 1,025, low Iron sat of 12, elevated glucose of 135, low calcium of 7.8, low protein of 5.4, low albumin of 3.3, elevated AST of 35. PT/OT evals completed with deficits noted with recommendation for high intensity therapy needed. On 4/11, patient complained of nausea so zofran was given. Labs showed low RBC of 3.53, low H&H Of 10.5 & 32.8, low calcium of 7.8, low albumin of 3.3. Dry dressing changes started. No BM reported since admission. On 4/12, labs showed elevated WBC of 12.68, low RBC of 3.30, low H&H of 9.9 & 30.4, low MCHC of 32.6, low chloride of 97, and elevated glucose of 126. Patient will need appointment with Carmen [facility] (Ortho) in 3 weeks for wound check and repeat Xrays. Patient complained of pain to LLE rating at 8-9/10 with movement, but controlled with rest. Patient is AAOx4.  Participating with therapy. Functional status includes setup/supervision  needed for eating, moderate assist for transfers with RW, max assist for toileting, moderate assist bed mobility, minimal assist for walking 26ft with RW, minimal assist for grooming, and max assist for lower body dressing. Patient was evaluated, accepted, and admitted to inpatient rehab to improve functional status. Transferred to [facility] on 4/12 without incident.  4/22: Seen with PT, seated in WC with SYED hose to LLE. Shoe a little tight with stocking folded over toe for length. She pops up from WC at SBA, and starts walking. States that pain is not that bad, but she only makes it about 10-15ft before pain is unbearable and radiating up to low back. States that it is a stabbing like pain. Noted to not have taken Oxycodone today and this is at the end of hr 3 hours of therapy today. May agree to do additional therapy after lunch if pain subsides with medication. Participating with therapy. VSSAF.               Review of Systems  Psychiatric: Does not have any confirmed mental health history or diagnoses. Per daughter, pt. seems to be depressed and anxious since her spouse passed away. She is a former smoker.      Depression/Anxiety: no current complaints. Therapy notes Anxiety     Pain: left hip with movement and weight bearing (spasms)-improved  LIDOcaine 5 % patch q24hr, 1800, low back  acetaminophen tablet 650 mg q6h  methocarbamoL tablet 500 mg TID. q6h     methocarbamoL tablet 500 mg TID PRN muscle spasm  acetaminophen tablet 650 mg q8h PRN mild pain  oxyCODONE immediate release tablet 5 mg q4h PRN mod pain  oxyCODONE immediate release tablet 10 mg q4h PRN severe pain  Bowels/Bladder: last BM 4/22.   naloxegoL (MOVANTIK) tablet 25 mg qd   Appetite: improving     Sleep: good  hydrOXYzine pamoate capsule 50 mg, qPM 1800        Physical Exam  General: well-developed, well-nourished, no acute distress  Respiratory: equal chest rise, no SOB, no audible wheeze  Cardiovascular: regular rate and rhythm, LE  edema  Gastrointestinal: soft, non-tender, non-distended   Musculoskeletal: decreased ROM/strength to LLE  Integumentary: bruising,  LLE incisions x 3-staples, dressing-c/d/I, surrounding ecchymosis and tape blisters-use paper tape  Neurologic: cranial nerves intact, signs of peripheral neurological deficit- numbness to fingertips, AAO  *MD performed and documented physical examination                 Labs:   Latest Reference Range & Units 04/22/24 05:10   WBC 4.50 - 11.50 x10(3)/mcL 9.13   RBC 4.20 - 5.40 x10(6)/mcL 3.33 (L)   Hemoglobin 12.0 - 16.0 g/dL 10.1 (L)   Hematocrit 37.0 - 47.0 % 31.0 (L)   MCV 80.0 - 94.0 fL 93.1   MCH 27.0 - 31.0 pg 30.3   MCHC 33.0 - 36.0 g/dL 32.6 (L)   RDW 11.5 - 17.0 % 15.5   Platelet Count 130 - 400 x10(3)/mcL 455 (H)   MPV 7.4 - 10.4 fL 9.7   Neut % % 62.4   LYMPH % % 21.4   Mono % % 8.7   Eos % % 5.7   Basophil % % 0.5   Immature Granulocytes % 1.3   Neut # 2.1 - 9.2 x10(3)/mcL 5.70   Lymph # 0.6 - 4.6 x10(3)/mcL 1.95   Mono # 0.1 - 1.3 x10(3)/mcL 0.79   Eos # 0 - 0.9 x10(3)/mcL 0.52   Baso # <=0.2 x10(3)/mcL 0.05   Immature Grans (Abs) 0 - 0.04 x10(3)/mcL 0.12 (H)   nRBC % 0.0   Sodium 132 - 146 mmol/L 138   Potassium 3.5 - 5.1 mmol/L 3.8   Chloride 98 - 111 mmol/L 104   CO2 23 - 31 mmol/L 27   BUN 9.8 - 20.1 mg/dL 17.5   Creatinine 0.55 - 1.02 mg/dL 0.78   eGFR mls/min/1.73/m2 >60   Glucose 75 - 121 mg/dL 88   Calcium 8.4 - 10.2 mg/dL 8.4   Phosphorus Level 2.3 - 4.7 mg/dL 3.1   Magnesium  1.60 - 2.60 mg/dL 2.00   ALP 40 - 150 unit/L 108   PROTEIN TOTAL 5.8 - 7.6 gm/dL 5.2 (L)   Albumin 3.4 - 4.8 g/dL 2.7 (L)   Albumin/Globulin Ratio 1.1 - 2.0 ratio 1.1   Prealbumin 14.0 - 37.0 mg/dL 15.2   BILIRUBIN TOTAL <=1.5 mg/dL 0.5   AST 5 - 34 unit/L 19   ALT 0 - 55 unit/L 17   Globulin, Total 2.4 - 3.5 gm/dL 2.5   (L): Data is abnormally low  (H): Data is abnormally high                Assessment/Plan  Hospital   Closed 2-part intertrochanteric fracture of proximal end of left femur  s/p repair   Fall     Non-Hospital   COPD (chronic obstructive pulmonary disease)   Hypertension   Frequent UTI   Osteoporosis       Wounds: LLE incisions x 3-staples, dressing-c/d/I, surrounding ecchymosis and tape blisters-use paper tape  S/p IM nail of intertrochanteric left femur fracture on 4/9 (Leonid)  Precautions: WBAT LLE  Bracing/AD: RW  Swallowing: Regular Diet  Function: Tolerating therapy. Continue PT/OT  VTE Prophylaxis:   enoxaparin injection 30 mg SubQ q24hr  Code Status: FULL CODE   Discharge: Lives alone in [place] in an independent living [facility] with no steps to enter. Completed high school and 1 year of college. Denies  history. Pt. Is retired from secretarial work.  Is  approximately 3 years ago.  Was independent without use of assistive devices. The plan is to return to [facility]. Date pending.               Disha Biggs NP, conducted additional independent physical examination and assisted with medical documentation.

## 2024-04-22 NOTE — PROGRESS NOTES
[facility] ([facility])  Rehab Progress Note    Patient Name: Safia Muñoz  MRN: [id number]  Age: 89 y.o. Sex: female  : 1931  Hospital Length of Stay: 10 days  Date of Service: 2024   Chief Complaint: Left hip fracture s/p left hip IM nailing on 2024     Subjective:     Basic Information  Admit Information: 89-year-old white female presented to [facility] ED on 2024 complaining of left hip pain after a ground level fall.  PMH significant for HTN and COPD not on home O2.  Workup significant for closed comminuted intertrochanteric left femur fracture.  Tolerated left hip IM nailing on  without perioperative complications.  Received 3 days Rocephin due to underlying UTI.  Urine culture appeared to be contaminated.  Orthopedic surgery recommended WBAT to LLE.  Tolerated transfer to [facility] inpatient rehab unit on  without incident.   Today's Information: No acute events overnight.  Sitting up in chair.  Reports good sleep and appetite.  Last BM .  Vital signs at goal with no recorded fevers.  CBC and CMP unremarkable.  No new imaging today.    Review of patient's allergies indicates:   Allergen Reactions    Adhesive tape-silicones Blisters    Ofloxacin     Penicillins     Sulfamethoxazole-trimethoprim      Other reaction(s): Unknown    Codeine Nausea Only        Current Facility-Administered Medications:     0.9%  NaCl infusion, , Intravenous, Continuous, Disha Biggs A, FNP, Last Rate: 125 mL/hr at 24 1723, New Bag at 24 1723    acetaminophen tablet 650 mg, 650 mg, Oral, Q6H, Augusto Guan A, FNP, 650 mg at 24 1131    acetaminophen tablet 650 mg, 650 mg, Oral, Q8H PRN, Disha Biggs A, FNP    aspirin EC tablet 81 mg, 81 mg, Oral, Daily, Ирина Ness, FNP, 81 mg at 24 0808    benzonatate capsule 100 mg, 100 mg, Oral, TID PRN, Augusto Guan, FNP, 100 mg at 24 0525    bisacodyL suppository 10 mg, 10 mg,  Rectal, Daily PRN, Freida, Augusto A, FNP, 10 mg at 04/15/24 0900    bisacodyL suppository 10 mg, 10 mg, Rectal, Once, Freida, Augusto A, FNP    cyanocobalamin tablet 500 mcg, 500 mcg, Oral, Daily, Freida, Augusto A, FNP, 500 mcg at 04/22/24 0808    enoxaparin injection 30 mg, 30 mg, Subcutaneous, Q24H (prophylaxis, 1700), Freida, Augusto A, FNP, 30 mg at 04/21/24 1739    estradiol 0.05 mg/24 hr td ptsw patch 1 patch, 1 patch, Transdermal, Every Wed, 1 patch at 04/17/24 2050 **AND** estradiol 0.05 mg/24 hr td ptsw patch 1 patch, 1 patch, Transdermal, Every Sun, Paawn Myers MD, 1 patch at 04/21/24 1740    ferrous sulfate tablet 1 each, 1 tablet, Oral, Daily, Freida, Augusto A, FNP, 1 each at 04/22/24 0808    furosemide tablet 40 mg, 40 mg, Oral, Daily, Ирина Ness, FNP, 40 mg at 04/22/24 0807    hydrALAZINE injection 10 mg, 10 mg, Intravenous, Q4H PRN, Freida, Augusto A, FNP    hydrOXYzine pamoate capsule 50 mg, 50 mg, Oral, After dinner, Dian, Disha A, FNP, 50 mg at 04/21/24 1739    labetalol 20 mg/4 mL (5 mg/mL) IV syring, 10 mg, Intravenous, Q4H PRN, Freida, Augusto A, FNP    LIDOcaine 5 % patch 1 patch, 1 patch, Transdermal, Q24H, Dian, Disha A, FNP, 1 patch at 04/21/24 1740    methocarbamoL tablet 500 mg, 500 mg, Oral, TID PRN, Dian, Disha A, FNP, 500 mg at 04/21/24 0212    methocarbamoL tablet 500 mg, 500 mg, Oral, Q6H, [place], Disha A, FNP, 500 mg at 04/22/24 1131    metoprolol injection 10 mg, 10 mg, Intravenous, Q2H PRN, Freida, Augusto A, FNP, 10 mg at 04/17/24 1207    metoprolol tartrate (LOPRESSOR) tablet 25 mg, 25 mg, Oral, BID, Yang Campbell, AGACNP-BC, 25 mg at 04/22/24 0808    naloxegoL (MOVANTIK) tablet 25 mg, 25 mg, Oral, Daily, Freida, Augusto A, FNP, 25 mg at 04/22/24 0807    nitroGLYCERIN SL tablet 0.4 mg, 0.4 mg, Sublingual, Q5 Min PRN, Freida, Augusto A, FNP    ondansetron disintegrating tablet 4 mg, 4 mg, Oral, Q6H PRN, Freida,  "Augusto A, FNP, 4 mg at 04/15/24 0705    oxyCODONE immediate release tablet 10 mg, 10 mg, Oral, Q4H PRN, Soper Disha A, FNP, 10 mg at 04/19/24 2303    oxyCODONE immediate release tablet 5 mg, 5 mg, Oral, Q4H PRN, Soper Disha A, FNP, 5 mg at 04/21/24 1303    polyethylene glycol packet 17 g, 17 g, Oral, BID PRN, Freida, Augusto A, FNP    promethazine tablet 25 mg, 25 mg, Oral, Q6H PRN, Freida, Augusto A, FNP, 25 mg at 04/15/24 0948    tamsulosin 24 hr capsule 0.4 mg, 0.4 mg, Oral, QHS, Ирина Ness, FNP, 0.4 mg at 04/21/24 2036     Review of Systems   Complete 12-point review of symptoms negative except for what's mentioned in HPI     Objective:     /62   Pulse 75   Temp 98.3 °F (36.8 °C) (Oral)   Resp 16   Ht 5' 2.99" (1.6 m)   Wt 74.5 kg (164 lb 3.9 oz)   SpO2 95%   Breastfeeding No   BMI 29.10 kg/m²      Physical Exam  Constitutional:       Appearance: She is ill-appearing.   HENT:      Head: Normocephalic.      Mouth/Throat:      Mouth: Mucous membranes are moist.   Eyes:      Pupils: Pupils are equal, round, and reactive to light.   Cardiovascular:      Rate and Rhythm: Normal rate and regular rhythm.      Heart sounds: Normal heart sounds.   Pulmonary:      Effort: Pulmonary effort is normal.      Breath sounds: Normal breath sounds.   Abdominal:      General: Bowel sounds are normal.      Palpations: Abdomen is soft.   Genitourinary:     Comments: Indwelling catheter  Musculoskeletal:      Cervical back: Neck supple.      Comments: Left hip dressing clean and intact.  Diffuse muscle atrophy.    Skin:     General: Skin is warm and dry.   Neurological:      Motor: Weakness present.   Psychiatric:         Mood and Affect: Mood normal.     *MD performed and documented physical examination          Lines/Drains/Airways       Drain  Duration                  Urethral Catheter 04/16/24 0300 16 Fr. 6 days              Peripheral Intravenous Line  Duration                  Peripheral IV " - Single Lumen 04/09/24 1210 18 G Right Forearm 13 days                  Labs  Recent Results (from the past 24 hour(s))   Comprehensive Metabolic Panel    Collection Time: 04/22/24  5:10 AM   Result Value Ref Range    Sodium Level 138 132 - 146 mmol/L    Potassium Level 3.8 3.5 - 5.1 mmol/L    Chloride 104 98 - 111 mmol/L    Carbon Dioxide 27 23 - 31 mmol/L    Glucose Level 88 75 - 121 mg/dL    Blood Urea Nitrogen 17.5 9.8 - 20.1 mg/dL    Creatinine 0.78 0.55 - 1.02 mg/dL    Calcium Level Total 8.4 8.4 - 10.2 mg/dL    Protein Total 5.2 (L) 5.8 - 7.6 gm/dL    Albumin Level 2.7 (L) 3.4 - 4.8 g/dL    Globulin 2.5 2.4 - 3.5 gm/dL    Albumin/Globulin Ratio 1.1 1.1 - 2.0 ratio    Bilirubin Total 0.5 <=1.5 mg/dL    Alkaline Phosphatase 108 40 - 150 unit/L    Alanine Aminotransferase 17 0 - 55 unit/L    Aspartate Aminotransferase 19 5 - 34 unit/L    eGFR >60 mls/min/1.73/m2   Magnesium    Collection Time: 04/22/24  5:10 AM   Result Value Ref Range    Magnesium Level 2.00 1.60 - 2.60 mg/dL   Phosphorus    Collection Time: 04/22/24  5:10 AM   Result Value Ref Range    Phosphorus Level 3.1 2.3 - 4.7 mg/dL   Prealbumin    Collection Time: 04/22/24  5:10 AM   Result Value Ref Range    Prealbumin 15.2 14.0 - 37.0 mg/dL   CBC with Differential    Collection Time: 04/22/24  5:10 AM   Result Value Ref Range    WBC 9.13 4.50 - 11.50 x10(3)/mcL    RBC 3.33 (L) 4.20 - 5.40 x10(6)/mcL    Hgb 10.1 (L) 12.0 - 16.0 g/dL    Hct 31.0 (L) 37.0 - 47.0 %    MCV 93.1 80.0 - 94.0 fL    MCH 30.3 27.0 - 31.0 pg    MCHC 32.6 (L) 33.0 - 36.0 g/dL    RDW 15.5 11.5 - 17.0 %    Platelet 455 (H) 130 - 400 x10(3)/mcL    MPV 9.7 7.4 - 10.4 fL    Neut % 62.4 %    Lymph % 21.4 %    Mono % 8.7 %    Eos % 5.7 %    Basophil % 0.5 %    Lymph # 1.95 0.6 - 4.6 x10(3)/mcL    Neut # 5.70 2.1 - 9.2 x10(3)/mcL    Mono # 0.79 0.1 - 1.3 x10(3)/mcL    Eos # 0.52 0 - 0.9 x10(3)/mcL    Baso # 0.05 <=0.2 x10(3)/mcL    IG# 0.12 (H) 0 - 0.04 x10(3)/mcL    IG% 1.3 %    NRBC%  0.0 %     Radiology  Left hip XR on 04/09/2024, IMPRESSION: Improved alignment following internal fixation of the femur.  Radiology  Abdominal XR on 04/15/2024, IMPRESSION:  Residual feces nonspecific gas pattern.    Assessment/Plan:     89 y.o. WF admitted on 4/12/2024    Left hip fracture   - s/p left hip IM nailing on 04/09/2024  - WBAT to LLE  - discontinue staples on 04/30  - discontinue Gabapentin 100 mg TID (initiated 4/12), Gabapentin 200 mg at bedtime (initiated 4/14) on 04/15 2/2 confusion  - continue                Tylenol 650 mg every 6 hours                Calcium-vitamin D3 1 tablet b.i.d.                 Oxycodone 5 mg q.6 hours.                Robaxin 500 mg t.i.d.  - defer to physiatry for rehab and pain management  - PT/OT/RT following     Osteoporosis  - stable  - vitamin-D level 42.1 on 04/10/2024  - continue.                Calcium-vitamin D3 1 tablet b.i.d.                 Estradiol 0.05 mg patch every Wednesday      HTN  - BP at goal!!  - discontinue Norvasc 5 mg daily on 04/18  - continue                  Metoprolol tartrate 25 mg b.i.d.                Hydralazine 10 mg every 2 hours as needed for BP > 160/90                Labetalol 10 mg every 2 hours as needed for BP > 160/90  - low sodium diet     COPD  - stable  - not on home 02  - keep 02 > 88%   - monitor closely     Normocytic anemia  - asymptomatic  - H/H trending down  - continue                Vitamin B12 tablet daily                    Ferrous sulfate 325 mg daily     - no evidence of active bleeds  - will closely monitor and transfuse if needed      Constipation  - stable   - discontinue Colace 100 mg b.i.d. and MiraLax 17 g daily 2/2 loose stools on 04/17  - continue  Movantik 25 mg daily (initiated 4/16)    Bilateral Lower extremity edema  - resolving  - continue   Lasix 40 mg daily (to start 4/15)    Insomnia with associated delirium  - improved  - s/p Haldol 5 mg IM x1 on 04/16  - continue   Melatonin 6 mg at bedtime  (initiated 4/14)    Urinary retention  - current  - required indwelling catheter insertion on 04/15  - continue   Flomax 0.4 mg at bedtime (initiated 4/16)    New onset atrial fibrillation  - controlled ventricular rate  - continue                  Metoprolol tartrate 25 mg b.i.d   Aspirin 81 mg daily  - cardiology does not recommend OAC 2/2 risks outweigh benefits, recommends aspirin 81 mg daily  - follow-up with cardiology outpatient for transthoracic echocardiogram     AB therapy  Rocephin 4/9-4/11     VTE Prophylaxis: Lovenox 30 mg daily   COVID-19 testing:  Unknown  COVID-19 vaccination status:  Vaccinated (Pfizer):  01/10/2021, 01/31/2021, 12/06/2022     POA: No  Living will: No  Contacts: Lacey Pizarro(Daughter) [phone]      CODE STATUS: DNR  Internal Medicine (attending): Pawan Myers MD  Physiatry (consulting):  Iglesia Tran MD     OUTPATIENT PROVIDERS  PCP:  Iglesia Steel MD  Orthopedic surgery:  Guy Jaquez D.O.  Cardiology: Matthew Molina MD     DISPOSITION:  Sleep hygiene, bowel maintenance, and appetite at goal.  Vital signs at goal with no recorded fevers.  Lab work unremarkable.  Bilateral lower extremity swelling significantly improved.  Continue current POC.  Monitor closely.  Notify of acute changes.    Staffing 4/16/2024:  Overall continent of bowel and bladder.  Good appetite.  PT reported overall partial/mod assist with bed mobility.  Ambulating 150 ft with supervision/touch assist using rolling walker.  OT reported overall independent to moderate assistance with toileting and bathing.  Moderate assistance with transfers.  Projected discharge pending.    Jacob Guan NP conducted independent physical examination and assisted with medical documentation.

## 2024-04-22 NOTE — PROGRESS NOTES
[facility] ([facility])  Rehab Progress Note    Patient Name: Safia Muñoz  MRN: [id number]  Age: 89 y.o. Sex: female  : 1931  Hospital Length of Stay: 9 days  Date of Service: 2024   Chief Complaint: Left hip fracture s/p left hip IM nailing on 2024     Subjective:     Basic Information  Admit Information: 89-year-old white female presented to [facility] ED on 2024 complaining of left hip pain after a ground level fall.  PMH significant for HTN and COPD not on home O2.  Workup significant for closed comminuted intertrochanteric left femur fracture.  Tolerated left hip IM nailing on  without perioperative complications.  Received 3 days Rocephin due to underlying UTI.  Urine culture appeared to be contaminated.  Orthopedic surgery recommended WBAT to LLE.  Tolerated transfer to [facility] inpatient rehab unit on  without incident.   Today's Information: No acute events overnight.  Sitting up in bed.  Reports good sleep and appetite.  Last BM .  Vital signs at goal with no recorded fevers.  No new labs or imaging today.    Review of patient's allergies indicates:   Allergen Reactions    Adhesive tape-silicones Blisters    Ofloxacin     Penicillins     Sulfamethoxazole-trimethoprim      Other reaction(s): Unknown    Codeine Nausea Only        Current Facility-Administered Medications:     0.9%  NaCl infusion, , Intravenous, Continuous, Disha Biggs, FNP, Last Rate: 125 mL/hr at 24 1723, New Bag at 24 1723    acetaminophen tablet 650 mg, 650 mg, Oral, Q6H, Augusto Guan, FNP, 650 mg at 24 1739    acetaminophen tablet 650 mg, 650 mg, Oral, Q8H PRN, Disha Biggs, FNP    aspirin EC tablet 81 mg, 81 mg, Oral, Daily, Ирина Ness, FNP, 81 mg at 24 0800    benzonatate capsule 100 mg, 100 mg, Oral, TID PRN, Augusto Guan, FNP, 100 mg at 24 0525    bisacodyL suppository 10 mg, 10 mg, Rectal, Daily PRN,  Freida, Augusto A, FNP, 10 mg at 04/15/24 0900    bisacodyL suppository 10 mg, 10 mg, Rectal, Once, Freida, Augusto A, FNP    cyanocobalamin tablet 500 mcg, 500 mcg, Oral, Daily, Freida, Augusto A, FNP, 500 mcg at 04/21/24 0800    enoxaparin injection 30 mg, 30 mg, Subcutaneous, Q24H (prophylaxis, 1700), Freida, Augusto A, FNP, 30 mg at 04/21/24 1739    estradiol 0.05 mg/24 hr td ptsw patch 1 patch, 1 patch, Transdermal, Every Wed, 1 patch at 04/17/24 2050 **AND** estradiol 0.05 mg/24 hr td ptsw patch 1 patch, 1 patch, Transdermal, Every Sun, Pawan Myers MD, 1 patch at 04/21/24 1740    ferrous sulfate tablet 1 each, 1 tablet, Oral, Daily, Freida, Augusto A, FNP, 1 each at 04/21/24 0800    furosemide tablet 40 mg, 40 mg, Oral, Daily, Ирина Ness, FNP, 40 mg at 04/21/24 0800    hydrALAZINE injection 10 mg, 10 mg, Intravenous, Q4H PRN, Freida, Augusto A, FNP    hydrOXYzine pamoate capsule 50 mg, 50 mg, Oral, After dinner, Dian Disha A, FNP, 50 mg at 04/21/24 1739    labetalol 20 mg/4 mL (5 mg/mL) IV syring, 10 mg, Intravenous, Q4H PRN, Freida, Augusto A, FNP    LIDOcaine 5 % patch 1 patch, 1 patch, Transdermal, Q24H, Bowler Disha A, FNP, 1 patch at 04/21/24 1740    methocarbamoL tablet 500 mg, 500 mg, Oral, TID PRN, Dian, Disha A, FNP, 500 mg at 04/21/24 0212    methocarbamoL tablet 500 mg, 500 mg, Oral, Q6H, Dian Disha A, FNP, 500 mg at 04/21/24 1740    metoprolol injection 10 mg, 10 mg, Intravenous, Q2H PRN, Freida, Augusto A, FNP, 10 mg at 04/17/24 1207    metoprolol tartrate (LOPRESSOR) tablet 25 mg, 25 mg, Oral, BID, Yang Campbell, GURVINDERP-BC, 25 mg at 04/21/24 2036    naloxegoL (MOVANTIK) tablet 25 mg, 25 mg, Oral, Daily, Freida, Augusto A, FNP, 25 mg at 04/21/24 0800    nitroGLYCERIN SL tablet 0.4 mg, 0.4 mg, Sublingual, Q5 Min PRN, Freida, Augusto A, FNP    ondansetron disintegrating tablet 4 mg, 4 mg, Oral, Q6H PRN, Freida, Augusto A, FNP, 4 mg at  "04/15/24 0705    oxyCODONE immediate release tablet 10 mg, 10 mg, Oral, Q4H PRN, Dian, Disha A, FNP, 10 mg at 24 2303    oxyCODONE immediate release tablet 5 mg, 5 mg, Oral, Q4H PRN, Thomas, Disha A, FNP, 5 mg at 24 1303    polyethylene glycol packet 17 g, 17 g, Oral, BID PRN, Freida, Augusto A, FNP    promethazine tablet 25 mg, 25 mg, Oral, Q6H PRN, Freida, Augusto A, FNP, 25 mg at 04/15/24 0948    tamsulosin 24 hr capsule 0.4 mg, 0.4 mg, Oral, QHS, Ирина Ness, FNP, 0.4 mg at 24     Review of Systems   Complete 12-point review of symptoms negative except for what's mentioned in HPI     Objective:     /71   Pulse 89   Temp 97.5 °F (36.4 °C) (Oral)   Resp 18   Ht 5' 2.99" (1.6 m)   Wt 74.5 kg (164 lb 3.9 oz)   SpO2 95%   Breastfeeding No   BMI 29.10 kg/m²      Physical Exam  Constitutional:       Appearance: She is ill-appearing.   HENT:      Head: Normocephalic.      Mouth/Throat:      Mouth: Mucous membranes are moist.   Eyes:      Pupils: Pupils are equal, round, and reactive to light.   Cardiovascular:      Rate and Rhythm: Normal rate and regular rhythm.      Heart sounds: Normal heart sounds.   Pulmonary:      Effort: Pulmonary effort is normal.      Breath sounds: Normal breath sounds.   Abdominal:      General: Bowel sounds are normal.      Palpations: Abdomen is soft.   Genitourinary:     Comments: Indwelling catheter  Musculoskeletal:      Cervical back: Neck supple.      Left lower le+ Edema present.      Comments: Left hip dressing clean and intact.  Diffuse muscle atrophy.    Skin:     General: Skin is warm and dry.   Neurological:      Motor: Weakness present.   Psychiatric:         Mood and Affect: Mood normal.          Lines/Drains/Airways       Drain  Duration                  Urethral Catheter 24 0300 16 Fr. 5 days              Peripheral Intravenous Line  Duration                  Peripheral IV - Single Lumen 24 1210 18 G Right " Forearm 12 days                  Labs  No results found for this or any previous visit (from the past 24 hour(s)).      Radiology  Left hip XR on 04/09/2024, IMPRESSION: Improved alignment following internal fixation of the femur.  Radiology  Abdominal XR on 04/15/2024, IMPRESSION:  Residual feces nonspecific gas pattern.    Assessment/Plan:     89 y.o. WF admitted on 4/12/2024    Left hip fracture   - s/p left hip IM nailing on 04/09/2024  - WBAT to LLE  - discontinue staples on 04/30  - discontinue Gabapentin 100 mg TID (initiated 4/12), Gabapentin 200 mg at bedtime (initiated 4/14) on 04/15 2/2 confusion  - continue                Tylenol 650 mg every 6 hours                Calcium-vitamin D3 1 tablet b.i.d.                 Oxycodone 5 mg q.6 hours.                Robaxin 500 mg t.i.d.  - defer to physiatry for rehab and pain management  - PT/OT/RT following     Osteoporosis  - stable  - vitamin-D level 42.1 on 04/10/2024  - continue.                Calcium-vitamin D3 1 tablet b.i.d.                 Estradiol 0.05 mg patch every Wednesday      HTN  - BP at goal!!  - discontinue Norvasc 5 mg daily on 04/18  - continue                  Metoprolol tartrate 25 mg b.i.d.                Hydralazine 10 mg every 2 hours as needed for BP > 160/90                Labetalol 10 mg every 2 hours as needed for BP > 160/90  - low sodium diet     COPD  - stable  - not on home 02  - keep 02 > 88%   - monitor closely     Normocytic anemia  - asymptomatic  - H/H trending down  - continue                Vitamin B12 tablet daily                    Ferrous sulfate 325 mg daily     - no evidence of active bleeds  - will closely monitor and transfuse if needed      Constipation  - stable   - discontinue Colace 100 mg b.i.d. and MiraLax 17 g daily 2/2 loose stools on 04/17  - continue  Movantik 25 mg daily (initiated 4/16)    Bilateral Lower extremity edema  - resolving  - continue   Lasix 40 mg daily (to start 4/15)    Insomnia with  associated delirium  - improved  - s/p Haldol 5 mg IM x1 on 04/16  - continue   Melatonin 6 mg at bedtime (initiated 4/14)    Urinary retention  - current  - required indwelling catheter insertion on 04/15  - continue   Flomax 0.4 mg at bedtime (initiated 4/16)    New onset atrial fibrillation  - controlled ventricular rate  - continue                  Metoprolol tartrate 25 mg b.i.d   Aspirin 81 mg daily  - cardiology does not recommend OAC 2/2 risks outweigh benefits, recommends aspirin 81 mg daily  - follow-up with cardiology outpatient for transthoracic echocardiogram     AB therapy  Rocephin 4/9-4/11     VTE Prophylaxis: Lovenox 30 mg daily   COVID-19 testing:  Unknown  COVID-19 vaccination status:  Vaccinated (Pfizer):  01/10/2021, 01/31/2021, 12/06/2022     POA: No  Living will: No  Contacts: Lacey Pizarro(Daughter) [phone]      CODE STATUS: DNR  Internal Medicine (attending): Pawan Myers MD  Physiatry (consulting):  Iglesia Tran MD     OUTPATIENT PROVIDERS  PCP:  Iglesia Steel MD  Orthopedic surgery:  Guy Jaquez D.O.  Cardiology: Matthew Molina MD     DISPOSITION:  Sleep hygiene, bowel maintenance, and appetite at goal.  Last BM 4/20.  Vital signs at goal with no recorded fevers.  No new labs or imaging today.  Left lower extremity swelling significantly improved from last week.  Continues with indwelling catheter.  Repeat lab work in the morning.  Monitor closely.  Notify of changes.    Staffing 4/16:  Overall continent of bowel and bladder.  Good appetite.  PT reported overall partial/mod assist with bed mobility.  Ambulating 150 ft with supervision/touch assist using rolling walker.  OT reported overall independent to moderate assistance with toileting and bathing.  Moderate assistance with transfers.  Projected discharge pending.

## 2024-04-22 NOTE — PT/OT/SLP PROGRESS
"Occupational Therapy Inpatient Rehab Treatment    Name: Safia Muñoz  MRN: [id number]    Assessment:  Sfaia Muñoz is a 89 y.o. female admitted with a medical diagnosis of Closed 2-part intertrochanteric fracture of proximal end of left femur.  She presents with the following impairments/functional limitations:  impaired endurance, impaired sensation, impaired self care skills, impaired functional mobility, decreased upper extremity function, decreased lower extremity function, pain, decreased ROM, impaired fine motor, orthopedic precautions Pt. Is a RED STAR FALL RISK..    General Precautions: Standard, fall     Orthopedic Precautions:LLE weight bearing as tolerated     Braces: N/A    Rehab Prognosis: Good; patient would benefit from acute skilled OT services to address these deficits and reach maximum level of function.      History:     Past Medical History:   Diagnosis Date    COPD (chronic obstructive pulmonary disease)     Hypertension        Past Surgical History:   Procedure Laterality Date    RETROGRADE INTRAMEDULLARY RODDING OF DISTAL FEMUR Left 4/9/2024    Procedure: INSERTION, INTRAMEDULLARY NAIL INTERTROCHENTERIC FRACTURE;  Surgeon: Guy Jaquez DO;  Location: [facility];  Service: Orthopedics;  Laterality: Left;  HANA TABLE, SYNTHES TFNA       Subjective     Orientation: Oriented x4    Chief Complaint: "I'm so tired." Pt. C rest breaks; c/o "dry mouth" also.     Patient/Family Comments/goals: "Pt. Will have 24 Hr. SVN s/p D/C" per sonJeremy on Saturday.     Vitals   Vitals at Rest  BP    HR    O2 Sat    Pain 0/10     Vitals With Activity  /62   HR 75   O2 Sat    Pain 5/10     Respiratory Status: Room air    Patients cultural, spiritual, Mormon conflicts given the current situation: no       Objective:     Patient found up in chair with chair check, sarmiento catheter  upon OT entry to room.    Mobility   Patient completed:  Sit to Stand Transfer with supervision and stand by assistance " "with rolling walker  Stand to Sit Transfer with supervision and stand by assistance with rolling walker  Shower Transfer Step Transfer technique with moderate assistance with rolling walker and step threshold; SC c back/arms    Functional Mobility  Pt. Performed sit <>stand only in room ; then transfer via RW into <>out of "shower" c step over ledge.     ADLs   Current Status   Eating     Oral Hygiene 4 standing via RW; sitting for make up   Shower, Bathe Self     Upper Body Dressing     Lower Body Dressing     Toileting Hygiene     Toilet Transfer     Putting On, Taking Off Footwear 3 c AE     Limiting Factors for ADLs: motor, endurance, limited ROM, cognition, and pain . Pt. C decreased STM/recall of AE for donning/doffing shoes.       Therapeutic Activities  Pt. In standing moved rings laterally from one end of horizontal dowel to opposite side. Pt. C/o "tired" throughout. At 10:18 , vitals checked 111/62 c 75p.     Therapeutic Exercise  Pt. C B UE AROM on UBE while seated away from backrest x's 5 min at 1.5 umana. Pt. C 2 # dowel performed 15 reps each of B UE AROM through all joints/planes while sitting edge of chair.           LifeStyle Change and Education:             Patient left up in chair with call button in reach and chair alarm on.     Education provided: ADLs, transfer training, body mechanics, assistive device, wheelchair precautions, sequencing, safety precautions, fall prevention, and equipment recommendations    Multidisciplinary Problems       Occupational Therapy Goals          Problem: Occupational Therapy    Goal Priority Disciplines Outcome Interventions   Occupational Therapy Goal     OT, PT/OT Ongoing, Progressing    Description: Pt to perform eating tasks with independence by re-eval. Pt. C numb fingertips; unable to manage packages. Discussed c son getting safety scissors     Pt to perform oral hygiene tasks with min A standing at sink with RW by re-eval. Ongoing Sits in w/c -Pt. C new " onset A-Fib/RVR c fatigue; by Monday 4/22     Pt to perform UB dressing with independence by re-eval.Touch A c bra Numb fingertips/maybe front hook bra ?    Pt to perform LB dressing with mod A using AE PRN by re-eval. MET   Pt to perform donning/doffing footwear with max A using AE PRN by re-eval. MET   Pt to perform toileting hygiene with mod A by re-eval.  Pt. Continues c Lyman . Pt. Able to wipe self after BM 4/20/2024 c touch A to steady while standing c RE. Pt. Managed underwear up /down.   Pt to perform bathing tasks with mod A by re-eval. MET (sponge bath)     Pt to perform toilet transfers with mod A using LRAD by re-eval. MET  Pt to perform WIS transfers with mod A using LRAD by re-eval.  Pt. Unable to elevate L LE high enough to clear shower ledge. Son will measure shower door opening width vs. Tub accessibility.   Pt to perform meal prep activity with mod A by re-eval. MET   Pt to perform laundry management task with mod A by re-eval.  Pt to perform light housekeeping activity with mod A by re-eval.  Pt. To prepare water/food for dog by Re-Eval  Balance, Strengthening, Endurance, Balance:  Pt to consistently demonstrate adherence to orthopedic precautions during all ADL's as instructed by OT.  Pt to demonstrate consistent adherence to breathing control and energy conservation techniques as educated by OT.  Pt. To remember safety techniques for functional transfers S v/c's by D/C. !!                       Time Tracking     OT Received On: 04/22/24  Time In 0915     Time Out 1038  Total Time 83 min  Therapy Time: OT Individual: 83  Missed Time:    Missed Time Reason:      Billable Minutes: Self Care/Home Management 43, Therapeutic Activity 10, and Therapeutic Exercise 30    04/22/2024

## 2024-04-22 NOTE — PT/OT/SLP PROGRESS
Physical Therapy Inpatient Rehab Treatment    Patient Name:  Safia uMñoz   MRN:  [id number]    Recommendations:     Discharge Recommendations:  Moderate Intensity Therapy   Discharge Equipment Recommendations:     Barriers to discharge: Impaired functional mobility     Assessment:     Safia Muñoz is a 89 y.o. female admitted with a medical diagnosis of Closed 2-part intertrochanteric fracture of proximal end of left femur.  She presents with the following impairments/functional limitations:  weakness, impaired endurance, impaired functional mobility, gait instability, decreased lower extremity function, decreased safety awareness, pain .    Rehab Diagnosis: medical diagnosis of Closed 2-part intertrochanteric fracture of proximal end of left femur    General Precautions: Standard, fall     Orthopedic Precautions:LLE weight bearing as tolerated     Braces: N/A    Rehab Prognosis: Good; patient would benefit from acute skilled PT services to address these deficits and reach maximum level of function.      History:     Past Medical History:   Diagnosis Date    COPD (chronic obstructive pulmonary disease)     Hypertension        Past Surgical History:   Procedure Laterality Date    RETROGRADE INTRAMEDULLARY RODDING OF DISTAL FEMUR Left 4/9/2024    Procedure: INSERTION, INTRAMEDULLARY NAIL INTERTROCHENTERIC FRACTURE;  Surgeon: Guy Jaquez DO;  Location: [facility];  Service: Orthopedics;  Laterality: Left;  HANA TABLE, SYNTHES TFNA       Subjective     Patient comments: L leg is hurting    Respiratory Status: Room air    Patients cultural, spiritual, Amish conflicts given the current situation: no    Objective:      Patient found up in chair with chair check, sarmiento catheter  upon PT entry to room.    Therapeutic Activities and Exercises:  Pt preformed dynamic standing balance w/rw while playing beanbag toss.  Pt required seated rest breaks due to pain. Rec therapist present.     Activity Tolerance:  Good    Patient left up in chair with all lines intact and call button in reach.    Education provided: roles and goals of PT/PTA and balance training    Expected compliance: High compliance    Plan:     During this hospitalization, patient to be seen 6 x/week to address the identified rehab impairments via gait training, therapeutic activities, therapeutic exercises, neuromuscular re-education, wheelchair management/training and progress toward the following goals:    GOALS:   Multidisciplinary Problems       Physical Therapy Goals          Problem: Physical Therapy    Goal Priority Disciplines Outcome Goal Variances Interventions   Physical Therapy Goal     PT, PT/OT Ongoing, Progressing     Description: Bed Mobility:  MET - Roll left and right with partial/moderate assist.   MET - Sit to supine transfer with partial/moderate assist.   MET - Supine to sit transfer with partial/moderate assist.   Roll left and right independently  Sit to supine transfer with setup/clean-up assist assist  Supine to sit transfer with setup/clean-up assist assist    Transfers:  MET - Sit to stand transfer with supervision/touching assist using RW.   MET - Bed to chair transfer with supervision/touching assist Stand Step  using RW.   DISCONTINUE - Bed to chair transfer with partial/moderate assist Slide Board  using Slide board.   Sit to stand transfer with setup/clean-up assist using RW  Bed to chair transfer with setup/clean-up assist Stand Step  using RW  Car transfer with supervision/touching assist using RW   an object from the ground in standing position with supervision/touching assist using RW and reacher    Mobility:  Ambulate 150 feet with supervision/touching assist using RW.   Pt ascended/descended a 4 inch curb with partial/moderate assist using RW  Ascend/descend 4 stairs with partial/moderate assist using bilateral handrails  Ambulate 10 feet on uneven surfaces/ramps with partial/moderate assist using RW  Manual  wheelchair 150 feet with partial/moderate assist using Bilateral upper extremity                         Plan of Care Expires:  04/19/24  PT Next Visit Date: 04/25/24  Plan of Care reviewed with: patient    Additional Information:         Time Tracking:     Therapy Time  PT Received On: 04/22/24  PT Start Time: 1100  PT Stop Time: 1130  PT Total Time (min): 30 min   PT Individual: 30  Missed Time:    Time Missed due to:      Billable Minutes: Therapeutic Activity 15 and Therapeutic Exercise 15    04/22/2024

## 2024-04-23 PROCEDURE — 97110 THERAPEUTIC EXERCISES: CPT

## 2024-04-23 PROCEDURE — 25000003 PHARM REV CODE 250: Performed by: NURSE PRACTITIONER

## 2024-04-23 PROCEDURE — 97535 SELF CARE MNGMENT TRAINING: CPT

## 2024-04-23 PROCEDURE — 51798 US URINE CAPACITY MEASURE: CPT

## 2024-04-23 PROCEDURE — 94761 N-INVAS EAR/PLS OXIMETRY MLT: CPT

## 2024-04-23 PROCEDURE — 11800000 HC REHAB PRIVATE ROOM

## 2024-04-23 PROCEDURE — 99233 SBSQ HOSP IP/OBS HIGH 50: CPT | Mod: ,,, | Performed by: NURSE PRACTITIONER

## 2024-04-23 PROCEDURE — 97116 GAIT TRAINING THERAPY: CPT

## 2024-04-23 PROCEDURE — 63600175 PHARM REV CODE 636 W HCPCS: Performed by: NURSE PRACTITIONER

## 2024-04-23 PROCEDURE — 97530 THERAPEUTIC ACTIVITIES: CPT

## 2024-04-23 PROCEDURE — 94799 UNLISTED PULMONARY SVC/PX: CPT

## 2024-04-23 PROCEDURE — 99900031 HC PATIENT EDUCATION (STAT)

## 2024-04-23 RX ADMIN — METHOCARBAMOL 500 MG: 500 TABLET ORAL at 05:04

## 2024-04-23 RX ADMIN — METHOCARBAMOL 500 MG: 500 TABLET ORAL at 12:04

## 2024-04-23 RX ADMIN — FUROSEMIDE 40 MG: 40 TABLET ORAL at 07:04

## 2024-04-23 RX ADMIN — ASPIRIN 81 MG: 81 TABLET, COATED ORAL at 07:04

## 2024-04-23 RX ADMIN — METOPROLOL TARTRATE 25 MG: 25 TABLET, FILM COATED ORAL at 09:04

## 2024-04-23 RX ADMIN — OXYCODONE 10 MG: 5 TABLET ORAL at 07:04

## 2024-04-23 RX ADMIN — METOPROLOL TARTRATE 25 MG: 25 TABLET, FILM COATED ORAL at 07:04

## 2024-04-23 RX ADMIN — OXYCODONE 10 MG: 5 TABLET ORAL at 09:04

## 2024-04-23 RX ADMIN — METHOCARBAMOL 500 MG: 500 TABLET ORAL at 10:04

## 2024-04-23 RX ADMIN — ENOXAPARIN SODIUM 30 MG: 30 INJECTION SUBCUTANEOUS at 05:04

## 2024-04-23 RX ADMIN — NALOXEGOL OXALATE 25 MG: 25 TABLET, FILM COATED ORAL at 07:04

## 2024-04-23 RX ADMIN — TAMSULOSIN HYDROCHLORIDE 0.4 MG: 0.4 CAPSULE ORAL at 09:04

## 2024-04-23 RX ADMIN — ACETAMINOPHEN 325MG 650 MG: 325 TABLET ORAL at 05:04

## 2024-04-23 RX ADMIN — HYDROXYZINE PAMOATE 50 MG: 50 CAPSULE ORAL at 05:04

## 2024-04-23 RX ADMIN — CYANOCOBALAMIN TAB 500 MCG 500 MCG: 500 TAB at 07:04

## 2024-04-23 RX ADMIN — LIDOCAINE 5% 1 PATCH: 700 PATCH TOPICAL at 05:04

## 2024-04-23 RX ADMIN — ACETAMINOPHEN 325MG 650 MG: 325 TABLET ORAL at 10:04

## 2024-04-23 RX ADMIN — ACETAMINOPHEN 325MG 650 MG: 325 TABLET ORAL at 12:04

## 2024-04-23 RX ADMIN — FERROUS SULFATE TAB 325 MG (65 MG ELEMENTAL FE) 1 EACH: 325 (65 FE) TAB at 07:04

## 2024-04-23 NOTE — CARE UPDATE
[id number] Team conference today. Therapy would like pt to be dc this Friday or Monday depending when sitters can come for training.   According  to Clarice's note, pt's dgtr Lacey would like dc on Monday instead of Friday. Called Lacey to schedule sitter training for this Friday. Left a voice message.

## 2024-04-23 NOTE — PT/OT/SLP PROGRESS
Physical Therapy Inpatient Rehab Treatment    Patient Name:  Safia Muñoz   MRN:  [id number]    Recommendations:     Discharge Recommendations:  Moderate Intensity Therapy   Discharge Equipment Recommendations:     Barriers to discharge: Impaired functional mobility  and pain    Assessment:     Safia Muñoz is a 89 y.o. female admitted with a medical diagnosis of Closed 2-part intertrochanteric fracture of proximal end of left femur.  She presents with the following impairments/functional limitations:  weakness, impaired endurance, impaired self care skills, impaired functional mobility, gait instability, impaired cognition, decreased lower extremity function, decreased ROM, orthopedic precautions.    Rehab Diagnosis: medical diagnosis of Closed 2-part intertrochanteric fracture of proximal end of left femur    General Precautions: Standard, fall     Orthopedic Precautions:LLE weight bearing as tolerated     Braces: N/A    Rehab Prognosis: Good; patient would benefit from acute skilled PT services to address these deficits and reach maximum level of function.      History:     Past Medical History:   Diagnosis Date    COPD (chronic obstructive pulmonary disease)     Hypertension        Past Surgical History:   Procedure Laterality Date    RETROGRADE INTRAMEDULLARY RODDING OF DISTAL FEMUR Left 4/9/2024    Procedure: INSERTION, INTRAMEDULLARY NAIL INTERTROCHENTERIC FRACTURE;  Surgeon: Guy Jaquez DO;  Location: [facility];  Service: Orthopedics;  Laterality: Left;  HANA TABLE, SYNTHES TFNA       Subjective     Patient comments: pt agreeable to participate, but c/o feeling very tired at start of session. Increased pain reported with gait, which improved as distance increased.    Respiratory Status: Room air    Patients cultural, spiritual, Episcopal conflicts given the current situation: no    Objective:     Communicated with pt and OT Yoli prior to session.  Patient found up in chair with head down on a  pillow upon PT entry to room.    Pt is Oriented x3 and Alert and Cooperative.    Vitals   1000: 99/55, 65 bpm - at start of session  1020: 117/68, 69 bpm - taken after gait    Pain Pain Rating 1: 8/10  Location - Side 1: Left  Location 1: hip  Pain Addressed 1: Pre-medicate for activity, Reposition, Cessation of Activity, Other (see comments) (NP Ana Laura aware)  Pain Rating Post-Intervention 1: 2/10       Functional Mobility:    Current   Status  Discharge   Goal   Functional Area: Care Score:    Roll Left and Right   Independent   Sit to Lying   Supervision or touching assistance   Lying to Sitting on Side of Bed   Supervision or touching assistance   Sit to Stand 4  SBA with RW and VC for technique Supervision or touching assistance   Chair/Bed-to-Chair Transfer 4  SBA with RW and VC for safety Supervision or touching assistance   Car Transfer   Not attempted due to medical/safety concerns   Walk 10 Feet 4 Not attempted due to medical/safety concerns   Walk 50 Feet with Two Turns 4  Pt amb 70' + 120' with RW and SBA, extended seated break between bouts. Slow gait speed, with increased pain reported at start of gait distance that decreased as ambulation continued. Distance limited by fatigue. Not attempted due to medical/safety concerns   Walk 150 Feet 88 Not attempted due to medical/safety concerns   Walk 10 Feet Uneven Surface 4  Pt amb 30' with RW outside on uneven surfaces, CGA provided for safety. Slower speed on uneven surfaces vs even surfaces, with distance limited by fatigue. Not attempted due to medical/safety concerns   1 Step (Curb)   Not attempted due to medical/safety concerns   4 Steps   Not attempted due to medical/safety concerns   12 Steps   Not attempted due to medical/safety concerns   Picking Up Object   Not attempted due to medical/safety concerns   Wheel 50 Feet with Two Turns   Independent   Wheel 150 Feet   Not attempted due to medical/safety concerns       Therapeutic Activities and  Exercises:  Patient educated on role of acute care PT and PT POC and safety while in hospital including calling nurse for mobility  Patient educated about importance of OOB mobility and remaining up in chair most of the day.    Co-treat with CTRS Corinna for washer toss activity to facilitate improved balance, coordination, and activity tolerance. Performed in standing with use of RW and CGA for balance. Pt using alternating UE to reach at varying heights on ipsilateral and contralateral sides of her body to retrieve washers, prior to tossing them at the target.    Activity Tolerance: Fair    Patient left up in chair with call button in reach and chair alarm on.    Education provided: roles and goals of PT/PTA, transfer training, gait training, safety awareness, body mechanics, assistive device, strengthening exercises, and fall prevention    Expected compliance: Moderate compliance    Plan:     During this hospitalization, patient to be seen 6 x/week to address the identified rehab impairments via gait training, therapeutic activities, therapeutic exercises, neuromuscular re-education, wheelchair management/training and progress toward the following goals:    GOALS:   Multidisciplinary Problems       Physical Therapy Goals          Problem: Physical Therapy    Goal Priority Disciplines Outcome Goal Variances Interventions   Physical Therapy Goal     PT, PT/OT Progressing     Description: Bed Mobility:  MET - Roll left and right with partial/moderate assist.   MET - Sit to supine transfer with partial/moderate assist.   MET - Supine to sit transfer with partial/moderate assist.   Roll left and right independently  Sit to supine transfer with setup/clean-up assist assist  Supine to sit transfer with setup/clean-up assist assist    Transfers:  MET - Sit to stand transfer with supervision/touching assist using RW.   MET - Bed to chair transfer with supervision/touching assist Stand Step  using RW.   DISCONTINUE - Bed to  chair transfer with partial/moderate assist Slide Board  using Slide board.   Sit to stand transfer with setup/clean-up assist using RW  Bed to chair transfer with setup/clean-up assist Stand Step  using RW  Car transfer with supervision/touching assist using RW   an object from the ground in standing position with supervision/touching assist using RW and reacher    Mobility:  Ambulate 150 feet with supervision/touching assist using RW.   Pt ascended/descended a 4 inch curb with partial/moderate assist using RW  Ascend/descend 4 stairs with partial/moderate assist using bilateral handrails  Ambulate 10 feet on uneven surfaces/ramps with partial/moderate assist using RW  Manual wheelchair 150 feet with partial/moderate assist using Bilateral upper extremity                         Plan of Care Expires:  04/19/24  PT Next Visit Date: 04/25/24  Plan of Care reviewed with: patient    Additional Information:         Time Tracking:     Therapy Time  PT Start Time: 1000  PT Stop Time: 1130  PT Total Time (min): 90 min   PT Individual: 90  Missed Time:    Time Missed due to:      Billable Minutes: Gait Training 60 min and Therapeutic Exercise 30 min    04/23/2024

## 2024-04-23 NOTE — PROGRESS NOTES
[facility] ([facility])  Rehab Progress Note    Patient Name: Safia Muñoz  MRN: [id number]  Age: 89 y.o. Sex: female  : 1931  Hospital Length of Stay: 11 days  Date of Service: 2024   Chief Complaint: Left hip fracture s/p left hip IM nailing on 2024     Subjective:     Basic Information  Admit Information: 89-year-old white female presented to [facility] ED on 2024 complaining of left hip pain after a ground level fall.  PMH significant for HTN and COPD not on home O2.  Workup significant for closed comminuted intertrochanteric left femur fracture.  Tolerated left hip IM nailing on  without perioperative complications.  Received 3 days Rocephin due to underlying UTI.  Urine culture appeared to be contaminated.  Orthopedic surgery recommended WBAT to LLE.  Tolerated transfer to [facility] inpatient rehab unit on  without incident.   Today's Information: No acute events overnight.  Sitting up in chair.  Reports good sleep and appetite.  Last BM .  Vital signs at goal with no recorded fevers.  No new labs or imaging today.    Review of patient's allergies indicates:   Allergen Reactions    Adhesive tape-silicones Blisters    Ofloxacin     Penicillins     Sulfamethoxazole-trimethoprim      Other reaction(s): Unknown    Codeine Nausea Only        Current Facility-Administered Medications:     0.9%  NaCl infusion, , Intravenous, Continuous, Disha Biggs FNP, Last Rate: 125 mL/hr at 24 1723, New Bag at 24 1723    acetaminophen tablet 650 mg, 650 mg, Oral, Q6H, Augusto Guan, FNP, 650 mg at 24 0509    acetaminophen tablet 650 mg, 650 mg, Oral, Q8H PRN, Disha Biggs, FNP    aspirin EC tablet 81 mg, 81 mg, Oral, Daily, Ирина Ness, ORLYP, 81 mg at 24 0715    benzonatate capsule 100 mg, 100 mg, Oral, TID PRN, Augusto Guan, FNP, 100 mg at 24 0525    bisacodyL suppository 10 mg, 10 mg, Rectal, Daily PRN,  Freida, Augusto A, FNP, 10 mg at 04/22/24 1426    bisacodyL suppository 10 mg, 10 mg, Rectal, Once, Freida, Augusto A, FNP    cyanocobalamin tablet 500 mcg, 500 mcg, Oral, Daily, Freida, Augusto A, FNP, 500 mcg at 04/23/24 0715    enoxaparin injection 30 mg, 30 mg, Subcutaneous, Q24H (prophylaxis, 1700), Freida, Augusto A, FNP, 30 mg at 04/22/24 1724    estradiol 0.05 mg/24 hr td ptsw patch 1 patch, 1 patch, Transdermal, Every Wed, 1 patch at 04/17/24 2050 **AND** estradiol 0.05 mg/24 hr td ptsw patch 1 patch, 1 patch, Transdermal, Every Sun, Pawan Myers MD, 1 patch at 04/21/24 1740    ferrous sulfate tablet 1 each, 1 tablet, Oral, Daily, Freida, Augusto A, FNP, 1 each at 04/23/24 0715    furosemide tablet 40 mg, 40 mg, Oral, Daily, Ирина Ness, FNP, 40 mg at 04/23/24 0715    hydrALAZINE injection 10 mg, 10 mg, Intravenous, Q4H PRN, Freida, Augusto A, FNP    hydrOXYzine pamoate capsule 50 mg, 50 mg, Oral, After dinner, Dian Disha A, FNP, 50 mg at 04/22/24 1800    labetalol 20 mg/4 mL (5 mg/mL) IV syring, 10 mg, Intravenous, Q4H PRN, Freida, Augusto A, FNP    LIDOcaine 5 % patch 1 patch, 1 patch, Transdermal, Q24H, Dian Disha A, FNP, 1 patch at 04/22/24 1724    methocarbamoL tablet 500 mg, 500 mg, Oral, TID PRN, Dian Disha A, FNP, 500 mg at 04/21/24 0212    methocarbamoL tablet 500 mg, 500 mg, Oral, Q6H, Dian Disha A, FNP, 500 mg at 04/23/24 0509    metoprolol injection 10 mg, 10 mg, Intravenous, Q2H PRN, Freida, Augusto A, FNP, 10 mg at 04/17/24 1207    metoprolol tartrate (LOPRESSOR) tablet 25 mg, 25 mg, Oral, BID, Yang Campbell, DINESH-BC, 25 mg at 04/23/24 0715    naloxegoL (MOVANTIK) tablet 25 mg, 25 mg, Oral, Daily, Freida, Augusto A, FNP, 25 mg at 04/23/24 0715    nitroGLYCERIN SL tablet 0.4 mg, 0.4 mg, Sublingual, Q5 Min PRN, Freida, Augusto A, FNP    ondansetron disintegrating tablet 4 mg, 4 mg, Oral, Q6H PRN, Freida, Augusto A, FNP, 4 mg at  "04/15/24 0705    oxyCODONE immediate release tablet 10 mg, 10 mg, Oral, Q4H PRN, DianArlynDisha A, FNP, 10 mg at 04/23/24 0715    oxyCODONE immediate release tablet 5 mg, 5 mg, Oral, Q4H PRN, Prospect, Disha A, FNP, 5 mg at 04/22/24 2037    polyethylene glycol packet 17 g, 17 g, Oral, BID PRN, Freida, Augusto A, FNP    promethazine tablet 25 mg, 25 mg, Oral, Q6H PRN, Freida, Augusto A, FNP, 25 mg at 04/15/24 0948    tamsulosin 24 hr capsule 0.4 mg, 0.4 mg, Oral, QHS, Ирина Ness, FNP, 0.4 mg at 04/22/24 2037     Review of Systems   Complete 12-point review of symptoms negative except for what's mentioned in HPI     Objective:     /72   Pulse 79   Temp 97.9 °F (36.6 °C) (Oral)   Resp 18   Ht 5' 2.99" (1.6 m)   Wt 74.5 kg (164 lb 3.9 oz)   SpO2 96%   Breastfeeding No   BMI 29.10 kg/m²      Physical Exam  Constitutional:       Appearance: She is ill-appearing.   HENT:      Head: Normocephalic.      Mouth/Throat:      Mouth: Mucous membranes are moist.   Eyes:      Pupils: Pupils are equal, round, and reactive to light.   Cardiovascular:      Rate and Rhythm: Normal rate and regular rhythm.      Heart sounds: Normal heart sounds.   Pulmonary:      Effort: Pulmonary effort is normal.      Breath sounds: Normal breath sounds.   Abdominal:      General: Bowel sounds are normal.      Palpations: Abdomen is soft.   Genitourinary:     Comments: Indwelling catheter  Musculoskeletal:      Cervical back: Neck supple.      Comments: Left hip dressing clean and intact.  Diffuse muscle atrophy.    Skin:     General: Skin is warm and dry.   Neurological:      Motor: Weakness present.   Psychiatric:         Mood and Affect: Mood normal.     *MD performed and documented physical examination          Lines/Drains/Airways       Drain  Duration                  Urethral Catheter 04/16/24 0300 16 Fr. 7 days              Peripheral Intravenous Line  Duration                  Peripheral IV - Single Lumen 04/09/24 " 1210 18 G Right Forearm 13 days                  Labs  No results found for this or any previous visit (from the past 24 hour(s)).    Radiology  Left hip XR on 04/09/2024, IMPRESSION: Improved alignment following internal fixation of the femur.  Radiology  Abdominal XR on 04/15/2024, IMPRESSION:  Residual feces nonspecific gas pattern.    Assessment/Plan:     89 y.o. WF admitted on 4/12/2024    Left hip fracture   - s/p left hip IM nailing on 04/09/2024  - WBAT to LLE  - discontinue staples on 04/30  - discontinue Gabapentin 100 mg TID (initiated 4/12), Gabapentin 200 mg at bedtime (initiated 4/14) on 04/15 2/2 confusion  - continue                Tylenol 650 mg every 6 hours                Calcium-vitamin D3 1 tablet b.i.d.                 Oxycodone 5 mg q.6 hours.                Robaxin 500 mg t.i.d.  - defer to physiatry for rehab and pain management  - PT/OT/RT following     Osteoporosis  - stable  - vitamin-D level 42.1 on 04/10/2024  - continue.                Calcium-vitamin D3 1 tablet b.i.d.                 Estradiol 0.05 mg patch every Wednesday      HTN  - BP at goal!!  - discontinue Norvasc 5 mg daily on 04/18  - continue                  Metoprolol tartrate 25 mg b.i.d.                Hydralazine 10 mg every 2 hours as needed for BP > 160/90                Labetalol 10 mg every 2 hours as needed for BP > 160/90  - low sodium diet     COPD  - stable  - not on home 02  - keep 02 > 88%   - monitor closely     Normocytic anemia  - asymptomatic  - H/H trending down  - continue                Vitamin B12 tablet daily                    Ferrous sulfate 325 mg daily     - no evidence of active bleeds  - will closely monitor and transfuse if needed      Constipation  - stable   - discontinue Colace 100 mg b.i.d. and MiraLax 17 g daily 2/2 loose stools on 04/17  - continue  Movantik 25 mg daily (initiated 4/16)    Bilateral Lower extremity edema  - resolving  - continue   Lasix 40 mg daily (to start  4/15)    Insomnia with associated delirium  - improved  - s/p Haldol 5 mg IM x1 on 04/16  - continue   Melatonin 6 mg at bedtime (initiated 4/14)    Urinary retention  - current  - required indwelling catheter insertion on 04/15  - continue   Flomax 0.4 mg at bedtime (initiated 4/16)  - discontinue indwelling catheter on 04/23    New onset atrial fibrillation  - controlled ventricular rate  - continue                  Metoprolol tartrate 25 mg b.i.d   Aspirin 81 mg daily  - cardiology does not recommend OAC 2/2 risks outweigh benefits, recommends aspirin 81 mg daily  - follow-up with cardiology outpatient for transthoracic echocardiogram     AB therapy  Rocephin 4/9-4/11     VTE Prophylaxis: Lovenox 30 mg daily   COVID-19 testing:  Unknown  COVID-19 vaccination status:  Vaccinated (Pfizer):  01/10/2021, 01/31/2021, 12/06/2022     POA: No  Living will: No  Contacts: Lacey Pizarro(Daughter) [phone]      CODE STATUS: DNR  Internal Medicine (attending): Pawan Myers MD  Physiatry (consulting):  Iglesia Tran MD     OUTPATIENT PROVIDERS  PCP:  Iglesia Steel MD  Orthopedic surgery:  Guy Jaquez D.O.  Cardiology: Matthew Molina MD     DISPOSITION:  Sleep hygiene, bowel maintenance, and appetite at goal.  Last BM 4/22.  Vital signs at goal with no recorded fevers.  No new labs or imaging today.  Discontinue peripheral IV and indwelling catheter.  Bladder scan every 6 hours to evaluate for retention times 24 hours.  Continue aggressive mobilization as tolerated.  Monitor closely.  Notify of acute changes.  Staffing completed today.    Staffing 4/23/2024:  Continent of bowel.  Lyman to be discontinued.  Blisters to hip from tape.  RT: Overall supervision.  Limited by left hip pain. Good appetite.  PT: SBA to min assist for bed mobility. SBA to CGA for OOB.  Ambulating 30-60 feet.  Needs increased time.  Limited by pain and fatigue. OT: Making good progress.  Overall mod to min assist.  Would be safer with a  TTB.  Mild cognitive deficits with poor carry over. Needs family training.  Projected discharge 4/26 if sitters available.     Jacob Guan NP conducted independent physical examination and assisted with medical documentation.    Total time spent on this encounter including chart review and direct MD + NP 1-on-1 patient interaction: 57 minutes   Over 50% of this time was spent in counseling and coordination of care

## 2024-04-23 NOTE — CARE UPDATE
[id number] Informed Luz NP that pt's dgtr would like the pt's staples be removed prior to dc. Luz will send Dr Leonid Gusman's NP to see if he can remove the staples the morning of pt's dc.

## 2024-04-23 NOTE — CARE UPDATE
[id number] Rec consult for HH services. Verbal FOC obtained from pt's dgtr. Referral sent to [facility] thru care port.

## 2024-04-23 NOTE — PROGRESS NOTES
Inpatient Nutrition Assessment    Admit Date: 4/12/2024   Total duration of encounter: 11 days   Patient Age: 89 y.o.    Nutrition Recommendation/Prescription     Continue Regular diet as tolerated.   Continue Ross, BID, to assist with healing.   Monitor intake, weight, and labs.       Communication of Recommendations:  care plan team.    Nutrition Assessment     Malnutrition Assessment/Nutrition-Focused Physical Exam    Does not meet criteria    Malnutrition Context: other (see comments) (Does not meet criteria) (04/18/24 1503)                                                           A minimum of two characteristics is recommended for diagnosis of either severe or non-severe malnutrition.    Chart Review    Reason Seen: follow-up    Malnutrition Screening Tool Results   Have you recently lost weight without trying?: No  Have you been eating poorly because of a decreased appetite?: No   MST Score: 0   Diagnosis:  Left hip fracture s/p left hip IM nailing on 4/9/2024.    Relevant Medical History:   COPD.  HTN.    Scheduled Medications:  acetaminophen, 650 mg, Q6H  aspirin, 81 mg, Daily  bisacodyL, 10 mg, Once  cyanocobalamin, 500 mcg, Daily  enoxparin, 30 mg, Q24H (prophylaxis, 1700)  estradiol 0.05 mg/24 hr td ptsw, 1 patch, Every Wed   And  estradiol 0.05 mg/24 hr td ptsw, 1 patch, Every Sun  ferrous sulfate, 1 tablet, Daily  furosemide, 40 mg, Daily  hydrOXYzine pamoate, 50 mg, After dinner  LIDOcaine, 1 patch, Q24H  methocarbamoL, 500 mg, Q6H  metoprolol tartrate, 25 mg, BID  naloxegoL, 25 mg, Daily  tamsulosin, 0.4 mg, QHS    Continuous Infusions:  sodium chloride 0.9%, Last Rate: 125 mL/hr at 04/17/24 1723    PRN Medications:     Current Facility-Administered Medications:     acetaminophen, 650 mg, Oral, Q8H PRN    benzonatate, 100 mg, Oral, TID PRN    bisacodyL, 10 mg, Rectal, Daily PRN    hydrALAZINE, 10 mg, Intravenous, Q4H PRN    labetalol, 10 mg, Intravenous, Q4H PRN    methocarbamoL, 500 mg, Oral, TID  "PRN    metoprolol, 10 mg, Intravenous, Q2H PRN    nitroGLYCERIN, 0.4 mg, Sublingual, Q5 Min PRN    ondansetron, 4 mg, Oral, Q6H PRN    oxyCODONE, 10 mg, Oral, Q4H PRN    oxyCODONE, 5 mg, Oral, Q4H PRN    polyethylene glycol, 17 g, Oral, BID PRN    promethazine, 25 mg, Oral, Q6H PRN      Calorie Containing IV Medications: no significant kcals from medications at this time    Recent Labs   Lab 04/17/24  0526 04/22/24  0510    138   K 3.8 3.8   CALCIUM 8.2* 8.4   PHOS  --  3.1   MG  --  2.00   CHLORIDE 99 104   CO2 28 27   BUN 18.1 17.5   CREATININE 0.71 0.78   EGFRNORACEVR >60 >60   GLUCOSE 123* 88   BILITOT  --  0.5   ALKPHOS  --  108   ALT  --  17   AST  --  19   ALBUMIN  --  2.7*   PREALB  --  15.2   WBC 12.74* 9.13   HGB 11.0* 10.1*   HCT 33.8* 31.0*     Nutrition Orders:  Diet Adult Regular  Dietary nutrition supplements Ross - Any flavor; BID    Appetite/Oral Intake: good/% of meals  Factors Affecting Nutritional Intake: none identified  Social Needs Impacting Access to Food: none identified  Food/Hindu/Cultural Preferences: none reported  Food Allergies: none reported  Last Bowel Movement: 04/22/24  Wound(s):  Intact    Comments    4/13: Pt with good appetite and intake. No recent weight loss noted/reported on nursing admit screen. Labs and meds reviewed. Will continue to monitor during stay.     4/18: Pt with fair to good appetite and intake. Stated she prefers soup with crackers at night. Denied N/V/C/D. No chewing or swallowing difficulties. No recent weight changes noted. Med/labs reviewed.     4/23: Pt with good appetite and intake. Consumes % of meals. No reports of N/V/C/D. Med/labs reviewed. No new wt recorded.      Anthropometrics    Height: 5' 2.99" (160 cm), Height Method: Stated  Last Weight: 74.5 kg (164 lb 3.9 oz) (04/13/24 0700), Weight Method: Bed Scale  BMI (Calculated): 29.1  BMI Classification: overweight (BMI 25-29.9)     Ideal Body Weight (IBW), Female: 114.95 lb   "   % Ideal Body Weight, Female (lb): 142.05 %                    Usual Body Weight (UBW), k.4 kg  % Usual Body Weight: 121.59     Usual Weight Provided By: EMR weight history    Wt Readings from Last 5 Encounters:   24 74.5 kg (164 lb 3.9 oz)   24 68.2 kg (150 lb 5.7 oz)   10/07/23 64.9 kg (143 lb)   18 68.4 kg (150 lb 12.7 oz)     Weight Change(s) Since Admission:   Wt Readings from Last 1 Encounters:   24 0700 74.5 kg (164 lb 3.9 oz)   24 1310 74.1 kg (163 lb 5.8 oz)   Admit Weight: 74.1 kg (163 lb 5.8 oz) (24 1310), Weight Method: Standard Scale    Estimated Needs    Weight Used For Calorie Calculations: 74.5 kg (164 lb 3.9 oz)  Energy Calorie Requirements (kcal): 1490-98881 kcal (20-25 kcal/kg/BW)  Energy Need Method: Kcal/kg  Weight Used For Protein Calculations: 74.5 kg (164 lb 3.9 oz)  Protein Requirements: 75 to 90 g/day (1.0-1.2 g/kg/CBW)  Fluid Requirements (mL): 1500 to 1850 kcals/day (1mL/kcal) or per MD Guidance.        Enteral Nutrition     Patient not receiving enteral nutrition at this time.    Parenteral Nutrition     Patient not receiving parenteral nutrition support at this time.    Evaluation of Received Nutrient Intake    Calories: meeting estimated needs  Protein: meeting estimated needs    Patient Education     Not applicable.    Nutrition Diagnosis     PES: Increased nutrient needs (protein and wound healing vitamins ) related to increased protein energy demand for wound healing as evidenced by S/P IM nailing. (active)       Nutrition Interventions     Intervention(s): multivitamin/mineral supplement therapy and collaboration with other providers    Goal: Meet greater than 80% of nutritional needs by follow-up. (goal progressing)      Nutrition Goals & Monitoring     Dietitian will monitor: food and beverage intake, energy intake, weight, weight change, electrolyte/renal panel, glucose/endocrine profile, and gastrointestinal profile  Discharge planning:  resume home regimen  Nutrition Risk/Follow-Up: low (follow-up in 5-7 days)   Please consult if re-assessment needed sooner.

## 2024-04-23 NOTE — PROGRESS NOTES
Dos 4/23/24  Patient seen and evaluated in OT today  Continues to participate and make progress toward goals  Working on ADL's and IADL's  Discussed with patient and OT  Reviewed chart and discussed with nursing, Dr Myers's primary medicine team, as well as Ana Laura Biggs, my NP  Agree with present POC  Subjective  HPI: 89 yo WF with a PMH of COPD and hypertension presented to the ED at [facility] on 4/8/24 with complaint of left hip pain after a ground level fall where she tripped on a rug and fell. X-ray show intertrochanteric left femur fracture. Orthopedic surgery consulted and referred to hospital medicine for admission. Chest x-ray show no airspace disease. EKG sinus rhythm with PACs. On 4/9, orthopedic surgeon recommended surgical intervention. Patient underwent IM nailing, and was placed WBAT to LLE. On 4/10, patient placed on Lovenox for DVT ppx, and can change on ASA 81mg BID at discharge. Patient was started on Ceftriazone for UTI. Patient gets frequent UTIs. Labs showed low H&H of 10.4 & 33.5, low Iron of 26, low TIBC of 218, elevated B12 of 1,025, low Iron sat of 12, elevated glucose of 135, low calcium of 7.8, low protein of 5.4, low albumin of 3.3, elevated AST of 35. PT/OT evals completed with deficits noted with recommendation for high intensity therapy needed. On 4/11, patient complained of nausea so zofran was given. Labs showed low RBC of 3.53, low H&H Of 10.5 & 32.8, low calcium of 7.8, low albumin of 3.3. Dry dressing changes started. No BM reported since admission. On 4/12, labs showed elevated WBC of 12.68, low RBC of 3.30, low H&H of 9.9 & 30.4, low MCHC of 32.6, low chloride of 97, and elevated glucose of 126. Patient will need appointment with Carmen AMAYA (Ortho) in 3 weeks for wound check and repeat Xrays. Patient complained of pain to LLE rating at 8-9/10 with movement, but controlled with rest. Patient is AAOx4.  Participating with therapy. Functional status includes setup/supervision  needed for eating, moderate assist for transfers with RW, max assist for toileting, moderate assist bed mobility, minimal assist for walking 26ft with RW, minimal assist for grooming, and max assist for lower body dressing. Patient was evaluated, accepted, and admitted to inpatient rehab to improve functional status. Transferred to [facility] on 4/12 without incident.  4/23: Seen with PT, seated in WC with compression stocking to LLE. Swelling improved. OT relays BP drop during session with SBP in 90s. Patient rates pain seated at 2/10 currently. Noted to have Oxycodone 10mg at 0715am. STS at SBA and walking at [facility]. Stops to do some mini squats and loosen up her hip. Having some wincing with pain increasing her score up to an 8/10 during weight bearing on LLE. She pushes through and states that it is starting to get better. Amb w/ RW for 70ft with a couple standing rest breaks. Progressing with therapy. VSSAF with increased BP after walk. IM following.             Review of Systems  Psychiatric: Does not have any confirmed mental health history or diagnoses. Per daughter, pt. seems to be depressed and anxious since her spouse passed away. She is a former smoker.      Depression/Anxiety: no current complaints. Therapy notes Anxiety     Pain: left hip with movement and weight bearing (spasms)-improved  LIDOcaine 5 % patch q24hr, 1800, low back  acetaminophen tablet 650 mg q6h  methocarbamoL tablet 500 mg TID. q6h     methocarbamoL tablet 500 mg TID PRN muscle spasm  acetaminophen tablet 650 mg q8h PRN mild pain  oxyCODONE immediate release tablet 5 mg q4h PRN mod pain  oxyCODONE immediate release tablet 10 mg q4h PRN severe pain  Bowels/Bladder: last BM 4/22.   naloxegoL (MOVANTIK) tablet 25 mg qd   Appetite: improving     Sleep: good  hydrOXYzine pamoate capsule 50 mg, qPM 1800        Physical Exam  General: well-developed, well-nourished, no acute distress  Respiratory: equal chest rise, no SOB, no audible  wheeze  Cardiovascular: regular rate and rhythm, LE edema  Gastrointestinal: soft, non-tender, non-distended   Musculoskeletal: decreased ROM/strength to LLE  Integumentary: bruising,  LLE incisions x 3-staples, dressing-c/d/I, surrounding ecchymosis and tape blisters-use paper tape  Neurologic: cranial nerves intact, signs of peripheral neurological deficit- numbness to fingertips, AAO  *MD performed and documented physical examination               Assessment/Plan  Hospital   Closed 2-part intertrochanteric fracture of proximal end of left femur s/p repair   Fall     Non-Hospital   COPD (chronic obstructive pulmonary disease)   Hypertension   Frequent UTI   Osteoporosis       Wounds: LLE incisions x 3-staples, dressing-c/d/I, surrounding ecchymosis and tape blisters-use paper tape  S/p IM nail of intertrochanteric left femur fracture on 4/9 (Leonid)  Precautions: WBAT LLE  Bracing/AD: RW  Swallowing: Regular Diet  Function: Tolerating therapy. Continue PT/OT  VTE Prophylaxis:   enoxaparin injection 30 mg SubQ q24hr  Code Status: FULL CODE   Discharge: Lives alone in [place] in an independent living [facility] with no steps to enter. Completed high school and 1 year of college. Denies  history. Pt. Is retired from secretarial work.  Is  approximately 3 years ago.  Was independent without use of assistive devices. The plan is to [facility]. Date 4/26 Friday vs 4/29 Monday depending on available care over the weekend.               Disha Biggs NP, conducted additional independent physical examination and assisted with medical documentation.

## 2024-04-23 NOTE — PROGRESS NOTES
04/23/24 1101   Rec Therapy Time Calculation   Date of Treatment 04/23/24   Rec Start Time 1101   Rec Stop Time 1130   Rec Total Time (min) 29 min   Time   Treatment time 2 units   Charges   $Therapeutic Activity 1unit   Precautions   General Precautions fall   Orthopedic Precautions  LLE weight bearing as tolerated   Braces N/A   Pain/Comfort   Pain Rating 1 no pain   OTHER   Rehab identified problem list/impairments weakness;impaired endurance;impaired functional mobility;gait instability;impaired cognition;decreased coordination;decreased upper extremity function;decreased lower extremity function;decreased safety awareness   Values/Beliefs/Spiritual Care   Spiritual, Cultural Beliefs, Catholic Practices, Values that Affect Care no   Overall Level of Functioning   Activity Tolerance Independent   Dynamic Sitting Balance/Reaching Independent   Dynamic Standing Balance/Reaching Standby Assist   Right UE Coodination/Dexterity Mod Indep   Left UE Coordination/Dexterity Mod Indep   Problem Solving/Sequencing Skills Mod Indep   Memory Recall Mod Indep   R/L Neglect/Inattention Does not occur   Attention Span Mod Indep   Social Interaction Independent   Recreational Therapy Short Term Goals   Short Term Goal 1 Progression Met   Short Term Goal 2 Progression Met   Short Term Goal 3 Progression Met   Recreational Therapy Long Term Goals   Long Term Goal 1 Progression Met   Long Term Goal 2 Progression Met   Plan   Patient to be seen Daily   Planned Duration 1 week   Treatments Planned Cognitive training;Coordination;Energy conservation training;Safety education   Treatment plan/goals estblished with Patient/Caregiver Yes

## 2024-04-23 NOTE — PT/OT/SLP PROGRESS
Recreational Therapy Treatment    Date of Treatment: 04/23/24  Start Time: 1101  Stop Time: 1130  Total Time: 29 min  Missed Time:     General Precautions: fall  Ortho Precautions: LLE weight bearing as tolerated  Braces: N/A    Vitals   Vitals at Rest  BP    HR    O2 Sat    Pain      Vitals With Activity  BP    HR    O2 Sat    Pain        Treatment     Cognitive Skills Building   Cognitive Observation Activity Assist Position Equipment Response            Comment:          Dynamic Activities   Activity Assist Position Equipment Response   Activity 1 Washer toss modified independence and supervision Standing Rolling walker and Metal washers good   Comment: Sit to stand was supervision/setup as was dynamic standing balance/reaching.  Standing tolerance was 5 minutes.  UE coordination and problem solving skills were setup.  More animated and focused on task at hand       Fine Motor Activities   Activity Assist Position Equipment Response           Comment:          Additional Info: Pt progress, plan of care and discharge plans were discussed during staffing at 0930    This was a co-treat with PT    Goals     Short Term Goals    Goal  Goal Status   Will increase activity tolerance to supervision Met   Will increase sit to stand to min Met   Will improve dynamic standing balance/reaching to min Met           Long Term Goals    Goal Goal Status   Will increase standing tolerance to 5 minutes Met   Will improve dynamic standing balance/reaching to supervision Met

## 2024-04-23 NOTE — PLAN OF CARE
Problem: Fall Injury Risk  Goal: Absence of Fall and Fall-Related Injury  Outcome: Ongoing, Progressing  Intervention: Promote Injury-Free Environment  Flowsheets (Taken 4/22/2024 2660)  Safety Promotion/Fall Prevention:   assistive device/personal item within reach   bed alarm set   Fall Risk signage in place   lighting adjusted   nonskid shoes/socks when out of bed   side rails raised x 3   supervised activity   Supervised toileting - stay within arms reach   instructed to call staff for mobility

## 2024-04-23 NOTE — PLAN OF CARE
Problem: Fall Injury Risk  Goal: Absence of Fall and Fall-Related Injury  Outcome: Progressing     Problem: Skin Injury Risk Increased  Goal: Skin Health and Integrity  Outcome: Progressing     Problem: Pain Acute  Goal: Acceptable Pain Control and Functional Ability  Outcome: Progressing     Problem: Infection  Goal: Absence of Infection Signs and Symptoms  Outcome: Progressing

## 2024-04-23 NOTE — CARE UPDATE
[id number] ericka Rahman's dgtr called and informed me the main sitter for her mother will be going out of town on Friday morning. She asked if sitter training be schedule for Thursday afternoon. Lacey reported the main sitter will be at the training and the main sitter will train the other sitters. Lacey and her brother will also be at the training. Scheduled sitter and family training for 1:00 on Thursday (4/25/24)  Lacey had concerns about the sarmiento cath and staples. I informed her the cath will come out today. Lacey asked if the nick can be removed on the day of dc. I told her I will ask STEVE James.  Lacey reports pt has a RW that she has not used. She also reports [facility] uses [facility].

## 2024-04-23 NOTE — PROGRESS NOTES
[facility] - Rehab Inpatient Services  Wound Care    Patient Name:  Safia Muñoz   MRN:  [id number]  Date: 4/23/2024  Diagnosis: Closed 2-part intertrochanteric fracture of proximal end of left femur    History:     Past Medical History:   Diagnosis Date    COPD (chronic obstructive pulmonary disease)     Hypertension        Social History     Socioeconomic History    Marital status:    Tobacco Use    Smoking status: Never    Smokeless tobacco: Never     Social Determinants of Health     Transportation Needs: No Transportation Needs (4/12/2024)    PRAPARE - Transportation     Lack of Transportation (Medical): No     Lack of Transportation (Non-Medical): No       Precautions:     Allergies as of 04/12/2024 - Reviewed 04/12/2024   Allergen Reaction Noted    Adhesive tape-silicones Blisters 04/12/2024    Ofloxacin  06/09/2020    Penicillins  10/07/2023    Sulfamethoxazole-trimethoprim  02/03/2021    Codeine Nausea Only 02/03/2021       WOC Assessment Details/Treatment      04/23/24 1429        (Retired) Incision/Site 04/09/24 1238 Left Thigh lateral   Date First Assessed/Time First Assessed: 04/09/24 1238   Present Prior to Hospital Arrival?: No  Side: Left  Location: Thigh  Orientation: lateral  Additional Comments: surgical incisions closed, dressed with xeroform, island dressings   Dressing Appearance Clean;Dry;Intact   Periwound Area Blistered;Dry  (blisters are healing well)   Dressing Non-adherent  (paper tape)     Lt hip blisters: dry and healing well with use of paper tape.  04/23/2024

## 2024-04-23 NOTE — PT/OT/SLP PROGRESS
"Occupational Therapy Inpatient Rehab Treatment    Name: Safia Muñoz  MRN: [id number]    Assessment:  Safia Muñoz is a 89 y.o. female admitted with a medical diagnosis of Closed 2-part intertrochanteric fracture of proximal end of left femur.  She presents with the following impairments/functional limitations:  weakness, impaired endurance, impaired self care skills, impaired functional mobility, gait instability, impaired cognition, decreased lower extremity function, decreased ROM, orthopedic precautions Pt. Is a RED STAR FALL RISK.    General Precautions: Standard, fall     Orthopedic Precautions:LLE weight bearing as tolerated     Braces: N/A    Rehab Prognosis: Good; patient would benefit from acute skilled OT services to address these deficits and reach maximum level of function.      History:     Past Medical History:   Diagnosis Date    COPD (chronic obstructive pulmonary disease)     Hypertension        Past Surgical History:   Procedure Laterality Date    RETROGRADE INTRAMEDULLARY RODDING OF DISTAL FEMUR Left 4/9/2024    Procedure: INSERTION, INTRAMEDULLARY NAIL INTERTROCHENTERIC FRACTURE;  Surgeon: Guy Jaquez DO;  [facility];  Service: Orthopedics;  Laterality: Left;  HANA TABLE, SYNTHES TFNA       Subjective     Orientation: Oriented x4    Chief Complaint: "I'm tired. I could get back in that bed and sleep"     Patient/Family Comments/goals: Pt. Will be returning to Assisted Living c sitters/family " and her dog. "    Vitals   Vitals at Rest  /68   HR 77   O2 Sat    Pain 2/10     Vitals With Activity  /60   HR 69   O2 Sat    Pain 7/10     Respiratory Status: Room air    Patients cultural, spiritual, Druze conflicts given the current situation: no       Objective:     Patient found up in chair with peripheral IV, chair check, sarmiento catheter  upon OT entry to room.    Mobility   Patient completed:  Sit to Stand Transfer with supervision and stand by assistance with " "rolling walker  Stand to Sit Transfer with supervision and stand by assistance with rolling walker  Toilet Transfer Step Transfer technique with contact guard assistance with  rolling walker, bedside commode, and extra time  Tub Transfer Stand Pivot technique with minimum assistance with rolling walker, grab bars, and leg  on TTB    Functional Mobility  Pt. C mini squats and short FM for ADL; Pt. C difficulty lifting R foot Off of floor (increasing WB on L LE). At this time OT does not recommend Pt. Attempt 7 " step to clear ledge of WIS at home. Will defer to family and HH OT to determine DME for bathing. It appears TTB would be safest in light of this and that Pt. Has a tub in spare bathroom.     ADLs   Current Status   Eating     Oral Hygiene     Shower, Bathe Self 4   Upper Body Dressing 4   Lower Body Dressing 4   Toileting Hygiene 4   Toilet Transfer 4   Putting On, Taking Off Footwear 3     Limiting Factors for ADLs: motor, endurance, limited ROM, weakness, and pain       Additional Treatments: Pt. Required re-education c AE for ADL. Pt. C minimal STM deficits/decreased carry over of new learning.     LifeStyle Change and Education:             Patient left up in chair with call button in reach and chair alarm on.     Education provided: ADLs, transfer training, body mechanics, assistive device, wheelchair precautions, sequencing, safety precautions, fall prevention, post-op precautions, and equipment recommendations    Multidisciplinary Problems       Occupational Therapy Goals          Problem: Occupational Therapy    Goal Priority Disciplines Outcome Interventions   Occupational Therapy Goal     OT, PT/OT Progressing    Description: Pt to perform eating tasks with independence by re-eval. Pt. C numb fingertips; unable to manage packages. Discussed c son getting safety scissors     Pt to perform oral hygiene tasks with min A standing at sink with RW by re-eval. Ongoing Sits in w/c -Pt. C new onset " A-Fib/RVR c fatigue; by Monday 4/22     Pt to perform UB dressing with independence by re-eval.Touch A c bra Numb fingertips/maybe front hook bra ?    Pt to perform LB dressing with mod A using AE PRN by re-eval. MET   Pt to perform donning/doffing footwear with max A using AE PRN by re-eval. MET   Pt to perform toileting hygiene with mod A by re-eval.  Pt. Continues c Lyman . Pt. Able to wipe self after BM 4/20/2024 c touch A to steady while standing c RE. Pt. Managed underwear up /down.   Pt to perform bathing tasks with mod A by re-eval. MET (sponge bath)     Pt to perform toilet transfers with mod A using LRAD by re-eval. MET  Pt to perform WIS transfers with mod A using LRAD by re-eval.  Pt. Unable to elevate L LE high enough to clear shower ledge. Son will measure shower door opening width vs. Tub accessibility.   Pt to perform meal prep activity with mod A by re-eval. MET   Pt to perform laundry management task with mod A by re-eval.  Pt to perform light housekeeping activity with mod A by re-eval.  Pt. To prepare water/food for dog by Re-Eval  Balance, Strengthening, Endurance, Balance:  Pt to consistently demonstrate adherence to orthopedic precautions during all ADL's as instructed by OT.  Pt to demonstrate consistent adherence to breathing control and energy conservation techniques as educated by OT.  Pt. To remember safety techniques for functional transfers S v/c's by D/C. !!                       Time Tracking     OT Received On: 04/23/24  Time In 0730     Time Out 0900  Total Time 90 min  Therapy Time: OT Individual: 90  Missed Time:    Missed Time Reason:      Billable Minutes: Self Care/Home Management 90    04/23/2024

## 2024-04-24 PROCEDURE — 94799 UNLISTED PULMONARY SVC/PX: CPT

## 2024-04-24 PROCEDURE — 51701 INSERT BLADDER CATHETER: CPT

## 2024-04-24 PROCEDURE — 63600175 PHARM REV CODE 636 W HCPCS: Performed by: NURSE PRACTITIONER

## 2024-04-24 PROCEDURE — 97110 THERAPEUTIC EXERCISES: CPT

## 2024-04-24 PROCEDURE — 25000003 PHARM REV CODE 250: Performed by: INTERNAL MEDICINE

## 2024-04-24 PROCEDURE — 97116 GAIT TRAINING THERAPY: CPT | Mod: CQ

## 2024-04-24 PROCEDURE — 99233 SBSQ HOSP IP/OBS HIGH 50: CPT | Mod: ,,, | Performed by: NURSE PRACTITIONER

## 2024-04-24 PROCEDURE — 97530 THERAPEUTIC ACTIVITIES: CPT

## 2024-04-24 PROCEDURE — 94761 N-INVAS EAR/PLS OXIMETRY MLT: CPT

## 2024-04-24 PROCEDURE — 51798 US URINE CAPACITY MEASURE: CPT

## 2024-04-24 PROCEDURE — 99900031 HC PATIENT EDUCATION (STAT)

## 2024-04-24 PROCEDURE — 25000003 PHARM REV CODE 250: Performed by: NURSE PRACTITIONER

## 2024-04-24 PROCEDURE — 97535 SELF CARE MNGMENT TRAINING: CPT

## 2024-04-24 PROCEDURE — 97530 THERAPEUTIC ACTIVITIES: CPT | Mod: CQ

## 2024-04-24 PROCEDURE — 11800000 HC REHAB PRIVATE ROOM

## 2024-04-24 RX ADMIN — ACETAMINOPHEN 325MG 650 MG: 325 TABLET ORAL at 12:04

## 2024-04-24 RX ADMIN — OXYCODONE 5 MG: 5 TABLET ORAL at 08:04

## 2024-04-24 RX ADMIN — OXYCODONE 10 MG: 5 TABLET ORAL at 04:04

## 2024-04-24 RX ADMIN — METOPROLOL TARTRATE 25 MG: 25 TABLET, FILM COATED ORAL at 08:04

## 2024-04-24 RX ADMIN — METHOCARBAMOL 500 MG: 500 TABLET ORAL at 05:04

## 2024-04-24 RX ADMIN — ASPIRIN 81 MG: 81 TABLET, COATED ORAL at 08:04

## 2024-04-24 RX ADMIN — TAMSULOSIN HYDROCHLORIDE 0.4 MG: 0.4 CAPSULE ORAL at 08:04

## 2024-04-24 RX ADMIN — FUROSEMIDE 40 MG: 40 TABLET ORAL at 08:04

## 2024-04-24 RX ADMIN — ACETAMINOPHEN 325MG 650 MG: 325 TABLET ORAL at 11:04

## 2024-04-24 RX ADMIN — NALOXEGOL OXALATE 25 MG: 25 TABLET, FILM COATED ORAL at 08:04

## 2024-04-24 RX ADMIN — HYDROXYZINE PAMOATE 50 MG: 50 CAPSULE ORAL at 05:04

## 2024-04-24 RX ADMIN — METHOCARBAMOL 500 MG: 500 TABLET ORAL at 12:04

## 2024-04-24 RX ADMIN — FERROUS SULFATE TAB 325 MG (65 MG ELEMENTAL FE) 1 EACH: 325 (65 FE) TAB at 08:04

## 2024-04-24 RX ADMIN — ACETAMINOPHEN 325MG 650 MG: 325 TABLET ORAL at 05:04

## 2024-04-24 RX ADMIN — ENOXAPARIN SODIUM 30 MG: 30 INJECTION SUBCUTANEOUS at 05:04

## 2024-04-24 RX ADMIN — OXYCODONE 5 MG: 5 TABLET ORAL at 05:04

## 2024-04-24 RX ADMIN — LIDOCAINE 5% 1 PATCH: 700 PATCH TOPICAL at 05:04

## 2024-04-24 RX ADMIN — ESTRADIOL 1 PATCH: 0.05 PATCH TRANSDERMAL at 05:04

## 2024-04-24 RX ADMIN — METHOCARBAMOL 500 MG: 500 TABLET ORAL at 11:04

## 2024-04-24 RX ADMIN — CYANOCOBALAMIN TAB 500 MCG 500 MCG: 500 TAB at 08:04

## 2024-04-24 NOTE — PROGRESS NOTES
[facility] ([facility])  Rehab Progress Note    Patient Name: Safia Muñoz  MRN: [id number]  Age: 89 y.o. Sex: female  : 1931  Hospital Length of Stay: 12 days  Date of Service: 2024   Chief Complaint: Left hip fracture s/p left hip IM nailing on 2024     Subjective:     Basic Information  Admit Information: 89-year-old white female presented to [facility] ED on 2024 complaining of left hip pain after a ground level fall.  PMH significant for HTN and COPD not on home O2.  Workup significant for closed comminuted intertrochanteric left femur fracture.  Tolerated left hip IM nailing on  without perioperative complications.  Received 3 days Rocephin due to underlying UTI.  Urine culture appeared to be contaminated.  Orthopedic surgery recommended WBAT to LLE.  Tolerated transfer to [facility] inpatient rehab unit on  without incident.   Today's Information: No acute events overnight.  Sitting up in wheelchair.  Reports fair sleep overnight.  Reports she woke up at midnight and could not go back to sleep.  Last BM .  Appetite is good.  Staff reporting she still having some urinary retention after removal of indwelling catheter.  Vital signs at goal with no recorded fevers.  No new labs or imaging today.    Review of patient's allergies indicates:   Allergen Reactions    Adhesive tape-silicones Blisters    Ofloxacin     Penicillins     Sulfamethoxazole-trimethoprim      Other reaction(s): Unknown    Codeine Nausea Only        Current Facility-Administered Medications:     0.9%  NaCl infusion, , Intravenous, Continuous, Disha Biggs FNP, Last Rate: 125 mL/hr at 24 1723, New Bag at 24 1723    acetaminophen tablet 650 mg, 650 mg, Oral, Q6H, Augusto Guan FNP, 650 mg at 24 0510    acetaminophen tablet 650 mg, 650 mg, Oral, Q8H PRN, Disha Biggs FNP    aspirin EC tablet 81 mg, 81 mg, Oral, Daily, Ирина Ness FNP, 81 mg at  04/24/24 0811    benzonatate capsule 100 mg, 100 mg, Oral, TID PRN, Freida, Augusto A, FNP, 100 mg at 04/17/24 0525    bisacodyL suppository 10 mg, 10 mg, Rectal, Daily PRN, Freida, Augusto A, FNP, 10 mg at 04/22/24 1426    bisacodyL suppository 10 mg, 10 mg, Rectal, Once, Freida, Augusto A, FNP    cyanocobalamin tablet 500 mcg, 500 mcg, Oral, Daily, Freida, Augusto A, FNP, 500 mcg at 04/24/24 0811    enoxaparin injection 30 mg, 30 mg, Subcutaneous, Q24H (prophylaxis, 1700), Freida, Augusto A, FNP, 30 mg at 04/23/24 1723    estradiol 0.05 mg/24 hr td ptsw patch 1 patch, 1 patch, Transdermal, Every Wed, 1 patch at 04/17/24 2050 **AND** estradiol 0.05 mg/24 hr td ptsw patch 1 patch, 1 patch, Transdermal, Every Sun, Pawan Myers MD, 1 patch at 04/21/24 1740    ferrous sulfate tablet 1 each, 1 tablet, Oral, Daily, Freida, Augusto A, FNP, 1 each at 04/24/24 0811    furosemide tablet 40 mg, 40 mg, Oral, Daily, Ирина Ness, FNP, 40 mg at 04/24/24 0811    hydrALAZINE injection 10 mg, 10 mg, Intravenous, Q4H PRN, Freida, Augusto A, FNP    hydrOXYzine pamoate capsule 50 mg, 50 mg, Oral, After dinner, Disha Biggs, FNP, 50 mg at 04/23/24 1723    labetalol 20 mg/4 mL (5 mg/mL) IV syring, 10 mg, Intravenous, Q4H PRN, Freida, Augusto A, FNP    LIDOcaine 5 % patch 1 patch, 1 patch, Transdermal, Q24H, Disha Biggs A, FNP, 1 patch at 04/23/24 1723    methocarbamoL tablet 500 mg, 500 mg, Oral, TID PRN, Bluefield, Disha A, FNP, 500 mg at 04/21/24 0212    methocarbamoL tablet 500 mg, 500 mg, Oral, Q6H, Disha Biggs A, FNP, 500 mg at 04/24/24 0510    metoprolol injection 10 mg, 10 mg, Intravenous, Q2H PRN, Augusto Guan FNP, 10 mg at 04/17/24 1207    metoprolol tartrate (LOPRESSOR) tablet 25 mg, 25 mg, Oral, BID, Yang Campbell, GURVINDERP-BC, 25 mg at 04/24/24 0811    naloxegoL (MOVANTIK) tablet 25 mg, 25 mg, Oral, Daily, Augusto Guan FNP, 25 mg at 04/24/24 0811    nitroGLYCERIN  "SL tablet 0.4 mg, 0.4 mg, Sublingual, Q5 Min PRN, Freida, Augusto A, FNP    ondansetron disintegrating tablet 4 mg, 4 mg, Oral, Q6H PRN, Freida, Augusto A, FNP, 4 mg at 04/15/24 0705    oxyCODONE immediate release tablet 10 mg, 10 mg, Oral, Q4H PRN, Dian, Disha A, FNP, 10 mg at 04/24/24 0421    oxyCODONE immediate release tablet 5 mg, 5 mg, Oral, Q4H PRN, Dian, Disha A, FNP, 5 mg at 04/24/24 0812    polyethylene glycol packet 17 g, 17 g, Oral, BID PRN, Freida, Augusto A, FNP    promethazine tablet 25 mg, 25 mg, Oral, Q6H PRN, Freida, Augusto A, FNP, 25 mg at 04/15/24 0948    tamsulosin 24 hr capsule 0.4 mg, 0.4 mg, Oral, QHS, Ирина Ness, FNP, 0.4 mg at 04/23/24 2122     Review of Systems   Complete 12-point review of symptoms negative except for what's mentioned in HPI     Objective:     /67   Pulse 70   Temp 98 °F (36.7 °C) (Oral)   Resp 18   Ht 5' 2.99" (1.6 m)   Wt 74.5 kg (164 lb 3.9 oz)   SpO2 96%   Breastfeeding No   BMI 29.10 kg/m²      Physical Exam  Constitutional:       Appearance: She is ill-appearing.   HENT:      Head: Normocephalic.      Mouth/Throat:      Mouth: Mucous membranes are moist.   Eyes:      Pupils: Pupils are equal, round, and reactive to light.   Cardiovascular:      Rate and Rhythm: Normal rate and regular rhythm.      Heart sounds: Normal heart sounds.   Pulmonary:      Effort: Pulmonary effort is normal.      Breath sounds: Normal breath sounds.   Abdominal:      General: Bowel sounds are normal.      Palpations: Abdomen is soft.   Musculoskeletal:      Cervical back: Neck supple.      Comments: Left hip dressing clean and intact.  Diffuse muscle atrophy.    Skin:     General: Skin is warm and dry.   Neurological:      Motor: Weakness present.   Psychiatric:         Mood and Affect: Mood normal.     *MD performed and documented physical examination          Lines/Drains/Airways       Peripheral Intravenous Line  Duration                  " Peripheral IV - Single Lumen 04/09/24 1210 18 G Right Forearm 14 days                  Labs  No results found for this or any previous visit (from the past 24 hour(s)).    Radiology  Left hip XR on 04/09/2024, IMPRESSION: Improved alignment following internal fixation of the femur.  Radiology  Abdominal XR on 04/15/2024, IMPRESSION:  Residual feces nonspecific gas pattern.    Assessment/Plan:     89 y.o. WF admitted on 4/12/2024    Left hip fracture   - s/p left hip IM nailing on 04/09/2024  - WBAT to LLE  - discontinue staples on 04/30  - discontinue Gabapentin 100 mg TID (initiated 4/12), Gabapentin 200 mg at bedtime (initiated 4/14) on 04/15 2/2 confusion  - continue                Tylenol 650 mg every 6 hours                Calcium-vitamin D3 1 tablet b.i.d.                 Oxycodone 5 mg q.6 hours.                Robaxin 500 mg t.i.d.  - defer to physiatry for rehab and pain management  - PT/OT/RT following     Osteoporosis  - stable  - vitamin-D level 42.1 on 04/10/2024  - continue.                Calcium-vitamin D3 1 tablet b.i.d.                 Estradiol 0.05 mg patch every Wednesday      HTN  - BP at goal!!  - discontinue Norvasc 5 mg daily on 04/18  - continue                  Metoprolol tartrate 25 mg b.i.d.                Hydralazine 10 mg every 2 hours as needed for BP > 160/90                Labetalol 10 mg every 2 hours as needed for BP > 160/90  - low sodium diet     COPD  - stable  - not on home 02  - keep 02 > 88%   - monitor closely     Normocytic anemia  - asymptomatic  - H/H trending down  - continue                Vitamin B12 tablet daily                    Ferrous sulfate 325 mg daily     - no evidence of active bleeds  - will closely monitor and transfuse if needed      Constipation  - stable   - discontinue Colace 100 mg b.i.d. and MiraLax 17 g daily 2/2 loose stools on 04/17  - continue  Movantik 25 mg daily (initiated 4/16)    Bilateral Lower extremity edema  - resolving  -  continue   Lasix 40 mg daily (to start 4/15)    Insomnia with associated delirium  - improved  - s/p Haldol 5 mg IM x1 on 04/16  - continue   Melatonin 6 mg at bedtime (initiated 4/14)    Urinary retention  - current  - required indwelling catheter insertion on 04/15  - continue   Flomax 0.4 mg at bedtime (initiated 4/16)  - discontinue indwelling catheter on 04/23  - staff report urinary retention  - continue bladder scan times 24 hours and resume indwelling catheter if she requires more than 2 in and out catheterizations    New onset atrial fibrillation  - controlled ventricular rate  - continue                  Metoprolol tartrate 25 mg b.i.d   Aspirin 81 mg daily  - cardiology does not recommend OAC 2/2 risks outweigh benefits, recommends aspirin 81 mg daily  - follow-up with cardiology outpatient for transthoracic echocardiogram     AB therapy  Rocephin 4/9-4/11     VTE Prophylaxis: Lovenox 30 mg daily   COVID-19 testing:  Unknown  COVID-19 vaccination status:  Vaccinated (Pfizer):  01/10/2021, 01/31/2021, 12/06/2022     POA: No  Living will: No  Contacts: Lacey Pizarro(Daughter) [phone]      CODE STATUS: DNR  Internal Medicine (attending): Pawan Myers MD  Physiatry (consulting):  Iglesia Tran MD     OUTPATIENT PROVIDERS  PCP:  Iglesia Steel MD  Orthopedic surgery:  Guy Jaquez D.O.  Cardiology: Matthew Molina MD     DISPOSITION:  Sleep hygiene fair overnight.  Appetite and bowel maintenance at goal.  Vital signs at goal with no recorded fevers.  No new labs or imaging today.  Still reports urinary retention per staff.  Continue bladder scanning postvoid residuals.  If greater than 300 initiate in and out catheterization.  She is already received 1.  If requires another in and out catheterization resume indwelling catheter x7 days.  Continue aggressive mobilization as tolerated.  Monitor closely.  Notify of acute changes.    Staffing 4/23/2024:  Continent of bowel.  Lyman to be discontinued.   Blisters to hip from tape.  RT: Overall supervision.  Limited by left hip pain. Good appetite.  PT: SBA to min assist for bed mobility. SBA to CGA for OOB.  Ambulating 30-60 feet.  Needs increased time.  Limited by pain and fatigue. OT: Making good progress.  Overall mod to min assist.  Would be safer with a TTB.  Mild cognitive deficits with poor carry over. Needs family training.  Projected discharge 4/26 if sitters available.     Jacob Guan NP conducted independent physical examination and assisted with medical documentation.    Total time spent on this encounter including chart review and direct MD + NP 1-on-1 patient interaction: 51 minutes   Over 50% of this time was spent in counseling and coordination of care

## 2024-04-24 NOTE — PT/OT/SLP PROGRESS
Recreational Therapy Treatment    Date of Treatment: 04/24/24  Start Time: 1101  Stop Time: 1130  Total Time: 29 min  Missed Time:     General Precautions: fall  Ortho Precautions: LLE weight bearing as tolerated  Braces: N/A    Vitals   Vitals at Rest  BP    HR    O2 Sat    Pain      Vitals With Activity  BP    HR    O2 Sat    Pain        Treatment     Cognitive Skills Building   Cognitive Observation Activity Assist Position Equipment Response            Comment:          Dynamic Activities   Activity Assist Position Equipment Response   Activity 1 Bocce ball supervision and minimum assistance Standing Rolling walker and Bocce balls fair   Comment: Sit to stand was supervision as was dynamic standing balance/reaching.  Standing tolerance was 10 minutes with 2 rest breaks.  LUE coordination was min.  RUE coordination was supervision. Memory and problem solving skills were min.  More distracted and confused this AM       Fine Motor Activities   Activity Assist Position Equipment Response           Comment:          Additional Info: This was a co-treat with OT    Goals     Short Term Goals    Goal  Goal Status   Will increase activity tolerance to supervision Met   Will increase sit to stand to min Met   Will improve dynamic standing balance/reaching to min Met           Long Term Goals    Goal Goal Status   Will increase standing tolerance to 5 minutes Progressing   Will improve dynamic standing balance/reaching to supervision Progressing

## 2024-04-24 NOTE — PLAN OF CARE
Spoke with la Mckeon ([phone]).  Reviewed DME recs of w/c (confirmed that pt does NOT have one in close; it's a RW in the closet), BSC, and TTB.  Sent pics/names via email, and Linda will order BSC and TTB on [employer] tonight.  I will order the w/c rental.    [facility] [employer] rep came to meet with pt today and obtained consent for 24-hour care for at least 2 weeks.    Sent actual HH PT/OT/nursing order to [facility]'s [facility] via [employer] (order was evidently not sent yesterday with original referral)

## 2024-04-24 NOTE — PROGRESS NOTES
Subjective  HPI: 89 yo WF with a PMH of COPD and hypertension presented to the ED at [facility] on 4/8/24 with complaint of left hip pain after a ground level fall where she tripped on a rug and fell. X-ray show intertrochanteric left femur fracture. Orthopedic surgery consulted and referred to hospital medicine for admission. Chest x-ray show no airspace disease. EKG sinus rhythm with PACs. On 4/9, orthopedic surgeon recommended surgical intervention. Patient underwent IM nailing, and was placed WBAT to LLE. On 4/10, patient placed on Lovenox for DVT ppx, and can change on ASA 81mg BID at discharge. Patient was started on Ceftriazone for UTI. Patient gets frequent UTIs. Labs showed low H&H of 10.4 & 33.5, low Iron of 26, low TIBC of 218, elevated B12 of 1,025, low Iron sat of 12, elevated glucose of 135, low calcium of 7.8, low protein of 5.4, low albumin of 3.3, elevated AST of 35. PT/OT evals completed with deficits noted with recommendation for high intensity therapy needed. On 4/11, patient complained of nausea so zofran was given. Labs showed low RBC of 3.53, low H&H Of 10.5 & 32.8, low calcium of 7.8, low albumin of 3.3. Dry dressing changes started. No BM reported since admission. On 4/12, labs showed elevated WBC of 12.68, low RBC of 3.30, low H&H of 9.9 & 30.4, low MCHC of 32.6, low chloride of 97, and elevated glucose of 126. Patient will need appointment with Carmen AMAYA (Ortho) in 3 weeks for wound check and repeat Xrays. Patient complained of pain to LLE rating at 8-9/10 with movement, but controlled with rest. Patient is AAOx4.  Participating with therapy. Functional status includes setup/supervision needed for eating, moderate assist for transfers with RW, max assist for toileting, moderate assist bed mobility, minimal assist for walking 26ft with RW, minimal assist for grooming, and max assist for lower body dressing. Patient was evaluated, accepted, and admitted to inpatient rehab to improve  functional status. Transferred to [facility] on 4/12 without incident.  4/24: Seen with PT, Isidra w/RW for 120ft. Complaints of a corn on top of her right great toe. Covered with foam dressing and relayed to wound care nurse. Progressing in therapy. VSSAF.             Review of Systems  Psychiatric: Does not have any confirmed mental health history or diagnoses. Per daughter, pt. seems to be depressed and anxious since her spouse passed away. She is a former smoker.      Depression/Anxiety: no current complaints. Therapy notes Anxiety     Pain: left hip with movement and weight bearing (spasms)-improved  LIDOcaine 5 % patch q24hr, 1800, low back  acetaminophen tablet 650 mg q6h  methocarbamoL tablet 500 mg TID. q6h     methocarbamoL tablet 500 mg TID PRN muscle spasm  acetaminophen tablet 650 mg q8h PRN mild pain  oxyCODONE immediate release tablet 5 mg q4h PRN mod pain  oxyCODONE immediate release tablet 10 mg q4h PRN severe pain  Bowels/Bladder: last BM 4/22.   naloxegoL (MOVANTIK) tablet 25 mg qd   Appetite: improving     Sleep: good  hydrOXYzine pamoate capsule 50 mg, qPM 1800        Physical Exam  General: well-developed, well-nourished, no acute distress  Respiratory: equal chest rise, no SOB, no audible wheeze  Cardiovascular: regular rate and rhythm, LE edema  Gastrointestinal: soft, non-tender, non-distended   Musculoskeletal: decreased ROM/strength to LLE  Integumentary: bruising,  LLE incisions x 3-staples, dressing-c/d/I, surrounding ecchymosis and tape blisters-use paper tape, corn to right great toe  Neurologic: cranial nerves intact, signs of peripheral neurological deficit- numbness to fingertips, AAO                Assessment/Plan  Hospital   Closed 2-part intertrochanteric fracture of proximal end of left femur s/p repair   Fall     Non-Hospital   COPD (chronic obstructive pulmonary disease)   Hypertension   Frequent UTI   Osteoporosis       Wounds: LLE incisions x 3-staples, dressing-c/d/I, surrounding  ecchymosis and tape blisters-use paper tape  S/p IM nail of intertrochanteric left femur fracture on 4/9 (Leonid)  Precautions: WBAT LLE  Bracing/AD: RW  Swallowing: Regular Diet  Function: Tolerating therapy. Continue PT/OT  VTE Prophylaxis:   enoxaparin injection 30 mg SubQ q24hr  Code Status: FULL CODE   Discharge: Lives alone in [place] [facility] with no steps to enter. Completed high school and 1 year of college. Denies  history. Pt. Is retired from secretarial work.  Is  approximately 3 years ago.  Was independent without use of assistive devices. The plan is to return to [facility]. Date 4/29 Monday to the [facility] with sitters.     Patient has a mobility limitation (closed 2-part intertrochanteric fracture of proximal end of left femur s/p repair) that significantly impairs patient's ability to participate in one or more mobility-related ADLs customary in the home.   Patient's mobility limitation cannot be sufficiently resolved by the use of only a cane or walker.  Patient will need a manual reclining wheelchair (for main mobility in the home) due to patient's acute and ongoing medical conditions/deficits (COPD).   The patient's home provides adequate access between rooms, maneuvering space, and surfaces for use of the manual wheelchair that is being provided. The use of a manual wheelchair will significantly improve the patient's ability to participate in MRADLs, and the patient will use it on a regular basis within the home. The patient has not expressed an unwillingness to use the manual wheelchair within the home. The patient has sufficient upper extremity function and other physical and mental capabilities needed to safely self-propel the standard manual wheelchair during a typical day at home.  The patient has demonstrated this ability while on the inpatient rehabilitation unit immediately preceding  patient's discharge home.   The patient has caregivers who are available, willing, and able to provide assistance with the wheelchair within the home environment.  Patient is currently using the manual wheelchair and using her rolling walker (for short distance of ' max during physical therapy).  The manual wheelchair will be patient's main mode of mobility in the home and within her FDC/independent living community (to get to dining room to eat and to gym for therapy).  The general use seat cushion is needed for skin protection since the wheelchair will be patient's main mode of mobility.  She is at high risk of skin breakdown on sacrum.

## 2024-04-24 NOTE — PT/OT/SLP PROGRESS
"Occupational Therapy Inpatient Rehab Treatment    Name: Safia Muñoz  MRN: [id number]    Assessment:  Safia Muñoz is a 89 y.o. female admitted with a medical diagnosis of Closed 2-part intertrochanteric fracture of proximal end of left femur.  She presents with the following impairments/functional limitations:  weakness, impaired endurance, impaired sensation, impaired self care skills, impaired functional mobility, decreased upper extremity function, decreased lower extremity function, decreased ROM, impaired coordination, impaired fine motor, orthopedic precautions Pt. Is a RED STAR FALL RISK .    General Precautions: Standard, fall     Orthopedic Precautions:LLE weight bearing as tolerated     Braces: N/A    Rehab Prognosis: Good; patient would benefit from acute skilled OT services to address these deficits and reach maximum level of function.      History:     Past Medical History:   Diagnosis Date    COPD (chronic obstructive pulmonary disease)     Hypertension        Past Surgical History:   Procedure Laterality Date    RETROGRADE INTRAMEDULLARY RODDING OF DISTAL FEMUR Left 4/9/2024    Procedure: INSERTION, INTRAMEDULLARY NAIL INTERTROCHENTERIC FRACTURE;  Surgeon: Guy Jaquez DO;  Location: [facility];  Service: Orthopedics;  Laterality: Left;  HANA TABLE, SYNTHES TFNA       Subjective     Orientation: Oriented x4    Chief Complaint: "I can't stop peeing" Pt. C Lyman catheter removed. Pt. Spent much of the time on toilet today and improved c wiping and managing pants.     Patient/Family Comments/goals: Pt. Will have sitters initially upon D/C from Rehab unit.  visiting c Pt. Upon OT arrival.     Pt. Also c/o "I'm so tired."     Vitals With Activity  /64   HR 67   O2 Sat    Pain 10/10     Respiratory Status: Room air    Patients cultural, spiritual, Mormon conflicts given the current situation: no       Objective:     Patient found  on toilet c PTA, Alicia  with peripheral IV " " upon OT entry to room.    Mobility   Patient completed:  Sit to Stand Transfer with supervision and contact guard assistance with rolling walker  Stand to Sit Transfer with supervision and contact guard assistance with rolling walker  Toilet Transfer Step Transfer technique with supervision and contact guard assistance with  rolling walker and bedside commode    Functional Mobility  Pt. Performed FM in room ~ 15' x's 2 after toileting to return to w/c or recliner.     ADLs   Current Status   Eating     Oral Hygiene     Shower, Bathe Self     Upper Body Dressing     Lower Body Dressing     Toileting Hygiene 4   Toilet Transfer 4   Putting On, Taking Off Footwear       Limiting Factors for ADLs: endurance, limited ROM, weakness, and pain     IADLs: Pt. Able to turn pages in magazine and shop for clothing; showing OT some "cute outfits."     Therapeutic Activities  13:00-13:30; Pt. C/O "so tired" Pt. Sitting away from back of recliner participated in B UE FM Ax c small clips/game parts and enjoyed board game of "[employer]".     Therapeutic Exercise  Pt. Sitting away from backrest performed B UE AROM on UBE x's 5 min at 1.5 umana. Pt. Educated on PLB as Pt. Was mouth breathing. Pt. In pain at this time preferred to sit c ice pack applied to L hip. Pt. In standing c TR participated in B UE AROM while standing c 36 reps each UE to toss Bocce Balls to end of hallway. Two rest breaks         Additional Treatments: Pt. C wet brief required Mod A to stand and doff pull up and assist c pants/don clean brief.     LifeStyle Change and Education:             Patient left up in chair with call button in reach and chair alarm on.     Education provided: ADLs, transfer training, body mechanics, post-op precautions, and equipment recommendations    Multidisciplinary Problems       Occupational Therapy Goals          Problem: Occupational Therapy    Goal Priority Disciplines Outcome Interventions   Occupational Therapy Goal     " OT, PT/OT Progressing    Description: Pt to perform eating tasks with independence by re-eval. Pt. C numb fingertips; unable to manage packages. Discussed c son getting safety scissors     Pt to perform oral hygiene tasks with min A standing at sink with RW by re-eval. Ongoing Sits in w/c -Pt. C new onset A-Fib/RVR c fatigue; by Monday 4/22     Pt to perform UB dressing with independence by re-eval.Touch A c bra Numb fingertips/maybe front hook bra ?    Pt to perform LB dressing with mod A using AE PRN by re-eval. MET   Pt to perform donning/doffing footwear with max A using AE PRN by re-eval. MET   Pt to perform toileting hygiene with mod A by re-eval.  Pt. Continues c Lyman . Pt. Able to wipe self after BM 4/20/2024 c touch A to steady while standing c RE. Pt. Managed underwear up /down.   Pt to perform bathing tasks with mod A by re-eval. MET (sponge bath)     Pt to perform toilet transfers with mod A using LRAD by re-eval. MET  Pt to perform WIS transfers with mod A using LRAD by re-eval.  Pt. Unable to elevate L LE high enough to clear shower ledge. Son will measure shower door opening width vs. Tub accessibility.   Pt to perform meal prep activity with mod A by re-eval. MET   Pt to perform laundry management task with mod A by re-eval.  Pt to perform light housekeeping activity with mod A by re-eval.  Pt. To prepare water/food for dog by Re-Eval  Balance, Strengthening, Endurance, Balance:  Pt to consistently demonstrate adherence to orthopedic precautions during all ADL's as instructed by OT.  Pt to demonstrate consistent adherence to breathing control and energy conservation techniques as educated by OT.  Pt. To remember safety techniques for functional transfers S v/c's by D/C. !!                       Time Tracking     OT Received On: 04/24/24  Time In  (1035 & 1300)     Time Out  (1200 & 1330)  Total Time    Therapy Time: OT Individual: 115  Missed Time:    Missed Time Reason:      Billable Minutes: Self  Care/Home Management 40, Therapeutic Activity 30, and Therapeutic Exercise 45    04/24/2024

## 2024-04-24 NOTE — PROGRESS NOTES
04/24/24 1101   Rec Therapy Time Calculation   Date of Treatment 04/24/24   Rec Start Time 1101   Rec Stop Time 1130   Rec Total Time (min) 29 min   Time   Treatment time 2 units   Charges   $Therapeutic Activity 1unit   Precautions   General Precautions fall   Orthopedic Precautions  LLE weight bearing as tolerated   Braces N/A   OTHER   Rehab identified problem list/impairments weakness;impaired functional mobility;gait instability;impaired cognition;decreased upper extremity function;decreased lower extremity function;decreased safety awareness   Values/Beliefs/Spiritual Care   Spiritual, Cultural Beliefs, Congregational Practices, Values that Affect Care no   Overall Level of Functioning   Activity Tolerance Independent   Dynamic Sitting Balance/Reaching Independent   Dynamic Standing Balance/Reaching Standby Assist   Right UE Coodination/Dexterity Standby Assist   Left UE Coordination/Dexterity Min A   Problem Solving/Sequencing Skills Min A   Memory Recall Min A   R/L Neglect/Inattention Does not occur   Attention Span Standby Assist   Social Interaction Independent   Recreational Therapy Short Term Goals   Short Term Goal 1 Progression Met   Short Term Goal 2 Progression Met   Short Term Goal 3 Progression Met   Recreational Therapy Long Term Goals   Long Term Goal 1 Progression Progressing   Long Term Goal 2 Progression Progressing   Plan   Patient to be seen Daily   Planned Duration 1 week   Treatments Planned Energy conservation training;Cognitive training;Safety education   Treatment plan/goals estblished with Patient/Caregiver Yes

## 2024-04-24 NOTE — PT/OT/SLP PROGRESS
"Physical Therapy Inpatient Rehab Treatment    Patient Name:  Safia Muñoz   MRN:  [id number]    Recommendations:     Discharge Recommendations:  Moderate Intensity Therapy   Discharge Equipment Recommendations: walker, rolling, bedside commode, shower chair   Barriers to discharge: Decreased caregiver support    Assessment:     Safia Muñoz is a 89 y.o. female admitted with a medical diagnosis of Closed 2-part intertrochanteric fracture of proximal end of left femur.  She presents with the following impairments/functional limitations:  weakness, impaired endurance, impaired self care skills, impaired functional mobility, gait instability, impaired balance, decreased lower extremity function, decreased coordination, decreased safety awareness, pain, decreased ROM, edema, impaired skin, impaired coordination, orthopedic precautions, impaired muscle length, impaired cardiopulmonary response to activity .    Rehab Diagnosis: medical diagnosis of Closed 2-part intertrochanteric fracture of proximal end of left femur    General Precautions: Standard, fall     Orthopedic Precautions:LLE weight bearing as tolerated     Braces: N/A    Rehab Prognosis: Good; patient would benefit from acute skilled PT services to address these deficits and reach maximum level of function.      History:     Past Medical History:   Diagnosis Date    COPD (chronic obstructive pulmonary disease)     Hypertension        Past Surgical History:   Procedure Laterality Date    RETROGRADE INTRAMEDULLARY RODDING OF DISTAL FEMUR Left 4/9/2024    Procedure: INSERTION, INTRAMEDULLARY NAIL INTERTROCHENTERIC FRACTURE;  Surgeon: Guy Jaquez DO;  Location: [facility];  Service: Orthopedics;  Laterality: Left;  TIGRE TABLE, SYNTHES TFNA       Subjective     Chief Complaint: " I didn't sleep much last night was up at [phone] and didn't go back to sleep I'm tired "     Respiratory Status: Room air    Patients cultural, spiritual, Adventism conflicts " given the current situation: no      Objective:     Communicated with nursing  prior to session.  Patient found up in chair with peripheral IV, chair check, Other (comments) (pt in recliner)  upon PT entry to room.    Pt is Oriented x3 and Alert and Cooperative.    Functional Mobility:   Transfers:     Toilet Transfer: Supervision or touching assistance  with  rolling walker  using  Step Transfer and pt had accidental void wetting clothes assisted with changing and putting on pull up.  pt had sarmiento cath removed      Current   Status  Discharge   Goal   Functional Area: Care Score:    Roll Left and Right   Independent   Sit to Lying   Supervision or touching assistance   Lying to Sitting on Side of Bed   Supervision or touching assistance   Sit to Stand 5  Use of rw  Supervision or touching assistance   Chair/Bed-to-Chair Transfer 4  Use of rw sba  Supervision or touching assistance   Car Transfer   Not attempted due to medical/safety concerns   Walk 10 Feet 4 Not attempted due to medical/safety concerns   Walk 50 Feet with Two Turns 4  Use of rw sba slow katie increased time 70ft then 100ft lle wbat few standing rest breaks with ambulation 100ft  Not attempted due to medical/safety concerns   Walk 150 Feet 88  Fatigue  Not attempted due to medical/safety concerns   Walk 10 Feet Uneven Surface   Not attempted due to medical/safety concerns   1 Step (Curb)   Not attempted due to medical/safety concerns   4 Steps   Not attempted due to medical/safety concerns   12 Steps   Not attempted due to medical/safety concerns   Picking Up Object   Not attempted due to medical/safety concerns   Wheel 50 Feet with Two Turns 4 Independent   Wheel 150 Feet 4  BUE only 150ft then 200ft slow advancing wc increased time required  Not attempted due to medical/safety concerns       Therapeutic Activities and Exercises:  Dynamic/static sitting balance unsupported on toilet with assist to change pants and pull up    Activity Tolerance:  Good    Patient left  on toilet   with  OT Yoli  present and pt kenan tx pt improving with ambulation distance with use of regular rw (not using pediatric rw) for practice with home RW . Pt requiring decrease physical assist but still sba for fall prevention and safety and use of rw . Pt fatigues quickly and is inconsistent with ambulation distances requiring use of wc for functional independence .    .    Education provided: transfer training, gait training, balance training, safety awareness, body mechanics, assistive device, wheelchair management, strengthening exercises, and fall prevention    Expected compliance: High compliance    GOALS:   Multidisciplinary Problems       Physical Therapy Goals          Problem: Physical Therapy    Goal Priority Disciplines Outcome Goal Variances Interventions   Physical Therapy Goal     PT, PT/OT Progressing     Description: Bed Mobility:  MET - Roll left and right with partial/moderate assist.   MET - Sit to supine transfer with partial/moderate assist.   MET - Supine to sit transfer with partial/moderate assist.   Roll left and right independently  Sit to supine transfer with setup/clean-up assist assist  Supine to sit transfer with setup/clean-up assist assist    Transfers:  MET - Sit to stand transfer with supervision/touching assist using RW.   MET - Bed to chair transfer with supervision/touching assist Stand Step  using RW.   DISCONTINUE - Bed to chair transfer with partial/moderate assist Slide Board  using Slide board.   Sit to stand transfer with setup/clean-up assist using RW  Bed to chair transfer with setup/clean-up assist Stand Step  using RW  Car transfer with supervision/touching assist using RW   an object from the ground in standing position with supervision/touching assist using RW and reacher    Mobility:  Ambulate 150 feet with supervision/touching assist using RW.   Pt ascended/descended a 4 inch curb with partial/moderate assist using  RW  Ascend/descend 4 stairs with partial/moderate assist using bilateral handrails  Ambulate 10 feet on uneven surfaces/ramps with partial/moderate assist using RW  Manual wheelchair 150 feet with partial/moderate assist using Bilateral upper extremity                         Plan:     During this hospitalization, patient to be seen 6 x/week to address the identified rehab impairments via gait training, therapeutic activities, therapeutic exercises, neuromuscular re-education, wheelchair management/training and progress toward the following goals:    Plan of Care Expires:  04/19/24  PT Next Visit Date: 04/25/24  Plan of Care reviewed with: patient    Additional Information:         Time Tracking:     Therapy Time  PT Received On: 04/24/24  PT Start Time: 0930  PT Stop Time: 1035  PT Total Time (min): 65 min   PT Individual: 65  Missed Time:    Time Missed due to:      Billable Minutes: Gait Training 30min and Therapeutic Activity 35min    04/24/2024

## 2024-04-24 NOTE — PLAN OF CARE
Problem: Fall Injury Risk  Goal: Absence of Fall and Fall-Related Injury  Outcome: Progressing  Intervention: Promote Injury-Free Environment  Flowsheets (Taken 4/23/2024 2135)  Safety Promotion/Fall Prevention:   assistive device/personal item within reach   bed alarm set   Fall Risk signage in place   lighting adjusted   nonskid shoes/socks when out of bed   side rails raised x 3   instructed to call staff for mobility

## 2024-04-25 PROCEDURE — 97535 SELF CARE MNGMENT TRAINING: CPT

## 2024-04-25 PROCEDURE — 97530 THERAPEUTIC ACTIVITIES: CPT

## 2024-04-25 PROCEDURE — 99233 SBSQ HOSP IP/OBS HIGH 50: CPT | Mod: ,,, | Performed by: NURSE PRACTITIONER

## 2024-04-25 PROCEDURE — 97164 PT RE-EVAL EST PLAN CARE: CPT

## 2024-04-25 PROCEDURE — 25000003 PHARM REV CODE 250: Performed by: NURSE PRACTITIONER

## 2024-04-25 PROCEDURE — 99900031 HC PATIENT EDUCATION (STAT)

## 2024-04-25 PROCEDURE — 63600175 PHARM REV CODE 636 W HCPCS: Performed by: NURSE PRACTITIONER

## 2024-04-25 PROCEDURE — 99900035 HC TECH TIME PER 15 MIN (STAT)

## 2024-04-25 PROCEDURE — 51702 INSERT TEMP BLADDER CATH: CPT

## 2024-04-25 PROCEDURE — 94799 UNLISTED PULMONARY SVC/PX: CPT

## 2024-04-25 PROCEDURE — 11800000 HC REHAB PRIVATE ROOM

## 2024-04-25 PROCEDURE — 97116 GAIT TRAINING THERAPY: CPT

## 2024-04-25 PROCEDURE — 51798 US URINE CAPACITY MEASURE: CPT

## 2024-04-25 RX ADMIN — METHOCARBAMOL 500 MG: 500 TABLET ORAL at 12:04

## 2024-04-25 RX ADMIN — METHOCARBAMOL 500 MG: 500 TABLET ORAL at 05:04

## 2024-04-25 RX ADMIN — ACETAMINOPHEN 325MG 650 MG: 325 TABLET ORAL at 12:04

## 2024-04-25 RX ADMIN — NALOXEGOL OXALATE 25 MG: 25 TABLET, FILM COATED ORAL at 07:04

## 2024-04-25 RX ADMIN — FUROSEMIDE 40 MG: 40 TABLET ORAL at 07:04

## 2024-04-25 RX ADMIN — FERROUS SULFATE TAB 325 MG (65 MG ELEMENTAL FE) 1 EACH: 325 (65 FE) TAB at 08:04

## 2024-04-25 RX ADMIN — LIDOCAINE 5% 1 PATCH: 700 PATCH TOPICAL at 04:04

## 2024-04-25 RX ADMIN — OXYCODONE 5 MG: 5 TABLET ORAL at 04:04

## 2024-04-25 RX ADMIN — OXYCODONE 5 MG: 5 TABLET ORAL at 07:04

## 2024-04-25 RX ADMIN — METOPROLOL TARTRATE 25 MG: 25 TABLET, FILM COATED ORAL at 07:04

## 2024-04-25 RX ADMIN — CYANOCOBALAMIN TAB 500 MCG 500 MCG: 500 TAB at 07:04

## 2024-04-25 RX ADMIN — OXYCODONE 5 MG: 5 TABLET ORAL at 12:04

## 2024-04-25 RX ADMIN — METOPROLOL TARTRATE 25 MG: 25 TABLET, FILM COATED ORAL at 08:04

## 2024-04-25 RX ADMIN — OXYCODONE 5 MG: 5 TABLET ORAL at 08:04

## 2024-04-25 RX ADMIN — ACETAMINOPHEN 325MG 650 MG: 325 TABLET ORAL at 04:04

## 2024-04-25 RX ADMIN — ASPIRIN 81 MG: 81 TABLET, COATED ORAL at 07:04

## 2024-04-25 RX ADMIN — HYDROXYZINE PAMOATE 50 MG: 50 CAPSULE ORAL at 04:04

## 2024-04-25 RX ADMIN — TAMSULOSIN HYDROCHLORIDE 0.4 MG: 0.4 CAPSULE ORAL at 08:04

## 2024-04-25 RX ADMIN — METHOCARBAMOL 500 MG: 500 TABLET ORAL at 04:04

## 2024-04-25 RX ADMIN — ENOXAPARIN SODIUM 30 MG: 30 INJECTION SUBCUTANEOUS at 04:04

## 2024-04-25 NOTE — PROGRESS NOTES
[facility] ([facility])  Rehab Progress Note    Patient Name: Safia Muñoz  MRN: [id number]  Age: 89 y.o. Sex: female  : 1931  Hospital Length of Stay: 13 days  Date of Service: 2024   Chief Complaint: Left hip fracture s/p left hip IM nailing on 2024     Subjective:     Basic Information  Admit Information: 89-year-old white female presented to [facility] ED on 2024 complaining of left hip pain after a ground level fall.  PMH significant for HTN and COPD not on home O2.  Workup significant for closed comminuted intertrochanteric left femur fracture.  Tolerated left hip IM nailing on  without perioperative complications.  Received 3 days Rocephin due to underlying UTI.  Urine culture appeared to be contaminated.  Orthopedic surgery recommended WBAT to LLE.  Tolerated transfer to [facility] inpatient rehab unit on  without incident.   Today's Information: No acute events overnight.  Sitting up in chair.  Reports good sleep and appetite.  Required reinsertion of indwelling catheter yesterday due to retention.  Last BM .  Vital signs at goal with no recorded fevers.  No new labs or imaging today.    Review of patient's allergies indicates:   Allergen Reactions    Adhesive tape-silicones Blisters    Ofloxacin     Penicillins     Sulfamethoxazole-trimethoprim      Other reaction(s): Unknown    Codeine Nausea Only        Current Facility-Administered Medications:     0.9%  NaCl infusion, , Intravenous, Continuous, Disha Biggs FNP, Last Rate: 125 mL/hr at 24 1723, New Bag at 24 1723    acetaminophen tablet 650 mg, 650 mg, Oral, Q6H, Augusto Guan FNP, 650 mg at 24 0012    acetaminophen tablet 650 mg, 650 mg, Oral, Q8H PRN, Disha Biggs FNP    aspirin EC tablet 81 mg, 81 mg, Oral, Daily, Ирина Ness, FNP, 81 mg at 24 0811    benzonatate capsule 100 mg, 100 mg, Oral, TID PRN, Augusto Guan FNP, 100 mg at  04/17/24 0525    bisacodyL suppository 10 mg, 10 mg, Rectal, Daily PRN, Freida, Augusto A, FNP, 10 mg at 04/22/24 1426    bisacodyL suppository 10 mg, 10 mg, Rectal, Once, Freida, Augusto A, FNP    cyanocobalamin tablet 500 mcg, 500 mcg, Oral, Daily, Freida, Augusto A, FNP, 500 mcg at 04/24/24 0811    enoxaparin injection 30 mg, 30 mg, Subcutaneous, Q24H (prophylaxis, 1700), Freida, Augusto A, FNP, 30 mg at 04/24/24 1734    estradiol 0.05 mg/24 hr td ptsw patch 1 patch, 1 patch, Transdermal, Every Wed, 1 patch at 04/24/24 1736 **AND** estradiol 0.05 mg/24 hr td ptsw patch 1 patch, 1 patch, Transdermal, Every Sun, Pawan Myers MD, 1 patch at 04/21/24 1740    ferrous sulfate tablet 1 each, 1 tablet, Oral, Daily, Freida, Augusto A, FNP, 1 each at 04/24/24 0811    furosemide tablet 40 mg, 40 mg, Oral, Daily, Ирина Ness, FNP, 40 mg at 04/24/24 0811    hydrALAZINE injection 10 mg, 10 mg, Intravenous, Q4H PRN, Freida, Augusto A, FNP    hydrOXYzine pamoate capsule 50 mg, 50 mg, Oral, After dinner, Jami Biggsyn A, FNP, 50 mg at 04/24/24 1734    labetalol 20 mg/4 mL (5 mg/mL) IV syring, 10 mg, Intravenous, Q4H PRN, Freida, Augusto A, FNP    LIDOcaine 5 % patch 1 patch, 1 patch, Transdermal, Q24H, Dian Disha A, FNP, 1 patch at 04/24/24 1734    methocarbamoL tablet 500 mg, 500 mg, Oral, TID PRN, [place] Disha A, FNP, 500 mg at 04/21/24 0212    methocarbamoL tablet 500 mg, 500 mg, Oral, Q6H, [place] Disha A, FNP, 500 mg at 04/25/24 0534    metoprolol injection 10 mg, 10 mg, Intravenous, Q2H PRN, Augusto Guan FNP, 10 mg at 04/17/24 1207    metoprolol tartrate (LOPRESSOR) tablet 25 mg, 25 mg, Oral, BID, Yang Campbell, AGACNP-BC, 25 mg at 04/24/24 2019    naloxegoL (MOVANTIK) tablet 25 mg, 25 mg, Oral, Daily, Augusto Guan FNP, 25 mg at 04/24/24 0811    nitroGLYCERIN SL tablet 0.4 mg, 0.4 mg, Sublingual, Q5 Min PRN, Augusto Guan FNP    ondansetron  "disintegrating tablet 4 mg, 4 mg, Oral, Q6H PRN, Freida, Augusto A, FNP, 4 mg at 04/15/24 0705    oxyCODONE immediate release tablet 10 mg, 10 mg, Oral, Q4H PRN, Dian, Disha A, FNP, 10 mg at 04/24/24 0421    oxyCODONE immediate release tablet 5 mg, 5 mg, Oral, Q4H PRN, [place], Disha A, FNP, 5 mg at 04/25/24 0402    polyethylene glycol packet 17 g, 17 g, Oral, BID PRN, Freida, Augusto A, FNP    promethazine tablet 25 mg, 25 mg, Oral, Q6H PRN, Freida, Augusto A, FNP, 25 mg at 04/15/24 0948    tamsulosin 24 hr capsule 0.4 mg, 0.4 mg, Oral, QHS, Ирина Ness, FNP, 0.4 mg at 04/24/24 2019     Review of Systems   Complete 12-point review of symptoms negative except for what's mentioned in HPI     Objective:     BP (!) 148/81   Pulse 74   Temp 98 °F (36.7 °C) (Axillary)   Resp 20   Ht 5' 2.99" (1.6 m)   Wt 74.5 kg (164 lb 3.9 oz)   SpO2 95%   Breastfeeding No   BMI 29.10 kg/m²      Physical Exam  Constitutional:       Appearance: She is ill-appearing.   HENT:      Head: Normocephalic.      Mouth/Throat:      Mouth: Mucous membranes are moist.   Eyes:      Pupils: Pupils are equal, round, and reactive to light.   Cardiovascular:      Rate and Rhythm: Normal rate and regular rhythm.      Heart sounds: Normal heart sounds.   Pulmonary:      Effort: Pulmonary effort is normal.      Breath sounds: Normal breath sounds.   Abdominal:      General: Bowel sounds are normal.      Palpations: Abdomen is soft.   Musculoskeletal:      Cervical back: Neck supple.      Comments: Left hip dressing clean and intact.  Diffuse muscle atrophy.    Skin:     General: Skin is warm and dry.   Neurological:      Motor: Weakness present.   Psychiatric:         Mood and Affect: Mood normal.     *MD performed and documented physical examination          Lines/Drains/Airways       Drain  Duration                  Urethral Catheter 04/25/24 0300 16 Fr. <1 day              Peripheral Intravenous Line  Duration                 "  Peripheral IV - Single Lumen 04/09/24 1210 18 G Right Forearm 15 days                  Labs  No results found for this or any previous visit (from the past 24 hour(s)).    Radiology  Left hip XR on 04/09/2024, IMPRESSION: Improved alignment following internal fixation of the femur.  Radiology  Abdominal XR on 04/15/2024, IMPRESSION:  Residual feces nonspecific gas pattern.    Assessment/Plan:     89 y.o. WF admitted on 4/12/2024    Left hip fracture   - s/p left hip IM nailing on 04/09/2024  - WBAT to LLE  - discontinue staples on 04/30  - discontinue Gabapentin 100 mg TID (initiated 4/12), Gabapentin 200 mg at bedtime (initiated 4/14) on 04/15 2/2 confusion  - continue                Tylenol 650 mg every 6 hours                Calcium-vitamin D3 1 tablet b.i.d.                 Oxycodone 5 mg q.6 hours.                Robaxin 500 mg t.i.d.  - defer to physiatry for rehab and pain management  - PT/OT/RT following     Osteoporosis  - stable  - vitamin-D level 42.1 on 04/10/2024  - continue.                Calcium-vitamin D3 1 tablet b.i.d.                 Estradiol 0.05 mg patch every Wednesday      HTN  - BP at goal!!  - discontinue Norvasc 5 mg daily on 04/18  - continue                  Metoprolol tartrate 25 mg b.i.d.                Hydralazine 10 mg every 2 hours as needed for BP > 160/90                Labetalol 10 mg every 2 hours as needed for BP > 160/90  - low sodium diet     COPD  - stable  - not on home 02  - keep 02 > 88%   - monitor closely     Normocytic anemia  - asymptomatic  - H/H trending down  - continue                Vitamin B12 tablet daily                    Ferrous sulfate 325 mg daily     - no evidence of active bleeds  - will closely monitor and transfuse if needed      Constipation  - stable   - discontinue Colace 100 mg b.i.d. and MiraLax 17 g daily 2/2 loose stools on 04/17  - continue  Movantik 25 mg daily (initiated 4/16)    Bilateral Lower extremity edema  - resolving  -  continue   Lasix 40 mg daily (to start 4/15)    Insomnia with associated delirium  - improved  - s/p Haldol 5 mg IM x1 on 04/16  - continue   Melatonin 6 mg at bedtime (initiated 4/14)    Urinary retention  - current  - required indwelling catheter insertion on 04/15  - continue   Flomax 0.4 mg at bedtime (initiated 4/16)  - discontinue indwelling catheter on 04/23  - indwelling catheter resumed on 04/24  - to follow-up with urology outpatient within 1 week    New onset atrial fibrillation  - controlled ventricular rate  - continue                  Metoprolol tartrate 25 mg b.i.d   Aspirin 81 mg daily  - cardiology does not recommend OAC 2/2 risks outweigh benefits, recommends aspirin 81 mg daily  - follow-up with cardiology outpatient for transthoracic echocardiogram     AB therapy  Rocephin 4/9-4/11     VTE Prophylaxis: Lovenox 30 mg daily   COVID-19 testing:  Unknown  COVID-19 vaccination status:  Vaccinated (Pfizer):  01/10/2021, 01/31/2021, 12/06/2022     POA: No  Living will: No  Contacts: Lacey Pizarro(Daughter) [phone]      CODE STATUS: DNR  Internal Medicine (attending): Pawan Myers MD  Physiatry (consulting):  Iglesia Tran MD     OUTPATIENT PROVIDERS  PCP:  Iglesia Steel MD  Orthopedic surgery:  Guy Jaquez D.O.  Cardiology: Matthew Molina MD  Urology: Sheldon Villa MD     DISPOSITION:  Sleep hygiene, bowel maintenance, and appetite at goal.  Last BM 4/24.  Vital signs at goal with no recorded fevers.  No new labs or imaging today.  Required reinsertion of indwelling catheter.  Continues to participate in progress in therapy.  Projected discharge on 04/26.  Monitor closely.  Notify of acute changes.    Staffing 4/23/2024:  Continent of bowel.  Lyman to be discontinued.  Blisters to hip from tape.  RT: Overall supervision.  Limited by left hip pain. Good appetite.  PT: SBA to min assist for bed mobility. SBA to CGA for OOB.  Ambulating 30-60 feet.  Needs increased time.  Limited by pain  and fatigue. OT: Making good progress.  Overall mod to min assist.  Would be safer with a TTB.  Mild cognitive deficits with poor carry over. Needs family training.  Projected discharge 4/26 if sitters available.     Jacob Guan NP conducted independent physical examination and assisted with medical documentation.

## 2024-04-25 NOTE — PROGRESS NOTES
Subjective  HPI: 89 yo WF with a PMH of COPD and hypertension presented to the ED at [facility] on 4/8/24 with complaint of left hip pain after a ground level fall where she tripped on a rug and fell. X-ray show intertrochanteric left femur fracture. Orthopedic surgery consulted and referred to hospital medicine for admission. Chest x-ray show no airspace disease. EKG sinus rhythm with PACs. On 4/9, orthopedic surgeon recommended surgical intervention. Patient underwent IM nailing, and was placed WBAT to LLE. On 4/10, patient placed on Lovenox for DVT ppx, and can change on ASA 81mg BID at discharge. Patient was started on Ceftriazone for UTI. Patient gets frequent UTIs. Labs showed low H&H of 10.4 & 33.5, low Iron of 26, low TIBC of 218, elevated B12 of 1,025, low Iron sat of 12, elevated glucose of 135, low calcium of 7.8, low protein of 5.4, low albumin of 3.3, elevated AST of 35. PT/OT evals completed with deficits noted with recommendation for high intensity therapy needed. On 4/11, patient complained of nausea so zofran was given. Labs showed low RBC of 3.53, low H&H Of 10.5 & 32.8, low calcium of 7.8, low albumin of 3.3. Dry dressing changes started. No BM reported since admission. On 4/12, labs showed elevated WBC of 12.68, low RBC of 3.30, low H&H of 9.9 & 30.4, low MCHC of 32.6, low chloride of 97, and elevated glucose of 126. Patient will need appointment with Carmen AMAYA (Ortho) in 3 weeks for wound check and repeat Xrays. Patient complained of pain to LLE rating at 8-9/10 with movement, but controlled with rest. Patient is AAOx4.  Participating with therapy. Functional status includes setup/supervision needed for eating, moderate assist for transfers with RW, max assist for toileting, moderate assist bed mobility, minimal assist for walking 26ft with RW, minimal assist for grooming, and max assist for lower body dressing. Patient was evaluated, accepted, and admitted to inpatient rehab to improve  functional status. Transferred to [facility] on 4/12 without incident.  4/25: Seen with PT, seated in WC after completing car transfer. PT relays that she has family training this afternoon, so they are trying to conserve some energy. Patient states that her beautician is coming to do her hair. She thought around 1pm, but will call to make sure it is after 4pm to not interfere with therapy. Pain continues to be better controlled. Rating 0/10 at rest and 5/10 with WB activity. VSSAF with mild HTN noted prior to morning medications. IM following.             Review of Systems  Psychiatric: Does not have any confirmed mental health history or diagnoses. Per daughter, pt. seems to be depressed and anxious since her spouse passed away. She is a former smoker.      Depression/Anxiety: no current complaints. Therapy notes Anxiety     Pain: left hip with movement and weight bearing (spasms)-improved  LIDOcaine 5 % patch q24hr, 1800, low back  acetaminophen tablet 650 mg q6h  methocarbamoL tablet 500 mg TID. q6h     methocarbamoL tablet 500 mg TID PRN muscle spasm  acetaminophen tablet 650 mg q8h PRN mild pain  oxyCODONE immediate release tablet 5 mg q4h PRN mod pain  oxyCODONE immediate release tablet 10 mg q4h PRN severe pain  Bowels/Bladder: last BM 4/24   naloxegoL (MOVANTIK) tablet 25 mg qd   Appetite: good    Sleep: good  hydrOXYzine pamoate capsule 50 mg, qPM 1800        Physical Exam  General: well-developed, well-nourished, no acute distress  Respiratory: equal chest rise, no SOB, no audible wheeze  Cardiovascular: regular rate and rhythm, LE edema  Gastrointestinal: soft, non-tender, non-distended   Musculoskeletal: decreased ROM/strength to LLE  Integumentary: bruising,  LLE incisions x 3-staples, dressing-c/d/I, surrounding ecchymosis and tape blisters-use paper tape, corn to right great toe  Neurologic: cranial nerves intact, signs of peripheral neurological deficit- numbness to fingertips,  AAO                Assessment/Plan  Hospital   Closed 2-part intertrochanteric fracture of proximal end of left femur s/p repair   Fall     Non-Hospital   COPD (chronic obstructive pulmonary disease)   Hypertension   Frequent UTI   Osteoporosis       Wounds: LLE incisions x 3-staples, dressing-c/d/I, surrounding ecchymosis and tape blisters-use paper tape  S/p IM nail of intertrochanteric left femur fracture on 4/9 (Leonid)  Precautions: WBAT LLE  Bracing/AD: RW  Swallowing: Regular Diet  Function: Tolerating therapy. Continue PT/OT  VTE Prophylaxis:   enoxaparin injection 30 mg SubQ q24hr  Code Status: FULL CODE   Discharge: Lives alone in [place] in an independent living [facility] with no steps to enter. Completed high school and 1 year of college. Denies  history. Pt. Is retired from secretarial work.  Is  approximately 3 years ago.  Was independent without use of assistive devices. The plan is to return to [facility]. Date 4/29 Monday to the [facility] with sitters.

## 2024-04-25 NOTE — PT/OT/SLP PROGRESS
"Occupational Therapy Inpatient Rehab Treatment    Name: Safia Muñoz  MRN: [id number]    Assessment:  Safia Muñoz is a 89 y.o. female admitted with a medical diagnosis of Closed 2-part intertrochanteric fracture of proximal end of left femur.  She presents with the following impairments/functional limitations:  weakness, impaired endurance, impaired self care skills, impaired functional mobility, gait instability, impaired cognition, decreased lower extremity function, decreased safety awareness, pain, decreased ROM, impaired fine motor, orthopedic precautions Pt. Is a RED STAR FALL RISK..    General Precautions: Standard, fall     Orthopedic Precautions:LLE weight bearing as tolerated     Braces: N/A    Rehab Prognosis: Good and Fair; patient would benefit from acute skilled OT services to address these deficits and reach maximum level of function.      History:     Past Medical History:   Diagnosis Date    COPD (chronic obstructive pulmonary disease)     Hypertension        Past Surgical History:   Procedure Laterality Date    RETROGRADE INTRAMEDULLARY RODDING OF DISTAL FEMUR Left 4/9/2024    Procedure: INSERTION, INTRAMEDULLARY NAIL INTERTROCHENTERIC FRACTURE;  Surgeon: Guy Jaquez DO;  Location: [facility];  Service: Orthopedics;  Laterality: Left;  HANA TABLE, SYNTHES TFNA       Subjective     Orientation: Oriented x4    Chief Complaint: "I need the bathroom." Pt. Was scheduled at 10:30-11:00 however OT was in hallway and Pt. Called out for OT to help.     Patient/Family Comments/goals: Pt. Will have 24 Hr care. We ordered her tub bench. Pt.'s Dtr./son and caregivers presenet during P.M. session for FT. Pt. Demonstrated ability to transfer and short distance F/M.       Respiratory Status: Room air    Patients cultural, spiritual, Muslim conflicts given the current situation: no       Objective:     Patient found up in chair with sarmiento catheter, chair check, peripheral IV  upon OT entry to " "room.    Mobility   Patient completed:  Sit to Stand Transfer with supervision and contact guard assistance with rolling walker  Stand to Sit Transfer with supervision and stand by assistance with rolling walker  Toilet Transfer Step Transfer technique with contact guard assistance with  rolling walker and bedside commode  Tub Transfer Step Transfer technique with minimum assistance with rolling walker, grab bars, and TTB and leg   Shower Transfer Stand Pivot technique with moderate assistance and of 2 persons with rolling walker, grab bars, and ledge ~ 4 " and TTB    Functional Mobility  Short F/M for transfers and FT    ADLs   Current Status   Eating     Oral Hygiene 4 standing   Shower, Bathe Self     Upper Body Dressing     Lower Body Dressing     Toileting Hygiene 4 BM   Toilet Transfer 4    Putting On, Taking Off Footwear       Limiting Factors for ADLs: motor, limited ROM, safety awareness, and pain       Additional Treatments: [id number] FT successful c sitter and family.     LifeStyle Change and Education:             Patient left up in chair with chair alarm on and Yevgeniy PT at 14:00  present.     Education provided: Roles and goals of OT, ADLs, transfer training, body mechanics, wheelchair precautions, safety precautions, fall prevention, post-op precautions, equipment recommendations, and home safety    Multidisciplinary Problems       Occupational Therapy Goals          Problem: Occupational Therapy    Goal Priority Disciplines Outcome Interventions   Occupational Therapy Goal     OT, PT/OT Progressing    Description: Pt to perform eating tasks with independence by re-eval. Pt. C numb fingertips; unable to manage packages. Discussed c son getting safety scissors     Pt to perform oral hygiene tasks with min A standing at sink with RW by re-eval. Ongoing Sits in w/c -Pt. C new onset A-Fib/RVR c fatigue; by Monday 4/22     Pt to perform UB dressing with independence by re-eval.Touch A c bra Numb " fingertips/maybe front hook bra ?    Pt to perform LB dressing with mod A using AE PRN by re-eval. MET   Pt to perform donning/doffing footwear with max A using AE PRN by re-eval. MET   Pt to perform toileting hygiene with mod A by re-eval.  Pt. Continues c Lyman . Pt. Able to wipe self after BM 4/20/2024 c touch A to steady while standing c RE. Pt. Managed underwear up /down.   Pt to perform bathing tasks with mod A by re-eval. MET (sponge bath)     Pt to perform toilet transfers with mod A using LRAD by re-eval. MET  Pt to perform WIS transfers with mod A using LRAD by re-eval.  Pt. Unable to elevate L LE high enough to clear shower ledge. Son will measure shower door opening width vs. Tub accessibility.   Pt to perform meal prep activity with mod A by re-eval. MET   Pt to perform laundry management task with mod A by re-eval.  Pt to perform light housekeeping activity with mod A by re-eval.  Pt. To prepare water/food for dog by Re-Eval  Balance, Strengthening, Endurance, Balance:  Pt to consistently demonstrate adherence to orthopedic precautions during all ADL's as instructed by OT.  Pt to demonstrate consistent adherence to breathing control and energy conservation techniques as educated by OT.  Pt. To remember safety techniques for functional transfers S v/c's by D/C. !!                       Time Tracking     OT Received On: 04/25/24  Time In  (1005 & 1300)     Time Out  (1035 & 1400)  Total Time    Therapy Time: OT Individual: 90  Missed Time:    Missed Time Reason:      Billable Minutes: Self Care/Home Management 90    04/25/2024

## 2024-04-25 NOTE — PLAN OF CARE
Problem: Rehabilitation (IR[facility]) Plan of Care  Goal: Absence of New-Onset Illness or Injury  Outcome: Progressing  Goal: Optimal Comfort and Wellbeing  Outcome: Progressing  Goal: Readiness for Transition of Care  Outcome: Progressing     Problem: Fall Injury Risk  Goal: Absence of Fall and Fall-Related Injury  Outcome: Progressing     Problem: Skin Injury Risk Increased  Goal: Skin Health and Integrity  Outcome: Progressing     Problem: Pain Acute  Goal: Acceptable Pain Control and Functional Ability  Outcome: Progressing     Problem: Infection  Goal: Absence of Infection Signs and Symptoms  Outcome: Progressing

## 2024-04-25 NOTE — PT/OT/SLP RE-EVAL
Physical Therapy Rehab Re-Evaluation    Patient Name:  Safia Muñoz   MRN:  [id number]    Recommendations:     Discharge Recommendations:  Moderate Intensity Therapy   Discharge Equipment Recommendations: walker, rolling, bedside commode, shower chair   Barriers to discharge: Decreased caregiver support and Impaired functional mobility     Assessment:     Safia Muñoz is a 89 y.o. female admitted with a medical diagnosis of Closed 2-part intertrochanteric fracture of proximal end of left femur.  She presents with the following impairments/functional limitations:  weakness, impaired endurance, impaired self care skills, impaired functional mobility, gait instability, impaired cognition, decreased lower extremity function, decreased safety awareness, pain, decreased ROM, impaired fine motor, orthopedic precautions.    Rehab Diagnosis: medical diagnosis of Closed 2-part intertrochanteric fracture of proximal end of left femur    General Precautions: Standard, fall     Orthopedic Precautions: LLE weight bearing as tolerated     Braces: N/A    Rehab Prognosis: Good; patient would benefit from acute skilled PT services to address these deficits and reach maximum level of function.      History:     Past Medical History:   Diagnosis Date    COPD (chronic obstructive pulmonary disease)     Hypertension        Past Surgical History:   Procedure Laterality Date    RETROGRADE INTRAMEDULLARY RODDING OF DISTAL FEMUR Left 4/9/2024    Procedure: INSERTION, INTRAMEDULLARY NAIL INTERTROCHENTERIC FRACTURE;  Surgeon: Guy Jaquez DO;  Location: [facility];  Service: Orthopedics;  Laterality: Left;  TIGRE MARIEE, SYNTHES TFNA       Subjective     Patient Comments: pt agreeable to participate with PT    Patients cultural, spiritual, Taoist conflicts given the current situation: no       Living Environment  People in Home: alone  Living Arrangements: other (see comments) (lives in single level home)  Home Accessibility:  wheelchair accessible  Number of Stairs, Main Entrance: none (small threshold)  Home Layout: Able to live on 1st floor  Transportation Anticipated: family or friend will provide  Equipment Currently Used at Home: other (see comments) (has RW, but does not use it. grab bar and HHS in WIS.)    Prior Level of Function  Ambulation Skills: independent  Stairs:  (has small threshold into home no steps)  Transfer Skills: independent  ADL Skills: independent  Cognitive Communication: independent    Objective:     Communicated with pt prior to session.  Patient found up in chair upon PT entry to room at start of both sessions    Vitals   Pain Pain Rating 1: 5/10  Location - Side 1: Left  Location 1: hip  Pain Addressed 1: Reposition, Cessation of Activity, Nurse notified  Pain Rating Post-Intervention 1: 0/10     Respiratory Status: Room air    Exams  Cognitive Exam:  Patient is oriented to Person, Place, Time, and Situation    Functional Mobility   Admit Current   Status Goal   Functional Area: Care Score: Care Score: Care Score:   Roll Left and Right 3 4 Independent   Sit to Lying 1 3  Min A needed for L LE d/t pain Supervision or touching assistance   Lying to Sitting on Side of Bed 1 4  With leg  Supervision or touching assistance   Sit to Stand 2 5  Setup/cleanup assist with RW Supervision or touching assistance   Chair/Bed-to-Chair Transfer 2 4  SBA with RW Supervision or touching assistance   Car Transfer 88 4  SBA with RW + leg /thigh strap for L LE to enter car. Performed in AM session and again in PM session for family training. Not attempted due to medical/safety concerns   Walk 10 Feet 88 4 Not attempted due to medical/safety concerns   Walk 50 Feet with Two Turns 88 4 Not attempted due to medical/safety concerns   Walk 150 Feet 88 4  Pt amb 170' with RW and SBA. Slow gait speed, occasional short standing breaks provided throughout. Decreased L knee flexion exhibited during swing phase. Not attempted  "due to medical/safety concerns   Walk 10 Feet Uneven Surface 88  Not attempted due to medical/safety concerns   1 Step (Curb) 88 4  Pt amb up/down 4" curb with RW and CGA + VC for technique Not attempted due to medical/safety concerns   4 Steps 88 88 Not attempted due to medical/safety concerns   12 Steps 88 88 Not attempted due to medical/safety concerns   Picking Up Object 88 88 Not attempted due to medical/safety concerns   Wheel 50 Feet with Two Turns 3  Independent   Wheel 150 Feet 88  Not attempted due to medical/safety concerns     Therapeutic Activities and Exercises:  Patient educated on role of acute care PT and PT POC and safety while in hospital including calling nurse for mobility  Patient educated about importance of OOB mobility and remaining up in chair most of the day.    AM session:  Toilet t/f performed with SBA/CGA and use of RW. Toileting completed with mod A for hygiene. +BM, documented on I/O sheet.    PM session:  Family training completed, all questions/concerns addressed as appropriate. Pt and pts family verbalized understanding of all education.    Activity Tolerance: Good    Patient left up in chair with call button in reach and chair alarm on at end of both sessions.    Education Provided: roles and goals of PT/PTA, transfer training, bed mob, gait training, stair training, balance training, safety awareness, body mechanics, assistive device, and fall prevention    Expected compliance: Moderate compliance      Plan:     During this hospitalization, patient to be seen 6 x/week to address the identified rehab impairments via gait training, therapeutic activities, therapeutic exercises, neuromuscular re-education, wheelchair management/training and progress toward the following goals:    GOALS:   Multidisciplinary Problems       Physical Therapy Goals          Problem: Physical Therapy    Goal Priority Disciplines Outcome Goal Variances Interventions   Physical Therapy Goal     PT, PT/OT " Progressing     Description: Bed Mobility:  MET - Roll left and right with partial/moderate assist.   MET - Sit to supine transfer with partial/moderate assist.   MET - Supine to sit transfer with partial/moderate assist.   Roll left and right independently  Sit to supine transfer with setup/clean-up assist assist  Supine to sit transfer with setup/clean-up assist assist    Transfers:  MET - Sit to stand transfer with supervision/touching assist using RW.   MET - Bed to chair transfer with supervision/touching assist Stand Step  using RW.   DISCONTINUE - Bed to chair transfer with partial/moderate assist Slide Board  using Slide board.   Sit to stand transfer with setup/clean-up assist using RW  Bed to chair transfer with setup/clean-up assist Stand Step  using RW  Car transfer with supervision/touching assist using RW   an object from the ground in standing position with supervision/touching assist using RW and reacher    Mobility:  Ambulate 150 feet with supervision/touching assist using RW.   Pt ascended/descended a 4 inch curb with partial/moderate assist using RW  Ascend/descend 4 stairs with partial/moderate assist using bilateral handrails  Ambulate 10 feet on uneven surfaces/ramps with partial/moderate assist using RW  Manual wheelchair 150 feet with partial/moderate assist using Bilateral upper extremity                         Plan of Care Expires:  04/19/24  PT Next Visit Date: 05/01/24  Plan of Care reviewed with: patient      PT Care conference held with PTA. Short term goals updated appropriately. Plan of care updates discussed and reviewed with PTA Alicia Macias    Additional Infomation:     Pt seen for PT [id number] and [id number]    Time Tracking:     Therapy Time   PT Start Time:  (0830; 1400)  PT Stop Time:  (0930; 1500)  PT Individual: 120  Missed Time:    Time Missed due to:      Billable Minutes: Re-eval 20 min, Gait Training 15 min, Therapeutic Activity 45 min, and Self care/Home management  40 min    04/25/2024

## 2024-04-26 PROCEDURE — 99900031 HC PATIENT EDUCATION (STAT)

## 2024-04-26 PROCEDURE — 25000003 PHARM REV CODE 250: Performed by: NURSE PRACTITIONER

## 2024-04-26 PROCEDURE — 99900035 HC TECH TIME PER 15 MIN (STAT)

## 2024-04-26 PROCEDURE — 97116 GAIT TRAINING THERAPY: CPT | Mod: CQ

## 2024-04-26 PROCEDURE — 97110 THERAPEUTIC EXERCISES: CPT | Mod: CQ

## 2024-04-26 PROCEDURE — 97168 OT RE-EVAL EST PLAN CARE: CPT

## 2024-04-26 PROCEDURE — 99233 SBSQ HOSP IP/OBS HIGH 50: CPT | Mod: ,,, | Performed by: NURSE PRACTITIONER

## 2024-04-26 PROCEDURE — 97535 SELF CARE MNGMENT TRAINING: CPT

## 2024-04-26 PROCEDURE — 63600175 PHARM REV CODE 636 W HCPCS: Performed by: NURSE PRACTITIONER

## 2024-04-26 PROCEDURE — 11800000 HC REHAB PRIVATE ROOM

## 2024-04-26 PROCEDURE — 97530 THERAPEUTIC ACTIVITIES: CPT

## 2024-04-26 PROCEDURE — 94799 UNLISTED PULMONARY SVC/PX: CPT

## 2024-04-26 PROCEDURE — 97110 THERAPEUTIC EXERCISES: CPT

## 2024-04-26 RX ORDER — FUROSEMIDE 10 MG/ML
40 INJECTION INTRAMUSCULAR; INTRAVENOUS ONCE
Status: COMPLETED | OUTPATIENT
Start: 2024-04-26 | End: 2024-04-26

## 2024-04-26 RX ORDER — POTASSIUM CHLORIDE 20 MEQ/1
40 TABLET, EXTENDED RELEASE ORAL ONCE
Status: COMPLETED | OUTPATIENT
Start: 2024-04-26 | End: 2024-04-26

## 2024-04-26 RX ADMIN — LIDOCAINE 5% 1 PATCH: 700 PATCH TOPICAL at 05:04

## 2024-04-26 RX ADMIN — NALOXEGOL OXALATE 25 MG: 25 TABLET, FILM COATED ORAL at 07:04

## 2024-04-26 RX ADMIN — ENOXAPARIN SODIUM 30 MG: 30 INJECTION SUBCUTANEOUS at 05:04

## 2024-04-26 RX ADMIN — TAMSULOSIN HYDROCHLORIDE 0.4 MG: 0.4 CAPSULE ORAL at 08:04

## 2024-04-26 RX ADMIN — OXYCODONE 10 MG: 5 TABLET ORAL at 07:04

## 2024-04-26 RX ADMIN — CYANOCOBALAMIN TAB 500 MCG 500 MCG: 500 TAB at 07:04

## 2024-04-26 RX ADMIN — OXYCODONE 10 MG: 5 TABLET ORAL at 08:04

## 2024-04-26 RX ADMIN — FUROSEMIDE 40 MG: 10 INJECTION, SOLUTION INTRAMUSCULAR; INTRAVENOUS at 12:04

## 2024-04-26 RX ADMIN — OXYCODONE 10 MG: 5 TABLET ORAL at 12:04

## 2024-04-26 RX ADMIN — ASPIRIN 81 MG: 81 TABLET, COATED ORAL at 07:04

## 2024-04-26 RX ADMIN — METOPROLOL TARTRATE 25 MG: 25 TABLET, FILM COATED ORAL at 08:04

## 2024-04-26 RX ADMIN — METHOCARBAMOL 500 MG: 500 TABLET ORAL at 05:04

## 2024-04-26 RX ADMIN — FERROUS SULFATE TAB 325 MG (65 MG ELEMENTAL FE) 1 EACH: 325 (65 FE) TAB at 07:04

## 2024-04-26 RX ADMIN — ACETAMINOPHEN 325MG 650 MG: 325 TABLET ORAL at 12:04

## 2024-04-26 RX ADMIN — METOPROLOL TARTRATE 25 MG: 25 TABLET, FILM COATED ORAL at 07:04

## 2024-04-26 RX ADMIN — FUROSEMIDE 40 MG: 40 TABLET ORAL at 07:04

## 2024-04-26 RX ADMIN — HYDROXYZINE PAMOATE 50 MG: 50 CAPSULE ORAL at 05:04

## 2024-04-26 RX ADMIN — METHOCARBAMOL 500 MG: 500 TABLET ORAL at 12:04

## 2024-04-26 RX ADMIN — POTASSIUM CHLORIDE 40 MEQ: 1500 TABLET, EXTENDED RELEASE ORAL at 12:04

## 2024-04-26 RX ADMIN — ACETAMINOPHEN 325MG 650 MG: 325 TABLET ORAL at 05:04

## 2024-04-26 NOTE — PLAN OF CARE
Sent amended HH order (to include Lyman care per protocol) to [facility] via [facility].

## 2024-04-26 NOTE — PROGRESS NOTES
Dos 4/26/24  Patient seen in PT gym today  Participation and progress toward goals noted  Continues working on strength, mobility, balance and increasing endurance  Progress and problems discussed with patient and therapists  Questions answered  Chart reviewed and discussed with Ana Laura Biggs, my FNP  Continue present management  Subjective  HPI: 89 yo WF with a PMH of COPD and hypertension presented to the ED at [facility] on 4/8/24 with complaint of left hip pain after a ground level fall where she tripped on a rug and fell. X-ray show intertrochanteric left femur fracture. Orthopedic surgery consulted and referred to hospital medicine for admission. Chest x-ray show no airspace disease. EKG sinus rhythm with PACs. On 4/9, orthopedic surgeon recommended surgical intervention. Patient underwent IM nailing, and was placed WBAT to LLE. On 4/10, patient placed on Lovenox for DVT ppx, and can change on ASA 81mg BID at discharge. Patient was started on Ceftriazone for UTI. Patient gets frequent UTIs. Labs showed low H&H of 10.4 & 33.5, low Iron of 26, low TIBC of 218, elevated B12 of 1,025, low Iron sat of 12, elevated glucose of 135, low calcium of 7.8, low protein of 5.4, low albumin of 3.3, elevated AST of 35. PT/OT evals completed with deficits noted with recommendation for high intensity therapy needed. On 4/11, patient complained of nausea so zofran was given. Labs showed low RBC of 3.53, low H&H Of 10.5 & 32.8, low calcium of 7.8, low albumin of 3.3. Dry dressing changes started. No BM reported since admission. On 4/12, labs showed elevated WBC of 12.68, low RBC of 3.30, low H&H of 9.9 & 30.4, low MCHC of 32.6, low chloride of 97, and elevated glucose of 126. Patient will need appointment with Carmen AMAYA (Ortho) in 3 weeks for wound check and repeat Xrays. Patient complained of pain to LLE rating at 8-9/10 with movement, but controlled with rest. Patient is AAOx4.  Participating with therapy. Functional status  includes setup/supervision needed for eating, moderate assist for transfers with RW, max assist for toileting, moderate assist bed mobility, minimal assist for walking 26ft with RW, minimal assist for grooming, and max assist for lower body dressing. Patient was evaluated, accepted, and admitted to inpatient rehab to improve functional status. Transferred to [facility] on 4/12 without incident.  4/26: Seen with PT, seated in WC performing LE exercises. Noted increase in swelling to BLE. Lasix ordered. Lyman catheter in place. Had her hair done yesterday. In good spirits. Tolerating therapy without complaint. VSSAF.                 Review of Systems  Psychiatric: Does not have any confirmed mental health history or diagnoses. Per daughter, pt. seems to be depressed and anxious since her spouse passed away. She is a former smoker.      Depression/Anxiety: no current complaints. Therapy notes Anxiety     Pain: left hip with movement and weight bearing (spasms)-improved  LIDOcaine 5 % patch q24hr, 1800, low back  acetaminophen tablet 650 mg q6h  methocarbamoL tablet 500 mg TID. q6h     methocarbamoL tablet 500 mg TID PRN muscle spasm  acetaminophen tablet 650 mg q8h PRN mild pain  oxyCODONE immediate release tablet 5 mg q4h PRN mod pain  oxyCODONE immediate release tablet 10 mg q4h PRN severe pain  Bowels/Bladder: last BM 4/24   naloxegoL (MOVANTIK) tablet 25 mg qd   Appetite: good    Sleep: good  hydrOXYzine pamoate capsule 50 mg, qPM 1800        Physical Exam  General: well-developed, well-nourished, no acute distress  Respiratory: equal chest rise, no SOB, no audible wheeze  Cardiovascular: regular rate and rhythm, LE edema  Gastrointestinal: soft, non-tender, non-distended   Musculoskeletal: decreased ROM/strength to LLE  Integumentary: bruising,  LLE incisions x 3-staples, dressing-c/d/I, surrounding ecchymosis and tape blisters-use paper tape, corn to right great toe  Neurologic: cranial nerves intact, signs of  peripheral neurological deficit- numbness to fingertips, AAO  *MD performed and documented physical examination               Assessment/Plan  Hospital   Closed 2-part intertrochanteric fracture of proximal end of left femur s/p repair   Fall     Non-Hospital   COPD (chronic obstructive pulmonary disease)   Hypertension   Frequent UTI   Osteoporosis       Wounds: LLE incisions x 3-staples, dressing-c/d/I, surrounding ecchymosis and tape blisters-use paper tape  S/p IM nail of intertrochanteric left femur fracture on 4/9 (Leonid)  Precautions: WBAT LLE  Bracing/AD: RW  Swallowing: Regular Diet  Function: Tolerating therapy. Continue PT/OT  VTE Prophylaxis:   enoxaparin injection 30 mg SubQ q24hr  Code Status: FULL CODE   Discharge: Lives alone in [place] in an independent living [facility] with no steps to enter. Completed high school and 1 year of college. Denies  history. Pt. Is retired from secretarial work.  Is  approximately 3 years ago.  Was independent without use of assistive devices. The plan is to return to [facility]. Date 4/29 Monday to the [facility] with sitters.               Disha Biggs NP, conducted additional independent physical examination and assisted with medical documentation.

## 2024-04-26 NOTE — PLAN OF CARE
Problem: Occupational Therapy  Goal: Occupational Therapy Goal  Description: Pt to perform eating tasks with independence by re-eval. Pt. C numb fingertips; unable to manage packages. Discussed c son getting safety scissors     Pt to perform oral hygiene tasks with min A standing at sink with RW by re-eval. Ongoing Sits in w/c -Pt. C new onset A-Fib/RVR c fatigue; by Monday 4/22 MET standing c SVN for safety     Pt to perform UB dressing with independence by re-eval.Touch A c bra Numb fingertips/maybe front hook bra ? SBA MET no bra    Pt to perform LB dressing with mod A using AE PRN by re-eval. MET   Pt to perform donning/doffing footwear with max A using AE PRN by re-eval. MET   Pt to perform toileting hygiene with mod A by re-eval.  Pt. Continues c Lyman . Pt. Able to wipe self after BM 4/20/2024 c touch A to steady while standing c RE. Pt. Managed underwear up /down. MET  Pt to perform bathing tasks with mod A by re-eval. MET (sponge bath) Touch A MET    Pt to perform toilet transfers with mod A using LRAD by re-eval. MET Touch A MET  Pt to perform WIS transfers with mod A using LRAD by re-eval.  Pt. Unable to elevate L LE high enough to clear shower ledge. Son will measure shower door opening width vs. Tub accessibility.   Pt to perform meal prep activity with mod A by re-eval. MET   Pt to perform laundry management task with mod A by re-eval.  Pt to perform light housekeeping activity with mod A by re-eval.  Pt. To prepare water/food for dog by Re-Eval  Balance, Strengthening, Endurance, Balance:  Pt to consistently demonstrate adherence to orthopedic precautions during all ADL's as instructed by OT.  Pt to demonstrate consistent adherence to breathing control and energy conservation techniques as educated by OT.  Pt. To remember safety techniques for functional transfers S v/c's by D/C. !!  Outcome: Progressing

## 2024-04-26 NOTE — PT/OT/SLP RE-EVAL
"Occupational Therapy Inpatient Rehab Re-Evaluation    Name: Safia Muñoz  MRN: [id number]    Recommendations:     Discharge Recommendations:  Low Intensity Therapy   Discharge Equipment Recommendations: bedside commode, bath bench   Barriers to discharge:  Pt. Progressing well; will have 24 Hr sitters/family initially s/p D/C    Assessment:  Safia Muñoz is a 89 y.o. female admitted with a medical diagnosis of Closed 2-part intertrochanteric fracture of proximal end of left femur.  She presents with the following impairments/functional limitations:  impaired self care skills, decreased safety awareness, orthopedic precautions Pt. Is a RED STAR FALL RISK.    General Precautions: Standard, fall     Orthopedic Precautions:LLE weight bearing as tolerated     Braces: N/A    Rehab Prognosis: Good; patient would benefit from acute skilled OT services to address these deficits and reach maximum level of function.      History:     Past Medical History:   Diagnosis Date    COPD (chronic obstructive pulmonary disease)     Hypertension        Past Surgical History:   Procedure Laterality Date    RETROGRADE INTRAMEDULLARY RODDING OF DISTAL FEMUR Left 4/9/2024    Procedure: INSERTION, INTRAMEDULLARY NAIL INTERTROCHENTERIC FRACTURE;  Surgeon: Guy Jaquez DO;  Location: [facility];  Service: Orthopedics;  Laterality: Left;  HANA TABLE, SYNTHES TFNA       Subjective     Orientation: Oriented x4    Chief Complaint: No c/o today; Pt. Very pleasant mood.     Patient/Family Comments/goals: "I will miss yall when I go"      Respiratory Status: Room air    Patients cultural, spiritual, Scientology conflicts given the current situation:         Living Environment   Living Environment  People in Home: alone  Living Arrangements: assisted living  Home Accessibility: wheelchair accessible  Number of Stairs, Main Entrance: none  Home Layout: Able to live on 1st floor  Transportation Anticipated: family or friend will provide  Equipment " "Currently Used at Home: other (see comments) (has RW, but does not use it. grab bar and HHS in WIS.)  Shower Setup: Tub/Shower combo and Walk-in shower    Prior Level of Function  BADL: Independent    IADL: Modified Independent    Equipment used at home: other (see comments) (has RW, but does not use it. grab bar and HHS in WIS.).  DME owned (not currently used):  Pt. And family educated on LOC and Pt. Unable to elevate LE's >7" to T/F into shower. Will use Tub Transfer Bench  .      Upon discharge, patient will have assistance from Sitters, family.    Objective:     Patient found HOB elevated with bed alarm, sarmiento catheter, peripheral IV  upon OT entry to room.    Mobility   Patient completed:  Rolling/Turning to Right with supervision  Supine to Sit with stand by assistance  Sit to Stand Transfer with supervision and stand by assistance with rolling walker  Bed to Chair Transfer using Step Transfer technique with contact guard assistance with rolling walker  Toilet Transfer Step Transfer technique with contact guard assistance with  rolling walker and bedside commode  Tub Transfer Stand Pivot technique with contact guard assistance with rolling walker, grab bars, and TTB and leg      Functional Mobility   At 1400, Pt. FM from 402 > OT Gym c OT trailing w/c behind.  Pt. C FM in room for ADL. CGA/SVN for safety.     ADLs     Current Status   Eating 5 opening packaged   Oral Hygiene 4 standing via RW /SVN    Shower, Bathe Self 4 LHS/Touch to steady as Pt. Washed leana area   Upper Body Dressing 5 SBA    Lower Body Dressing 3 c reacher; Pt. Needs practice    Toileting Hygiene 4 Pt. Wiped BM ; OT steadied    Toilet Transfer 4   Putting On, Taking Off Footwear 3 AE (needs help donning sock on sock Aid)     Limiting Factors for ADLs: motor, sensory, limited ROM, coordination, and safety awareness    Exams     ROM:          -       WFL    Hand Dominance: Right    ROM Hand  Left Hand: WFL  Right Hand: " WFL    Strength  Overall Strength:          -       WFL     Strength:   WNL  Pinch Strength:   WNL    Sensation  Left:          -       WNL, except tips numb  Right: WNL, except tips     Coordination:      -       Intact    Tone  Left: WNL  Right: WNL    Visual/Perceptual  Intact; wears reading glasses    Cognition:   WFL, except STM deficits    Balance    Sitting  Sitting Surface: TTB  Static: No UE Support, Good  Dynamic: One UE Extremity, Good    Standing  Static: One UE Extremity, Good  Dynamic: One UE Extremity, Good (-)    Righting Reaction:   WNL    Posture/Deviations  WNL    Additional Treatments   14:00, Pt. To OT gym via RW , then in standing performed B UE AROM on UBE at 1.8 umana x's 6 min. Pt. C brief rest break, then in standing, sidestepped to L side and performed L UE & R UE AROM/pinch c graded clothespins on horizontal/vertical dowels. Pt. C positive response. Pt. Then returned to room via w/c and transferred w/c >recliner. Pt. Requested make up bag and mirror. OT set up.     LifeStyle Change and Education     Patient left up in chair with call button in reach, chair alarm on, and B LE elevated .    Education provided: ADLs, transfer training, body mechanics, assistive device, wheelchair precautions, sequencing, fall prevention, post-op precautions, and equipment recommendations    Multidisciplinary Problems       Occupational Therapy Goals          Problem: Occupational Therapy    Goal Priority Disciplines Outcome Interventions   Occupational Therapy Goal     OT, PT/OT Progressing    Description: Pt to perform eating tasks with independence by re-eval. Pt. C numb fingertips; unable to manage packages. Discussed c son getting safety scissors     Pt to perform oral hygiene tasks with min A standing at sink with RW by re-eval. Ongoing Sits in w/c -Pt. C new onset A-Fib/RVR c fatigue; by Monday 4/22 MET standing c SVN for safety     Pt to perform UB dressing with independence by re-eval.Touch A c  bra Numb fingertips/maybe front hook bra ? SBA MET no bra    Pt to perform LB dressing with mod A using AE PRN by re-eval. MET   Pt to perform donning/doffing footwear with max A using AE PRN by re-eval. MET   Pt to perform toileting hygiene with mod A by re-eval.  Pt. Continues c Lyman . Pt. Able to wipe self after BM 4/20/2024 c touch A to steady while standing c RE. Pt. Managed underwear up /down. MET  Pt to perform bathing tasks with mod A by re-eval. MET (sponge bath) Touch A MET    Pt to perform toilet transfers with mod A using LRAD by re-eval. MET Touch A MET  Pt to perform WIS transfers with mod A using LRAD by re-eval.  Pt. Unable to elevate L LE high enough to clear shower ledge. Son will measure shower door opening width vs. Tub accessibility.   Pt to perform meal prep activity with mod A by re-eval. MET   Pt to perform laundry management task with mod A by re-eval.  Pt to perform light housekeeping activity with mod A by re-eval.  Pt. To prepare water/food for dog by Re-Eval  Balance, Strengthening, Endurance, Balance:  Pt to consistently demonstrate adherence to orthopedic precautions during all ADL's as instructed by OT.  Pt to demonstrate consistent adherence to breathing control and energy conservation techniques as educated by OT.  Pt. To remember safety techniques for functional transfers S v/c's by D/C. !!                       Plan     During this hospitalization, patient to be seen  (5-7 x's weekly) to address the identified rehab impairments via self-care/home management, therapeutic activities, therapeutic exercises and progress toward the following goals:    Plan of Care Expires:  05/02/24    Time Tracking     OT Received On: 04/26/24  Time In  (0730 & 1400)     Time Out  (0830 & 1440)  Total Time    Therapy Time: OT Individual: 100  Missed Time:    Missed Time Reason:      Billable Minutes: Re-eval 15, Self Care/Home Management 45, Therapeutic Activity 25, and Therapeutic Exercise  15    04/26/2024

## 2024-04-26 NOTE — PROGRESS NOTES
[facility] ([facility])  Rehab Progress Note    Patient Name: Safia Muñoz  MRN: [id number]  Age: 89 y.o. Sex: female  : 1931  Hospital Length of Stay: 14 days  Date of Service: 2024   Chief Complaint: Left hip fracture s/p left hip IM nailing on 2024     Subjective:     Basic Information  Admit Information: 89-year-old white female presented to [facility] ED on 2024 complaining of left hip pain after a ground level fall.  PMH significant for HTN and COPD not on home O2.  Workup significant for closed comminuted intertrochanteric left femur fracture.  Tolerated left hip IM nailing on  without perioperative complications.  Received 3 days Rocephin due to underlying UTI.  Urine culture appeared to be contaminated.  Orthopedic surgery recommended WBAT to LLE.  Tolerated transfer to [facility] inpatient rehab unit on  without incident.   Today's Information: No acute events overnight.  Sitting up in chair.  Reports good sleep and appetite.  Last BM .  Significant bilateral lower extremity edema.  Vital signs at goal with no recorded fevers.  No new labs or imaging today.      Review of patient's allergies indicates:   Allergen Reactions    Adhesive tape-silicones Blisters    Ofloxacin     Penicillins     Sulfamethoxazole-trimethoprim      Other reaction(s): Unknown    Codeine Nausea Only        Current Facility-Administered Medications:     0.9%  NaCl infusion, , Intravenous, Continuous, Disha Biggs A, FNP, Last Rate: 125 mL/hr at 24 1723, New Bag at 24 1723    acetaminophen tablet 650 mg, 650 mg, Oral, Q6H, Augusto Guan A, FNP, 650 mg at 24 0501    acetaminophen tablet 650 mg, 650 mg, Oral, Q8H PRN, Disha Biggs, FNP    aspirin EC tablet 81 mg, 81 mg, Oral, Daily, Ирина Ness, FNP, 81 mg at 24 0727    benzonatate capsule 100 mg, 100 mg, Oral, TID PRN, Augusto Guan, FNP, 100 mg at 24 0525    bisacodyL  suppository 10 mg, 10 mg, Rectal, Daily PRN, Freida, Augusto A, FNP, 10 mg at 04/22/24 1426    bisacodyL suppository 10 mg, 10 mg, Rectal, Once, Freida, Augusto A, FNP    cyanocobalamin tablet 500 mcg, 500 mcg, Oral, Daily, Freida, Augusto A, FNP, 500 mcg at 04/26/24 0726    enoxaparin injection 30 mg, 30 mg, Subcutaneous, Q24H (prophylaxis, 1700), Freida, Augusto A, FNP, 30 mg at 04/25/24 1640    estradiol 0.05 mg/24 hr td ptsw patch 1 patch, 1 patch, Transdermal, Every Wed, 1 patch at 04/24/24 1736 **AND** estradiol 0.05 mg/24 hr td ptsw patch 1 patch, 1 patch, Transdermal, Every Sun, Pawan Myers MD, 1 patch at 04/21/24 1740    ferrous sulfate tablet 1 each, 1 tablet, Oral, Daily, Freida, Augusto A, FNP, 1 each at 04/26/24 0726    furosemide tablet 40 mg, 40 mg, Oral, Daily, Ирина Ness, FNP, 40 mg at 04/26/24 0726    hydrALAZINE injection 10 mg, 10 mg, Intravenous, Q4H PRN, Freida, Augusto A, FNP    hydrOXYzine pamoate capsule 50 mg, 50 mg, Oral, After dinner, [place] Disha A, FNP, 50 mg at 04/25/24 1645    labetalol 20 mg/4 mL (5 mg/mL) IV syring, 10 mg, Intravenous, Q4H PRN, Freida, Augusto A, FNP    LIDOcaine 5 % patch 1 patch, 1 patch, Transdermal, Q24H, Dian Disha A, FNP, 1 patch at 04/25/24 1642    methocarbamoL tablet 500 mg, 500 mg, Oral, TID PRN, Dian, Disha A, FNP, 500 mg at 04/21/24 0212    methocarbamoL tablet 500 mg, 500 mg, Oral, Q6H, [place] Disha A, FNP, 500 mg at 04/26/24 0501    metoprolol injection 10 mg, 10 mg, Intravenous, Q2H PRN, Freida, Augusto A, FNP, 10 mg at 04/17/24 1207    metoprolol tartrate (LOPRESSOR) tablet 25 mg, 25 mg, Oral, BID, Yang Campbell, AGACNP-BC, 25 mg at 04/26/24 0727    naloxegoL (MOVANTIK) tablet 25 mg, 25 mg, Oral, Daily, Augusto Guan, FNP, 25 mg at 04/26/24 0726    nitroGLYCERIN SL tablet 0.4 mg, 0.4 mg, Sublingual, Q5 Min PRN, Freida, Augusto A, FNP    ondansetron disintegrating tablet 4 mg, 4 mg, Oral,  "Q6H PRN, Freida, Augusto A, FNP, 4 mg at 04/15/24 0705    oxyCODONE immediate release tablet 10 mg, 10 mg, Oral, Q4H PRN, [place], Disha A, FNP, 10 mg at 24 0726    oxyCODONE immediate release tablet 5 mg, 5 mg, Oral, Q4H PRN, [place], Disha A, FNP, 5 mg at 24    polyethylene glycol packet 17 g, 17 g, Oral, BID PRN, Freida, Augusto A, FNP    promethazine tablet 25 mg, 25 mg, Oral, Q6H PRN, Freida, Augusto A, FNP, 25 mg at 04/15/24 0948    tamsulosin 24 hr capsule 0.4 mg, 0.4 mg, Oral, QHS, Ирина Ness, FNP, 0.4 mg at 24     Review of Systems   Complete 12-point review of symptoms negative except for what's mentioned in HPI     Objective:     /60   Pulse 66   Temp 97.5 °F (36.4 °C) (Oral)   Resp 20   Ht 5' 2.99" (1.6 m)   Wt 74.5 kg (164 lb 3.9 oz)   SpO2 97%   Breastfeeding No   BMI 29.10 kg/m²      Physical Exam  Constitutional:       Appearance: She is ill-appearing.   HENT:      Head: Normocephalic.      Mouth/Throat:      Mouth: Mucous membranes are moist.   Eyes:      Pupils: Pupils are equal, round, and reactive to light.   Cardiovascular:      Rate and Rhythm: Normal rate and regular rhythm.      Heart sounds: Normal heart sounds.   Pulmonary:      Effort: Pulmonary effort is normal.      Breath sounds: Normal breath sounds.   Abdominal:      General: Bowel sounds are normal.      Palpations: Abdomen is soft.   Musculoskeletal:      Cervical back: Neck supple.      Right lower le+ Pitting Edema present.      Left lower le+ Pitting Edema present.      Comments: Left hip dressing clean and intact.  Diffuse muscle atrophy.    Skin:     General: Skin is warm and dry.   Neurological:      Motor: Weakness present.   Psychiatric:         Mood and Affect: Mood normal.     *MD performed and documented physical examination          Lines/Drains/Airways       Drain  Duration                  Urethral Catheter 24 0300 16 Fr. 1 day              " Peripheral Intravenous Line  Duration                  Peripheral IV - Single Lumen 04/09/24 1210 18 G Right Forearm 16 days                  Labs  No results found for this or any previous visit (from the past 24 hour(s)).    Radiology  Left hip XR on 04/09/2024, IMPRESSION: Improved alignment following internal fixation of the femur.  Radiology  Abdominal XR on 04/15/2024, IMPRESSION:  Residual feces nonspecific gas pattern.    Assessment/Plan:     89 y.o. WF admitted on 4/12/2024    Left hip fracture   - s/p left hip IM nailing on 04/09/2024  - WBAT to LLE  - discontinue staples on 04/30  - discontinue Gabapentin 100 mg TID (initiated 4/12), Gabapentin 200 mg at bedtime (initiated 4/14) on 04/15 2/2 confusion  - continue                Tylenol 650 mg every 6 hours                Calcium-vitamin D3 1 tablet b.i.d.                 Oxycodone 5 mg q.6 hours.                Robaxin 500 mg t.i.d.  - defer to physiatry for rehab and pain management  - PT/OT/RT following     Osteoporosis  - stable  - vitamin-D level 42.1 on 04/10/2024  - continue.                Calcium-vitamin D3 1 tablet b.i.d.                 Estradiol 0.05 mg patch every Wednesday      HTN  - BP at goal!!  - discontinue Norvasc 5 mg daily on 04/18  - continue                  Metoprolol tartrate 25 mg b.i.d.                Hydralazine 10 mg every 2 hours as needed for BP > 160/90                Labetalol 10 mg every 2 hours as needed for BP > 160/90  - low sodium diet     COPD  - stable  - not on home 02  - keep 02 > 88%   - monitor closely     Normocytic anemia  - asymptomatic  - H/H trending down  - continue                Vitamin B12 tablet daily                    Ferrous sulfate 325 mg daily     - no evidence of active bleeds  - will closely monitor and transfuse if needed      Constipation  - stable   - discontinue Colace 100 mg b.i.d. and MiraLax 17 g daily 2/2 loose stools on 04/17  - continue  Movantik 25 mg daily (initiated  4/16)    Bilateral Lower extremity edema  - current  - initiate   Lasix 40 mg IVP x1 dose now  - continue   Lasix 40 mg daily (to start 4/15)    Insomnia with associated delirium  - improved  - s/p Haldol 5 mg IM x1 on 04/16  - continue   Melatonin 6 mg at bedtime (initiated 4/14)    Urinary retention  - current  - required indwelling catheter insertion on 04/15  - continue   Flomax 0.4 mg at bedtime (initiated 4/16)  - discontinue indwelling catheter on 04/23  - indwelling catheter resumed on 04/24  - to follow-up with urology outpatient within 1 week    New onset atrial fibrillation  - controlled ventricular rate  - continue                  Metoprolol tartrate 25 mg b.i.d   Aspirin 81 mg daily  - cardiology does not recommend OAC 2/2 risks outweigh benefits, recommends aspirin 81 mg daily  - follow-up with cardiology outpatient for transthoracic echocardiogram     AB therapy  Rocephin 4/9-4/11     VTE Prophylaxis: Lovenox 30 mg daily   COVID-19 testing:  Unknown  COVID-19 vaccination status:  Vaccinated (Pfizer):  01/10/2021, 01/31/2021, 12/06/2022     POA: No  Living will: No  Contacts: Lacey Pizarro(Daughter) [phone]      CODE STATUS: DNR  Internal Medicine (attending): Pawan Myers MD  Physiatry (consulting):  Iglesia Tran MD     OUTPATIENT PROVIDERS  PCP:  Iglesia Steel MD  Orthopedic surgery:  Guy Jaquez D.O.  Cardiology: Matthew Molina MD  Urology: Sheldon Villa MD     DISPOSITION:  Sleep hygiene, bowel maintenance, and appetite at goal.  Last BM 4/24.  Vital signs at goal with no fevers.  No new labs or imaging today.  Initiate Lasix 40 mg IVP x1 dose now for bilateral lower extremity edema.  Continue current POC.  Monitor closely.  Notify of acute changes.    Staffing 4/23/2024:  Continent of bowel.  Lyman to be discontinued.  Blisters to hip from tape.  RT: Overall supervision.  Limited by left hip pain. Good appetite.  PT: SBA to min assist for bed mobility. SBA to CGA for OOB.   Ambulating 30-60 feet.  Needs increased time.  Limited by pain and fatigue. OT: Making good progress.  Overall mod to min assist.  Would be safer with a TTB.  Mild cognitive deficits with poor carry over. Needs family training.  Projected discharge 4/26 if sitters available.     Jacob Guan NP conducted independent physical examination and assisted with medical documentation.

## 2024-04-26 NOTE — PLAN OF CARE
Problem: Rehabilitation (IRF) Plan of Care  Goal: Absence of New-Onset Illness or Injury  Outcome: Progressing     Problem: Fall Injury Risk  Goal: Absence of Fall and Fall-Related Injury  Outcome: Progressing     Problem: Skin Injury Risk Increased  Goal: Skin Health and Integrity  Outcome: Progressing     Problem: Pain Acute  Goal: Acceptable Pain Control and Functional Ability  Outcome: Progressing     Problem: Infection  Goal: Absence of Infection Signs and Symptoms  Outcome: Progressing

## 2024-04-26 NOTE — PT/OT/SLP PROGRESS
Physical Therapy Inpatient Rehab Treatment    Patient Name:  Safia Muñoz   MRN:  [id number]    Recommendations:     Discharge Recommendations:  Moderate Intensity Therapy   Discharge Equipment Recommendations:     Barriers to discharge: Impaired functional mobility     Assessment:     Safai Muñoz is a 89 y.o. female admitted with a medical diagnosis of Closed 2-part intertrochanteric fracture of proximal end of left femur.  She presents with the following impairments/functional limitations:  weakness, impaired endurance, impaired self care skills, impaired functional mobility, gait instability, impaired cognition, decreased lower extremity function, decreased safety awareness, pain, decreased ROM, impaired fine motor, orthopedic precautions .    Rehab Diagnosis: medical diagnosis of Closed 2-part intertrochanteric fracture of proximal end of left femur    General Precautions: Standard, fall     Orthopedic Precautions:LLE weight bearing as tolerated     Braces: N/A    Rehab Prognosis: Good; patient would benefit from acute skilled PT services to address these deficits and reach maximum level of function.      History:     Past Medical History:   Diagnosis Date    COPD (chronic obstructive pulmonary disease)     Hypertension        Past Surgical History:   Procedure Laterality Date    RETROGRADE INTRAMEDULLARY RODDING OF DISTAL FEMUR Left 4/9/2024    Procedure: INSERTION, INTRAMEDULLARY NAIL INTERTROCHENTERIC FRACTURE;  Surgeon: Guy Jaquez DO;  Location: [facility];  Service: Orthopedics;  Laterality: Left;  HANA TABLE, SYNTHES TFNA       Subjective     Patient comments: pain in hip    Respiratory Status: Room air    Patients cultural, spiritual, Anabaptism conflicts given the current situation: no    Objective:     Communicated with rn prior to session.  Patient found up in chair with sarmiento catheter, chair check, peripheral IV  upon PT entry to room.        Functional Mobility:        Current   Status   Discharge   Goal   Functional Area: Care Score:    Roll Left and Right   Independent   Sit to Lying   Supervision or touching assistance   Lying to Sitting on Side of Bed   Supervision or touching assistance   Sit to Stand 4  W/rw Supervision or touching assistance   Chair/Bed-to-Chair Transfer 4  W/rw Supervision or touching assistance   Car Transfer   Not attempted due to medical/safety concerns   Walk 10 Feet 4   Not attempted due to medical/safety concerns   Walk 50 Feet with Two Turns 4  Pt amb 120ft w/rw and cga for safety. Pt c/o hip pain Not attempted due to medical/safety concerns   Walk 150 Feet   Not attempted due to medical/safety concerns   Walk 10 Feet Uneven Surface   Not attempted due to medical/safety concerns   1 Step (Curb)   Not attempted due to medical/safety concerns   4 Steps   Not attempted due to medical/safety concerns   12 Steps   Not attempted due to medical/safety concerns   Picking Up Object   Not attempted due to medical/safety concerns   Wheel 50 Feet with Two Turns   Independent   Wheel 150 Feet   Not attempted due to medical/safety concerns       Therapeutic Activities and Exercises:  Patient performed 2 set(s) of 15 repetitions of the following seated exercises: long arc quads, marches, hip abduction, hip adduction, and knee flexion for bilateral LE. Patient required skilled PT for instruction of exercises and appropriate cues to perform exercises safely and appropriately. Sitting edge of chair. Donned ice pack to L hip.     Activity Tolerance: Good    Patient left up in chair with all lines intact and call button in reach.    Education provided: roles and goals of PT/PTA, transfer training, gait training, and strengthening exercises    Expected compliance: High compliance    Plan:     During this hospitalization, patient to be seen 6 x/week to address the identified rehab impairments via gait training, therapeutic activities, therapeutic exercises, neuromuscular re-education,  wheelchair management/training and progress toward the following goals:    GOALS:   Multidisciplinary Problems       Physical Therapy Goals          Problem: Physical Therapy    Goal Priority Disciplines Outcome Goal Variances Interventions   Physical Therapy Goal     PT, PT/OT Progressing     Description: Bed Mobility:  MET - Roll left and right with partial/moderate assist.   MET - Sit to supine transfer with partial/moderate assist.   MET - Supine to sit transfer with partial/moderate assist.   Roll left and right independently  Sit to supine transfer with setup/clean-up assist assist  Supine to sit transfer with setup/clean-up assist assist    Transfers:  MET - Sit to stand transfer with supervision/touching assist using RW.   MET - Bed to chair transfer with supervision/touching assist Stand Step  using RW.   DISCONTINUE - Bed to chair transfer with partial/moderate assist Slide Board  using Slide board.   Sit to stand transfer with setup/clean-up assist using RW  Bed to chair transfer with setup/clean-up assist Stand Step  using RW  Car transfer with supervision/touching assist using RW   an object from the ground in standing position with supervision/touching assist using RW and reacher    Mobility:  Ambulate 150 feet with supervision/touching assist using RW.   Pt ascended/descended a 4 inch curb with partial/moderate assist using RW  Ascend/descend 4 stairs with partial/moderate assist using bilateral handrails  Ambulate 10 feet on uneven surfaces/ramps with partial/moderate assist using RW  Manual wheelchair 150 feet with partial/moderate assist using Bilateral upper extremity                         Plan of Care Expires:  04/19/24  PT Next Visit Date: 05/01/24  Plan of Care reviewed with: patient    Additional Information:         Time Tracking:     Therapy Time  PT Received On: 04/26/24  PT Start Time: 1100  PT Stop Time: 1200  PT Total Time (min): 60 min   PT Individual: 60  Missed Time:    Time  Missed due to:      Billable Minutes: Gait Training 30 and Therapeutic Exercise 30    04/26/2024

## 2024-04-26 NOTE — PT/OT/SLP PROGRESS
Physical Therapy Inpatient Rehab Treatment    Patient Name:  Safia Muñoz   MRN:  [id number]    Recommendations:     Discharge Recommendations:  Moderate Intensity Therapy   Discharge Equipment Recommendations: walker, rolling, bedside commode, shower chair   Barriers to discharge: Decreased caregiver support    Assessment:     Safia Muñoz is a 89 y.o. female admitted with a medical diagnosis of Closed 2-part intertrochanteric fracture of proximal end of left femur.  She presents with the following impairments/functional limitations:  weakness, impaired endurance, impaired self care skills, impaired functional mobility, gait instability, impaired balance, decreased lower extremity function, impaired cognition, decreased coordination, pain, decreased ROM, impaired coordination, impaired fine motor, impaired skin, edema, orthopedic precautions, impaired cardiopulmonary response to activity .    Rehab Diagnosis: medical diagnosis of Closed 2-part intertrochanteric fracture of proximal end of left femur    General Precautions: Standard, fall     Orthopedic Precautions:LLE weight bearing as tolerated     Braces: N/A    Rehab Prognosis: Good; patient would benefit from acute skilled PT services to address these deficits and reach maximum level of function.      History:     Past Medical History:   Diagnosis Date    COPD (chronic obstructive pulmonary disease)     Hypertension        Past Surgical History:   Procedure Laterality Date    RETROGRADE INTRAMEDULLARY RODDING OF DISTAL FEMUR Left 4/9/2024    Procedure: INSERTION, INTRAMEDULLARY NAIL INTERTROCHENTERIC FRACTURE;  Surgeon: Guy Jaquez DO;  Location: [facility];  Service: Orthopedics;  Laterality: Left;  TIGRE TABLE, SYNTHES TFNA       Subjective     Chief Complaint: fatigue     Respiratory Status: Room air    Patients cultural, spiritual, Yazidism conflicts given the current situation: no      Objective:     Communicated with nursing  prior to session.   Patient found up in chair with chair check, sarmiento catheter, peripheral IV, Other (comments) (pt in wc)  upon PT entry to room.    Pt is Oriented x3 and Alert and Cooperative.    Functional Mobility:        Current   Status  Discharge   Goal   Functional Area: Care Score:    Roll Left and Right   Independent   Sit to Lying   Supervision or touching assistance   Lying to Sitting on Side of Bed   Supervision or touching assistance   Sit to Stand 4  Use of rw sba  Supervision or touching assistance   Chair/Bed-to-Chair Transfer 4  Use of rw stand pivot slow transition wc>recliner 10ft ambulated for t/f lle wbat  Supervision or touching assistance   Car Transfer   Not attempted due to medical/safety concerns   Walk 10 Feet 4  Use of rw sba  Not attempted due to medical/safety concerns   Walk 50 Feet with Two Turns  Not attempted due to medical/safety concerns   Walk 150 Feet   Not attempted due to medical/safety concerns   Walk 10 Feet Uneven Surface   Not attempted due to medical/safety concerns   1 Step (Curb)   Not attempted due to medical/safety concerns   4 Steps   Not attempted due to medical/safety concerns   12 Steps   Not attempted due to medical/safety concerns   Picking Up Object   Not attempted due to medical/safety concerns   Wheel 50 Feet with Two Turns   Independent   Wheel 150 Feet   Not attempted due to medical/safety concerns       Therapeutic Activities and Exercises:  Donned BLE SYED stocking per nursing secondary to B edema noted   Pt performed  seated BLE exercises sitting in recliner 2sets 10reps rest break required     Activity Tolerance: Good    Patient left up in chair with call button in reach and BLE elevated . Pt kenan tx fatigued tx as tolerated pt able to ambulate short distance for t/f .  .    Education provided: transfer training, balance training, safety awareness, assistive device, strengthening exercises, and fall prevention    Expected compliance: High compliance    GOALS:    Multidisciplinary Problems       Physical Therapy Goals          Problem: Physical Therapy    Goal Priority Disciplines Outcome Goal Variances Interventions   Physical Therapy Goal     PT, PT/OT Progressing     Description: Bed Mobility:  MET - Roll left and right with partial/moderate assist.   MET - Sit to supine transfer with partial/moderate assist.   MET - Supine to sit transfer with partial/moderate assist.   Roll left and right independently  Sit to supine transfer with setup/clean-up assist assist  Supine to sit transfer with setup/clean-up assist assist    Transfers:  MET - Sit to stand transfer with supervision/touching assist using RW.   MET - Bed to chair transfer with supervision/touching assist Stand Step  using RW.   DISCONTINUE - Bed to chair transfer with partial/moderate assist Slide Board  using Slide board.   Sit to stand transfer with setup/clean-up assist using RW  Bed to chair transfer with setup/clean-up assist Stand Step  using RW  Car transfer with supervision/touching assist using RW   an object from the ground in standing position with supervision/touching assist using RW and reacher    Mobility:  Ambulate 150 feet with supervision/touching assist using RW.   Pt ascended/descended a 4 inch curb with partial/moderate assist using RW  Ascend/descend 4 stairs with partial/moderate assist using bilateral handrails  Ambulate 10 feet on uneven surfaces/ramps with partial/moderate assist using RW  Manual wheelchair 150 feet with partial/moderate assist using Bilateral upper extremity                         Plan:     During this hospitalization, patient to be seen 6 x/week to address the identified rehab impairments via gait training, therapeutic activities, therapeutic exercises, neuromuscular re-education, wheelchair management/training and progress toward the following goals:    Plan of Care Expires:  04/19/24  PT Next Visit Date: 05/01/24  Plan of Care reviewed with:  patient    Additional Information:         Time Tracking:     Therapy Time  PT Received On: 04/26/24  PT Start Time: 1300  PT Stop Time: 1330  PT Total Time (min): 30 min   PT Individual: 30  Missed Time:    Time Missed due to:      Billable Minutes: Therapeutic Exercise 30min    04/26/2024

## 2024-04-27 PROCEDURE — 99900035 HC TECH TIME PER 15 MIN (STAT)

## 2024-04-27 PROCEDURE — 25000003 PHARM REV CODE 250: Performed by: NURSE PRACTITIONER

## 2024-04-27 PROCEDURE — 94799 UNLISTED PULMONARY SVC/PX: CPT

## 2024-04-27 PROCEDURE — 11800000 HC REHAB PRIVATE ROOM

## 2024-04-27 PROCEDURE — 99900031 HC PATIENT EDUCATION (STAT)

## 2024-04-27 PROCEDURE — 97535 SELF CARE MNGMENT TRAINING: CPT

## 2024-04-27 PROCEDURE — 63600175 PHARM REV CODE 636 W HCPCS: Performed by: NURSE PRACTITIONER

## 2024-04-27 RX ORDER — GABAPENTIN 300 MG/1
300 CAPSULE ORAL NIGHTLY
Status: DISCONTINUED | OUTPATIENT
Start: 2024-04-27 | End: 2024-04-27

## 2024-04-27 RX ORDER — GABAPENTIN 100 MG/1
100 CAPSULE ORAL 3 TIMES DAILY
Status: DISCONTINUED | OUTPATIENT
Start: 2024-04-27 | End: 2024-04-29 | Stop reason: HOSPADM

## 2024-04-27 RX ORDER — GABAPENTIN 100 MG/1
200 CAPSULE ORAL NIGHTLY
Status: DISCONTINUED | OUTPATIENT
Start: 2024-04-27 | End: 2024-04-29 | Stop reason: HOSPADM

## 2024-04-27 RX ADMIN — METHOCARBAMOL 500 MG: 500 TABLET ORAL at 06:04

## 2024-04-27 RX ADMIN — ACETAMINOPHEN 325MG 650 MG: 325 TABLET ORAL at 04:04

## 2024-04-27 RX ADMIN — METHOCARBAMOL 500 MG: 500 TABLET ORAL at 04:04

## 2024-04-27 RX ADMIN — ASPIRIN 81 MG: 81 TABLET, COATED ORAL at 08:04

## 2024-04-27 RX ADMIN — METHOCARBAMOL 500 MG: 500 TABLET ORAL at 01:04

## 2024-04-27 RX ADMIN — NALOXEGOL OXALATE 25 MG: 25 TABLET, FILM COATED ORAL at 08:04

## 2024-04-27 RX ADMIN — METOPROLOL TARTRATE 25 MG: 25 TABLET, FILM COATED ORAL at 08:04

## 2024-04-27 RX ADMIN — LIDOCAINE 5% 1 PATCH: 700 PATCH TOPICAL at 04:04

## 2024-04-27 RX ADMIN — GABAPENTIN 100 MG: 100 CAPSULE ORAL at 01:04

## 2024-04-27 RX ADMIN — ENOXAPARIN SODIUM 30 MG: 30 INJECTION SUBCUTANEOUS at 04:04

## 2024-04-27 RX ADMIN — ACETAMINOPHEN 325MG 650 MG: 325 TABLET ORAL at 06:04

## 2024-04-27 RX ADMIN — HYDROXYZINE PAMOATE 50 MG: 50 CAPSULE ORAL at 04:04

## 2024-04-27 RX ADMIN — OXYCODONE 10 MG: 5 TABLET ORAL at 11:04

## 2024-04-27 RX ADMIN — METHOCARBAMOL 500 MG: 500 TABLET ORAL at 11:04

## 2024-04-27 RX ADMIN — TAMSULOSIN HYDROCHLORIDE 0.4 MG: 0.4 CAPSULE ORAL at 09:04

## 2024-04-27 RX ADMIN — ACETAMINOPHEN 325MG 650 MG: 325 TABLET ORAL at 01:04

## 2024-04-27 RX ADMIN — ACETAMINOPHEN 325MG 650 MG: 325 TABLET ORAL at 11:04

## 2024-04-27 RX ADMIN — GABAPENTIN 200 MG: 100 CAPSULE ORAL at 09:04

## 2024-04-27 RX ADMIN — CYANOCOBALAMIN TAB 500 MCG 500 MCG: 500 TAB at 08:04

## 2024-04-27 RX ADMIN — BENZONATATE 100 MG: 100 CAPSULE ORAL at 09:04

## 2024-04-27 RX ADMIN — LIDOCAINE 5% 1 PATCH: 700 PATCH TOPICAL at 06:04

## 2024-04-27 RX ADMIN — GABAPENTIN 100 MG: 100 CAPSULE ORAL at 10:04

## 2024-04-27 RX ADMIN — METOPROLOL TARTRATE 25 MG: 25 TABLET, FILM COATED ORAL at 09:04

## 2024-04-27 RX ADMIN — FERROUS SULFATE TAB 325 MG (65 MG ELEMENTAL FE) 1 EACH: 325 (65 FE) TAB at 08:04

## 2024-04-27 RX ADMIN — FUROSEMIDE 40 MG: 40 TABLET ORAL at 08:04

## 2024-04-27 NOTE — PT/OT/SLP PROGRESS
"Occupational Therapy Inpatient Rehab Treatment    Name: Safia Muñoz  MRN: [id number]    Assessment:  Safia Muñoz is a 89 y.o. female admitted with a medical diagnosis of Closed 2-part intertrochanteric fracture of proximal end of left femur.  She presents with the following impairments/functional limitations:  impaired endurance, impaired cognition.    General Precautions: Standard, fall     Orthopedic Precautions:LLE weight bearing as tolerated     Braces: N/A    Rehab Prognosis: Good; patient would benefit from acute skilled OT services to address these deficits and reach maximum level of function.      History:     Past Medical History:   Diagnosis Date    COPD (chronic obstructive pulmonary disease)     Hypertension        Past Surgical History:   Procedure Laterality Date    RETROGRADE INTRAMEDULLARY RODDING OF DISTAL FEMUR Left 4/9/2024    Procedure: INSERTION, INTRAMEDULLARY NAIL INTERTROCHENTERIC FRACTURE;  Surgeon: Guy Jaquez DO;  Location: [facility];  Service: Orthopedics;  Laterality: Left;  HANA TABLE, SYNTHES TFNA       Subjective     Orientation: Oriented x4    Chief Complaint: "When can I rest?"    Respiratory Status: Room air    Patients cultural, spiritual, Sabianism conflicts given the current situation: no       Objective:     Patient found supine with peripheral IV, sarmiento catheter  upon OT entry to room.    Mobility   Patient completed:  Supine to Sit with minimum assistance  Sit to Stand Transfer with stand by assistance with rolling walker  Stand to Sit Transfer with stand by assistance with rolling walker  Toilet Transfer Step Transfer technique with supervision with  rolling walker and bedside commode    Functional Mobility  Pt ambulated in/out bathroom with RW and supervision. No loss of balance noted.    ADLs   Current Status   Eating 6   Oral Hygiene 6 *seated*   Shower, Bathe Self 4 verbal report given, as pt refused to take a bath since she had one yesterday   Upper Body " Dressing 5   Lower Body Dressing 4   Toileting Hygiene 4   Toilet Transfer 4   Putting On, Taking Off Footwear 4     Limiting Factors for ADLs: endurance, weakness, cognition, and safety awareness     LifeStyle Change and Education:     Patient left up in chair with call button in reach and chair alarm on.     Education provided: Roles and goals of OT, ADLs, transfer training, bed mobility, body mechanics, assistive device, wheelchair precautions, modified goals, sequencing, safety precautions, fall prevention, equipment recommendations, and home safety    Multidisciplinary Problems       Occupational Therapy Goals          Problem: Occupational Therapy    Goal Priority Disciplines Outcome Interventions   Occupational Therapy Goal     OT, PT/OT Progressing    Description: Pt to perform eating tasks with independence by re-eval. Pt. C numb fingertips; unable to manage packages. Discussed c son getting safety scissors     Pt to perform oral hygiene tasks with min A standing at sink with RW by re-eval. Ongoing Sits in w/c -Pt. C new onset A-Fib/RVR c fatigue; by Monday 4/22 MET standing c SVN for safety     Pt to perform UB dressing with independence by re-eval.Touch A c bra Numb fingertips/maybe front hook bra ? SBA MET no bra    Pt to perform LB dressing with mod A using AE PRN by re-eval. MET   Pt to perform donning/doffing footwear with max A using AE PRN by re-eval. MET   Pt to perform toileting hygiene with mod A by re-eval.  Pt. Continues c Lyman . Pt. Able to wipe self after BM 4/20/2024 c touch A to steady while standing c RE. Pt. Managed underwear up /down. MET  Pt to perform bathing tasks with mod A by re-eval. MET (sponge bath) Touch A MET    Pt to perform toilet transfers with mod A using LRAD by re-eval. MET Touch A MET  Pt to perform WIS transfers with mod A using LRAD by re-eval.  Pt. Unable to elevate L LE high enough to clear shower ledge. Son will measure shower door opening width vs. Tub  accessibility.   Pt to perform meal prep activity with mod A by robert. MET   Pt to perform laundry management task with mod A by robert.  Pt to perform light housekeeping activity with mod A by robert.  Pt. To prepare water/food for dog by Robert  Balance, Strengthening, Endurance, Balance:  Pt to consistently demonstrate adherence to orthopedic precautions during all ADL's as instructed by OT.  Pt to demonstrate consistent adherence to breathing control and energy conservation techniques as educated by OT.  Pt. To remember safety techniques for functional transfers S v/c's by D/C. !!                       Time Tracking     OT Received On: 04/27/24  Time In 0930     Time Out 1000  Total Time 30 min  Therapy Time: OT Individual: 30  Missed Time:    Missed Time Reason:      Billable Minutes: Self Care/Home Management 30 04/27/2024

## 2024-04-27 NOTE — PLAN OF CARE
Problem: Rehabilitation (IRF) Plan of Care  Goal: Plan of Care Review  Outcome: Progressing  Goal: Patient-Specific Goal (Individualized)  Outcome: Progressing  Goal: Absence of New-Onset Illness or Injury  Outcome: Progressing  Goal: Optimal Comfort and Wellbeing  Outcome: Progressing  Goal: Readiness for Transition of Care  Outcome: Progressing

## 2024-04-27 NOTE — PROGRESS NOTES
[facility] ([facility])  Rehab Progress Note    Patient Name: Safia Muñoz  MRN: [id number]  Age: 89 y.o. Sex: female  : 1931  Hospital Length of Stay: 15 days  Date of Service: 2024   Chief Complaint: Left hip fracture s/p left hip IM nailing on 2024     Subjective:     Basic Information  Admit Information: 89-year-old white female presented to [facility] ED on 2024 complaining of left hip pain after a ground level fall.  PMH significant for HTN and COPD not on home O2.  Workup significant for closed comminuted intertrochanteric left femur fracture.  Tolerated left hip IM nailing on  without perioperative complications.  Received 3 days Rocephin due to underlying UTI.  Urine culture appeared to be contaminated.  Orthopedic surgery recommended WBAT to LLE.  Tolerated transfer to [facility] inpatient rehab unit on  without incident.   Today's Information: No acute events overnight.  Resting in bed.  Reports fair sleep due to pain.  Appetite is good.  Last BM .  Vital signs at goal with no recorded fevers.  No new labs or imaging today.    Review of patient's allergies indicates:   Allergen Reactions    Adhesive tape-silicones Blisters    Ofloxacin     Penicillins     Sulfamethoxazole-trimethoprim      Other reaction(s): Unknown    Codeine Nausea Only        Current Facility-Administered Medications:     0.9%  NaCl infusion, , Intravenous, Continuous, Disha Biggs, FNP, Last Rate: 125 mL/hr at 24 1723, New Bag at 24 1723    acetaminophen tablet 650 mg, 650 mg, Oral, Q6H, Augusto Guan, FNP, 650 mg at 24 0143    acetaminophen tablet 650 mg, 650 mg, Oral, Q8H PRN, Disha Biggs, FNP    aspirin EC tablet 81 mg, 81 mg, Oral, Daily, Ирина Ness, FNP, 81 mg at 24 0727    benzonatate capsule 100 mg, 100 mg, Oral, TID PRN, Augusto Guan, FNP, 100 mg at 24 0525    bisacodyL suppository 10 mg, 10 mg, Rectal,  Daily PRN, Freida, Augusto A, FNP, 10 mg at 04/22/24 1426    bisacodyL suppository 10 mg, 10 mg, Rectal, Once, Freida, Augusto A, FNP    cyanocobalamin tablet 500 mcg, 500 mcg, Oral, Daily, Freida, Augusto A, FNP, 500 mcg at 04/26/24 0726    enoxaparin injection 30 mg, 30 mg, Subcutaneous, Q24H (prophylaxis, 1700), Freida, Augusto A, FNP, 30 mg at 04/26/24 1713    estradiol 0.05 mg/24 hr td ptsw patch 1 patch, 1 patch, Transdermal, Every Wed, 1 patch at 04/24/24 1736 **AND** estradiol 0.05 mg/24 hr td ptsw patch 1 patch, 1 patch, Transdermal, Every Sun, Pawan Myers MD, 1 patch at 04/21/24 1740    ferrous sulfate tablet 1 each, 1 tablet, Oral, Daily, Freida, Augusto A, FNP, 1 each at 04/26/24 0726    furosemide tablet 40 mg, 40 mg, Oral, Daily, Ирина Ness, FNP, 40 mg at 04/26/24 0726    hydrALAZINE injection 10 mg, 10 mg, Intravenous, Q4H PRN, Freida, Augusto A, FNP    hydrOXYzine pamoate capsule 50 mg, 50 mg, Oral, After dinner, [place] Disha A, FNP, 50 mg at 04/26/24 1713    labetalol 20 mg/4 mL (5 mg/mL) IV syring, 10 mg, Intravenous, Q4H PRN, Freida, Augusto A, FNP    LIDOcaine 5 % patch 1 patch, 1 patch, Transdermal, Q24H, Dian, Disha A, FNP, 1 patch at 04/26/24 1713    methocarbamoL tablet 500 mg, 500 mg, Oral, TID PRN, [place], Disha A, FNP, 500 mg at 04/21/24 0212    methocarbamoL tablet 500 mg, 500 mg, Oral, Q6H, [place], Disha A, FNP, 500 mg at 04/27/24 0143    metoprolol injection 10 mg, 10 mg, Intravenous, Q2H PRN, Augusto Guan, FNP, 10 mg at 04/17/24 1207    metoprolol tartrate (LOPRESSOR) tablet 25 mg, 25 mg, Oral, BID, Yang Campbell, AGADEBORAHP-BC, 25 mg at 04/26/24 2052    naloxegoL (MOVANTIK) tablet 25 mg, 25 mg, Oral, Daily, Augusto Guan, FNP, 25 mg at 04/26/24 0726    nitroGLYCERIN SL tablet 0.4 mg, 0.4 mg, Sublingual, Q5 Min PRN, Augusto Guan, FNP    ondansetron disintegrating tablet 4 mg, 4 mg, Oral, Q6H PRN, Augusto Guan,  "FNP, 4 mg at 04/15/24 0705    oxyCODONE immediate release tablet 10 mg, 10 mg, Oral, Q4H PRN, Disha Biggs, FNP, 10 mg at 24    oxyCODONE immediate release tablet 5 mg, 5 mg, Oral, Q4H PRN, Disha Biggs, FNP, 5 mg at 24    polyethylene glycol packet 17 g, 17 g, Oral, BID PRN, Freida, Augusto A, FNP    promethazine tablet 25 mg, 25 mg, Oral, Q6H PRN, Freida, Augusto A, FNP, 25 mg at 04/15/24 0948    tamsulosin 24 hr capsule 0.4 mg, 0.4 mg, Oral, QHS, Ирина Ness, FNP, 0.4 mg at 24     Review of Systems   Complete 12-point review of symptoms negative except for what's mentioned in HPI     Objective:     /62   Pulse 78   Temp 97.8 °F (36.6 °C) (Oral)   Resp 18   Ht 5' 2.99" (1.6 m)   Wt 74.5 kg (164 lb 3.9 oz)   SpO2 97%   Breastfeeding No   BMI 29.10 kg/m²      Physical Exam  Constitutional:       Appearance: She is ill-appearing.   HENT:      Head: Normocephalic.      Mouth/Throat:      Mouth: Mucous membranes are moist.   Eyes:      Pupils: Pupils are equal, round, and reactive to light.   Cardiovascular:      Rate and Rhythm: Normal rate and regular rhythm.      Heart sounds: Normal heart sounds.   Pulmonary:      Effort: Pulmonary effort is normal.      Breath sounds: Normal breath sounds.   Abdominal:      General: Bowel sounds are normal.      Palpations: Abdomen is soft.   Musculoskeletal:      Cervical back: Neck supple.      Right lower le+ Pitting Edema present.      Left lower le+ Pitting Edema present.      Comments: Left hip dressing clean and intact.  Diffuse muscle atrophy.    Skin:     General: Skin is warm and dry.   Neurological:      Motor: Weakness present.   Psychiatric:         Mood and Affect: Mood normal.       Lines/Drains/Airways       Drain  Duration                  Urethral Catheter 24 0300 16 Fr. 2 days              Peripheral Intravenous Line  Duration                  Peripheral IV - Single Lumen 24 " 1210 18 G Right Forearm 17 days                  Labs  No results found for this or any previous visit (from the past 24 hour(s)).    Radiology  Left hip XR on 04/09/2024, IMPRESSION: Improved alignment following internal fixation of the femur.  Radiology  Abdominal XR on 04/15/2024, IMPRESSION:  Residual feces nonspecific gas pattern.    Assessment/Plan:     89 y.o. WF admitted on 4/12/2024    Left hip fracture   - s/p left hip IM nailing on 04/09/2024  - WBAT to LLE  - discontinue staples on 04/30  - initiate   Gabapentin 100 mg t.i.d. (initiated 4/27)   Gabapentin 200 mg at bedtime (initiated 4/27)  - continue                Tylenol 650 mg every 6 hours                Calcium-vitamin D3 1 tablet b.i.d.                 Oxycodone 5 mg q.6 hours.                Robaxin 500 mg t.i.d.  - defer to physiatry for rehab and pain management  - PT/OT/RT following     Osteoporosis  - stable  - vitamin-D level 42.1 on 04/10/2024  - continue.                Calcium-vitamin D3 1 tablet b.i.d.                 Estradiol 0.05 mg patch every Wednesday      HTN  - BP at goal!!  - discontinue Norvasc 5 mg daily on 04/18  - continue                  Metoprolol tartrate 25 mg b.i.d.                Hydralazine 10 mg every 2 hours as needed for BP > 160/90                Labetalol 10 mg every 2 hours as needed for BP > 160/90  - low sodium diet     COPD  - stable  - not on home 02  - keep 02 > 88%   - monitor closely     Normocytic anemia  - asymptomatic  - H/H trending down  - continue                Vitamin B12 tablet daily                    Ferrous sulfate 325 mg daily     - no evidence of active bleeds  - will closely monitor and transfuse if needed      Constipation  - stable   - discontinue Colace 100 mg b.i.d. and MiraLax 17 g daily 2/2 loose stools on 04/17  - continue  Movantik 25 mg daily (initiated 4/16)    Bilateral Lower extremity edema  - current  - s/p Lasix x1 on 04/26  - continue   Lasix 40 mg daily (to start  4/15)    Insomnia with associated delirium  - improved  - s/p Haldol 5 mg IM x1 on 04/16  - continue   Melatonin 6 mg at bedtime (initiated 4/14)    Urinary retention  - current  - required indwelling catheter insertion on 04/15  - continue   Flomax 0.4 mg at bedtime (initiated 4/16)  - discontinue indwelling catheter on 04/23  - indwelling catheter resumed on 04/24  - to follow-up with urology outpatient within 1 week    New onset atrial fibrillation  - controlled ventricular rate  - continue                  Metoprolol tartrate 25 mg b.i.d   Aspirin 81 mg daily  - cardiology does not recommend OAC 2/2 risks outweigh benefits, recommends aspirin 81 mg daily  - follow-up with cardiology outpatient for transthoracic echocardiogram     AB therapy  Rocephin 4/9-4/11     VTE Prophylaxis: Lovenox 30 mg daily   COVID-19 testing:  Unknown  COVID-19 vaccination status:  Vaccinated (Pfizer):  01/10/2021, 01/31/2021, 12/06/2022     POA: No  Living will: No  Contacts: Lacey Pizarro(Daughter) [phone]      CODE STATUS: DNR  Internal Medicine (attending): Pawan Myers MD  Physiatry (consulting):  Iglesia Tran MD     OUTPATIENT PROVIDERS  PCP:  Iglesia Steel MD  Orthopedic surgery:  Guy Jaquez D.O.  Cardiology: Matthew Molina MD  Urology: Sheldon Villa MD     DISPOSITION:  Sleep hygiene poor due to pain.  Bowel maintenance, and appetite at goal.  Last BM 4/25.  Vital signs at goal with no recorded fevers.  No new labs or imaging today.  Initiate gabapentin 100 mg t.i.d. and then gabapentin 200 mg at bedtime.  Continue current POC.  Monitor closely.  Notify of acute changes.    Staffing 4/23/2024:  Continent of bowel.  Lyman to be discontinued.  Blisters to hip from tape.  RT: Overall supervision.  Limited by left hip pain. Good appetite.  PT: SBA to min assist for bed mobility. SBA to CGA for OOB.  Ambulating 30-60 feet.  Needs increased time.  Limited by pain and fatigue. OT: Making good progress.  Overall mod  to min assist.  Would be safer with a TTB.  Mild cognitive deficits with poor carry over. Needs family training.  Projected discharge 4/26 if sitters available.

## 2024-04-28 PROCEDURE — 94799 UNLISTED PULMONARY SVC/PX: CPT

## 2024-04-28 PROCEDURE — 99900035 HC TECH TIME PER 15 MIN (STAT)

## 2024-04-28 PROCEDURE — 25000003 PHARM REV CODE 250: Performed by: NURSE PRACTITIONER

## 2024-04-28 PROCEDURE — 63600175 PHARM REV CODE 636 W HCPCS: Performed by: NURSE PRACTITIONER

## 2024-04-28 PROCEDURE — 11800000 HC REHAB PRIVATE ROOM

## 2024-04-28 PROCEDURE — 25000003 PHARM REV CODE 250: Performed by: INTERNAL MEDICINE

## 2024-04-28 PROCEDURE — 25000242 PHARM REV CODE 250 ALT 637 W/ HCPCS: Performed by: NURSE PRACTITIONER

## 2024-04-28 PROCEDURE — 99900031 HC PATIENT EDUCATION (STAT)

## 2024-04-28 RX ORDER — IPRATROPIUM BROMIDE AND ALBUTEROL SULFATE 2.5; .5 MG/3ML; MG/3ML
3 SOLUTION RESPIRATORY (INHALATION) EVERY 4 HOURS PRN
Status: DISCONTINUED | OUTPATIENT
Start: 2024-04-28 | End: 2024-04-29 | Stop reason: HOSPADM

## 2024-04-28 RX ORDER — FLUTICASONE PROPIONATE 50 MCG
2 SPRAY, SUSPENSION (ML) NASAL DAILY
Status: DISCONTINUED | OUTPATIENT
Start: 2024-04-28 | End: 2024-04-29 | Stop reason: HOSPADM

## 2024-04-28 RX ORDER — MONTELUKAST SODIUM 5 MG/1
5 TABLET, CHEWABLE ORAL DAILY
Status: DISCONTINUED | OUTPATIENT
Start: 2024-04-28 | End: 2024-04-29 | Stop reason: HOSPADM

## 2024-04-28 RX ORDER — GUAIFENESIN 600 MG/1
600 TABLET, EXTENDED RELEASE ORAL 2 TIMES DAILY
Status: DISCONTINUED | OUTPATIENT
Start: 2024-04-28 | End: 2024-04-29 | Stop reason: HOSPADM

## 2024-04-28 RX ADMIN — FERROUS SULFATE TAB 325 MG (65 MG ELEMENTAL FE) 1 EACH: 325 (65 FE) TAB at 08:04

## 2024-04-28 RX ADMIN — METHOCARBAMOL 500 MG: 500 TABLET ORAL at 05:04

## 2024-04-28 RX ADMIN — MONTELUKAST SODIUM 5 MG: 5 TABLET, CHEWABLE ORAL at 08:04

## 2024-04-28 RX ADMIN — BENZONATATE 100 MG: 100 CAPSULE ORAL at 08:04

## 2024-04-28 RX ADMIN — ESTRADIOL 1 PATCH: 0.05 PATCH TRANSDERMAL at 05:04

## 2024-04-28 RX ADMIN — ACETAMINOPHEN 325MG 650 MG: 325 TABLET ORAL at 05:04

## 2024-04-28 RX ADMIN — GUAIFENESIN 600 MG: 600 TABLET, EXTENDED RELEASE ORAL at 07:04

## 2024-04-28 RX ADMIN — FLUTICASONE PROPIONATE 100 MCG: 50 SPRAY, METERED NASAL at 07:04

## 2024-04-28 RX ADMIN — ASPIRIN 81 MG: 81 TABLET, COATED ORAL at 08:04

## 2024-04-28 RX ADMIN — OXYCODONE 10 MG: 5 TABLET ORAL at 08:04

## 2024-04-28 RX ADMIN — GABAPENTIN 100 MG: 100 CAPSULE ORAL at 10:04

## 2024-04-28 RX ADMIN — GABAPENTIN 100 MG: 100 CAPSULE ORAL at 05:04

## 2024-04-28 RX ADMIN — METHOCARBAMOL 500 MG: 500 TABLET ORAL at 11:04

## 2024-04-28 RX ADMIN — GABAPENTIN 100 MG: 100 CAPSULE ORAL at 02:04

## 2024-04-28 RX ADMIN — NALOXEGOL OXALATE 25 MG: 25 TABLET, FILM COATED ORAL at 07:04

## 2024-04-28 RX ADMIN — BENZONATATE 100 MG: 100 CAPSULE ORAL at 05:04

## 2024-04-28 RX ADMIN — ENOXAPARIN SODIUM 30 MG: 30 INJECTION SUBCUTANEOUS at 05:04

## 2024-04-28 RX ADMIN — LIDOCAINE 5% 1 PATCH: 700 PATCH TOPICAL at 05:04

## 2024-04-28 RX ADMIN — IPRATROPIUM BROMIDE AND ALBUTEROL SULFATE 3 ML: .5; 3 SOLUTION RESPIRATORY (INHALATION) at 08:04

## 2024-04-28 RX ADMIN — CYANOCOBALAMIN TAB 500 MCG 500 MCG: 500 TAB at 08:04

## 2024-04-28 RX ADMIN — ACETAMINOPHEN 325MG 650 MG: 325 TABLET ORAL at 11:04

## 2024-04-28 RX ADMIN — GUAIFENESIN 600 MG: 600 TABLET, EXTENDED RELEASE ORAL at 08:04

## 2024-04-28 RX ADMIN — HYDROXYZINE PAMOATE 50 MG: 50 CAPSULE ORAL at 05:04

## 2024-04-28 RX ADMIN — METOPROLOL TARTRATE 25 MG: 25 TABLET, FILM COATED ORAL at 08:04

## 2024-04-28 RX ADMIN — FUROSEMIDE 40 MG: 40 TABLET ORAL at 08:04

## 2024-04-28 RX ADMIN — TAMSULOSIN HYDROCHLORIDE 0.4 MG: 0.4 CAPSULE ORAL at 08:04

## 2024-04-28 RX ADMIN — GABAPENTIN 200 MG: 100 CAPSULE ORAL at 08:04

## 2024-04-28 NOTE — PT/OT/SLP PROGRESS
"Physical Therapy      Patient Name:  Safia Muñoz   MRN:  [id number]    Patient not seen today secondary to Nurse/ INOCENCIO hold. Pt on hold per Shelly OSBORNE, due to having a "rough night".  Attempted to see pt again for PM and NSG requested we hold pt for whole day so she can rest.    "

## 2024-04-28 NOTE — PLAN OF CARE
Problem: Fall Injury Risk  Goal: Absence of Fall and Fall-Related Injury  Outcome: Progressing  Intervention: Promote Injury-Free Environment  Flowsheets (Taken 4/27/2024 2128)  Safety Promotion/Fall Prevention:   assistive device/personal item within reach   bed alarm set   Fall Risk signage in place   lighting adjusted   nonskid shoes/socks when out of bed   side rails raised x 3   supervised activity   instructed to call staff for mobility

## 2024-04-28 NOTE — PLAN OF CARE
Problem: Fall Injury Risk  Goal: Absence of Fall and Fall-Related Injury  Outcome: Progressing     Problem: Skin Injury Risk Increased  Goal: Skin Health and Integrity  Outcome: Progressing     Problem: Pain Acute  Goal: Acceptable Pain Control and Functional Ability  Outcome: Progressing

## 2024-04-28 NOTE — PROGRESS NOTES
[facility] ([facility])  Rehab Progress Note    Patient Name: Safia Muñoz  MRN: [id number]  Age: 89 y.o. Sex: female  : 1931  Hospital Length of Stay: 16 days  Date of Service: 2024   Chief Complaint: Left hip fracture s/p left hip IM nailing on 2024     Subjective:     Basic Information  Admit Information: 89-year-old white female presented to [facility] ED on 2024 complaining of left hip pain after a ground level fall.  PMH significant for HTN and COPD not on home O2.  Workup significant for closed comminuted intertrochanteric left femur fracture.  Tolerated left hip IM nailing on  without perioperative complications.  Received 3 days Rocephin due to underlying UTI.  Urine culture appeared to be contaminated.  Orthopedic surgery recommended WBAT to LLE.  Tolerated transfer to [facility] inpatient rehab unit on  without incident.   Today's Information: No acute events overnight.  Resting in bed.  Staff reports good sleep and appetite.  Last BM .  Complained of coughing overnight.  Tessalon Perles do not help.  Vital signs at goal with no recorded fevers.  No new labs or imaging today.    Review of patient's allergies indicates:   Allergen Reactions    Adhesive tape-silicones Blisters    Ofloxacin     Penicillins     Sulfamethoxazole-trimethoprim      Other reaction(s): Unknown    Codeine Nausea Only        Current Facility-Administered Medications:     0.9%  NaCl infusion, , Intravenous, Continuous, Disha Biggs FNP, Last Rate: 125 mL/hr at 24 1723, New Bag at 24 1723    acetaminophen tablet 650 mg, 650 mg, Oral, Q6H, Augusto Guan FNP, 650 mg at 24 1656    acetaminophen tablet 650 mg, 650 mg, Oral, Q8H PRN, Disha Biggs, ORLYP    aspirin EC tablet 81 mg, 81 mg, Oral, Daily, Ирина Ness, FNP, 81 mg at 24 0807    benzonatate capsule 100 mg, 100 mg, Oral, TID PRN, Augusto Guan FNP, 100 mg at 24  2116    bisacodyL suppository 10 mg, 10 mg, Rectal, Daily PRN, Freida, Augusto A, FNP, 10 mg at 04/22/24 1426    bisacodyL suppository 10 mg, 10 mg, Rectal, Once, Freida, Augusto A, FNP    cyanocobalamin tablet 500 mcg, 500 mcg, Oral, Daily, Freida, Augusto A, FNP, 500 mcg at 04/27/24 0808    enoxaparin injection 30 mg, 30 mg, Subcutaneous, Q24H (prophylaxis, 1700), Freida, Augusto A, FNP, 30 mg at 04/27/24 1656    estradiol 0.05 mg/24 hr td ptsw patch 1 patch, 1 patch, Transdermal, Every Wed, 1 patch at 04/24/24 1736 **AND** estradiol 0.05 mg/24 hr td ptsw patch 1 patch, 1 patch, Transdermal, Every Sun, Pawan Myers MD, 1 patch at 04/21/24 1740    ferrous sulfate tablet 1 each, 1 tablet, Oral, Daily, Freida, Augusto A, FNP, 1 each at 04/27/24 0807    furosemide tablet 40 mg, 40 mg, Oral, Daily, Ирина Ness, FNP, 40 mg at 04/27/24 0807    gabapentin capsule 100 mg, 100 mg, Oral, TID, Freida, Augusto A, FNP, 100 mg at 04/27/24 2200    gabapentin capsule 200 mg, 200 mg, Oral, QHS, Freida, Augusto A, FNP, 200 mg at 04/27/24 2116    hydrALAZINE injection 10 mg, 10 mg, Intravenous, Q4H PRN, Freida, Augusto A, FNP    hydrOXYzine pamoate capsule 50 mg, 50 mg, Oral, After dinner, Disha Biggs, FNP, 50 mg at 04/27/24 1657    labetalol 20 mg/4 mL (5 mg/mL) IV syring, 10 mg, Intravenous, Q4H PRN, Freida, Augusto A, FNP    LIDOcaine 5 % patch 1 patch, 1 patch, Transdermal, Q24H, Disha Biggs FNP, 1 patch at 04/27/24 1800    methocarbamoL tablet 500 mg, 500 mg, Oral, TID PRN, Disha Biggs A, FNP, 500 mg at 04/21/24 0212    methocarbamoL tablet 500 mg, 500 mg, Oral, Q6H, Disha Biggs A, FNP, 500 mg at 04/27/24 1657    metoprolol injection 10 mg, 10 mg, Intravenous, Q2H PRN, Augusto Guan FNP, 10 mg at 04/17/24 1207    metoprolol tartrate (LOPRESSOR) tablet 25 mg, 25 mg, Oral, BID, Yang Campbell, AGACNP-BC, 25 mg at 04/27/24 2116    naloxegoL (MOVANTIK) tablet 25 mg,  "25 mg, Oral, Daily, Freida, Augusto A, FNP, 25 mg at 24 0807    nitroGLYCERIN SL tablet 0.4 mg, 0.4 mg, Sublingual, Q5 Min PRN, Freida, Augusto A, FNP    ondansetron disintegrating tablet 4 mg, 4 mg, Oral, Q6H PRN, Freida, Augusto A, FNP, 4 mg at 04/15/24 0705    oxyCODONE immediate release tablet 10 mg, 10 mg, Oral, Q4H PRN, [place], Disha A, FNP, 10 mg at 24 1123    oxyCODONE immediate release tablet 5 mg, 5 mg, Oral, Q4H PRN, Dian, Disha A, FNP, 5 mg at 24    polyethylene glycol packet 17 g, 17 g, Oral, BID PRN, Freida, Augusto A, FNP    promethazine tablet 25 mg, 25 mg, Oral, Q6H PRN, Freida, Augusto A, FNP, 25 mg at 04/15/24 0948    tamsulosin 24 hr capsule 0.4 mg, 0.4 mg, Oral, QHS, Ирина Ness, FNP, 0.4 mg at 246     Review of Systems   Complete 12-point review of symptoms negative except for what's mentioned in HPI     Objective:     BP (!) 145/69   Pulse 73   Temp 98.1 °F (36.7 °C) (Oral)   Resp 18   Ht 5' 2.99" (1.6 m)   Wt 76.7 kg (169 lb 1.5 oz)   SpO2 97%   Breastfeeding No   BMI 29.96 kg/m²      Physical Exam  Constitutional:       Appearance: She is ill-appearing.   HENT:      Head: Normocephalic.      Mouth/Throat:      Mouth: Mucous membranes are moist.   Eyes:      Pupils: Pupils are equal, round, and reactive to light.   Cardiovascular:      Rate and Rhythm: Normal rate and regular rhythm.      Heart sounds: Normal heart sounds.   Pulmonary:      Effort: Pulmonary effort is normal.      Breath sounds: Normal breath sounds.   Abdominal:      General: Bowel sounds are normal.      Palpations: Abdomen is soft.   Musculoskeletal:      Cervical back: Neck supple.      Right lower le+ Pitting Edema present.      Left lower le+ Pitting Edema present.      Comments: Left hip dressing clean and intact.  Diffuse muscle atrophy.    Skin:     General: Skin is warm and dry.   Neurological:      Motor: Weakness present.   Psychiatric:    "      Mood and Affect: Mood normal.       Lines/Drains/Airways       Drain  Duration                  Urethral Catheter 04/25/24 0300 16 Fr. 3 days              Peripheral Intravenous Line  Duration                  Peripheral IV - Single Lumen 04/09/24 1210 18 G Right Forearm 18 days                  Labs  No results found for this or any previous visit (from the past 24 hour(s)).    Radiology  Left hip XR on 04/09/2024, IMPRESSION: Improved alignment following internal fixation of the femur.  Radiology  Abdominal XR on 04/15/2024, IMPRESSION:  Residual feces nonspecific gas pattern.    Assessment/Plan:     89 y.o. WF admitted on 4/12/2024    Left hip fracture   - s/p left hip IM nailing on 04/09/2024  - WBAT to LLE  - discontinue staples on 04/30  - continue   Gabapentin 100 mg t.i.d. (initiated 4/27)   Gabapentin 200 mg at bedtime (initiated 4/27)                Tylenol 650 mg every 6 hours                Calcium-vitamin D3 1 tablet b.i.d.                 Oxycodone 5 mg q.6 hours.                Robaxin 500 mg t.i.d.  - defer to physiatry for rehab and pain management  - PT/OT/RT following     Osteoporosis  - stable  - vitamin-D level 42.1 on 04/10/2024  - continue.                Calcium-vitamin D3 1 tablet b.i.d.                 Estradiol 0.05 mg patch every Wednesday      HTN  - BP at goal!!  - discontinue Norvasc 5 mg daily on 04/18  - continue                  Metoprolol tartrate 25 mg b.i.d.                Hydralazine 10 mg every 2 hours as needed for BP > 160/90                Labetalol 10 mg every 2 hours as needed for BP > 160/90  - low sodium diet     COPD  - stable  - not on home 02  - keep 02 > 88%   - monitor closely  - initiate   Singulair 5 mg daily (initiated 4/28)   DuoNeb q.4 hours p.r.n.     Normocytic anemia  - asymptomatic  - H/H trending down  - continue                Vitamin B12 tablet daily                    Ferrous sulfate 325 mg daily     - no evidence of active bleeds  - will closely  monitor and transfuse if needed      Constipation  - stable   - discontinue Colace 100 mg b.i.d. and MiraLax 17 g daily 2/2 loose stools on 04/17  - continue  Movantik 25 mg daily (initiated 4/16)    Bilateral Lower extremity edema  - current  - s/p Lasix x1 on 04/26  - continue   Lasix 40 mg daily (to start 4/15)    Insomnia with associated delirium  - improved  - s/p Haldol 5 mg IM x1 on 04/16  - continue   Melatonin 6 mg at bedtime (initiated 4/14)    Urinary retention  - current  - required indwelling catheter insertion on 04/15  - continue   Flomax 0.4 mg at bedtime (initiated 4/16)  - discontinue indwelling catheter on 04/23  - indwelling catheter resumed on 04/24  - to follow-up with urology outpatient within 1 week    New onset atrial fibrillation  - controlled ventricular rate  - continue                  Metoprolol tartrate 25 mg b.i.d   Aspirin 81 mg daily  - cardiology does not recommend OAC 2/2 risks outweigh benefits, recommends aspirin 81 mg daily  - follow-up with cardiology outpatient for transthoracic echocardiogram    Cough  - current  - initiate   Mucinex 600 mg b.i.d. (initiated 4/28)   Flonase b.i.d. (initiated 4/28)  - obtain CXR     AB therapy  Rocephin 4/9-4/11     VTE Prophylaxis: Lovenox 30 mg daily   COVID-19 testing:  Unknown  COVID-19 vaccination status:  Vaccinated (Pfizer):  01/10/2021, 01/31/2021, 12/06/2022     POA: No  Living will: No  Contacts: Lacey Pizarro(Daughter) [phone]      CODE STATUS: DNR  Internal Medicine (attending): Pawan Myers MD  Physiatry (consulting):  Iglesia Tran MD     OUTPATIENT PROVIDERS  PCP:  Iglesia Steel MD  Orthopedic surgery:  Guy Jaquez D.O.  Cardiology: Matthew Molina MD  Urology: Sheldon Villa MD     DISPOSITION:  Sleep hygiene, bowel maintenance, and appetite at goal.  Vital signs at goal with no recorded fevers.  No new labs or imaging today.  Complains of cough.  Initiate Mucinex and Flonase.  Obtain CXR.  History of COPD.   Initiate Singulair 5 mg daily and DuoNebs p.r.n..  Monitor closely.  Notify of acute changes.    Staffing 4/23/2024:  Continent of bowel.  Lyman to be discontinued.  Blisters to hip from tape.  RT: Overall supervision.  Limited by left hip pain. Good appetite.  PT: SBA to min assist for bed mobility. SBA to CGA for OOB.  Ambulating 30-60 feet.  Needs increased time.  Limited by pain and fatigue. OT: Making good progress.  Overall mod to min assist.  Would be safer with a TTB.  Mild cognitive deficits with poor carry over. Needs family training.  Projected discharge 4/26 if sitters available.

## 2024-04-28 NOTE — PLAN OF CARE
Discharge PHQ completed (score=0 negative).    Reviewed discharge plans for tomorrow with pt in person and with dtr Linda via email.

## 2024-04-29 VITALS
WEIGHT: 169.06 LBS | BODY MASS INDEX: 29.95 KG/M2 | HEIGHT: 63 IN | DIASTOLIC BLOOD PRESSURE: 66 MMHG | OXYGEN SATURATION: 95 % | HEART RATE: 74 BPM | RESPIRATION RATE: 18 BRPM | SYSTOLIC BLOOD PRESSURE: 142 MMHG | TEMPERATURE: 98 F

## 2024-04-29 LAB
ALBUMIN SERPL-MCNC: 2.9 G/DL (ref 3.4–4.8)
ALBUMIN/GLOB SERPL: 0.9 RATIO (ref 1.1–2)
ALP SERPL-CCNC: 163 UNIT/L (ref 40–150)
ALT SERPL-CCNC: 13 UNIT/L (ref 0–55)
AST SERPL-CCNC: 19 UNIT/L (ref 5–34)
BASOPHILS # BLD AUTO: 0.04 X10(3)/MCL
BASOPHILS NFR BLD AUTO: 0.4 %
BILIRUB SERPL-MCNC: 0.5 MG/DL
BUN SERPL-MCNC: 22.1 MG/DL (ref 9.8–20.1)
CALCIUM SERPL-MCNC: 8.5 MG/DL (ref 8.4–10.2)
CHLORIDE SERPL-SCNC: 100 MMOL/L (ref 98–111)
CO2 SERPL-SCNC: 27 MMOL/L (ref 23–31)
CREAT SERPL-MCNC: 0.89 MG/DL (ref 0.55–1.02)
EOSINOPHIL # BLD AUTO: 0.41 X10(3)/MCL (ref 0–0.9)
EOSINOPHIL NFR BLD AUTO: 3.8 %
ERYTHROCYTE [DISTWIDTH] IN BLOOD BY AUTOMATED COUNT: 15.6 % (ref 11.5–17)
GFR SERPLBLD CREATININE-BSD FMLA CKD-EPI: >60 MLS/MIN/1.73/M2
GLOBULIN SER-MCNC: 3.1 GM/DL (ref 2.4–3.5)
GLUCOSE SERPL-MCNC: 97 MG/DL (ref 75–121)
HCT VFR BLD AUTO: 33 % (ref 37–47)
HGB BLD-MCNC: 10.5 G/DL (ref 12–16)
IMM GRANULOCYTES # BLD AUTO: 0.04 X10(3)/MCL (ref 0–0.04)
IMM GRANULOCYTES NFR BLD AUTO: 0.4 %
LYMPHOCYTES # BLD AUTO: 1.7 X10(3)/MCL (ref 0.6–4.6)
LYMPHOCYTES NFR BLD AUTO: 15.6 %
MAGNESIUM SERPL-MCNC: 2 MG/DL (ref 1.6–2.6)
MCH RBC QN AUTO: 29.8 PG (ref 27–31)
MCHC RBC AUTO-ENTMCNC: 31.8 G/DL (ref 33–36)
MCV RBC AUTO: 93.8 FL (ref 80–94)
MONOCYTES # BLD AUTO: 1.18 X10(3)/MCL (ref 0.1–1.3)
MONOCYTES NFR BLD AUTO: 10.8 %
NEUTROPHILS # BLD AUTO: 7.55 X10(3)/MCL (ref 2.1–9.2)
NEUTROPHILS NFR BLD AUTO: 69 %
NRBC BLD AUTO-RTO: 0 %
PHOSPHATE SERPL-MCNC: 3.2 MG/DL (ref 2.3–4.7)
PLATELET # BLD AUTO: 391 X10(3)/MCL (ref 130–400)
PMV BLD AUTO: 9.7 FL (ref 7.4–10.4)
POTASSIUM SERPL-SCNC: 3.9 MMOL/L (ref 3.5–5.1)
PREALB SERPL-MCNC: 15.2 MG/DL (ref 14–37)
PROT SERPL-MCNC: 6 GM/DL (ref 5.8–7.6)
RBC # BLD AUTO: 3.52 X10(6)/MCL (ref 4.2–5.4)
SODIUM SERPL-SCNC: 136 MMOL/L (ref 132–146)
WBC # SPEC AUTO: 10.92 X10(3)/MCL (ref 4.5–11.5)

## 2024-04-29 PROCEDURE — 80053 COMPREHEN METABOLIC PANEL: CPT | Performed by: NURSE PRACTITIONER

## 2024-04-29 PROCEDURE — 97535 SELF CARE MNGMENT TRAINING: CPT

## 2024-04-29 PROCEDURE — 84100 ASSAY OF PHOSPHORUS: CPT | Performed by: NURSE PRACTITIONER

## 2024-04-29 PROCEDURE — 25000003 PHARM REV CODE 250: Performed by: NURSE PRACTITIONER

## 2024-04-29 PROCEDURE — 97530 THERAPEUTIC ACTIVITIES: CPT

## 2024-04-29 PROCEDURE — 36415 COLL VENOUS BLD VENIPUNCTURE: CPT | Performed by: NURSE PRACTITIONER

## 2024-04-29 PROCEDURE — 84134 ASSAY OF PREALBUMIN: CPT | Performed by: NURSE PRACTITIONER

## 2024-04-29 PROCEDURE — 99900031 HC PATIENT EDUCATION (STAT)

## 2024-04-29 PROCEDURE — 94761 N-INVAS EAR/PLS OXIMETRY MLT: CPT

## 2024-04-29 PROCEDURE — 94640 AIRWAY INHALATION TREATMENT: CPT

## 2024-04-29 PROCEDURE — 97110 THERAPEUTIC EXERCISES: CPT

## 2024-04-29 PROCEDURE — 85025 COMPLETE CBC W/AUTO DIFF WBC: CPT | Performed by: NURSE PRACTITIONER

## 2024-04-29 PROCEDURE — 97116 GAIT TRAINING THERAPY: CPT

## 2024-04-29 PROCEDURE — 83735 ASSAY OF MAGNESIUM: CPT | Performed by: NURSE PRACTITIONER

## 2024-04-29 PROCEDURE — 25000242 PHARM REV CODE 250 ALT 637 W/ HCPCS: Performed by: NURSE PRACTITIONER

## 2024-04-29 RX ORDER — ASPIRIN 81 MG/1
81 TABLET ORAL 2 TIMES DAILY
Qty: 60 TABLET | Refills: 0 | Status: ON HOLD | OUTPATIENT
Start: 2024-04-29 | End: 2024-06-19

## 2024-04-29 RX ORDER — METHOCARBAMOL 500 MG/1
500 TABLET, FILM COATED ORAL EVERY 6 HOURS
Qty: 40 TABLET | Refills: 0 | Status: SHIPPED | OUTPATIENT
Start: 2024-04-29 | End: 2024-05-09

## 2024-04-29 RX ORDER — GUAIFENESIN 600 MG/1
600 TABLET, EXTENDED RELEASE ORAL 2 TIMES DAILY
Qty: 14 TABLET | Refills: 0 | Status: SHIPPED | OUTPATIENT
Start: 2024-04-29 | End: 2024-05-06

## 2024-04-29 RX ORDER — METOPROLOL TARTRATE 50 MG/1
50 TABLET ORAL 2 TIMES DAILY
Status: DISCONTINUED | OUTPATIENT
Start: 2024-04-29 | End: 2024-04-29 | Stop reason: HOSPADM

## 2024-04-29 RX ORDER — TAMSULOSIN HYDROCHLORIDE 0.4 MG/1
0.4 CAPSULE ORAL NIGHTLY
Qty: 14 CAPSULE | Refills: 0 | Status: ON HOLD | OUTPATIENT
Start: 2024-04-29 | End: 2024-06-19

## 2024-04-29 RX ORDER — ACETAMINOPHEN 325 MG/1
650 TABLET ORAL EVERY 6 HOURS
Qty: 80 TABLET | Refills: 0 | Status: SHIPPED | OUTPATIENT
Start: 2024-04-29 | End: 2024-05-09

## 2024-04-29 RX ORDER — FERROUS SULFATE 325(65) MG
325 TABLET, DELAYED RELEASE (ENTERIC COATED) ORAL DAILY
Qty: 30 TABLET | Refills: 0 | Status: SHIPPED | OUTPATIENT
Start: 2024-04-29 | End: 2024-05-29

## 2024-04-29 RX ORDER — OXYCODONE AND ACETAMINOPHEN 5; 325 MG/1; MG/1
1 TABLET ORAL EVERY 6 HOURS PRN
Qty: 20 TABLET | Refills: 0 | Status: SHIPPED | OUTPATIENT
Start: 2024-04-29 | End: 2024-05-04

## 2024-04-29 RX ORDER — UBIDECARENONE 75 MG
500 CAPSULE ORAL DAILY
Qty: 30 TABLET | Refills: 0 | Status: SHIPPED | OUTPATIENT
Start: 2024-04-29 | End: 2024-05-29

## 2024-04-29 RX ORDER — METOPROLOL TARTRATE 50 MG/1
50 TABLET ORAL 2 TIMES DAILY
Qty: 60 TABLET | Refills: 0 | Status: SHIPPED | OUTPATIENT
Start: 2024-04-29 | End: 2024-05-29

## 2024-04-29 RX ORDER — GABAPENTIN 100 MG/1
100 CAPSULE ORAL 3 TIMES DAILY
Qty: 42 CAPSULE | Refills: 0 | Status: SHIPPED | OUTPATIENT
Start: 2024-04-29 | End: 2024-04-29 | Stop reason: HOSPADM

## 2024-04-29 RX ORDER — FUROSEMIDE 40 MG/1
40 TABLET ORAL DAILY
Qty: 14 TABLET | Refills: 0 | Status: ON HOLD | OUTPATIENT
Start: 2024-04-29 | End: 2024-06-19 | Stop reason: HOSPADM

## 2024-04-29 RX ORDER — ESTRADIOL 0.05 MG/D
FILM, EXTENDED RELEASE TRANSDERMAL
Qty: 8 PATCH | Refills: 0 | Status: SHIPPED | OUTPATIENT
Start: 2024-05-01 | End: 2024-06-16

## 2024-04-29 RX ORDER — MONTELUKAST SODIUM 5 MG/1
5 TABLET, CHEWABLE ORAL DAILY
Qty: 30 TABLET | Refills: 0 | Status: SHIPPED | OUTPATIENT
Start: 2024-04-29 | End: 2024-05-29

## 2024-04-29 RX ADMIN — GABAPENTIN 100 MG: 100 CAPSULE ORAL at 06:04

## 2024-04-29 RX ADMIN — FLUTICASONE PROPIONATE 100 MCG: 50 SPRAY, METERED NASAL at 08:04

## 2024-04-29 RX ADMIN — METHOCARBAMOL 500 MG: 500 TABLET ORAL at 12:04

## 2024-04-29 RX ADMIN — METHOCARBAMOL 500 MG: 500 TABLET ORAL at 11:04

## 2024-04-29 RX ADMIN — ACETAMINOPHEN 325MG 650 MG: 325 TABLET ORAL at 11:04

## 2024-04-29 RX ADMIN — OXYCODONE 10 MG: 5 TABLET ORAL at 09:04

## 2024-04-29 RX ADMIN — FUROSEMIDE 40 MG: 40 TABLET ORAL at 08:04

## 2024-04-29 RX ADMIN — METHOCARBAMOL 500 MG: 500 TABLET ORAL at 06:04

## 2024-04-29 RX ADMIN — ACETAMINOPHEN 325MG 650 MG: 325 TABLET ORAL at 06:04

## 2024-04-29 RX ADMIN — GUAIFENESIN 600 MG: 600 TABLET, EXTENDED RELEASE ORAL at 08:04

## 2024-04-29 RX ADMIN — FERROUS SULFATE TAB 325 MG (65 MG ELEMENTAL FE) 1 EACH: 325 (65 FE) TAB at 08:04

## 2024-04-29 RX ADMIN — MONTELUKAST SODIUM 5 MG: 5 TABLET, CHEWABLE ORAL at 08:04

## 2024-04-29 RX ADMIN — METOPROLOL TARTRATE 50 MG: 50 TABLET, FILM COATED ORAL at 08:04

## 2024-04-29 RX ADMIN — IPRATROPIUM BROMIDE AND ALBUTEROL SULFATE 3 ML: .5; 3 SOLUTION RESPIRATORY (INHALATION) at 04:04

## 2024-04-29 RX ADMIN — ACETAMINOPHEN 325MG 650 MG: 325 TABLET ORAL at 12:04

## 2024-04-29 RX ADMIN — CYANOCOBALAMIN TAB 500 MCG 500 MCG: 500 TAB at 08:04

## 2024-04-29 RX ADMIN — NALOXEGOL OXALATE 25 MG: 25 TABLET, FILM COATED ORAL at 08:04

## 2024-04-29 RX ADMIN — ASPIRIN 81 MG: 81 TABLET, COATED ORAL at 08:04

## 2024-04-29 RX ADMIN — BENZONATATE 100 MG: 100 CAPSULE ORAL at 04:04

## 2024-04-29 NOTE — DISCHARGE SUMMARY
[facility] ([facility])  Rehab Discharge Summary    Patient Name: Safia Muñoz  MRN: [id number]  Age: 89 y.o. Sex: female  : 1931  Hospital Length of Stay: 17 days   Date of Service: 2024      Discharge Information   Date of Admission: 2024  Date of Discharge: 2024  Admit Diagnosis:  Left hip fracture         Osteoporosis         Hypertension         COPD         Anemia         Constipation  Discharge Diagnosis: Left hip fracture (stable)           Osteoporosis (stable)           Hypertension (stable)           COPD (stable)           Anemia (stable)           Constipation (stable)           Bilateral lower extremity edema (stable)           Insomnia with associated delirium (resolved)           Urinary retention (stable)           New onset atrial fibrillation (stable)           Cough (stable)    COVID-19 testing:  Unknown  COVID-19 vaccination status:  Vaccinated (Pfizer):  01/10/2021, 2021, 2022     Internal Medicine (attending): Pawan Myers MD  Physiatry (consulting):  Iglesia Tran MD     OUTPATIENT PROVIDERS  PCP:  Iglesia Steel MD  Orthopedic surgery:  Guy Jaquez D.O.    Hospital Course   89-year-old white female presented to [facility] ED on 2024 complaining of left hip pain after a ground level fall.  PMH significant for HTN and COPD not on home O2.  Workup significant for closed comminuted intertrochanteric left femur fracture.  Tolerated left hip IM nailing on  without perioperative complications.  Received 3 days Rocephin due to underlying UTI.  Urine culture appeared to be contaminated.  Orthopedic surgery recommended WBAT to LLE.  Tolerated transfer to [facility] inpatient rehab unit on  without incident.     Throughout inpatient rehab course remained continent of bowel and bladder.  On admit resumed Norvasc 5 mg daily.  Fair pain management.  Initiated Tylenol 650 mg every 6 hours, Robaxin 500 mg t.i.d., and gabapentin  200 mg at bedtime.  Bilateral lower extremity edema appreciated on 04/13.  Initiated Lasix 40 mg IVP x2 days.  Sleep hygiene improved with initiation of melatonin 6 mg at bedtime and increase gabapentin at night.  Reported delirium with decreased sleep hygiene on 04/16.  Required indwelling catheter.  Initiated Flomax 0.4 mg at bedtime.  Flu and COVID negative.  UA unremarkable.  Discontinued gabapentin.  Sleep hygiene improved greatly with Haldol x1.  Bowel management improved with initiation of MiraLax 17 g b.i.d. and Movantik 25 mg daily.  On 04/17 reported orthostatic hypotension which resolved once sitting back down.  Initiated 500 normal saline bolus.  Likely 2/2 volume loss s/p continued loose stools.  Discontinue Colace and MiraLax.  Held Lasix.  EKG significant for AFib with RVR.  Initiated 10 mg metoprolol IVP x1 and heart rate controlled ventricular rate.  Initiate metoprolol tartrate 25 mg b.i.d. and another 500 mL normal saline bolus.  Discontinue Norvasc.  Cardiology recommended no OAC 2/2 risks outweigh benefits, recommended aspirin 81 mg daily for CVA prophylaxis.  We will follow-up outpatient for transthoracic echocardiogram.  Chest x-ray unremarkable.  Continue Lasix 40 mg p.o. daily.  Discontinued indwelling catheter and required re-initiation on 04/24.  Will discharge with indwelling catheter and follow-up with Urology outpatient.  Bilateral lower extremity edema improved with initiation of an extra 40 mg IVP Lasix once on 04/26.  Remained with adequate sleep hygiene, bowel management, and appetite throughout remainder of inpatient rehab course.  Reported cough improved with Flonase b.i.d. and Mucinex 600 mg b.i.d..  Initiated Singulair 5 mg daily and DuoNebs p.r.n..  Chest x-ray unremarkable.  Labs today reviewed.  CBC and CMP overall unremarkable.  Left femoral x-ray with no changes.  Appreciate Orthopedic surgery recommendations.  Discharge with aspirin 81 mg b.i.d. and continue weight-bearing  "as tolerated to left lower extremity.  We will follow-up in 6 weeks for repeat x-rays.  Staples removed today without complication.  RT reported overall supervision.  PT reported SBA to minimal assist for bed mobility.  SP a to contact guard assist for OB.  Ambulating 30-60 feet.  OT reported making good progress and overall Min to mod assist. Discharge orders initiated.  Stable for transfer home with home health and family care.  Follow-up with scheduled appointments documented below.    Chief Complaint: Left hip fracture s/p left hip IM nailing on 04/09/2024     BP (!) 142/66   Pulse 74   Temp 98 °F (36.7 °C) (Oral)   Resp 18   Ht 5' 2.99" (1.6 m)   Wt 76.7 kg (169 lb 1.5 oz)   SpO2 95%   Breastfeeding No   BMI 29.96 kg/m²      Physical Exam  Constitutional:       Appearance: She is ill-appearing.   HENT:      Head: Normocephalic.      Mouth/Throat:      Mouth: Mucous membranes are moist.   Eyes:      Pupils: Pupils are equal, round, and reactive to light.   Cardiovascular:      Rate and Rhythm: Normal rate and regular rhythm.      Heart sounds: Normal heart sounds.   Pulmonary:      Effort: Pulmonary effort is normal.      Breath sounds: Normal breath sounds.   Abdominal:      General: Bowel sounds are normal.      Palpations: Abdomen is soft.   Musculoskeletal:      Cervical back: Neck supple.      Comments: Left hip dressing clean and intact.  Diffuse muscle atrophy.    Skin:     General: Skin is warm and dry.   Neurological:      Motor: Weakness present.   Psychiatric:         Mood and Affect: Mood normal.   *MD performed and documented physical examination        Labs  Recent Results (from the past 24 hour(s))   Comprehensive Metabolic Panel    Collection Time: 04/29/24  5:25 AM   Result Value Ref Range    Sodium Level 136 132 - 146 mmol/L    Potassium Level 3.9 3.5 - 5.1 mmol/L    Chloride 100 98 - 111 mmol/L    Carbon Dioxide 27 23 - 31 mmol/L    Glucose Level 97 75 - 121 mg/dL    Blood Urea " Nitrogen 22.1 (H) 9.8 - 20.1 mg/dL    Creatinine 0.89 0.55 - 1.02 mg/dL    Calcium Level Total 8.5 8.4 - 10.2 mg/dL    Protein Total 6.0 5.8 - 7.6 gm/dL    Albumin Level 2.9 (L) 3.4 - 4.8 g/dL    Globulin 3.1 2.4 - 3.5 gm/dL    Albumin/Globulin Ratio 0.9 (L) 1.1 - 2.0 ratio    Bilirubin Total 0.5 <=1.5 mg/dL    Alkaline Phosphatase 163 (H) 40 - 150 unit/L    Alanine Aminotransferase 13 0 - 55 unit/L    Aspartate Aminotransferase 19 5 - 34 unit/L    eGFR >60 mls/min/1.73/m2   Magnesium    Collection Time: 04/29/24  5:25 AM   Result Value Ref Range    Magnesium Level 2.00 1.60 - 2.60 mg/dL   Phosphorus    Collection Time: 04/29/24  5:25 AM   Result Value Ref Range    Phosphorus Level 3.2 2.3 - 4.7 mg/dL   Prealbumin    Collection Time: 04/29/24  5:25 AM   Result Value Ref Range    Prealbumin 15.2 14.0 - 37.0 mg/dL   CBC with Differential    Collection Time: 04/29/24  5:25 AM   Result Value Ref Range    WBC 10.92 4.50 - 11.50 x10(3)/mcL    RBC 3.52 (L) 4.20 - 5.40 x10(6)/mcL    Hgb 10.5 (L) 12.0 - 16.0 g/dL    Hct 33.0 (L) 37.0 - 47.0 %    MCV 93.8 80.0 - 94.0 fL    MCH 29.8 27.0 - 31.0 pg    MCHC 31.8 (L) 33.0 - 36.0 g/dL    RDW 15.6 11.5 - 17.0 %    Platelet 391 130 - 400 x10(3)/mcL    MPV 9.7 7.4 - 10.4 fL    Neut % 69.0 %    Lymph % 15.6 %    Mono % 10.8 %    Eos % 3.8 %    Basophil % 0.4 %    Lymph # 1.70 0.6 - 4.6 x10(3)/mcL    Neut # 7.55 2.1 - 9.2 x10(3)/mcL    Mono # 1.18 0.1 - 1.3 x10(3)/mcL    Eos # 0.41 0 - 0.9 x10(3)/mcL    Baso # 0.04 <=0.2 x10(3)/mcL    IG# 0.04 0 - 0.04 x10(3)/mcL    IG% 0.4 %    NRBC% 0.0 %       Radiology  Left hip XR on 04/09/2024, IMPRESSION: Improved alignment following internal fixation of the femur.  Radiology  Abdominal XR on 04/15/2024, IMPRESSION:  Residual feces nonspecific gas pattern.    Discharge Summary Plan   Discharge Status:  Improved    Location: Discharge home with home health    Medications: See discharge medicine reconciliation    Activity:  As tolerated    Diet:   Regular diet    Instructions:  Take all medications as prescribed.      Attend appointments as scheduled.      Return to ED if symptoms worsen, or if t > 100.4.    Education:  Left hip fracture    Follow-up:  Cecil Steel MD on 05/10 at 9:00 a.m.      Matthew Molina MD on 5/21 at 2:40 p.m.      Carmen AMAYA on 5/5 at 3:15 p.m.    Discussed plan of care, and patient communicated understanding. Agreed to comply with recommendations.    Ирина Ness NP conducted independent examination and assisted with medical documentation.     Discharge Time: 41 minutes

## 2024-04-29 NOTE — PLAN OF CARE
Problem: Fall Injury Risk  Goal: Absence of Fall and Fall-Related Injury  Outcome: Progressing  Intervention: Promote Injury-Free Environment  Flowsheets (Taken 4/28/2024 2201)  Safety Promotion/Fall Prevention:   assistive device/personal item within reach   bed alarm set   Fall Risk signage in place   lighting adjusted   nonskid shoes/socks when out of bed   side rails raised x 3   instructed to call staff for mobility

## 2024-04-29 NOTE — PT/OT/SLP DISCHARGE
Physical Therapy Discharge Summary    Name: Safia Muñoz  MRN: [id number]   Principal Problem: Closed 2-part intertrochanteric fracture of proximal end of left femur     Patient Discharged from acute Physical Therapy on 04/29/24.     Assessment:     Goals partially met. Patient appropriate for care in another setting.    Objective:     GOALS:   Multidisciplinary Problems       Physical Therapy Goals          Problem: Physical Therapy    Goal Priority Disciplines Outcome Goal Variances Interventions   Physical Therapy Goal     PT, PT/OT Adequate for Care Transition     Description: Bed Mobility:  MET - Roll left and right with partial/moderate assist.   MET - Sit to supine transfer with partial/moderate assist.   MET - Supine to sit transfer with partial/moderate assist.   NOT MET - Roll left and right independently  NOT MET - Sit to supine transfer with setup/clean-up assist assist  NOT MET - Supine to sit transfer with setup/clean-up assist assist    Transfers:  MET - Sit to stand transfer with supervision/touching assist using RW.   MET - Bed to chair transfer with supervision/touching assist Stand Step  using RW.   DISCONTINUE - Bed to chair transfer with partial/moderate assist Slide Board  using Slide board.   MET - Sit to stand transfer with setup/clean-up assist using RW  NOT MET - Bed to chair transfer with setup/clean-up assist Stand Step  using RW  MET - Car transfer with supervision/touching assist using RW  MET -  an object from the ground in standing position with supervision/touching assist using RW and reacher    Mobility:  MET - Ambulate 150 feet with supervision/touching assist using RW.   MET - Pt ascended/descended a 4 inch curb with partial/moderate assist using RW  MET - Ascend/descend 4 stairs with partial/moderate assist using bilateral handrails  MET - Ambulate 10 feet on uneven surfaces/ramps with partial/moderate assist using RW  MET - Manual wheelchair 150 feet with  partial/moderate assist using Bilateral upper extremity                         Care Scores:   Admission Assessment Current   Status  Discharge   Goal   Functional Area: Care Score:  Care Score:    Roll Left and Right 3 4 Independent   Sit to Lying 1 3 Supervision or touching assistance   Lying to Sitting on Side of Bed 1 4 Supervision or touching assistance   Sit to Stand 2 5 Supervision or touching assistance   Chair/Bed-to-Chair Transfer 2 4 Supervision or touching assistance   Car Transfer 88 4 Not attempted due to medical/safety concerns   Walk 10 Feet 88 6 Not attempted due to medical/safety concerns   Walk 50 Feet with Two Turns 88 4 Not attempted due to medical/safety concerns   Walk 150 Feet 88 4 Not attempted due to medical/safety concerns   Walk 10 Feet Uneven Surface 88 4 Not attempted due to medical/safety concerns   1 Step (Curb) 88 4 Not attempted due to medical/safety concerns   4 Steps 88 4 Not attempted due to medical/safety concerns   12 Steps 88 88 Not attempted due to medical/safety concerns   Picking Up Object 88 6 Not attempted due to medical/safety concerns   Wheel 50 Feet with Two Turns 3 4 Independent   Wheel 150 Feet 88 4 Not attempted due to medical/safety concerns       Reasons for Discontinuation of Therapy Services  Transfer to alternate level of care. and Satisfactory goal achievement.      Plan:     Patient Discharged to: Home with Home Health Service.    4/29/2024

## 2024-04-29 NOTE — PT/OT/SLP PROGRESS
Physical Therapy Inpatient Rehab Treatment    Patient Name:  Safia Muñoz   MRN:  [id number]    Recommendations:     Discharge Recommendations:  Moderate Intensity Therapy   Discharge Equipment Recommendations:     Barriers to discharge: Impaired functional mobility     Assessment:     Safia Muñoz is a 89 y.o. female admitted with a medical diagnosis of Closed 2-part intertrochanteric fracture of proximal end of left femur.  She presents with the following impairments/functional limitations:  weakness, impaired functional mobility, gait instability, impaired cognition, decreased lower extremity function, decreased safety awareness.    Rehab Diagnosis: medical diagnosis of Closed 2-part intertrochanteric fracture of proximal end of left femur    General Precautions: Standard, fall     Orthopedic Precautions:LLE weight bearing as tolerated     Braces: N/A    Rehab Prognosis: Fair; patient would benefit from acute skilled PT services to address these deficits and reach maximum level of function.      History:     Past Medical History:   Diagnosis Date    COPD (chronic obstructive pulmonary disease)     Hypertension        Past Surgical History:   Procedure Laterality Date    RETROGRADE INTRAMEDULLARY RODDING OF DISTAL FEMUR Left 4/9/2024    Procedure: INSERTION, INTRAMEDULLARY NAIL INTERTROCHENTERIC FRACTURE;  Surgeon: Guy Jaquez DO;  Location: [facility];  Service: Orthopedics;  Laterality: Left;  HANA TABLE, SYNTHES TFNA       Subjective     Patient comments: pt agreeable to participate with PT. C/o significant pain with increased swelling in L LE, as well as new onset of cough and hoarseness today.    Respiratory Status: Room air    Patients cultural, spiritual, Episcopal conflicts given the current situation: no    Objective:     Communicated with pt and NSG prior to session.  Patient found  seated on toilet  with NSG staff present upon PT entry to room at 0830, and seated up in wheelchair at 1015.    Pt  is Oriented x3 and Alert, Cooperative, and Motivated.    Vitals   Pain Pain Rating 1: 8/10  Location - Side 1: Left  Location 1: leg  Pain Addressed 1: Reposition, Cessation of Activity, Nurse notified  Pain Rating Post-Intervention 1: 4/10       Functional Mobility:    Current   Status  Discharge   Goal   Functional Area: Care Score:    Roll Left and Right 4  SBA with bed rails Independent   Sit to Lying 3  Min A for assist with L LE Supervision or touching assistance   Lying to Sitting on Side of Bed 4  SBA with bed rails Supervision or touching assistance   Sit to Stand 5  With RW; setup/cleanup assist needed for sarmiento catheter management Supervision or touching assistance   Chair/Bed-to-Chair Transfer 4  With RW; SBA needed with VC for hand placement. Supervision or touching assistance   Car Transfer 4  With RW and use of leg ; VC needed for technique. Not attempted due to medical/safety concerns   Walk 10 Feet 6 Not attempted due to medical/safety concerns   Walk 50 Feet with Two Turns 4 Not attempted due to medical/safety concerns   Walk 150 Feet 4  Pt amb 75' (limited distance d/t toileting needs) + 150' with RW. Mod I with RW for very short distances, but SBA recommended for increased distances for safety. Very slow speed, encouragement needed for increased distance. Standing rest breaks needed throughout. Not attempted due to medical/safety concerns   Walk 10 Feet Uneven Surface 4  Pt amb 10' on uneven surfaces with RW and close CGA. Very slow speed. Not attempted due to medical/safety concerns   1 Step (Curb) 4 Not attempted due to medical/safety concerns   4 Steps 4  Pt amb up/down 4 stairs with B rails and CGA. Slow speed with VC needed for technique. Number of stairs limited by fatigue and pain. Not attempted due to medical/safety concerns   12 Steps 88 Not attempted due to medical/safety concerns   Picking Up Object 6  With RW and reacher. Not attempted due to medical/safety concerns   Wheel 50  Feet with Two Turns   Independent   Wheel 150 Feet   Not attempted due to medical/safety concerns       Therapeutic Activities and Exercises:  Patient educated on role of acute care PT and PT POC and safety while in hospital including calling nurse for mobility  Patient educated about importance of OOB mobility and remaining up in chair most of the day.    Co-treat with CTRS Corinan [phone] for washer toss activity. Performed with use of RW and SBA. Pt using alternating UE to reach on ipsilateral and contralateral sides of her body at varying heights to retrieve washers, prior to tossing them at a target.    Toilet t/f performed x2 more completions after PT assisted NSG staff with returning from toilet at start of 0830 session: once at 0900 with continent episode of +BM and then again at 0920 with an incontinent episode of +BM. CNA staff present at second completion to assist pt with hygiene. NP Jacob notified.    Family education provided on w/c management with pts personal w/c. Pt and family verbalized understanding of all.      Activity Tolerance: Fair    Patient left  seated on toilet  with  CNA staff present at end of first session and seated up in w/c with family present at end of second session.    Education provided: roles and goals of PT/PTA, transfer training, bed mob, gait training, stair training, balance training, safety awareness, body mechanics, assistive device, wheelchair management, and fall prevention    Expected compliance: Moderate compliance    Plan:     During this hospitalization, patient to be seen 6 x/week to address the identified rehab impairments via gait training, therapeutic activities, therapeutic exercises, neuromuscular re-education, wheelchair management/training and progress toward the following goals:    GOALS:   Multidisciplinary Problems       Physical Therapy Goals          Problem: Physical Therapy    Goal Priority Disciplines Outcome Goal Variances Interventions   Physical  Therapy Goal     PT, PT/OT Progressing     Description: Bed Mobility:  MET - Roll left and right with partial/moderate assist.   MET - Sit to supine transfer with partial/moderate assist.   MET - Supine to sit transfer with partial/moderate assist.   Roll left and right independently  Sit to supine transfer with setup/clean-up assist assist  Supine to sit transfer with setup/clean-up assist assist    Transfers:  MET - Sit to stand transfer with supervision/touching assist using RW.   MET - Bed to chair transfer with supervision/touching assist Stand Step  using RW.   DISCONTINUE - Bed to chair transfer with partial/moderate assist Slide Board  using Slide board.   Sit to stand transfer with setup/clean-up assist using RW  Bed to chair transfer with setup/clean-up assist Stand Step  using RW  Car transfer with supervision/touching assist using RW   an object from the ground in standing position with supervision/touching assist using RW and reacher    Mobility:  Ambulate 150 feet with supervision/touching assist using RW.   Pt ascended/descended a 4 inch curb with partial/moderate assist using RW  Ascend/descend 4 stairs with partial/moderate assist using bilateral handrails  Ambulate 10 feet on uneven surfaces/ramps with partial/moderate assist using RW  Manual wheelchair 150 feet with partial/moderate assist using Bilateral upper extremity                         Plan of Care Expires:  04/19/24  PT Next Visit Date: 05/01/24  Plan of Care reviewed with: patient, family    Additional Information:         Time Tracking:     Therapy Time  PT Start Time:  (0830; 1015)  PT Stop Time:  (0930; 1145)   PT Individual: 135  Missed Time: 15 Minutes  Time Missed due to: Testing/imaging (xray/CT/MRI) (STAT x-ray)    Billable Minutes: Gait Training 45 min, Therapeutic Activity 60 min, Therapeutic Exercise 15 min, and Self care/Home management 15 min    04/29/2024

## 2024-04-29 NOTE — PLAN OF CARE
Met with pt and spoke with dtr Lynn ([phone]) when she called pt's room on speaker phone.      Discussed updates from team conference and discharge planned for Mon, 5/6.    Lynn confirmed that she will be assisting pt when his wife is working.  Scheduled family training for Thurs, 5/2, at 0900.  She may have her brother attend with her.      Discussed DME needs.  I will order the LBQC.  Lynn will order the TTB and will f/u on installation of a GB.  She confirmed that they already have a HHS.      Discussed return to [facility] for OP PT/OT (confirmed with [facility] that ST will not be needed).  Confirmed at [phone] that they were seeing patient prior.  (They do not currently have SLP services if they would have been needed.)    Lynn will have someone  pt on 5/6 at 1100.

## 2024-04-29 NOTE — PT/OT/SLP DISCHARGE
Occupational Therapy Discharge Summary    Safia Muñoz  MRN: [id number]   Principal Problem: Closed 2-part intertrochanteric fracture of proximal end of left femur      Patient Discharged from acute Occupational Therapy on 4/29/2024.  Please refer to prior OT note dated 4/27/2024 for functional status.    Assessment:      Patient appropriate for care in another setting.    Objective:     GOALS:   Multidisciplinary Problems       Occupational Therapy Goals          Problem: Occupational Therapy    Goal Priority Disciplines Outcome Interventions   Occupational Therapy Goal     OT, PT/OT Progressing    Description: Pt to perform eating tasks with independence by re-eval. Pt. C numb fingertips; unable to manage packages. Discussed c son getting safety scissors     Pt to perform oral hygiene tasks with min A standing at sink with RW by re-eval. Ongoing Sits in w/c -Pt. C new onset A-Fib/RVR c fatigue; by Monday 4/22 MET standing c SVN for safety     Pt to perform UB dressing with independence by re-eval.Touch A c bra Numb fingertips/maybe front hook bra ? SBA MET no bra    Pt to perform LB dressing with mod A using AE PRN by re-eval. MET   Pt to perform donning/doffing footwear with max A using AE PRN by re-eval. MET   Pt to perform toileting hygiene with mod A by re-eval.  Pt. Continues c Lyman . Pt. Able to wipe self after BM 4/20/2024 c touch A to steady while standing c RE. Pt. Managed underwear up /down. MET  Pt to perform bathing tasks with mod A by re-eval. MET (sponge bath) Touch A MET    Pt to perform toilet transfers with mod A using LRAD by re-eval. MET Touch A MET  Pt to perform WIS transfers with mod A using LRAD by re-eval.  Pt. Unable to elevate L LE high enough to clear shower ledge. Son will measure shower door opening width vs. Tub accessibility.   Pt to perform meal prep activity with mod A by re-eval. MET   Pt to perform laundry management task with mod A by re-eval.  Pt to perform light  housekeeping activity with mod A by re-eval.  Pt. To prepare water/food for dog by Re-Eval  Balance, Strengthening, Endurance, Balance:  Pt to consistently demonstrate adherence to orthopedic precautions during all ADL's as instructed by OT.  Pt to demonstrate consistent adherence to breathing control and energy conservation techniques as educated by OT.  Pt. To remember safety techniques for functional transfers S v/c's by D/C. !!                       Care Scores:   Admission Assessment Current Status Discharge  Goal   Functional Area: Care Score:  Care Score:    Eating 5 6 Set-up/clean-up   Oral Hygiene 4 6 Supervision or touching assistance   Toileting Hygiene 7 4 Supervision or touching assistance   Shower/Bathe Self 7 4 Supervision or touching assistance   Upper Body Dressing 7 5 Independent   Lower Body Dressing 2 4 Supervision or touching assistance   Putting On/Taking Off Footwear 1 4 Set-up/clean-up   Toilet Transfer 2 4 Supervision or touching assistance     Reasons for Discontinuation of Therapy Services   Transfer to alternate level of care. and Satisfactory goal achievement.  Pt. Made good progress; occasional STM deficits prevent Pt. From carryover c new learning of AE for LB DSG. Pt. Would benefit from 24 hr SVN for safety. Pt.'s family and sitters will provide this.     Plan:     Patient Discharged to: Home with Home Health Service      4/29/2024

## 2024-04-29 NOTE — PT/OT/SLP DISCHARGE
Recreational Therapy Discharge      Date of Treatment: 04/29/24  Start Time: 1015  Stop Time: 1030  Total Time: 15 min  Missed Time:     Assessment      Safia Muñoz is a 89 y.o. female admitted with a medical diagnosis of Closed 2-part intertrochanteric fracture of proximal end of left femur.  She presents with the following impairments/functional limitations:  weakness, impaired functional mobility, gait instability, impaired cognition, decreased lower extremity function, decreased safety awareness .    Rehab Diagnosis:     Recent Surgery:     General Precautions: Standard, fall     Orthopedic Precautions:LLE weight bearing as tolerated     Braces: N/A    Rehab Prognosis: Fair; patient would benefit from acute skilled Recreational Therapy services to address these deficits and reach maximum level of function.      Impairments: Cognitive deficits, Endurance deficits, Mobility deficits, Safety awareness deficits, and Strength deficits  Rehab Potential: Good  Treatment Recommendations: Complete discharge plan  Treatment Diagnosis: Fall, L intertrochanteric femur fx, IMN, COPD, HTN  Orientation: Identifies self  Affect/Behavior: Appropriate and Cooperative  Safety/Judgement: impaired   Basic Command Following: intact  Spiritual Cultural: no        History     Past Medical History:   Diagnosis Date    COPD (chronic obstructive pulmonary disease)     Hypertension        Past Surgical History:   Procedure Laterality Date    RETROGRADE INTRAMEDULLARY RODDING OF DISTAL FEMUR Left 4/9/2024    Procedure: INSERTION, INTRAMEDULLARY NAIL INTERTROCHENTERIC FRACTURE;  Surgeon: Guy Jaquez DO;  Location: [facility];  Service: Orthopedics;  Laterality: Left;  TIGRE TABLE, SYNTHES TFNA       Home Environment     Admit Date: 04/12/24  Living Situation  People in Home: alone  Lives in: assisted living  Patients Responsibilities: Caregiver to pet, Community mobility, , Financial management, Health and wellness, Laundry,  "Leisure/play/hobbies, Retired, Shopping, Social participation  Number of Children: 3  Occupation:Retired: Secretarial work    Instrumental Activities of Daily Living     Previous Hand Dominance: Right Current Hand Dominance: Right (Arnoldo UE weakness)     Other iADL Information:        Cognitive Skills Building         Cognitive Observation Activity Assist Position Equipment Response            Comment:      Dynamic Activities      Activity Assist Position Equipment Response   Activity 1 Bean bag toos modified independence and supervision Standing Rolling walker and Bean bags good   Comment: This was a co-treat with PT  Recliner to w/c transfer was supervision.  Sit to stand was setup as was dynamic standing balance/reaching.  Standing tolerance was 5 minutes.  UE coordination was I.  Memory and sequencing skills were supervision.  Less confused       Fine Motor Activities      Activity Assist Position Equipment Response           Comment:        Goals     Patient Goals  Patient Goal 1: "To get to stand and walk again."    Short Term Goals    Goal  Goal Status   Will increase activity tolerance to supervision Met   Will increase sit to stand to min Met   Will improve dynamic standing balance/reaching to min Met           Long Term Goals    Goal Goal Status   Will increase standing tolerance to 5 minutes Met   Will improve dynamic standing balance/reaching to supervision Met                     Plan       Patient to be seen: Daily  Duration: Other (Comment) (1 day)  Treatments planned: Cognitive training, Energy conservation training, Safety education  Treatment plan/goals established with Patient/Caregiver: Yes     "

## 2024-04-29 NOTE — PROGRESS NOTES
[facility]  Orthopedics  Progress Note    Patient Name: Safia Muñoz  MRN: [id number]  Admission Date: 4/12/2024  Hospital Length of Stay: 17 days  Attending Provider: Pawan Myers MD  Primary Care Provider: Iglesia Steel MD  Follow-up For: Procedure(s) (LRB):  INSERTION, INTRAMEDULLARY NAIL INTERTROCHENTERIC FRACTURE (Left)    Sx date: 04/09/2024  Subjective:     Principal Problem:Left IT fracture    Principal Orthopedic Problem: * No surgery found *   S/P IMN Left Femur    Interval History: Patient resting comfortably this morning. She is doing well this morning. States she has not been up walking much.Doing well otherwise. Planned for discharge from rehab tomorrow.     Review of patient's allergies indicates:   Allergen Reactions    Adhesive tape-silicones Blisters    Ofloxacin     Penicillins     Sulfamethoxazole-trimethoprim      Other reaction(s): Unknown    Codeine Nausea Only       Current Facility-Administered Medications   Medication Dose Route Frequency Provider Last Rate Last Admin    0.9%  NaCl infusion   Intravenous Continuous Disha Biggs  mL/hr at 04/17/24 1723 New Bag at 04/17/24 1723    acetaminophen tablet 650 mg  650 mg Oral Q6H Augusto Guan FNP   650 mg at 04/29/24 0600    acetaminophen tablet 650 mg  650 mg Oral Q8H PRN Disha Biggs FNP        albuterol-ipratropium 2.5 mg-0.5 mg/3 mL nebulizer solution 3 mL  3 mL Nebulization Q4H PRN Augusto Guan FNP   3 mL at 04/29/24 0447    aspirin EC tablet 81 mg  81 mg Oral Daily Ирина Ness FNP   81 mg at 04/28/24 0800    benzonatate capsule 100 mg  100 mg Oral TID PRN Augusto Guan FNP   100 mg at 04/29/24 0427    bisacodyL suppository 10 mg  10 mg Rectal Daily PRN Augusto Guan FNP   10 mg at 04/22/24 1426    bisacodyL suppository 10 mg  10 mg Rectal Once Augusto Guan FNP        cyanocobalamin tablet 500 mcg  500 mcg Oral Daily Augusto Guan  [place] FNP   500 mcg at 04/28/24 0800    enoxaparin injection 30 mg  30 mg Subcutaneous Q24H (prophylaxis, 1700) Augusto Guan FNP   30 mg at 04/28/24 1730    estradiol 0.05 mg/24 hr td ptsw patch 1 patch  1 patch Transdermal Every Wed Pawan Myers MD   1 patch at 04/24/24 1736    And    estradiol 0.05 mg/24 hr td ptsw patch 1 patch  1 patch Transdermal Every Sun Pawan Myers MD   1 patch at 04/28/24 1730    ferrous sulfate tablet 1 each  1 tablet Oral Daily Augusto Guan, FNP   1 each at 04/28/24 0800    fluticasone propionate 50 mcg/actuation nasal spray 100 mcg  2 spray Each Nostril Daily Augusto Guan FNP   100 mcg at 04/28/24 0759    furosemide tablet 40 mg  40 mg Oral Daily Ирина Ness, FNP   40 mg at 04/28/24 0800    gabapentin capsule 100 mg  100 mg Oral TID Augusto Guan FNP   100 mg at 04/29/24 0600    gabapentin capsule 200 mg  200 mg Oral QHS Augusto Guan, FNP   200 mg at 04/28/24 2027    guaiFENesin 12 hr tablet 600 mg  600 mg Oral BID Augusto Guan, FNP   600 mg at 04/28/24 2027    hydrALAZINE injection 10 mg  10 mg Intravenous Q4H PRN Augusto Guan, FNP        hydrOXYzine pamoate capsule 50 mg  50 mg Oral After dinner Disha Biggs FNP   50 mg at 04/28/24 1730    labetalol 20 mg/4 mL (5 mg/mL) IV syring  10 mg Intravenous Q4H PRN Augusto Guan, FNP        LIDOcaine 5 % patch 1 patch  1 patch Transdermal Q24H Disha Biggs FNP   1 patch at 04/28/24 1730    methocarbamoL tablet 500 mg  500 mg Oral TID PRN Disha Biggs FNP   500 mg at 04/21/24 0212    methocarbamoL tablet 500 mg  500 mg Oral Q6H Disha Biggs FNP   500 mg at 04/29/24 0600    metoprolol injection 10 mg  10 mg Intravenous Q2H PRN Augusto Guan FNP   10 mg at 04/17/24 1207    metoprolol tartrate (LOPRESSOR) tablet 50 mg  50 mg Oral BID Augusto Guan FNP        montelukast chewable tablet 5 mg  5 mg Oral Daily Augusto Guan FNP   5  "mg at 04/28/24 0800    naloxegoL (MOVANTIK) tablet 25 mg  25 mg Oral Daily Freida, Augusto A, FNP   25 mg at 04/28/24 0759    nitroGLYCERIN SL tablet 0.4 mg  0.4 mg Sublingual Q5 Min PRN Freida, Augusto A, FNP        ondansetron disintegrating tablet 4 mg  4 mg Oral Q6H PRN Freida, Augusto A, FNP   4 mg at 04/15/24 0705    oxyCODONE immediate release tablet 10 mg  10 mg Oral Q4H PRN Dian, Disha A, FNP   10 mg at 04/28/24 2028    oxyCODONE immediate release tablet 5 mg  5 mg Oral Q4H PRN Dian, Disha A, FNP   5 mg at 04/25/24 2035    polyethylene glycol packet 17 g  17 g Oral BID PRN Freida, Augusto A, FNP        promethazine tablet 25 mg  25 mg Oral Q6H PRN Freida, Augusto A, FNP   25 mg at 04/15/24 0948    tamsulosin 24 hr capsule 0.4 mg  0.4 mg Oral QHS Ирина Ness, FNP   0.4 mg at 04/28/24 2027     Objective:     Vital Signs (Most Recent):  Temp: 98 °F (36.7 °C) (04/29/24 0514)  Pulse: 74 (04/29/24 0514)  Resp: 20 (04/29/24 0514)  BP: (!) 142/66 (04/29/24 0514)  SpO2: 95 % (04/29/24 0514) Vital Signs (24h Range):  Temp:  [98 °F (36.7 °C)] 98 °F (36.7 °C)  Pulse:  [66-74] 74  Resp:  [18-20] 20  SpO2:  [94 %-95 %] 95 %  BP: (142-143)/(66-72) 142/66     Weight: 76.7 kg (169 lb 1.5 oz)  Height: 5' 2.99" (160 cm)  Body mass index is 29.96 kg/m².      Intake/Output Summary (Last 24 hours) at 4/29/2024 0803  Last data filed at 4/29/2024 0522  Gross per 24 hour   Intake 600 ml   Output 2201 ml   Net -1601 ml       Physical Exam:   General the patient is alert and oriented x3 no acute distress nontoxic-appearing appropriate affect.    Constitutional: Vital signs are examined and stable.  Resp: No signs of labored breathing               LLE: -Skin: incisions open to air; clean and dry. No drainge           -MSK: +EHL/FHL, Gastroc/Tib anterior            -Neuro:  Sensation intact to light touch L3-S1 dermatomes            -CV: Capillary refill is less than 2 seconds. + DP  Compartments soft and " compressible      Diagnostic Findings:   Significant Labs:   Recent Lab Results         04/29/24  0525        Albumin/Globulin Ratio 0.9       Albumin 2.9              ALT 13       AST 19       Baso # 0.04       Basophil % 0.4       BILIRUBIN TOTAL 0.5       BUN 22.1       Calcium 8.5       Chloride 100       CO2 27       Creatinine 0.89       eGFR >60       Eos # 0.41       Eos % 3.8       Globulin, Total 3.1       Glucose 97       Hematocrit 33.0       Hemoglobin 10.5       Immature Grans (Abs) 0.04       Immature Granulocytes 0.4       Lymph # 1.70       LYMPH % 15.6       Magnesium  2.00       MCH 29.8       MCHC 31.8       MCV 93.8       Mono # 1.18       Mono % 10.8       MPV 9.7       Neut # 7.55       Neut % 69.0       nRBC 0.0       Phosphorus Level 3.2       Platelet Count 391       Potassium 3.9       Prealbumin 15.2       PROTEIN TOTAL 6.0       RBC 3.52       RDW 15.6       Sodium 136       WBC 10.92                Significant Imaging: I have reviewed all pertinent imaging results/findings.     Assessment/Plan:     Active Diagnoses:    Diagnosis Date Noted POA    PRINCIPAL PROBLEM:  Closed 2-part intertrochanteric fracture of proximal end of left femur s/p repair [S72.142A] 04/08/2024 Yes    Vomiting [R11.10] 04/15/2024 Unknown    Fall [W19.XXXA] 04/12/2024 Yes      Problems Resolved During this Admission:   89YO F here following a fall S/P IMN left IT fracture  Daily dry dressing changes. May discontinue xeroform to incision sites.  Diet: as tolerated  Pain: multimodal PRN  DVT: continue while in house. Okay for aspirin 81 mg BID at NV.   PT/OT: continue daily mobility, Range of motion if she is not able to mobilize  Activity: WBAT LLE  Repeat x rays today for hardware evaluation  She will need follow up with us in 6 weeks for repeat x ray sand evaluation. Staples removed today without complication.      Carmen Porter PA-C   Orthopedic Trauma Surgery  [facility]

## 2024-04-29 NOTE — PLAN OF CARE
Problem: Rehabilitation ([facility]) Plan of Care  Goal: Plan of Care Review  Outcome: Met  Goal: Patient-Specific Goal (Individualized)  Outcome: Met  Goal: Optimal Comfort and Wellbeing  Outcome: Met  Goal: Readiness for Transition of Care  Outcome: Met

## 2024-04-29 NOTE — PLAN OF CARE
Discharging today.    Alerted [facility] via [facility].  They will provide HH PT/OT/nursing.  Will send AVS once completed.    Confirmed delivery of w/c to room this a.m. with Gosia Trevino.    Family training is complete.    Daughter should arrive 1030/1100 for pickup.

## 2024-04-29 NOTE — PROGRESS NOTES
04/29/24 1015   Rec Therapy Time Calculation   Date of Treatment 04/29/24   Rec Start Time 1015   Rec Stop Time 1030   Rec Total Time (min) 15 min   Time   Treatment time 1 units   Charges   $Therapeutic Activity 1unit   Precautions   General Precautions fall   Orthopedic Precautions  LLE weight bearing as tolerated   Braces N/A   OTHER   Rehab identified problem list/impairments weakness;impaired functional mobility;gait instability;impaired cognition;decreased lower extremity function;decreased safety awareness   Values/Beliefs/Spiritual Care   Spiritual, Cultural Beliefs, Congregation Practices, Values that Affect Care no   Overall Level of Functioning   Activity Tolerance Independent   Dynamic Sitting Balance/Reaching Independent   Dynamic Standing Balance/Reaching Mod Indep   Right UE Coodination/Dexterity Independent   Left UE Coordination/Dexterity Mod Indep   Problem Solving/Sequencing Skills Standby Assist   Memory Recall Standby Assist   R/L Neglect/Inattention Does not occur   Attention Span Mod Indep   Social Interaction Independent   Recreational Therapy Short Term Goals   Short Term Goal 1 Progression Met   Short Term Goal 2 Progression Met   Short Term Goal 3 Progression Met   Recreational Therapy Long Term Goals   Long Term Goal 1 Progression Met   Long Term Goal 2 Progression Met   Plan   Patient to be seen Daily   Planned Duration Other (Comment)  (1 day)   Treatments Planned Cognitive training;Energy conservation training;Safety education   Treatment plan/goals estblished with Patient/Caregiver Yes

## 2024-06-05 ENCOUNTER — DOCUMENT SCAN (OUTPATIENT)
Dept: HOME HEALTH SERVICES | Facility: HOSPITAL | Age: 89
End: 2024-06-05
Payer: MEDICARE

## 2024-06-10 ENCOUNTER — HOSPITAL ENCOUNTER (OUTPATIENT)
Dept: RADIOLOGY | Facility: CLINIC | Age: 89
Discharge: HOME | End: 2024-06-10
Attending: PHYSICIAN ASSISTANT
Payer: MEDICARE

## 2024-06-10 ENCOUNTER — OFFICE VISIT (OUTPATIENT)
Dept: ORTHOPEDICS | Facility: CLINIC | Age: 89
End: 2024-06-10
Payer: MEDICARE

## 2024-06-10 VITALS
WEIGHT: 169.06 LBS | DIASTOLIC BLOOD PRESSURE: 75 MMHG | RESPIRATION RATE: 18 BRPM | HEART RATE: 72 BPM | HEIGHT: 62 IN | SYSTOLIC BLOOD PRESSURE: 150 MMHG | BODY MASS INDEX: 31.11 KG/M2

## 2024-06-10 DIAGNOSIS — S72.92XD CLOSED FRACTURE OF LEFT FEMUR WITH ROUTINE HEALING, UNSPECIFIED FRACTURE MORPHOLOGY, UNSPECIFIED PORTION OF FEMUR, SUBSEQUENT ENCOUNTER: Primary | ICD-10-CM

## 2024-06-10 DIAGNOSIS — S72.92XD CLOSED FRACTURE OF LEFT FEMUR WITH ROUTINE HEALING, UNSPECIFIED FRACTURE MORPHOLOGY, UNSPECIFIED PORTION OF FEMUR, SUBSEQUENT ENCOUNTER: ICD-10-CM

## 2024-06-10 PROCEDURE — 73552 X-RAY EXAM OF FEMUR 2/>: CPT | Mod: LT,,, | Performed by: PHYSICIAN ASSISTANT

## 2024-06-10 PROCEDURE — 99024 POSTOP FOLLOW-UP VISIT: CPT | Mod: POP,,, | Performed by: PHYSICIAN ASSISTANT

## 2024-06-10 RX ORDER — QUETIAPINE FUMARATE 25 MG/1
12.5 TABLET, FILM COATED ORAL NIGHTLY
COMMUNITY
Start: 2024-05-10

## 2024-06-10 RX ORDER — METOPROLOL TARTRATE 25 MG/1
25 TABLET, FILM COATED ORAL 2 TIMES DAILY
Status: ON HOLD | COMMUNITY
Start: 2024-05-24 | End: 2024-06-19 | Stop reason: HOSPADM

## 2024-06-12 NOTE — PROGRESS NOTES
Subjective:       Patient ID: Safia Muñoz is a 89 y.o. female.  Chief Complaint   Patient presents with    Left Hip - Follow-up     9 week f/u IMN LEFT IT FEMUR FX, AMBULATING WITH WALKER, TAKING ALEVE FOR PAIN.  REPORTS LATERAL AND LEFT BUTTOCK PAIN.        HPI    Patient presents for 9 week follow up IMN left IT femur fracture. She is ambulatory with a walker. Moving from rehab to assisted living. Mild pain in the lateral left buttock at area of proximal incision. No groin pain. Doing well overall. Taking aleve and tylenol for pain relief. States the hip itself has not bothered her much. She is eager to return her independence to driving. Discussed likely lifelong use of walker and possibly no driving if she is not mobile independently.     ROS:  Constitutional: Denies fever chills  Eyes: No change in vision  ENT: No ringing or current infections  CV: No chest pain  Resp: No labored breathing  MSK: Pain evident at site of injury located in HPI,   Integ: No signs of abrasions or lacerations  Neuro: No numbness or tingling  Lymphatic: No swelling outside the area of injury     Current Outpatient Medications on File Prior to Visit   Medication Sig Dispense Refill    metoprolol tartrate (LOPRESSOR) 25 MG tablet Take 25 mg by mouth 2 (two) times daily.      QUEtiapine (SEROQUEL) 25 MG Tab Take 12.5 mg by mouth every evening.      aspirin (ECOTRIN) 81 MG EC tablet Take 1 tablet (81 mg total) by mouth 2 (two) times a day. 60 tablet 0    estradiol 0.05 mg/24 hr td ptsw (VIVELLE-DOT) 0.05 mg/24 hr Place 1 patch onto the skin every Wednesday AND 1 patch every Sunday. 8 patch 0    furosemide (LASIX) 40 MG tablet Take 1 tablet (40 mg total) by mouth once daily. for 14 days 14 tablet 0    tamsulosin (FLOMAX) 0.4 mg Cap Take 1 capsule (0.4 mg total) by mouth every evening. for 14 days 14 capsule 0     No current facility-administered medications on file prior to visit.          Objective:      BP (!) 150/75   Pulse 72   " Resp 18   Ht 5' 2" (1.575 m)   Wt 76.7 kg (169 lb 1.5 oz)   BMI 30.93 kg/m²   Physical Exam  General the patient is alert and oriented x3 no acute distress nontoxic-appearing appropriate affect.    Constitutional: Vital signs are examined and stable.  Resp: No signs of labored breathing    LLE: -Skin: Incisions healing well without erythema, drainage, signs of dehiscence, No signs of new abrasions or lacerations, no scars           -MSK: Hip and Knee F/E, EHL/FHL, Gastroc/Tib anterior Strength 5/5           -Neuro:  Sensation intact to light touch L3-S1 dermatomes           -Lymphatic: No signs of lymphadenopathy           -CV: Capillary refill is less than 2 seconds. DP/PT pulses 2/4. Compartments soft and compressible                            Body mass index is 30.93 kg/m².  Ideal body weight: 50.1 kg (110 lb 7.2 oz)  Adjusted ideal body weight: 60.7 kg (133 lb 14.5 oz)  Hemoglobin A1c   Date Value Ref Range Status   04/11/2024 5.9 <=7.0 % Final     Hgb   Date Value Ref Range Status   04/29/2024 10.5 (L) 12.0 - 16.0 g/dL Final   04/22/2024 10.1 (L) 12.0 - 16.0 g/dL Final     Hct   Date Value Ref Range Status   04/29/2024 33.0 (L) 37.0 - 47.0 % Final   04/22/2024 31.0 (L) 37.0 - 47.0 % Final     Iron Level   Date Value Ref Range Status   04/13/2024 36 (L) 50 - 170 ug/dL Final   04/10/2024 26 (L) 50 - 170 ug/dL Final     No components found for: "FROLATE"  Vitamin D   Date Value Ref Range Status   04/10/2024 42.1 30.0 - 80.0 ng/mL Final   07/27/2022 89.2 (H) 30.0 - 80.0 ng/mL Final     WBC   Date Value Ref Range Status   04/29/2024 10.92 4.50 - 11.50 x10(3)/mcL Final   04/22/2024 9.13 4.50 - 11.50 x10(3)/mcL Final       Radiology: 3 view x ray left femur: hardware intact without loosening or failure. Some evidence of controlled collapse of the fracture site. Stable as compared to previous images. Early callus formation present.         Assessment:         1. Closed fracture of left femur with routine healing, " unspecified fracture morphology, unspecified portion of femur, subsequent encounter  X-Ray Femur 2 View Left              Plan:         Follow up in about 2 months (around 8/10/2024), or if symptoms worsen or fail to improve.    Safia was seen today for follow-up.    Diagnoses and all orders for this visit:    Closed fracture of left femur with routine healing, unspecified fracture morphology, unspecified portion of femur, subsequent encounter  -     X-Ray Femur 2 View Left; Future        -WBAT and ROMAT. Recommend therapy 2-3 days weekly for strengthening and mobility.   -Discussed likely lifelong use of walker to prevent further falls due to balance deficits. We do not recommend driving with the need of assistive devices. Luckily for her she is moving to assisted living and will have all of her needs met in the facility   - She is doing well otherwise with minimal pain in the left hip. Follow up 2 months  -ED precautions given    The above findings, diagnostics, and treatment plan were discussed with Dr. Jaquez who is in agreement with the plan of care except as stated in additional documentation.       Carmen Porter PA-C          Future Appointments   Date Time Provider Department Center   8/15/2024 10:45 AM Guy Jaquez DO [facility]

## 2024-06-14 DIAGNOSIS — S72.92XD CLOSED FRACTURE OF LEFT FEMUR WITH ROUTINE HEALING, UNSPECIFIED FRACTURE MORPHOLOGY, UNSPECIFIED PORTION OF FEMUR, SUBSEQUENT ENCOUNTER: Primary | ICD-10-CM

## 2024-06-16 ENCOUNTER — HOSPITAL ENCOUNTER (INPATIENT)
Facility: HOSPITAL | Age: 89
LOS: 3 days | Discharge: HOME HEALTH | DRG: 291 | End: 2024-06-19
Attending: STUDENT IN AN ORGANIZED HEALTH CARE EDUCATION/TRAINING PROGRAM | Admitting: INTERNAL MEDICINE
Payer: MEDICARE

## 2024-06-16 DIAGNOSIS — I48.91 ATRIAL FIBRILLATION: ICD-10-CM

## 2024-06-16 DIAGNOSIS — R06.03 RESPIRATORY DISTRESS: ICD-10-CM

## 2024-06-16 DIAGNOSIS — I48.92 ATRIAL FLUTTER WITH RAPID VENTRICULAR RESPONSE: Primary | ICD-10-CM

## 2024-06-16 DIAGNOSIS — R07.9 CHEST PAIN: ICD-10-CM

## 2024-06-16 DIAGNOSIS — R00.0 TACHYCARDIA: ICD-10-CM

## 2024-06-16 LAB
ALBUMIN SERPL-MCNC: 3.2 G/DL (ref 3.4–4.8)
ALBUMIN/GLOB SERPL: 1 RATIO (ref 1.1–2)
ALP SERPL-CCNC: 153 UNIT/L (ref 40–150)
ALT SERPL-CCNC: 23 UNIT/L (ref 0–55)
ANION GAP SERPL CALC-SCNC: 9 MEQ/L
AST SERPL-CCNC: 27 UNIT/L (ref 5–34)
BACTERIA #/AREA URNS AUTO: ABNORMAL /HPF
BASOPHILS # BLD AUTO: 0.1 X10(3)/MCL
BASOPHILS NFR BLD AUTO: 1.1 %
BILIRUB SERPL-MCNC: 0.4 MG/DL
BILIRUB UR QL STRIP.AUTO: NEGATIVE
BNP BLD-MCNC: 1182.5 PG/ML
BUN SERPL-MCNC: 17.1 MG/DL (ref 9.8–20.1)
CALCIUM SERPL-MCNC: 8.8 MG/DL (ref 8.4–10.2)
CHLORIDE SERPL-SCNC: 103 MMOL/L (ref 98–111)
CLARITY UR: ABNORMAL
CO2 SERPL-SCNC: 25 MMOL/L (ref 23–31)
COLOR UR AUTO: ABNORMAL
CREAT SERPL-MCNC: 0.9 MG/DL (ref 0.55–1.02)
CREAT/UREA NIT SERPL: 19
EOSINOPHIL # BLD AUTO: 0.41 X10(3)/MCL (ref 0–0.9)
EOSINOPHIL NFR BLD AUTO: 4.3 %
ERYTHROCYTE [DISTWIDTH] IN BLOOD BY AUTOMATED COUNT: 14 % (ref 11.5–17)
GFR SERPLBLD CREATININE-BSD FMLA CKD-EPI: 60 ML/MIN/1.73/M2
GLOBULIN SER-MCNC: 3.2 GM/DL (ref 2.4–3.5)
GLUCOSE SERPL-MCNC: 95 MG/DL (ref 75–121)
GLUCOSE UR QL STRIP: NORMAL
HCT VFR BLD AUTO: 39 % (ref 37–47)
HGB BLD-MCNC: 12.2 G/DL (ref 12–16)
HGB UR QL STRIP: NEGATIVE
HYALINE CASTS #/AREA URNS LPF: ABNORMAL /LPF
IMM GRANULOCYTES # BLD AUTO: 0.04 X10(3)/MCL (ref 0–0.04)
IMM GRANULOCYTES NFR BLD AUTO: 0.4 %
KETONES UR QL STRIP: NEGATIVE
LACTATE SERPL-SCNC: 1.3 MMOL/L (ref 0.5–2.2)
LEUKOCYTE ESTERASE UR QL STRIP: 500
LYMPHOCYTES # BLD AUTO: 2.4 X10(3)/MCL (ref 0.6–4.6)
LYMPHOCYTES NFR BLD AUTO: 25.2 %
MCH RBC QN AUTO: 28.7 PG (ref 27–31)
MCHC RBC AUTO-ENTMCNC: 31.3 G/DL (ref 33–36)
MCV RBC AUTO: 91.8 FL (ref 80–94)
MONOCYTES # BLD AUTO: 0.93 X10(3)/MCL (ref 0.1–1.3)
MONOCYTES NFR BLD AUTO: 9.8 %
MUCOUS THREADS URNS QL MICRO: ABNORMAL /LPF
NEUTROPHILS # BLD AUTO: 5.64 X10(3)/MCL (ref 2.1–9.2)
NEUTROPHILS NFR BLD AUTO: 59.2 %
NITRITE UR QL STRIP: ABNORMAL
NRBC BLD AUTO-RTO: 0 %
PH UR STRIP: 6 [PH]
PLATELET # BLD AUTO: 370 X10(3)/MCL (ref 130–400)
PMV BLD AUTO: 10.8 FL (ref 7.4–10.4)
POTASSIUM SERPL-SCNC: 4.4 MMOL/L (ref 3.5–5.1)
PROT SERPL-MCNC: 6.4 GM/DL (ref 5.8–7.6)
PROT UR QL STRIP: NEGATIVE
RBC # BLD AUTO: 4.25 X10(6)/MCL (ref 4.2–5.4)
RBC #/AREA URNS AUTO: ABNORMAL /HPF
SODIUM SERPL-SCNC: 137 MMOL/L (ref 136–145)
SP GR UR STRIP.AUTO: 1.01 (ref 1–1.03)
SQUAMOUS #/AREA URNS LPF: ABNORMAL /HPF
TROPONIN I SERPL-MCNC: 0.02 NG/ML (ref 0–0.04)
TROPONIN I SERPL-MCNC: 0.03 NG/ML (ref 0–0.04)
UROBILINOGEN UR STRIP-ACNC: NORMAL
WBC # BLD AUTO: 9.52 X10(3)/MCL (ref 4.5–11.5)
WBC #/AREA URNS AUTO: ABNORMAL /HPF

## 2024-06-16 PROCEDURE — 25000242 PHARM REV CODE 250 ALT 637 W/ HCPCS: Performed by: STUDENT IN AN ORGANIZED HEALTH CARE EDUCATION/TRAINING PROGRAM

## 2024-06-16 PROCEDURE — 80053 COMPREHEN METABOLIC PANEL: CPT | Performed by: STUDENT IN AN ORGANIZED HEALTH CARE EDUCATION/TRAINING PROGRAM

## 2024-06-16 PROCEDURE — 81001 URINALYSIS AUTO W/SCOPE: CPT | Performed by: STUDENT IN AN ORGANIZED HEALTH CARE EDUCATION/TRAINING PROGRAM

## 2024-06-16 PROCEDURE — 94760 N-INVAS EAR/PLS OXIMETRY 1: CPT

## 2024-06-16 PROCEDURE — 63600175 PHARM REV CODE 636 W HCPCS: Performed by: NURSE PRACTITIONER

## 2024-06-16 PROCEDURE — 96374 THER/PROPH/DIAG INJ IV PUSH: CPT

## 2024-06-16 PROCEDURE — 99900035 HC TECH TIME PER 15 MIN (STAT)

## 2024-06-16 PROCEDURE — 63600175 PHARM REV CODE 636 W HCPCS: Performed by: STUDENT IN AN ORGANIZED HEALTH CARE EDUCATION/TRAINING PROGRAM

## 2024-06-16 PROCEDURE — 27000221 HC OXYGEN, UP TO 24 HOURS

## 2024-06-16 PROCEDURE — 25500020 PHARM REV CODE 255: Performed by: STUDENT IN AN ORGANIZED HEALTH CARE EDUCATION/TRAINING PROGRAM

## 2024-06-16 PROCEDURE — 96375 TX/PRO/DX INJ NEW DRUG ADDON: CPT

## 2024-06-16 PROCEDURE — 11000001 HC ACUTE MED/SURG PRIVATE ROOM

## 2024-06-16 PROCEDURE — 83880 ASSAY OF NATRIURETIC PEPTIDE: CPT | Performed by: STUDENT IN AN ORGANIZED HEALTH CARE EDUCATION/TRAINING PROGRAM

## 2024-06-16 PROCEDURE — 93005 ELECTROCARDIOGRAM TRACING: CPT

## 2024-06-16 PROCEDURE — 96376 TX/PRO/DX INJ SAME DRUG ADON: CPT

## 2024-06-16 PROCEDURE — 99291 CRITICAL CARE FIRST HOUR: CPT

## 2024-06-16 PROCEDURE — 87086 URINE CULTURE/COLONY COUNT: CPT | Performed by: STUDENT IN AN ORGANIZED HEALTH CARE EDUCATION/TRAINING PROGRAM

## 2024-06-16 PROCEDURE — 25000003 PHARM REV CODE 250: Performed by: STUDENT IN AN ORGANIZED HEALTH CARE EDUCATION/TRAINING PROGRAM

## 2024-06-16 PROCEDURE — 84484 ASSAY OF TROPONIN QUANT: CPT | Performed by: STUDENT IN AN ORGANIZED HEALTH CARE EDUCATION/TRAINING PROGRAM

## 2024-06-16 PROCEDURE — 85025 COMPLETE CBC W/AUTO DIFF WBC: CPT | Performed by: STUDENT IN AN ORGANIZED HEALTH CARE EDUCATION/TRAINING PROGRAM

## 2024-06-16 PROCEDURE — 94640 AIRWAY INHALATION TREATMENT: CPT

## 2024-06-16 PROCEDURE — 83605 ASSAY OF LACTIC ACID: CPT | Performed by: STUDENT IN AN ORGANIZED HEALTH CARE EDUCATION/TRAINING PROGRAM

## 2024-06-16 PROCEDURE — 99900031 HC PATIENT EDUCATION (STAT)

## 2024-06-16 PROCEDURE — 93010 ELECTROCARDIOGRAM REPORT: CPT | Mod: ,,, | Performed by: INTERNAL MEDICINE

## 2024-06-16 RX ORDER — ALPRAZOLAM 0.25 MG/1
0.25 TABLET ORAL 2 TIMES DAILY
COMMUNITY

## 2024-06-16 RX ORDER — ENOXAPARIN SODIUM 100 MG/ML
1 INJECTION SUBCUTANEOUS EVERY 12 HOURS
Status: DISCONTINUED | OUTPATIENT
Start: 2024-06-16 | End: 2024-06-17

## 2024-06-16 RX ORDER — DILTIAZEM HYDROCHLORIDE 5 MG/ML
10 INJECTION INTRAVENOUS
Status: COMPLETED | OUTPATIENT
Start: 2024-06-16 | End: 2024-06-16

## 2024-06-16 RX ORDER — DIGOXIN 0.25 MG/ML
250 INJECTION INTRAMUSCULAR; INTRAVENOUS EVERY 6 HOURS PRN
Status: DISCONTINUED | OUTPATIENT
Start: 2024-06-16 | End: 2024-06-19 | Stop reason: HOSPADM

## 2024-06-16 RX ORDER — LEVALBUTEROL INHALATION SOLUTION 1.25 MG/3ML
1.25 SOLUTION RESPIRATORY (INHALATION)
Status: COMPLETED | OUTPATIENT
Start: 2024-06-16 | End: 2024-06-16

## 2024-06-16 RX ORDER — METOPROLOL TARTRATE 1 MG/ML
5 INJECTION, SOLUTION INTRAVENOUS
Status: COMPLETED | OUTPATIENT
Start: 2024-06-16 | End: 2024-06-16

## 2024-06-16 RX ORDER — SODIUM CHLORIDE 9 MG/ML
500 INJECTION, SOLUTION INTRAVENOUS
Status: DISCONTINUED | OUTPATIENT
Start: 2024-06-16 | End: 2024-06-16

## 2024-06-16 RX ORDER — FUROSEMIDE 10 MG/ML
40 INJECTION INTRAMUSCULAR; INTRAVENOUS
Status: COMPLETED | OUTPATIENT
Start: 2024-06-16 | End: 2024-06-16

## 2024-06-16 RX ADMIN — AMIODARONE HYDROCHLORIDE 150 MG: 1.5 INJECTION, SOLUTION INTRAVENOUS at 09:06

## 2024-06-16 RX ADMIN — DIGOXIN 250 MCG: 0.25 INJECTION INTRAMUSCULAR; INTRAVENOUS at 08:06

## 2024-06-16 RX ADMIN — METOPROLOL TARTRATE 5 MG: 1 INJECTION, SOLUTION INTRAVENOUS at 06:06

## 2024-06-16 RX ADMIN — DILTIAZEM HYDROCHLORIDE 10 MG: 5 INJECTION, SOLUTION INTRAVENOUS at 07:06

## 2024-06-16 RX ADMIN — LEVALBUTEROL HYDROCHLORIDE 1.25 MG: 1.25 SOLUTION RESPIRATORY (INHALATION) at 07:06

## 2024-06-16 RX ADMIN — FUROSEMIDE 40 MG: 10 INJECTION, SOLUTION INTRAMUSCULAR; INTRAVENOUS at 06:06

## 2024-06-16 RX ADMIN — IOHEXOL 75 ML: 350 INJECTION, SOLUTION INTRAVENOUS at 09:06

## 2024-06-16 RX ADMIN — AMIODARONE HYDROCHLORIDE 1 MG/MIN: 1.8 INJECTION, SOLUTION INTRAVENOUS at 09:06

## 2024-06-16 RX ADMIN — DILTIAZEM HYDROCHLORIDE 10 MG: 5 INJECTION, SOLUTION INTRAVENOUS at 06:06

## 2024-06-16 NOTE — Clinical Note
Diagnosis: Chest pain [194723]   Future Attending Provider: MOISÉS MONZON [[id number]]   Admit to which facility:: [facility] [[id number]]   Reason for IP Medical Treatment  (Clinical interventions that can only be accomplished in the IP setting? ) :: Telemetry, monitoring, O2   I certify that Inpatient services for greater than or equal to 2 midnights are medically necessary:: Yes   Plans for Post-Acute care--if anticipated (pick the single best option):: A. No post acute care anticipated at this time

## 2024-06-16 NOTE — ED PROVIDER NOTES
Encounter Date: 6/16/2024    SCRIBE #1 NOTE: I, Lilli Douglass, am scribing for, and in the presence of,  Ephraim Menjivar MD. I have scribed the following portions of the note - Other sections scribed: HPI, ROS, PE.       History     Chief Complaint   Patient presents with    Respiratory Distress     Patient presents from assisted living via ems with resp distress. Patient having audible wheeze on exam. On 2 liters oxygen not on any chronic O2. Hx of CHF on lasix. GCS 14 baseline     Patient is a 89 year old female with a history of CHF, COPD, and HTN that presents to the ED via EMS from assisted living for respiratory distress today. EMS reports 98% on 2L en route; notes patient typically does not require supplemental oxygen. GCS 14 baseline. At this time, the patient denies shortness of breath, sore throat, chest pain, and palpitations.     The history is provided by the patient, medical records and the EMS personnel. No  was used.     Review of patient's allergies indicates:   Allergen Reactions    Adhesive tape-silicones Blisters    Ofloxacin     Penicillins     Sulfamethoxazole-trimethoprim      Other reaction(s): Unknown    Codeine Nausea Only     Past Medical History:   Diagnosis Date    COPD (chronic obstructive pulmonary disease)     Hypertension      Past Surgical History:   Procedure Laterality Date    FRACTURE SURGERY  4/8/24    HYSTERECTOMY  1964    RETROGRADE INTRAMEDULLARY RODDING OF DISTAL FEMUR Left 04/09/2024    Procedure: INSERTION, INTRAMEDULLARY NAIL INTERTROCHENTERIC FRACTURE;  Surgeon: Guy Jaquez DO;  Location: [facility];  Service: Orthopedics;  Laterality: Left;  HANA TABLE, SYNTHES TFNA     Family History   Problem Relation Name Age of Onset    Cancer Mother Mother     Heart disease Father Father     Stroke Father Father      Social History     Tobacco Use    Smoking status: Former     Current packs/day: 0.00     Average packs/day: 1 pack/day for 35.0 years (35.0 ttl  pk-yrs)     Types: Cigarettes     Start date: 1950     Quit date: 1985     Years since quittin.4    Smokeless tobacco: Never   Substance Use Topics    Alcohol use: Not Currently    Drug use: Never     Review of Systems   HENT:  Negative for sore throat.    Respiratory:  Negative for shortness of breath.         Sent for respiratory distress   Cardiovascular:  Negative for chest pain and palpitations.       Physical Exam     Initial Vitals [24 1804]   BP Pulse Resp Temp SpO2   (!) 141/114 (!) 155 (!) 29 97.8 °F (36.6 °C) 98 %      MAP       --         Physical Exam    Nursing note and vitals reviewed.  Constitutional: She appears well-developed and well-nourished. She is not diaphoretic. No distress.   HENT:   Head: Normocephalic and atraumatic.   Right Ear: External ear normal.   Left Ear: External ear normal.   Nose: Nose normal.   Eyes: Conjunctivae and EOM are normal. Pupils are equal, round, and reactive to light. Right eye exhibits no discharge. Left eye exhibits no discharge.   Cardiovascular:  Normal rate, regular rhythm, normal heart sounds and intact distal pulses.     Exam reveals no gallop and no friction rub.       No murmur heard.  Pulses:       Radial pulses are 2+ on the right side and 2+ on the left side.   Pulmonary/Chest: No respiratory distress. She has no rhonchi. She has no rales. She exhibits no tenderness.   Audible wheezing. Wheezing bilaterally.    Abdominal: Abdomen is soft. Bowel sounds are normal. She exhibits no distension and no mass. There is no abdominal tenderness. There is no rebound and no guarding.   Musculoskeletal:         General: Normal range of motion.      Comments: 3+ pitting edema to bilateral lower extremities.      Neurological: She is alert. No cranial nerve deficit or sensory deficit. GCS score is 15. GCS eye subscore is 4. GCS verbal subscore is 5. GCS motor subscore is 6.   Oriented to person, place, and time. Speaking truncated sentences   Skin:  Skin is warm and dry. Capillary refill takes less than 2 seconds. No erythema. No pallor.         ED Course   Critical Care    Date/Time: 6/16/2024 11:22 PM    Performed by: Ephraim Menjivar MD  Authorized by: Ephraim Menjivar MD  Direct patient critical care time: 20 minutes  Additional history critical care time: 13 minutes  Ordering / reviewing critical care time: 10 minutes  Documentation critical care time: 10 minutes  Consulting other physicians critical care time: 7 minutes  Consult with family critical care time: 5 minutes  Total critical care time (exclusive of procedural time) : 65 minutes  Critical care time was exclusive of separately billable procedures and treating other patients.  Critical care was necessary to treat or prevent imminent or life-threatening deterioration of the following conditions: cardiac failure.  Critical care was time spent personally by me on the following activities: development of treatment plan with patient or surrogate, discussions with consultants, discussions with primary provider, interpretation of cardiac output measurements, evaluation of patient's response to treatment, examination of patient, obtaining history from patient or surrogate, ordering and performing treatments and interventions, ordering and review of laboratory studies, ordering and review of radiographic studies, pulse oximetry, re-evaluation of patient's condition and review of old charts.  Comments: A flutter 2-1 rate in the 150s.  Requiring multiple rounds of IV medication ultimately amiodarone drip, admission.        Labs Reviewed   COMPREHENSIVE METABOLIC PANEL - Abnormal; Notable for the following components:       Result Value    Albumin 3.2 (*)     Albumin/Globulin Ratio 1.0 (*)      (*)     All other components within normal limits   B-TYPE NATRIURETIC PEPTIDE - Abnormal; Notable for the following components:    Natriuretic Peptide 1,182.5 (*)     All other components within normal  limits   CBC WITH DIFFERENTIAL - Abnormal; Notable for the following components:    MCHC 31.3 (*)     MPV 10.8 (*)     All other components within normal limits   TROPONIN I - Normal   TROPONIN I - Normal   LACTIC ACID, PLASMA - Normal   CBC W/ AUTO DIFFERENTIAL    Narrative:     The following orders were created for panel order CBC auto differential.  Procedure                               Abnormality         Status                     ---------                               -----------         ------                     CBC with Differential[[id number]]       Abnormal            Final result                 Please view results for these tests on the individual orders.   URINALYSIS, REFLEX TO URINE CULTURE          Imaging Results              CTA Chest Non-Coronary (PE Studies) (Final result)  Result time 06/16/24 21:35:23      Final result by Dhiraj Beaver MD (06/16/24 21:35:23)                   Impression:      No pulmonary embolus is demonstrated.    Bilateral pleural effusions.      Electronically signed by: Dhiraj Beaver MD  Date:    06/16/2024  Time:    21:35               Narrative:    EXAMINATION:  CTA CHEST NON CORONARY (PE STUDIES)    CLINICAL HISTORY:  Pulmonary embolism (PE) suspected, high prob;    TECHNIQUE:  Low dose axial images, sagittal and coronal reformations were obtained from the thoracic inlet to the lung bases following the IV administration of 75 mL of Omnipaque 350.  Contrast timing was optimized to evaluate the pulmonary arteries.  MIP images were performed.    Automatic exposure control (AEC) was utilized for dose reduction.    Dose: 456 mGycm    COMPARISON:  None    FINDINGS:  Mediastinum reveals no significant adenopathy.  The thoracic aorta appears intact.  There are small bilateral pleural effusions with associated atelectasis and/or infiltrate.    There is centrilobular emphysema.  There are no filling defects seen within the pulmonary arteries to indicate embolus.                                        X-Ray Chest 1 View (Final result)  Result time 06/16/24 18:21:25      Final result by Dhiraj Beaver MD (06/16/24 18:21:25)                   Impression:      No acute disease is demonstrated.      Electronically signed by: Dhiraj Beaver MD  Date:    06/16/2024  Time:    18:21               Narrative:    EXAMINATION:  XR CHEST 1 VIEW    CLINICAL HISTORY:  short;    TECHNIQUE:  Single frontal view of the chest was performed.    COMPARISON:  04/28/2024    FINDINGS:  There is elevation the right hemidiaphragm.  No infiltrates are seen.  Heart size is within normal limits.  There is vascular calcification noted.                                       Medications   digoxin injection 250 mcg (250 mcg Intravenous Given 6/16/24 2041)   amiodarone 360 mg/200 mL (1.8 mg/mL) infusion (1 mg/min Intravenous New Bag 6/16/24 2133)   amiodarone 360 mg/200 mL (1.8 mg/mL) infusion (has no administration in time range)   enoxaparin injection 70 mg (has no administration in time range)   metoprolol injection 5 mg (5 mg Intravenous Given 6/16/24 1820)   metoprolol injection 5 mg (5 mg Intravenous Given 6/16/24 1832)   diltiaZEM injection 10 mg (10 mg Intravenous Given 6/16/24 1847)   furosemide injection 40 mg (40 mg Intravenous Given 6/16/24 1859)   levalbuterol nebulizer solution 1.25 mg (1.25 mg Nebulization Given 6/16/24 1911)   diltiaZEM injection 10 mg (10 mg Intravenous Given 6/16/24 1921)   iohexoL (OMNIPAQUE 350) injection 75 mL (75 mLs Intravenous Given 6/16/24 2128)   amiodarone in dextrose 150 mg/100 mL (1.5 mg/mL) loading dose 150 mg (0 mg Intravenous Stopped 6/16/24 2144)     Medical Decision Making  Differential diagnosis includes, but is not limited to, COPD, asthma, pneumonia, viral syndrome, PE, pneumothorax, congestive heart failure, CAD, MI, pericarditis, anemia, electrolyte abnormality, hypotension, pleural effusion, arrhythmia.      Patient was awake and alert.  She remains  well-appearing while here in the emergency department.  Does have some wheezing he was in good spirits.  He was requiring some supplemental oxygen.  She was found to be tachycardic in the 150s with very little to no beat to beat variability, I would believe this EKG represented atrial flutter with a 2-1 conduction.  Her blood pressures remained stable.  She does have a history of AFib 5 metoprolol.  She was given 2 rounds of IV metoprolol with little to no effect.  Given 2 rounds of IV diltiazem with little known affect.  After this we consulted Michael ELLINGTON due to patient's blood pressure being somewhat soft and that she was received some any antihypertensive medications.  We decided to try to digoxin which once again had a little to no effect.  Ultimately we decided to do amiodarone which had a tremendous affect decreased since patient was heart rate into the 70s.  She remained asymptomatic.  Reports she reports feeling better in fact.  Her wheezing improved after Xopenex here in the emergency department.  She was diuresed proximally 1 L of cloudy urine.  We will send for UA.  Daughter in room reports patient does have history of dementia.  However that she was acting at baseline.  We will admit patient was for further evaluation management on amiodarone drip.  Cardiology has been consulted.  Will admit to hospitalist.  Discussed with Charley.  Happy to admit.    Amount and/or Complexity of Data Reviewed  Independent Historian: EMS     Details: Patient via EMS from assisted living for respiratory distress today. EMS reports 98% on 2L en route; notes patient typically does not require supplemental oxygen. GCS 14 baseline.  External Data Reviewed: notes.     Details: Refer to ED course  Labs: ordered. Decision-making details documented in ED Course.  Radiology: ordered and independent interpretation performed. Decision-making details documented in ED Course.  ECG/medicine tests: ordered and independent  interpretation performed. Decision-making details documented in ED Course.    Risk  OTC drugs.  Prescription drug management.  Decision regarding hospitalization.            Scribe Attestation:   Scribe #1: I performed the above scribed service and the documentation accurately describes the services I performed. I attest to the accuracy of the note.    Attending Attestation:           Physician Attestation for Scribe:  Physician Attestation Statement for Scribe #1: I, Ephraim Menjivar MD, reviewed documentation, as scribed by Lilli Douglass in my presence, and it is both accurate and complete.             ED Course as of 06/16/24 2329   Sun Jun 16, 2024   1814 EKG done at 6:04 p.m. shows tachycardia rate of 152.  .  Normal axis.  No obvious ST elevation or depression.  Wavering baseline especially in V3.  Either supraventricular tachycardia or more likely a flutter with a 2-1 conduction   [MM]   1815 Paperwork from nursing home does not have list of diagnoses but does have patient was medication including aspirin, vitamin B12, Lasix, Lopressor, Flomax, montelukast, Xanax.    Allergies including codeine, Bactrim, and penicillin [MM]   1818 Son who is in room now reports history of atrial fibrillation [MM]   1837 X-Ray Chest 1 View  Some congestive changes bilaterally.  No obvious infiltrate, pneumothorax. [MM]   1837 He was hemodynamically stable.  So far she has been unresponsive to metoprolol IV push 5 mg x 2.  Does take metoprolol at home.  May try another dose of not will try diltiazem. [MM]   1919 BNP(!): 1,182.5 [MM]   1919 Troponin I: 0.020 [MM]   1919 CBC auto differential(!)  No significant leukocytosis or anemia. [MM]   1919 Comprehensive metabolic panel(!)  No significant electrolyte abnormality or renal dysfunction [MM]   1930 Paged [facility] [ED]   1937 Discussed with [facility] [ED]   1939 Spoke with Michael, on-call for [facility].  He was ordered digoxin.  Will follow in consultation.  Agrees with management thus  far.  Agreed with Lasix [MM]   3149 [facility] medicine  [ED]      ED Course User Index  [ED] Lilli Douglass  [MM] Ephraim Menjivar MD                             Clinical Impression:  Final diagnoses:  [R07.9] Chest pain  [I48.92] Atrial flutter with rapid ventricular response (Primary)  [R06.03] Respiratory distress          ED Disposition Condition    Admit Stable                Ephraim Menjivar MD  06/16/24 8524

## 2024-06-17 LAB
AV INDEX (PROSTH): 0.97
AV MEAN GRADIENT: 4 MMHG
AV PEAK GRADIENT: 7 MMHG
AV REGURGITATION PRESSURE HALF TIME: 388 MS
AV VALVE AREA BY VELOCITY RATIO: 3.3 CM²
AV VALVE AREA: 3.35 CM²
AV VELOCITY RATIO: 0.95
CV ECHO LV RWT: 0.5 CM
DOP CALC AO PEAK VEL: 1.29 M/S
DOP CALC AO VTI: 21.5 CM
DOP CALC LVOT AREA: 3.5 CM2
DOP CALC LVOT DIAMETER: 2.1 CM
DOP CALC LVOT PEAK VEL: 1.23 M/S
DOP CALC LVOT STROKE VOLUME: 72.01 CM3
DOP CALC MV VTI: 24.6 CM
DOP CALCLVOT PEAK VEL VTI: 20.8 CM
E WAVE DECELERATION TIME: 158 MSEC
E/A RATIO: 1.54
E/E' RATIO: 16.71 M/S
ECHO LV POSTERIOR WALL: 1.02 CM (ref 0.6–1.1)
FRACTIONAL SHORTENING: 42 % (ref 28–44)
INTERVENTRICULAR SEPTUM: 0.88 CM (ref 0.6–1.1)
LEFT ATRIUM SIZE: 3.6 CM
LEFT ATRIUM VOLUME MOD: 60 CM3
LEFT INTERNAL DIMENSION IN SYSTOLE: 2.38 CM (ref 2.1–4)
LEFT VENTRICLE DIASTOLIC VOLUME: 75.1 ML
LEFT VENTRICLE SYSTOLIC VOLUME: 19.7 ML
LEFT VENTRICULAR INTERNAL DIMENSION IN DIASTOLE: 4.12 CM (ref 3.5–6)
LEFT VENTRICULAR MASS: 123.93 G
LV LATERAL E/E' RATIO: 14.63 M/S
LV SEPTAL E/E' RATIO: 19.5 M/S
LVOT MG: 3 MMHG
LVOT MV: 0.85 CM/S
MR PISA EROA: 0.18 CM2
MV MEAN GRADIENT: 2 MMHG
MV PEAK A VEL: 0.76 M/S
MV PEAK E VEL: 1.17 M/S
MV PEAK GRADIENT: 4 MMHG
MV VALVE AREA BY CONTINUITY EQUATION: 2.93 CM2
OHS CV RV/LV RATIO: 0.81 CM
OHS LV EJECTION FRACTION SIMPSONS BIPLANE MOD: 55 %
PISA AR MAX VEL: 4.33 M/S
PISA MRMAX VEL: 5.37 M/S
PISA RADIUS: 0.7 CM
PISA TR MAX VEL: 3.35 M/S
PISA VN NYQUIST MS: 0.31 M/S
PISA VN NYQUIST: 0.31 M/S
RIGHT VENTRICULAR END-DIASTOLIC DIMENSION: 3.35 CM
TDI LATERAL: 0.08 M/S
TDI SEPTAL: 0.06 M/S
TDI: 0.07 M/S
TR MAX PG: 45 MMHG
TRICUSPID ANNULAR PLANE SYSTOLIC EXCURSION: 1.83 CM

## 2024-06-17 PROCEDURE — 99900035 HC TECH TIME PER 15 MIN (STAT)

## 2024-06-17 PROCEDURE — 27000221 HC OXYGEN, UP TO 24 HOURS

## 2024-06-17 PROCEDURE — 63600175 PHARM REV CODE 636 W HCPCS: Performed by: NURSE PRACTITIONER

## 2024-06-17 PROCEDURE — 93010 ELECTROCARDIOGRAM REPORT: CPT | Mod: ,,, | Performed by: INTERNAL MEDICINE

## 2024-06-17 PROCEDURE — 25000003 PHARM REV CODE 250: Performed by: INTERNAL MEDICINE

## 2024-06-17 PROCEDURE — 25000003 PHARM REV CODE 250

## 2024-06-17 PROCEDURE — 94640 AIRWAY INHALATION TREATMENT: CPT

## 2024-06-17 PROCEDURE — 25000003 PHARM REV CODE 250: Performed by: NURSE PRACTITIONER

## 2024-06-17 PROCEDURE — 93005 ELECTROCARDIOGRAM TRACING: CPT

## 2024-06-17 PROCEDURE — 11000001 HC ACUTE MED/SURG PRIVATE ROOM

## 2024-06-17 PROCEDURE — 25000242 PHARM REV CODE 250 ALT 637 W/ HCPCS: Performed by: NURSE PRACTITIONER

## 2024-06-17 PROCEDURE — 63600175 PHARM REV CODE 636 W HCPCS: Performed by: STUDENT IN AN ORGANIZED HEALTH CARE EDUCATION/TRAINING PROGRAM

## 2024-06-17 PROCEDURE — 94760 N-INVAS EAR/PLS OXIMETRY 1: CPT

## 2024-06-17 PROCEDURE — 99900031 HC PATIENT EDUCATION (STAT)

## 2024-06-17 RX ORDER — FUROSEMIDE 10 MG/ML
40 INJECTION INTRAMUSCULAR; INTRAVENOUS EVERY 12 HOURS
Status: DISCONTINUED | OUTPATIENT
Start: 2024-06-17 | End: 2024-06-17

## 2024-06-17 RX ORDER — SODIUM CHLORIDE 0.9 % (FLUSH) 0.9 %
10 SYRINGE (ML) INJECTION EVERY 12 HOURS PRN
Status: DISCONTINUED | OUTPATIENT
Start: 2024-06-17 | End: 2024-06-19 | Stop reason: HOSPADM

## 2024-06-17 RX ORDER — ACETAMINOPHEN 325 MG/1
650 TABLET ORAL EVERY 8 HOURS PRN
Status: DISCONTINUED | OUTPATIENT
Start: 2024-06-17 | End: 2024-06-19 | Stop reason: HOSPADM

## 2024-06-17 RX ORDER — TAMSULOSIN HYDROCHLORIDE 0.4 MG/1
0.4 CAPSULE ORAL NIGHTLY
Status: DISCONTINUED | OUTPATIENT
Start: 2024-06-17 | End: 2024-06-19 | Stop reason: HOSPADM

## 2024-06-17 RX ORDER — DOXYCYCLINE HYCLATE 100 MG
100 TABLET ORAL EVERY 12 HOURS
Status: DISCONTINUED | OUTPATIENT
Start: 2024-06-17 | End: 2024-06-19 | Stop reason: HOSPADM

## 2024-06-17 RX ORDER — IBUPROFEN 200 MG
24 TABLET ORAL
Status: DISCONTINUED | OUTPATIENT
Start: 2024-06-17 | End: 2024-06-19 | Stop reason: HOSPADM

## 2024-06-17 RX ORDER — HALOPERIDOL 5 MG/ML
2 INJECTION INTRAMUSCULAR EVERY 6 HOURS PRN
Status: DISCONTINUED | OUTPATIENT
Start: 2024-06-17 | End: 2024-06-19 | Stop reason: HOSPADM

## 2024-06-17 RX ORDER — FUROSEMIDE 10 MG/ML
40 INJECTION INTRAMUSCULAR; INTRAVENOUS EVERY 12 HOURS
Status: DISCONTINUED | OUTPATIENT
Start: 2024-06-17 | End: 2024-06-18

## 2024-06-17 RX ORDER — GLUCAGON 1 MG
1 KIT INJECTION
Status: DISCONTINUED | OUTPATIENT
Start: 2024-06-17 | End: 2024-06-19 | Stop reason: HOSPADM

## 2024-06-17 RX ORDER — IBUPROFEN 200 MG
16 TABLET ORAL
Status: DISCONTINUED | OUTPATIENT
Start: 2024-06-17 | End: 2024-06-19 | Stop reason: HOSPADM

## 2024-06-17 RX ORDER — LEVALBUTEROL INHALATION SOLUTION 0.63 MG/3ML
0.63 SOLUTION RESPIRATORY (INHALATION) EVERY 4 HOURS PRN
Status: DISCONTINUED | OUTPATIENT
Start: 2024-06-17 | End: 2024-06-19 | Stop reason: HOSPADM

## 2024-06-17 RX ORDER — ASPIRIN 81 MG/1
81 TABLET ORAL 2 TIMES DAILY
Status: DISCONTINUED | OUTPATIENT
Start: 2024-06-17 | End: 2024-06-19 | Stop reason: HOSPADM

## 2024-06-17 RX ORDER — ONDANSETRON HYDROCHLORIDE 2 MG/ML
4 INJECTION, SOLUTION INTRAVENOUS EVERY 4 HOURS PRN
Status: DISCONTINUED | OUTPATIENT
Start: 2024-06-17 | End: 2024-06-19 | Stop reason: HOSPADM

## 2024-06-17 RX ORDER — ACETAMINOPHEN 325 MG/1
650 TABLET ORAL EVERY 4 HOURS PRN
Status: DISCONTINUED | OUTPATIENT
Start: 2024-06-17 | End: 2024-06-19 | Stop reason: HOSPADM

## 2024-06-17 RX ORDER — METOPROLOL TARTRATE 50 MG/1
50 TABLET ORAL 2 TIMES DAILY
Status: DISCONTINUED | OUTPATIENT
Start: 2024-06-17 | End: 2024-06-19 | Stop reason: HOSPADM

## 2024-06-17 RX ORDER — ENOXAPARIN SODIUM 100 MG/ML
40 INJECTION SUBCUTANEOUS EVERY 24 HOURS
Status: DISCONTINUED | OUTPATIENT
Start: 2024-06-18 | End: 2024-06-19 | Stop reason: HOSPADM

## 2024-06-17 RX ADMIN — ENOXAPARIN SODIUM 70 MG: 80 INJECTION SUBCUTANEOUS at 08:06

## 2024-06-17 RX ADMIN — DOXYCYCLINE HYCLATE 100 MG: 100 TABLET, COATED ORAL at 08:06

## 2024-06-17 RX ADMIN — ASPIRIN 81 MG: 81 TABLET, COATED ORAL at 08:06

## 2024-06-17 RX ADMIN — METOPROLOL TARTRATE 50 MG: 50 TABLET, FILM COATED ORAL at 08:06

## 2024-06-17 RX ADMIN — TAMSULOSIN HYDROCHLORIDE 0.4 MG: 0.4 CAPSULE ORAL at 08:06

## 2024-06-17 RX ADMIN — CEFTRIAXONE SODIUM 1 G: 1 INJECTION, POWDER, FOR SOLUTION INTRAMUSCULAR; INTRAVENOUS at 03:06

## 2024-06-17 RX ADMIN — LEVALBUTEROL HYDROCHLORIDE 0.63 MG: 0.63 SOLUTION RESPIRATORY (INHALATION) at 08:06

## 2024-06-17 RX ADMIN — FUROSEMIDE 40 MG: 10 INJECTION, SOLUTION INTRAMUSCULAR; INTRAVENOUS at 08:06

## 2024-06-17 RX ADMIN — FUROSEMIDE 40 MG: 10 INJECTION, SOLUTION INTRAMUSCULAR; INTRAVENOUS at 03:06

## 2024-06-17 RX ADMIN — LEVALBUTEROL HYDROCHLORIDE 0.63 MG: 0.63 SOLUTION RESPIRATORY (INHALATION) at 02:06

## 2024-06-17 RX ADMIN — DOXYCYCLINE HYCLATE 100 MG: 100 TABLET, COATED ORAL at 11:06

## 2024-06-17 RX ADMIN — AMIODARONE HYDROCHLORIDE 0.5 MG/MIN: 1.8 INJECTION, SOLUTION INTRAVENOUS at 03:06

## 2024-06-17 RX ADMIN — AMIODARONE HYDROCHLORIDE 1 MG/MIN: 1.8 INJECTION, SOLUTION INTRAVENOUS at 02:06

## 2024-06-17 NOTE — CONSULTS
[facility] - Emergency Dept    Cardiology  Consult Note    Patient Name: Safia Muñoz  MRN: [id number]  Admission Date: 6/16/2024  Hospital Length of Stay: 1 days  Code Status: Full Code   Attending Provider: Micheal Rivera MD   Consulting Provider: Merly Heart MD  Primary Care Physician: Iglesia Steel MD  Principal Problem:<principal problem not specified>    Patient information was obtained from patient, relative(s), and ER records.     Subjective:     Chief Complaint/Reason for Consult: a fib w/ RVR and CHF    HPI: 89y/o with PMH COPD, CHF, PAF, osteoporosis s/p left hip fracture, anemia presents to ED for SOB per family members. Recent episode of afib during hospitalization for hip fx in 04/2024. She was placed on 50mg metoprolol BID at that time. Upon f/u with cardiology, metoprolol was decreased to 25mg BID. Denies cardiac hx and having followed by cardiologist prior to 04/2024 episode. Family reports multiple falls at home, tries to be active, ambulates with walker.     In ED, she was in afib w/ RVR with HR 150s. Patient was placed on amio overnight and now has improvement in HR to goal, although continues to be in afib.     PMH: HTN, COPD, anemia, osteoporosis, left hip fracture/left hip IM nailing  PSH: left hip IM nailing  Social History: Former tobacco use quit 1994, denies EtOH and illicit drug use  Family History: Father-heart disease    Previous Cardiac Diagnostics:   No results found for this or any previous visit available for review     Past Medical History:   Diagnosis Date    COPD (chronic obstructive pulmonary disease)     Hypertension      Past Surgical History:   Procedure Laterality Date    FRACTURE SURGERY  4/8/24    HYSTERECTOMY  1964    RETROGRADE INTRAMEDULLARY RODDING OF DISTAL FEMUR Left 04/09/2024    Procedure: INSERTION, INTRAMEDULLARY NAIL INTERTROCHENTERIC FRACTURE;  Surgeon: Guy Jaquez DO;  Location: [facility];  Service: Orthopedics;  Laterality: Left;  [place]  TABLE, SYNTHES TFNA     Review of patient's allergies indicates:   Allergen Reactions    Adhesive tape-silicones Blisters    Ofloxacin     Penicillins     Sulfamethoxazole-trimethoprim      Other reaction(s): Unknown    Codeine Nausea Only     No current facility-administered medications on file prior to encounter.     Current Outpatient Medications on File Prior to Encounter   Medication Sig    ALPRAZolam (XANAX) 0.25 MG tablet Take 0.25 mg by mouth 2 (two) times a day.    aspirin (ECOTRIN) 81 MG EC tablet Take 1 tablet (81 mg total) by mouth 2 (two) times a day.    estradiol 0.05 mg/24 hr td ptsw (VIVELLE-DOT) 0.05 mg/24 hr Place 1 patch onto the skin every Wednesday AND 1 patch every .    metoprolol tartrate (LOPRESSOR) 25 MG tablet Take 25 mg by mouth 2 (two) times daily.    tamsulosin (FLOMAX) 0.4 mg Cap Take 1 capsule (0.4 mg total) by mouth every evening. for 14 days    furosemide (LASIX) 40 MG tablet Take 1 tablet (40 mg total) by mouth once daily. for 14 days    QUEtiapine (SEROQUEL) 25 MG Tab Take 12.5 mg by mouth every evening.     Family History       Problem Relation (Age of Onset)    Cancer Mother    Heart disease Father    Stroke Father          Tobacco Use    Smoking status: Former     Current packs/day: 0.00     Average packs/day: 1 pack/day for 35.0 years (35.0 ttl pk-yrs)     Types: Cigarettes     Start date: 1950     Quit date: 1985     Years since quittin.4    Smokeless tobacco: Never   Substance and Sexual Activity    Alcohol use: Not Currently    Drug use: Never    Sexual activity: Not Currently     Partners: Male     Birth control/protection: Post-menopausal       Review of Systems   Constitutional:  Negative for fever.   HENT:  Negative for congestion.    Respiratory:  Positive for shortness of breath.    Cardiovascular:  Positive for leg swelling. Negative for chest pain.   Gastrointestinal:  Negative for abdominal pain.   Genitourinary:  Negative for dysuria.   Skin:   Negative for color change.   Neurological:  Negative for dizziness.     Objective:     Vital Signs (Most Recent):  Temp: 98.8 °F (37.1 °C) (06/16/24 1853)  Pulse: 77 (06/17/24 0820)  Resp: (!) 25 (06/17/24 0820)  BP: (!) 150/76 (06/17/24 0748)  SpO2: 96 % (06/17/24 0820) Vital Signs (24h Range):  Temp:  [97.8 °F (36.6 °C)-98.8 °F (37.1 °C)] 98.8 °F (37.1 °C)  Pulse:  [] 77  Resp:  [18-29] 25  SpO2:  [90 %-99 %] 96 %  BP: ()/() 150/76   Weight: 68 kg (150 lb)  Body mass index is 27.44 kg/m².  SpO2: 96 %       Intake/Output Summary (Last 24 hours) at 6/17/2024 0917  Last data filed at 6/17/2024 0531  Gross per 24 hour   Intake 400 ml   Output 700 ml   Net -300 ml     Lines/Drains/Airways       Peripheral Intravenous Line  Duration                  Peripheral IV - Single Lumen 06/16/24 2053 18 G Right Antecubital <1 day         Peripheral IV - Single Lumen 06/17/24 0305 22 G Left;Posterior Hand <1 day                  Significant Labs:  Recent Results (from the past 72 hour(s))   Comprehensive metabolic panel    Collection Time: 06/16/24  6:18 PM   Result Value Ref Range    Sodium 137 136 - 145 mmol/L    Potassium 4.4 3.5 - 5.1 mmol/L    Chloride 103 98 - 111 mmol/L    CO2 25 23 - 31 mmol/L    Glucose 95 75 - 121 mg/dL    Blood Urea Nitrogen 17.1 9.8 - 20.1 mg/dL    Creatinine 0.90 0.55 - 1.02 mg/dL    Calcium 8.8 8.4 - 10.2 mg/dL    Protein Total 6.4 5.8 - 7.6 gm/dL    Albumin 3.2 (L) 3.4 - 4.8 g/dL    Globulin 3.2 2.4 - 3.5 gm/dL    Albumin/Globulin Ratio 1.0 (L) 1.1 - 2.0 ratio    Bilirubin Total 0.4 <=1.5 mg/dL     (H) 40 - 150 unit/L    ALT 23 0 - 55 unit/L    AST 27 5 - 34 unit/L    eGFR 60 mL/min/1.73/m2    Anion Gap 9.0 mEq/L    BUN/Creatinine Ratio 19    Troponin I #1    Collection Time: 06/16/24  6:18 PM   Result Value Ref Range    Troponin-I 0.020 0.000 - 0.045 ng/mL   B-Type natriuretic peptide (BNP)    Collection Time: 06/16/24  6:18 PM   Result Value Ref Range    Natriuretic  Peptide 1,182.5 (H) <=100.0 pg/mL   Lactic Acid, Plasma    Collection Time: 06/16/24  6:18 PM   Result Value Ref Range    Lactic Acid Level 1.3 0.5 - 2.2 mmol/L   CBC with Differential    Collection Time: 06/16/24  6:51 PM   Result Value Ref Range    WBC 9.52 4.50 - 11.50 x10(3)/mcL    RBC 4.25 4.20 - 5.40 x10(6)/mcL    Hgb 12.2 12.0 - 16.0 g/dL    Hct 39.0 37.0 - 47.0 %    MCV 91.8 80.0 - 94.0 fL    MCH 28.7 27.0 - 31.0 pg    MCHC 31.3 (L) 33.0 - 36.0 g/dL    RDW 14.0 11.5 - 17.0 %    Platelet 370 130 - 400 x10(3)/mcL    MPV 10.8 (H) 7.4 - 10.4 fL    Neut % 59.2 %    Lymph % 25.2 %    Mono % 9.8 %    Eos % 4.3 %    Basophil % 1.1 %    Lymph # 2.40 0.6 - 4.6 x10(3)/mcL    Neut # 5.64 2.1 - 9.2 x10(3)/mcL    Mono # 0.93 0.1 - 1.3 x10(3)/mcL    Eos # 0.41 0 - 0.9 x10(3)/mcL    Baso # 0.10 <=0.2 x10(3)/mcL    IG# 0.04 0 - 0.04 x10(3)/mcL    IG% 0.4 %    NRBC% 0.0 %   Troponin I #2    Collection Time: 06/16/24  9:47 PM   Result Value Ref Range    Troponin-I 0.027 0.000 - 0.045 ng/mL   Urinalysis, Reflex to Urine Culture    Collection Time: 06/16/24 11:36 PM    Specimen: Urine   Result Value Ref Range    Color, UA Light-Yellow Yellow, Light-Yellow, Colorless, Straw, Dark-Yellow    Appearance, UA Turbid (A) Clear    Specific Gravity, UA 1.015 1.005 - 1.030    pH, UA 6.0 5.0 - 8.5    Protein, UA Negative Negative    Glucose, UA Normal Negative, Normal    Ketones, UA Negative Negative    Blood, UA Negative Negative    Bilirubin, UA Negative Negative    Urobilinogen, UA Normal 0.2, 1.0, Normal    Nitrites, UA 1+ (A) Negative    Leukocyte Esterase,  (A) Negative    WBC, UA 21-50 (A) None Seen, 0-2, 3-5, 0-5 /HPF    Bacteria, UA Occasional (A) None Seen, Trace /HPF    Squamous Epithelial Cells, UA Trace None Seen /HPF    Mucous, UA Trace (A) None Seen /LPF    Hyaline Casts, UA 3-5 (A) None Seen /lpf    RBC, UA 0-5 None Seen, 0-2, 3-5, 0-5 /HPF   Echo    Collection Time: 06/17/24  8:50 AM   Result Value Ref Range     Campbell's Biplane MOD Ejection Fraction 55 %    LVOT stroke volume 72.01 cm3    LVIDd 4.12 3.5 - 6.0 cm    LV Systolic Volume 19.70 mL    LVIDs 2.38 2.1 - 4.0 cm    LV Diastolic Volume 75.10 mL    IVS 0.88 0.6 - 1.1 cm    LVOT diameter 2.10 cm    LVOT area 3.5 cm2    FS 42 28 - 44 %    Left Ventricle Relative Wall Thickness 0.50 cm    Posterior Wall 1.02 0.6 - 1.1 cm    LV mass 123.93 g    MV Peak E Deshawn 1.17 m/s    TDI LATERAL 0.08 m/s    TDI SEPTAL 0.06 m/s    E/E' ratio 16.71 m/s    MV Peak A Deshawn 0.76 m/s    TR Max Deshawn 3.35 m/s    E/A ratio 1.54     E wave deceleration time 158.00 msec    LV SEPTAL E/E' RATIO 19.50 m/s    LV LATERAL E/E' RATIO 14.63 m/s    LVOT peak deshawn 1.23 m/s    Left Ventricular Outflow Tract Mean Velocity 0.85 cm/s    Left Ventricular Outflow Tract Mean Gradient 3.00 mmHg    RVDD 3.35 cm    TAPSE 1.83 cm    RV/LV Ratio 0.81 cm    LA size 3.60 cm    LA volume (mod) 60.00 cm3    AV regurgitation pressure 1/2 time 388 ms    AR Max Deshawn 4.33 m/s    Vn Nyquist MS 0.31 m/s    AV mean gradient 4 mmHg    AV peak gradient 7 mmHg    Ao peak deshawn 1.29 m/s    Ao VTI 21.50 cm    LVOT peak VTI 20.80 cm    AV valve area 3.35 cm²    AV Velocity Ratio 0.95     AV index (prosthetic) 0.97     SRINIVAS by Velocity Ratio 3.30 cm²    Radius 0.70 cm    Vn Nyquist 0.31 m/s    Mr max deshawn 5.37 m/s    MR PISA EROA 0.18 cm2    MV mean gradient 2 mmHg    MV peak gradient 4 mmHg    MV valve area by continuity eq 2.93 cm2    MV VTI 24.6 cm    Triscuspid Valve Regurgitation Peak Gradient 45 mmHg    Mean e' 0.07 m/s     Significant Imaging:  Imaging Results              CTA Chest Non-Coronary (PE Studies) (Final result)  Result time 06/16/24 21:35:23      Final result by Dhiraj Beaver MD (06/16/24 21:35:23)                   Impression:      No pulmonary embolus is demonstrated.    Bilateral pleural effusions.      Electronically signed by: Dhiraj Beaver MD  Date:    06/16/2024  Time:    21:35               Narrative:     EXAMINATION:  CTA CHEST NON CORONARY (PE STUDIES)    CLINICAL HISTORY:  Pulmonary embolism (PE) suspected, high prob;    TECHNIQUE:  Low dose axial images, sagittal and coronal reformations were obtained from the thoracic inlet to the lung bases following the IV administration of 75 mL of Omnipaque 350.  Contrast timing was optimized to evaluate the pulmonary arteries.  MIP images were performed.    Automatic exposure control (AEC) was utilized for dose reduction.    Dose: 456 mGycm    COMPARISON:  None    FINDINGS:  Mediastinum reveals no significant adenopathy.  The thoracic aorta appears intact.  There are small bilateral pleural effusions with associated atelectasis and/or infiltrate.    There is centrilobular emphysema.  There are no filling defects seen within the pulmonary arteries to indicate embolus.                                       X-Ray Chest 1 View (Final result)  Result time 06/16/24 18:21:25      Final result by Dhiraj Beaver MD (06/16/24 18:21:25)                   Impression:      No acute disease is demonstrated.      Electronically signed by: Dhiraj Beaver MD  Date:    06/16/2024  Time:    18:21               Narrative:    EXAMINATION:  XR CHEST 1 VIEW    CLINICAL HISTORY:  short;    TECHNIQUE:  Single frontal view of the chest was performed.    COMPARISON:  04/28/2024    FINDINGS:  There is elevation the right hemidiaphragm.  No infiltrates are seen.  Heart size is within normal limits.  There is vascular calcification noted.                                    EKG:   Results for orders placed or performed during the hospital encounter of 04/12/24   EKG 12-lead    Collection Time: 04/17/24 11:56 AM   Result Value Ref Range    QRS Duration 76 ms    [facility] QTC Calculation 464 ms    Narrative    Test Reason : R00.0,    Vent. Rate : 152 BPM     Atrial Rate : 086 BPM     P-R Int : 000 ms          QRS Dur : 076 ms      QT Int : 292 ms       P-R-T Axes : 000 006 016 degrees     QTc Int : 464  ms    Atrial fibrillation with rapid ventricular response  Abnormal ECG  Confirmed by Frandy Robledo MD ([id number]) on 4/18/2024 9:48:49 AM    Referred By: SHAQ   SELF           Confirmed By:Frandy Robledo MD         Physical Exam  Vitals reviewed.   Constitutional:       General: She is not in acute distress.     Appearance: She is not ill-appearing.   HENT:      Head: Atraumatic.      Mouth/Throat:      Mouth: Mucous membranes are moist.   Eyes:      General: No scleral icterus.     Extraocular Movements: Extraocular movements intact.   Cardiovascular:      Rate and Rhythm: Normal rate. Rhythm irregular.      Heart sounds: No murmur heard.  Pulmonary:      Effort: No respiratory distress.      Breath sounds: No wheezing or rhonchi.   Abdominal:      General: There is no distension.   Musculoskeletal:      Right lower leg: Edema present.      Left lower leg: Edema present.   Skin:     General: Skin is warm and dry.      Coloration: Skin is not jaundiced or pale.   Neurological:      General: No focal deficit present.      Mental Status: She is alert and oriented to person, place, and time.   Psychiatric:         Behavior: Behavior normal.       Home Medications:   No current facility-administered medications on file prior to encounter.     Current Outpatient Medications on File Prior to Encounter   Medication Sig Dispense Refill    ALPRAZolam (XANAX) 0.25 MG tablet Take 0.25 mg by mouth 2 (two) times a day.      aspirin (ECOTRIN) 81 MG EC tablet Take 1 tablet (81 mg total) by mouth 2 (two) times a day. 60 tablet 0    estradiol 0.05 mg/24 hr td ptsw (VIVELLE-DOT) 0.05 mg/24 hr Place 1 patch onto the skin every Wednesday AND 1 patch every Sunday. 8 patch 0    metoprolol tartrate (LOPRESSOR) 25 MG tablet Take 25 mg by mouth 2 (two) times daily.      tamsulosin (FLOMAX) 0.4 mg Cap Take 1 capsule (0.4 mg total) by mouth every evening. for 14 days 14 capsule 0    furosemide (LASIX) 40 MG tablet Take 1 tablet (40 mg  total) by mouth once daily. for 14 days 14 tablet 0    QUEtiapine (SEROQUEL) 25 MG Tab Take 12.5 mg by mouth every evening.       Current Inpatient Medications:    Current Facility-Administered Medications:     acetaminophen tablet 650 mg, 650 mg, Oral, Q8H PRN, Naye Martinez, PA-ONEIL    acetaminophen tablet 650 mg, 650 mg, Oral, Q4H PRN, Naye Martinez, PA-ONEIL    amiodarone 360 mg/200 mL (1.8 mg/mL) infusion, 0.5 mg/min, Intravenous, Continuous, Ephraim Menjivar MD, Last Rate: 16.7 mL/hr at 06/17/24 0356, 0.5 mg/min at 06/17/24 0356    cefTRIAXone (Rocephin) 1 g in dextrose 5 % in water (D5W) 100 mL IVPB (MB+), 1 g, Intravenous, Q24H, Charley Wilder FNP, Stopped at 06/17/24 0345    dextrose 10% bolus 125 mL 125 mL, 12.5 g, Intravenous, PRN, Naye Martinez, PA-C    dextrose 10% bolus 250 mL 250 mL, 25 g, Intravenous, PRN, Naye Martinez, PA-C    digoxin injection 250 mcg, 250 mcg, Intravenous, Q6H PRN, Michael Savage Q., NP, 250 mcg at 06/16/24 2041    enoxaparin injection 70 mg, 1 mg/kg, Subcutaneous, Q12H (prophylaxis, 0900/2100), Charley Wilder, FNP, 70 mg at 06/17/24 0855    furosemide injection 40 mg, 40 mg, Intravenous, Q12H, Charley Wilder, FNP, 40 mg at 06/17/24 0854    glucagon (human recombinant) injection 1 mg, 1 mg, Intramuscular, PRN, Naye Martinez, PA-ONEIL    glucose chewable tablet 16 g, 16 g, Oral, PRN, Naye Martinez, PA-ONEIL    glucose chewable tablet 24 g, 24 g, Oral, PRN, Naye Martinez, PA-ONEIL    haloperidol lactate injection 2 mg, 2 mg, Intravenous, Q6H PRN, Kimberly Cooper MD    levalbuterol nebulizer solution 0.63 mg, 0.63 mg, Nebulization, Q4H PRN, Charley Wilder FNP, 0.63 mg at 06/17/24 0820    ondansetron injection 4 mg, 4 mg, Intravenous, Q4H PRN, Naye Martinez PA-C    sodium chloride 0.9% flush 10 mL, 10 mL, Intravenous, Q12H PRN, Naye Martinez PA-C    Current Outpatient Medications:     ALPRAZolam (XANAX) 0.25 MG tablet, Take 0.25 mg by mouth 2 (two)  times a day., Disp: , Rfl:     aspirin (ECOTRIN) 81 MG EC tablet, Take 1 tablet (81 mg total) by mouth 2 (two) times a day., Disp: 60 tablet, Rfl: 0    estradiol 0.05 mg/24 hr td ptsw (VIVELLE-DOT) 0.05 mg/24 hr, Place 1 patch onto the skin every Wednesday AND 1 patch every Sunday., Disp: 8 patch, Rfl: 0    metoprolol tartrate (LOPRESSOR) 25 MG tablet, Take 25 mg by mouth 2 (two) times daily., Disp: , Rfl:     tamsulosin (FLOMAX) 0.4 mg Cap, Take 1 capsule (0.4 mg total) by mouth every evening. for 14 days, Disp: 14 capsule, Rfl: 0    furosemide (LASIX) 40 MG tablet, Take 1 tablet (40 mg total) by mouth once daily. for 14 days, Disp: 14 tablet, Rfl: 0    QUEtiapine (SEROQUEL) 25 MG Tab, Take 12.5 mg by mouth every evening., Disp: , Rfl:   VTE Risk Mitigation (From admission, onward)           Ordered     enoxaparin injection 70 mg  Every 12 hours         06/16/24 2230                  Assessment:     Afib with RVR        - [id number]  HTN  COPD   Anemia  Osteoporosis         - Left interotrochanteric femur fracture/left hip IM nailing (4.9.24)     Plan:     Restart metoprolol 50mg BID  Stop amio as HR now controlled   Continue daily use ASA 81mg  Avoid OAC as patient is high risk for adverse bleeding event given recurrent falls and frailty   Obtain echo today   Continue lasix 40mg BID and strict I&Os    Thank you for your consult.     Merly Heart MD  Cardiology  [facility] - Emergency Dept  06/17/2024    I agree with the findings of the complexity of problems addressed and take responsibility for the plan's risks and complications. I approved the plan documented by resident .

## 2024-06-17 NOTE — H&P
[facility] Hospital Medicine History & Physical Examination       Patient Name: Safia Muñoz  MRN: [id number]  Patient Class: IP- Inpatient   Admission Date: 6/16/2024   Admitting Physician: Micheal Rivera MD   Length of Stay: 1  Attending Physician: Kimberly Cooper MD  Primary Care Provider: Iglesia Steel MD  Face-to-Face encounter date: 06/17/2024  Code Status: Full Code   Chief Complaint: Respiratory Distress (Patient presents from assisted living via ems with resp distress. Patient having audible wheeze on exam. On 2 liters oxygen not on any chronic O2. Hx of CHF on lasix. GCS 14 baseline)        Patient information was obtained from patient, patient's family, past medical records and ER records.     HISTORY OF PRESENT ILLNESS:   Safia Muñoz is a 89 y.o. White female with a past medical history of hypertension, congestive heart failure, atrial fibrillation, COPD not on home oxygen, anxiety/depression and resident of [facility]. I called the [place] but nurse could not be located to give history. The patient presented to [facility] on 6/16/2024 with a primary complaint of respiratory distress.  Patient reports burning with urination, chronic cough and swelling to bilateral lower extremities for the last day. Patient denies complaints of shortness of breath, chest pain, abdominal pain, nausea, vomiting, diarrhea.    Upon presentation to the ED, temperature 97.8° F, heart rate 155, respiratory rate 29, blood pressure 141/114 and SpO2 98% on room air.  Patient was placed on 3 L OxyMask which has been titrated down to room air with SpO2 ranging from 90-96%. CBC within limits.  Alkaline phosphatase 153, BNP 1,182.5, initial troponin 0.020.  UA with trace mucus, 3-5 hyaline casts, trace squamous epithelial cells, occasional bacteria, 21-50 WBCs, 500 leukocyte esterase, 1+ nitrites. Initial EKG with supraventricular tachycardia with a ventricular rate 152 and unable  to rule out age undetermined anterior infarct. Chest x-ray with no acute chest disease.  CTA of the chest without pulmonary embolism but bilateral pleural effusions.  In ED patient received IV Cardizem, IV amiodarone, Lasix, metoprolol, Xopenex and Rocephin for UTI.  Amiodarone drip was initiated. Cardiology consulted.  Patient is admitted to hospital medicine services for further medical management.    PAST MEDICAL HISTORY:     Past Medical History:   Diagnosis Date    COPD (chronic obstructive pulmonary disease)     Hypertension        PAST SURGICAL HISTORY:     Past Surgical History:   Procedure Laterality Date    FRACTURE SURGERY  4/8/24    HYSTERECTOMY  1964    RETROGRADE INTRAMEDULLARY RODDING OF DISTAL FEMUR Left 04/09/2024    Procedure: INSERTION, INTRAMEDULLARY NAIL INTERTROCHENTERIC FRACTURE;  Surgeon: Guy Jaquez DO;  Location: [facility];  Service: Orthopedics;  Laterality: Left;  HANA TABLE, SYNTHES TFNA       ALLERGIES:   Adhesive tape-silicones, Ofloxacin, Penicillins, Sulfamethoxazole-trimethoprim, and Codeine    FAMILY HISTORY:   Father: Stroke    SOCIAL HISTORY:   20 year smoker who quit 20 years ago  Denies alcohol or illicit drug use    Screening for Social Drivers for health:  Patient screened for food insecurity, housing instability, transportation needs, utility difficulties, and interpersonal safety (select all that apply as identified as concern)  []Housing or Food  []Transportation Needs  []Utility Difficulties  []Interpersonal safety  [x]None    HOME MEDICATIONS:     Prior to Admission medications    Medication Sig Start Date End Date Taking? Authorizing Provider   ALPRAZolam (XANAX) 0.25 MG tablet Take 0.25 mg by mouth 2 (two) times a day.   Yes Provider, Historical   aspirin (ECOTRIN) 81 MG EC tablet Take 1 tablet (81 mg total) by mouth 2 (two) times a day. 4/29/24 6/16/24 Yes Augusto Guan FNP   estradiol 0.05 mg/24 hr td ptsw (VIVELLE-DOT) 0.05 mg/24 hr Place 1 patch onto the  skin every Wednesday AND 1 patch every Sunday. 5/1/24 6/16/24 Yes Augusto Guan A, FNP   metoprolol tartrate (LOPRESSOR) 25 MG tablet Take 25 mg by mouth 2 (two) times daily. 5/24/24  Yes Provider, Historical   tamsulosin (FLOMAX) 0.4 mg Cap Take 1 capsule (0.4 mg total) by mouth every evening. for 14 days 4/29/24 6/16/24 Yes Augusto Guan A, FNP   furosemide (LASIX) 40 MG tablet Take 1 tablet (40 mg total) by mouth once daily. for 14 days 4/29/24 5/13/24  Freida, Augusto A, FNP   QUEtiapine (SEROQUEL) 25 MG Tab Take 12.5 mg by mouth every evening. 5/10/24   Provider, Historical       REVIEW OF SYSTEMS:   Except as documented, all other systems reviewed and negative     PHYSICAL EXAM:     VITAL SIGNS: 24 HRS MIN & MAX LAST   Temp  Min: 97.8 °F (36.6 °C)  Max: 98.8 °F (37.1 °C) 98.8 °F (37.1 °C)   BP  Min: 94/65  Max: 151/90 (!) 150/76   Pulse  Min: 66  Max: 158  81   Resp  Min: 18  Max: 29 20   SpO2  Min: 90 %  Max: 99 % 96 %       General appearance: Well-developed, well-nourished female in no apparent distress. No family at bedside.  HEENT: Atraumatic head. Moist mucous membranes of oral cavity.  Lungs:  Wheezing to auscultation bilaterally.  On 2 L O2 via nasal cannula.  Heart: Regular rate and rhythm.  2+ pitting edema bilateral extremities  Abdomen: Soft, non-distended.  Extremities: No cyanosis, clubbing. No deformities.  Skin: No Rash. Warm and dry.  Erythema to left antecubital fossa.  Neuro: Awake, alert and oriented. Motor and sensory exams grossly intact.  Psych/mental status: Appropriate mood and affect. Cooperative. Responds appropriately to questions.       LABS AND IMAGING:     Recent Labs   Lab 06/16/24  1851   WBC 9.52   RBC 4.25   HGB 12.2   HCT 39.0   MCV 91.8   MCH 28.7   MCHC 31.3*   RDW 14.0      MPV 10.8*       Recent Labs   Lab 06/16/24  1818      K 4.4      CO2 25   BUN 17.1   CREATININE 0.90   CALCIUM 8.8   ALBUMIN 3.2*   ALKPHOS 153*   ALT 23   AST 27    BILITOT 0.4       Microbiology Results (last 7 days)       Procedure Component Value Units Date/Time    Urine culture [[id number]] Collected: 06/16/24 2336    Order Status: Sent Specimen: Urine Updated: 06/16/24 4783             CTA Chest Non-Coronary (PE Studies)  Narrative: EXAMINATION:  CTA CHEST NON CORONARY (PE STUDIES)    CLINICAL HISTORY:  Pulmonary embolism (PE) suspected, high prob;    TECHNIQUE:  Low dose axial images, sagittal and coronal reformations were obtained from the thoracic inlet to the lung bases following the IV administration of 75 mL of Omnipaque 350.  Contrast timing was optimized to evaluate the pulmonary arteries.  MIP images were performed.    Automatic exposure control (AEC) was utilized for dose reduction.    Dose: 456 mGycm    COMPARISON:  None    FINDINGS:  Mediastinum reveals no significant adenopathy.  The thoracic aorta appears intact.  There are small bilateral pleural effusions with associated atelectasis and/or infiltrate.    There is centrilobular emphysema.  There are no filling defects seen within the pulmonary arteries to indicate embolus.  Impression: No pulmonary embolus is demonstrated.    Bilateral pleural effusions.    Electronically signed by: Dhiraj Beaver MD  Date:    06/16/2024  Time:    21:35  X-Ray Chest 1 View  Narrative: EXAMINATION:  XR CHEST 1 VIEW    CLINICAL HISTORY:  short;    TECHNIQUE:  Single frontal view of the chest was performed.    COMPARISON:  04/28/2024    FINDINGS:  There is elevation the right hemidiaphragm.  No infiltrates are seen.  Heart size is within normal limits.  There is vascular calcification noted.  Impression: No acute disease is demonstrated.    Electronically signed by: Dhiraj Beaver MD  Date:    06/16/2024  Time:    18:21        ASSESSMENT & PLAN:   Assessment:  Acute on chronic congestive heart failure exacerbation, type unknown  Bilateral pleural effusion secondary to above  Atrial fibrillation rapid ventricular rate   Acute  bacterial cystitis, present on admission  Hypertensive urgency  Elevated alkaline phosphatase   History of hypertension, COPD not on home oxygen, anxiety/depression    Plan:  - Cardiology consulted.  Appreciate recommendation  - Continue with amiodarone drip   - Strict I&Os and daily weights   - Telemetry monitoring   - IV Lasix 40 mg twice a day   - Continue with Rocephin following urine culture adjusting antibiotics as indicated  - Resume appropriate home medications when deemed necessary   - Labs in AM      VTE Prophylaxis: will be placed on Lovenox for DVT prophylaxis and will be advised to be as mobile as possible and sit in a chair as tolerated      __________________________________________________________________________  INPATIENT LIST OF MEDICATIONS     Current Facility-Administered Medications:     acetaminophen tablet 650 mg, 650 mg, Oral, Q8H PRN, Naye Martinez PA-C    acetaminophen tablet 650 mg, 650 mg, Oral, Q4H PRN, Naye Martinez PA-C    amiodarone 360 mg/200 mL (1.8 mg/mL) infusion, 0.5 mg/min, Intravenous, Continuous, Ephraim Menjivar MD, Last Rate: 16.7 mL/hr at 06/17/24 0356, 0.5 mg/min at 06/17/24 0356    cefTRIAXone (Rocephin) 1 g in dextrose 5 % in water (D5W) 100 mL IVPB (MB+), 1 g, Intravenous, Q24H, Charley Wilder FNP, Stopped at 06/17/24 0345    dextrose 10% bolus 125 mL 125 mL, 12.5 g, Intravenous, PRN, Naye Martinez PA-ONEIL    dextrose 10% bolus 250 mL 250 mL, 25 g, Intravenous, PRN, Naye Martinez PA-C    digoxin injection 250 mcg, 250 mcg, Intravenous, Q6H PRN, Callum Savageall Q., NP, 250 mcg at 06/16/24 2041    enoxaparin injection 70 mg, 1 mg/kg, Subcutaneous, Q12H (prophylaxis, 0900/2100), Charley Wilder FNP    furosemide injection 40 mg, 40 mg, Intravenous, Q12H, Charley Wilder FNP, 40 mg at 06/17/24 0309    glucagon (human recombinant) injection 1 mg, 1 mg, Intramuscular, PRN, Naye Martinez PA-C    glucose chewable tablet 16 g, 16 g, Oral, PRN,  Naye Martinez PA-C    glucose chewable tablet 24 g, 24 g, Oral, PRN, Naye Martinez PA-C    levalbuterol nebulizer solution 0.63 mg, 0.63 mg, Nebulization, Q4H PRN, TinaCharley FNP, 0.63 mg at 06/17/24 0252    ondansetron injection 4 mg, 4 mg, Intravenous, Q4H PRN, Naye Martinez PA-C    sodium chloride 0.9% flush 10 mL, 10 mL, Intravenous, Q12H PRN, Naye Martinez PA-C    Current Outpatient Medications:     ALPRAZolam (XANAX) 0.25 MG tablet, Take 0.25 mg by mouth 2 (two) times a day., Disp: , Rfl:     aspirin (ECOTRIN) 81 MG EC tablet, Take 1 tablet (81 mg total) by mouth 2 (two) times a day., Disp: 60 tablet, Rfl: 0    estradiol 0.05 mg/24 hr td ptsw (VIVELLE-DOT) 0.05 mg/24 hr, Place 1 patch onto the skin every Wednesday AND 1 patch every Sunday., Disp: 8 patch, Rfl: 0    metoprolol tartrate (LOPRESSOR) 25 MG tablet, Take 25 mg by mouth 2 (two) times daily., Disp: , Rfl:     tamsulosin (FLOMAX) 0.4 mg Cap, Take 1 capsule (0.4 mg total) by mouth every evening. for 14 days, Disp: 14 capsule, Rfl: 0    furosemide (LASIX) 40 MG tablet, Take 1 tablet (40 mg total) by mouth once daily. for 14 days, Disp: 14 tablet, Rfl: 0    QUEtiapine (SEROQUEL) 25 MG Tab, Take 12.5 mg by mouth every evening., Disp: , Rfl:       Scheduled Meds:   cefTRIAXone (Rocephin) IV (PEDS and ADULTS)  1 g Intravenous Q24H    enoxparin  1 mg/kg Subcutaneous Q12H (prophylaxis, 0900/2100)    furosemide (LASIX) injection  40 mg Intravenous Q12H     Continuous Infusions:   amiodarone in dextrose 5%  0.5 mg/min Intravenous Continuous 16.7 mL/hr at 06/17/24 0356 0.5 mg/min at 06/17/24 0356     PRN Meds:.  Current Facility-Administered Medications:     acetaminophen, 650 mg, Oral, Q8H PRN    acetaminophen, 650 mg, Oral, Q4H PRN    dextrose 10%, 12.5 g, Intravenous, PRN    dextrose 10%, 25 g, Intravenous, PRN    digoxin, 250 mcg, Intravenous, Q6H PRN    glucagon (human recombinant), 1 mg, Intramuscular, PRN    glucose, 16 g, Oral,  PRN    glucose, 24 g, Oral, PRN    levalbuterol, 0.63 mg, Nebulization, Q4H PRN    ondansetron, 4 mg, Intravenous, Q4H PRN    sodium chloride 0.9%, 10 mL, Intravenous, Q12H PRN      Discharge Planning and Disposition: Anticipated discharge to be determined.    Naye AMEZQUITA PA, have reviewed and discussed the case with Dr. Kimberly Cooper MD    Please see the following addendum for further assessment and plan from there attending MD.      Portion of this note is dictated using EMR integrated voice recognition software and may be subjected to voice recognition errors not corrected with proofreading. Please  for clarification if needed.       Naye Martinez PA-C  06/17/2024    MD Addendum:  Dr. Kenneth AMEZQUITA assumed care of this patient today at around 10:30 a.m.    For the patient encounter, I performed the substantive portion of the visit, I reviewed the NP/PA documentation, treatment plan, and medical decision making.  I had face to face time with this patient      A. History:      89-year-old female with significant history of HTN, COPD,  osteoporosis, left hip fracture for which she required intramedullary nailing, CHF with unknown ejection fraction, PAF presented to the ED from assisted living with complaints of acute respiratory distress.  Patient at baseline is able to ambulate with walker.  She was noted to be in AFib RVR with heart rate in 150s, normotensive.  Hypoxemia improved with supplemental oxygen via nasal cannula.  PE ruled out, patient noted to have bilateral pleural effusions, emphysema.  Labs non impressive.  Patient was initiated on amiodarone GTT in the ED and hospitalist team.  UA with concerns for possible UTI, initiated ceftriaxone.  Patient also noted to have left antecubital erythema/mild swelling.       B. Physical exam:  As above     C. Medical decision making:    Acute decompensated diastolic heart failure  Bilateral pleural effusion secondary to above  AFib  RVR  Suspected acute bacterial UTI   Moderate-to-severe MR  Left antecubital mild cellulitis   Essential HTN-stable   History of COPD/emphysema   History of left hip fracture secondary to osteoporosis requiring intramedullary nailing   Prophylaxis    IV Lasix 40 mg b.i.d.  Monitor strict Is&Os  Echocardiogram with intact ejection fraction, has grade 2 diastolic dysfunction, moderate-to-severe MR   Cardiology on board   Follow recommendations   Patient was initially on amiodarone GTT which was later weaned off  Now on p.o. Lopressor 50 mg b.i.d.  Advanced age, recurrent falls, considering benefit versus risk she would not be a good candidate for anticoagulation for thromboembolic prophylaxis  Supplemental oxygen to maintain saturations more than 90%, on nasal cannula, 4 L, weaned down as tolerated  Ceftriaxone for suspected UTI pending cultures   Patient also noted to have some left antecubital cellulitis, added doxycycline for the same  Resumed home meds-aspirin, tamsulosin   DVT prophylaxis-subQ Lovenox

## 2024-06-18 LAB
ALBUMIN SERPL-MCNC: 2.9 G/DL (ref 3.4–4.8)
ALBUMIN/GLOB SERPL: 1.2 RATIO (ref 1.1–2)
ALP SERPL-CCNC: 131 UNIT/L (ref 40–150)
ALT SERPL-CCNC: 16 UNIT/L (ref 0–55)
ANION GAP SERPL CALC-SCNC: 7 MEQ/L
AST SERPL-CCNC: 18 UNIT/L (ref 5–34)
BACTERIA UR CULT: ABNORMAL
BACTERIA UR CULT: ABNORMAL
BASOPHILS # BLD AUTO: 0.06 X10(3)/MCL
BASOPHILS NFR BLD AUTO: 0.6 %
BILIRUB SERPL-MCNC: 0.5 MG/DL
BUN SERPL-MCNC: 15.5 MG/DL (ref 9.8–20.1)
CALCIUM SERPL-MCNC: 8.2 MG/DL (ref 8.4–10.2)
CHLORIDE SERPL-SCNC: 101 MMOL/L (ref 98–111)
CO2 SERPL-SCNC: 27 MMOL/L (ref 23–31)
CREAT SERPL-MCNC: 0.82 MG/DL (ref 0.55–1.02)
CREAT/UREA NIT SERPL: 19
EOSINOPHIL # BLD AUTO: 0.55 X10(3)/MCL (ref 0–0.9)
EOSINOPHIL NFR BLD AUTO: 5.7 %
ERYTHROCYTE [DISTWIDTH] IN BLOOD BY AUTOMATED COUNT: 13.9 % (ref 11.5–17)
GFR SERPLBLD CREATININE-BSD FMLA CKD-EPI: >60 ML/MIN/1.73/M2
GLOBULIN SER-MCNC: 2.4 GM/DL (ref 2.4–3.5)
GLUCOSE SERPL-MCNC: 98 MG/DL (ref 75–121)
HCT VFR BLD AUTO: 34.4 % (ref 37–47)
HGB BLD-MCNC: 11.2 G/DL (ref 12–16)
IMM GRANULOCYTES # BLD AUTO: 0.01 X10(3)/MCL (ref 0–0.04)
IMM GRANULOCYTES NFR BLD AUTO: 0.1 %
LYMPHOCYTES # BLD AUTO: 2.05 X10(3)/MCL (ref 0.6–4.6)
LYMPHOCYTES NFR BLD AUTO: 21.3 %
MCH RBC QN AUTO: 28.7 PG (ref 27–31)
MCHC RBC AUTO-ENTMCNC: 32.6 G/DL (ref 33–36)
MCV RBC AUTO: 88.2 FL (ref 80–94)
MONOCYTES # BLD AUTO: 1.13 X10(3)/MCL (ref 0.1–1.3)
MONOCYTES NFR BLD AUTO: 11.7 %
NEUTROPHILS # BLD AUTO: 5.84 X10(3)/MCL (ref 2.1–9.2)
NEUTROPHILS NFR BLD AUTO: 60.6 %
NRBC BLD AUTO-RTO: 0 %
PLATELET # BLD AUTO: 295 X10(3)/MCL (ref 130–400)
PMV BLD AUTO: 11.2 FL (ref 7.4–10.4)
POTASSIUM SERPL-SCNC: 3.2 MMOL/L (ref 3.5–5.1)
PROT SERPL-MCNC: 5.3 GM/DL (ref 5.8–7.6)
RBC # BLD AUTO: 3.9 X10(6)/MCL (ref 4.2–5.4)
SODIUM SERPL-SCNC: 135 MMOL/L (ref 136–145)
WBC # BLD AUTO: 9.64 X10(3)/MCL (ref 4.5–11.5)

## 2024-06-18 PROCEDURE — 36415 COLL VENOUS BLD VENIPUNCTURE: CPT | Performed by: INTERNAL MEDICINE

## 2024-06-18 PROCEDURE — 63600175 PHARM REV CODE 636 W HCPCS: Performed by: NURSE PRACTITIONER

## 2024-06-18 PROCEDURE — 25000003 PHARM REV CODE 250: Performed by: NURSE PRACTITIONER

## 2024-06-18 PROCEDURE — 25000242 PHARM REV CODE 250 ALT 637 W/ HCPCS: Performed by: NURSE PRACTITIONER

## 2024-06-18 PROCEDURE — 99900031 HC PATIENT EDUCATION (STAT)

## 2024-06-18 PROCEDURE — 97161 PT EVAL LOW COMPLEX 20 MIN: CPT

## 2024-06-18 PROCEDURE — 25000003 PHARM REV CODE 250

## 2024-06-18 PROCEDURE — 25000003 PHARM REV CODE 250: Performed by: INTERNAL MEDICINE

## 2024-06-18 PROCEDURE — 94640 AIRWAY INHALATION TREATMENT: CPT

## 2024-06-18 PROCEDURE — 27000221 HC OXYGEN, UP TO 24 HOURS

## 2024-06-18 PROCEDURE — 25000003 PHARM REV CODE 250: Performed by: STUDENT IN AN ORGANIZED HEALTH CARE EDUCATION/TRAINING PROGRAM

## 2024-06-18 PROCEDURE — 63600175 PHARM REV CODE 636 W HCPCS: Performed by: STUDENT IN AN ORGANIZED HEALTH CARE EDUCATION/TRAINING PROGRAM

## 2024-06-18 PROCEDURE — 85025 COMPLETE CBC W/AUTO DIFF WBC: CPT | Performed by: INTERNAL MEDICINE

## 2024-06-18 PROCEDURE — 11000001 HC ACUTE MED/SURG PRIVATE ROOM

## 2024-06-18 PROCEDURE — 94760 N-INVAS EAR/PLS OXIMETRY 1: CPT

## 2024-06-18 PROCEDURE — 63600175 PHARM REV CODE 636 W HCPCS: Performed by: INTERNAL MEDICINE

## 2024-06-18 PROCEDURE — 80053 COMPREHEN METABOLIC PANEL: CPT | Performed by: INTERNAL MEDICINE

## 2024-06-18 PROCEDURE — 99900035 HC TECH TIME PER 15 MIN (STAT)

## 2024-06-18 RX ORDER — FUROSEMIDE 10 MG/ML
40 INJECTION INTRAMUSCULAR; INTRAVENOUS EVERY 12 HOURS
Status: COMPLETED | OUTPATIENT
Start: 2024-06-18 | End: 2024-06-18

## 2024-06-18 RX ORDER — DAPAGLIFLOZIN 10 MG/1
10 TABLET, FILM COATED ORAL DAILY
Status: DISCONTINUED | OUTPATIENT
Start: 2024-06-19 | End: 2024-06-19 | Stop reason: HOSPADM

## 2024-06-18 RX ORDER — VALSARTAN 40 MG/1
40 TABLET ORAL 2 TIMES DAILY
Status: DISCONTINUED | OUTPATIENT
Start: 2024-06-18 | End: 2024-06-19 | Stop reason: HOSPADM

## 2024-06-18 RX ADMIN — DOXYCYCLINE HYCLATE 100 MG: 100 TABLET, COATED ORAL at 10:06

## 2024-06-18 RX ADMIN — FUROSEMIDE 40 MG: 10 INJECTION, SOLUTION INTRAMUSCULAR; INTRAVENOUS at 10:06

## 2024-06-18 RX ADMIN — QUETIAPINE FUMARATE 12.5 MG: 25 TABLET ORAL at 08:06

## 2024-06-18 RX ADMIN — ASPIRIN 81 MG: 81 TABLET, COATED ORAL at 10:06

## 2024-06-18 RX ADMIN — ASPIRIN 81 MG: 81 TABLET, COATED ORAL at 08:06

## 2024-06-18 RX ADMIN — FUROSEMIDE 40 MG: 10 INJECTION, SOLUTION INTRAMUSCULAR; INTRAVENOUS at 08:06

## 2024-06-18 RX ADMIN — METOPROLOL TARTRATE 50 MG: 50 TABLET, FILM COATED ORAL at 10:06

## 2024-06-18 RX ADMIN — ENOXAPARIN SODIUM 40 MG: 40 INJECTION SUBCUTANEOUS at 05:06

## 2024-06-18 RX ADMIN — POTASSIUM BICARBONATE 40 MEQ: 391 TABLET, EFFERVESCENT ORAL at 10:06

## 2024-06-18 RX ADMIN — TAMSULOSIN HYDROCHLORIDE 0.4 MG: 0.4 CAPSULE ORAL at 08:06

## 2024-06-18 RX ADMIN — DOXYCYCLINE HYCLATE 100 MG: 100 TABLET, COATED ORAL at 08:06

## 2024-06-18 RX ADMIN — VALSARTAN 40 MG: 40 TABLET, FILM COATED ORAL at 08:06

## 2024-06-18 RX ADMIN — LEVALBUTEROL HYDROCHLORIDE 0.63 MG: 0.63 SOLUTION RESPIRATORY (INHALATION) at 11:06

## 2024-06-18 RX ADMIN — METOPROLOL TARTRATE 50 MG: 50 TABLET, FILM COATED ORAL at 08:06

## 2024-06-18 RX ADMIN — LEVALBUTEROL HYDROCHLORIDE 0.63 MG: 0.63 SOLUTION RESPIRATORY (INHALATION) at 08:06

## 2024-06-18 RX ADMIN — CEFTRIAXONE SODIUM 1 G: 1 INJECTION, POWDER, FOR SOLUTION INTRAMUSCULAR; INTRAVENOUS at 03:06

## 2024-06-18 NOTE — PLAN OF CARE
Problem: Physical Therapy  Goal: Physical Therapy Goal  Description: Pt will be seen for the following goals  1. Pt will be mod ind with all bed mobility  2. Pt will be mod ind with transfers  3. Pt will be mod ind with rw 150ft  Outcome: Progressing

## 2024-06-18 NOTE — PROGRESS NOTES
[facility] - Emergency Dept    Cardiology  Progress Note    Patient Name: Safia Muñoz  MRN: [id number]  Admission Date: 6/16/2024  Hospital Length of Stay: 2 days  Code Status: Full Code   Attending Provider: Micheal Rivera MD   Consulting Provider: JUAN Snell  Primary Care Physician: Iglesia Steel MD  Principal Problem:<principal problem not specified>    Patient information was obtained from patient, relative(s), and ER records.     Subjective:     Chief Complaint/Reason for Consult: a fib w/ RVR and CHF    HPI: 89y/o with PMH COPD, CHF, PAF, osteoporosis s/p left hip fracture, anemia presents to ED for SOB per family members. Recent episode of afib during hospitalization for hip fx in 04/2024. She was placed on 50mg metoprolol BID at that time. Upon f/u with cardiology, metoprolol was decreased to 25mg BID. Denies cardiac hx and having followed by cardiologist prior to 04/2024 episode. Family reports multiple falls at home, tries to be active, ambulates with walker.     In ED, she was in afib w/ RVR with HR 150s. Patient was placed on amio overnight and now has improvement in HR to goal, although continues to be in afib.     PMH: HTN, COPD, anemia, osteoporosis, left hip fracture/left hip IM nailing  PSH: left hip IM nailing  Social History: Former tobacco use quit 1994, denies EtOH and illicit drug use  Family History: Father-heart disease    Previous Cardiac Diagnostics:   TTE (6.17.24):    Left Ventricle: The left ventricle is normal in size. There is concentric remodeling. There is normal systolic function with a visually estimated ejection fraction of 60 - 65%. Grade II diastolic dysfunction. Elevated left ventricular filling pressure.    Right Ventricle: Normal right ventricular cavity size. Systolic function is normal.    Aortic Valve: The aortic valve is a trileaflet valve. Aortic valve peak velocity is 1.29 m/s. Mean gradient is 4 mmHg.    Mitral Valve: Mildly thickened  leaflets. The mean pressure gradient across the mitral valve is 2 mmHg at a heart rate of  bpm. There is moderate regurgitation. MV EROA by PISA is 0.18 cm2. Visually, the mitral regurgitaiton appears closer to moderate-severe.    Tricuspid Valve: There is mild regurgitation.    Pericardium: There is no pericardial effusion    TTE (5.16.2024)  1. The study quality is average.   2. The left ventricle is normal in size. Global left ventricular systolic function is normal.  The left ventricular ejection fraction is 60%.  3. Moderate (2+) mitral regurgitation. MR PISA Radius 1.15 cm. MR Flow Vol 117 mL. MV ERO Area 0.60^2.  4. The left atrium is mildly enlarged based on the left atrium volume index of 38.8ml/m².  5. Mild calcification of the aortic valve is noted with adequate cuspal excursion.   6. Moderate (2+) aortic regurgitation.  Mild to moderate (1-2+) tricuspid regurgitation. Trace pulmonic regurgitation.   7. The pulmonary artery systolic pressure is 41 mmHg. Evidence of pulmonary hypertension is noted    Past Medical History:   Diagnosis Date    COPD (chronic obstructive pulmonary disease)     Hypertension      Past Surgical History:   Procedure Laterality Date    FRACTURE SURGERY  4/8/24    HYSTERECTOMY  1964    RETROGRADE INTRAMEDULLARY RODDING OF DISTAL FEMUR Left 04/09/2024    Procedure: INSERTION, INTRAMEDULLARY NAIL INTERTROCHENTERIC FRACTURE;  Surgeon: Guy Jaquez DO;  Location: [facility];  Service: Orthopedics;  Laterality: Left;  HANA TABLE, SYNTHES TFNA     Review of patient's allergies indicates:   Allergen Reactions    Adhesive tape-silicones Blisters    Ofloxacin     Penicillins     Sulfamethoxazole-trimethoprim      Other reaction(s): Unknown    Codeine Nausea Only     No current facility-administered medications on file prior to encounter.     Current Outpatient Medications on File Prior to Encounter   Medication Sig    ALPRAZolam (XANAX) 0.25 MG tablet Take 0.25 mg by mouth 2 (two) times a  day.    aspirin (ECOTRIN) 81 MG EC tablet Take 1 tablet (81 mg total) by mouth 2 (two) times a day.    estradiol 0.05 mg/24 hr td ptsw (VIVELLE-DOT) 0.05 mg/24 hr Place 1 patch onto the skin every Wednesday AND 1 patch every .    metoprolol tartrate (LOPRESSOR) 25 MG tablet Take 25 mg by mouth 2 (two) times daily.    tamsulosin (FLOMAX) 0.4 mg Cap Take 1 capsule (0.4 mg total) by mouth every evening. for 14 days    furosemide (LASIX) 40 MG tablet Take 1 tablet (40 mg total) by mouth once daily. for 14 days    QUEtiapine (SEROQUEL) 25 MG Tab Take 12.5 mg by mouth every evening.     Family History       Problem Relation (Age of Onset)    Cancer Mother    Heart disease Father    Stroke Father          Tobacco Use    Smoking status: Former     Current packs/day: 0.00     Average packs/day: 1 pack/day for 35.0 years (35.0 ttl pk-yrs)     Types: Cigarettes     Start date: 1950     Quit date: 1985     Years since quittin.4    Smokeless tobacco: Never   Substance and Sexual Activity    Alcohol use: Not Currently    Drug use: Never    Sexual activity: Not Currently     Partners: Male     Birth control/protection: Post-menopausal       Review of Systems   Constitutional:  Negative for fever.   HENT:  Negative for congestion.    Respiratory:  Positive for shortness of breath.         KYLE improving w/ diuresis   Cardiovascular:  Positive for leg swelling. Negative for chest pain and palpitations.   Gastrointestinal:  Negative for abdominal pain.   Genitourinary:  Negative for dysuria.   Skin:  Negative for color change.   Neurological:  Negative for dizziness.     Objective:     Vital Signs (Most Recent):  Temp: 96.5 °F (35.8 °C) (24)  Pulse: 62 (24)  Resp: 14 (24)  BP: (!) 167/74 (24)  SpO2: 99 % (24) Vital Signs (24h Range):  Temp:  [96.5 °F (35.8 °C)-97.9 °F (36.6 °C)] 96.5 °F (35.8 °C)  Pulse:  [62-96] 62  Resp:  [14-20] 14  SpO2:  [91 %-99 %] 99  %  BP: (133-167)/(64-78) 167/74   Weight: 68 kg (150 lb)  Body mass index is 27.44 kg/m².  SpO2: 99 %       Intake/Output Summary (Last 24 hours) at 6/18/2024 0933  Last data filed at 6/18/2024 0641  Gross per 24 hour   Intake 360 ml   Output 600 ml   Net -240 ml     Lines/Drains/Airways       Peripheral Intravenous Line  Duration                  Peripheral IV - Single Lumen 06/16/24 2053 18 G Right Antecubital 1 day         Peripheral IV - Single Lumen 06/17/24 0305 22 G Left;Posterior Hand 1 day                  Significant Labs:  Recent Results (from the past 72 hour(s))   Comprehensive metabolic panel    Collection Time: 06/16/24  6:18 PM   Result Value Ref Range    Sodium 137 136 - 145 mmol/L    Potassium 4.4 3.5 - 5.1 mmol/L    Chloride 103 98 - 111 mmol/L    CO2 25 23 - 31 mmol/L    Glucose 95 75 - 121 mg/dL    Blood Urea Nitrogen 17.1 9.8 - 20.1 mg/dL    Creatinine 0.90 0.55 - 1.02 mg/dL    Calcium 8.8 8.4 - 10.2 mg/dL    Protein Total 6.4 5.8 - 7.6 gm/dL    Albumin 3.2 (L) 3.4 - 4.8 g/dL    Globulin 3.2 2.4 - 3.5 gm/dL    Albumin/Globulin Ratio 1.0 (L) 1.1 - 2.0 ratio    Bilirubin Total 0.4 <=1.5 mg/dL     (H) 40 - 150 unit/L    ALT 23 0 - 55 unit/L    AST 27 5 - 34 unit/L    eGFR 60 mL/min/1.73/m2    Anion Gap 9.0 mEq/L    BUN/Creatinine Ratio 19    Troponin I #1    Collection Time: 06/16/24  6:18 PM   Result Value Ref Range    Troponin-I 0.020 0.000 - 0.045 ng/mL   B-Type natriuretic peptide (BNP)    Collection Time: 06/16/24  6:18 PM   Result Value Ref Range    Natriuretic Peptide 1,182.5 (H) <=100.0 pg/mL   Lactic Acid, Plasma    Collection Time: 06/16/24  6:18 PM   Result Value Ref Range    Lactic Acid Level 1.3 0.5 - 2.2 mmol/L   CBC with Differential    Collection Time: 06/16/24  6:51 PM   Result Value Ref Range    WBC 9.52 4.50 - 11.50 x10(3)/mcL    RBC 4.25 4.20 - 5.40 x10(6)/mcL    Hgb 12.2 12.0 - 16.0 g/dL    Hct 39.0 37.0 - 47.0 %    MCV 91.8 80.0 - 94.0 fL    MCH 28.7 27.0 - 31.0 pg     MCHC 31.3 (L) 33.0 - 36.0 g/dL    RDW 14.0 11.5 - 17.0 %    Platelet 370 130 - 400 x10(3)/mcL    MPV 10.8 (H) 7.4 - 10.4 fL    Neut % 59.2 %    Lymph % 25.2 %    Mono % 9.8 %    Eos % 4.3 %    Basophil % 1.1 %    Lymph # 2.40 0.6 - 4.6 x10(3)/mcL    Neut # 5.64 2.1 - 9.2 x10(3)/mcL    Mono # 0.93 0.1 - 1.3 x10(3)/mcL    Eos # 0.41 0 - 0.9 x10(3)/mcL    Baso # 0.10 <=0.2 x10(3)/mcL    IG# 0.04 0 - 0.04 x10(3)/mcL    IG% 0.4 %    NRBC% 0.0 %   Troponin I #2    Collection Time: 06/16/24  9:47 PM   Result Value Ref Range    Troponin-I 0.027 0.000 - 0.045 ng/mL   Urinalysis, Reflex to Urine Culture    Collection Time: 06/16/24 11:36 PM    Specimen: Urine   Result Value Ref Range    Color, UA Light-Yellow Yellow, Light-Yellow, Colorless, Straw, Dark-Yellow    Appearance, UA Turbid (A) Clear    Specific Gravity, UA 1.015 1.005 - 1.030    pH, UA 6.0 5.0 - 8.5    Protein, UA Negative Negative    Glucose, UA Normal Negative, Normal    Ketones, UA Negative Negative    Blood, UA Negative Negative    Bilirubin, UA Negative Negative    Urobilinogen, UA Normal 0.2, 1.0, Normal    Nitrites, UA 1+ (A) Negative    Leukocyte Esterase,  (A) Negative    WBC, UA 21-50 (A) None Seen, 0-2, 3-5, 0-5 /HPF    Bacteria, UA Occasional (A) None Seen, Trace /HPF    Squamous Epithelial Cells, UA Trace None Seen /HPF    Mucous, UA Trace (A) None Seen /LPF    Hyaline Casts, UA 3-5 (A) None Seen /lpf    RBC, UA 0-5 None Seen, 0-2, 3-5, 0-5 /HPF   Urine culture    Collection Time: 06/16/24 11:36 PM    Specimen: Urine   Result Value Ref Range    Urine Culture (A)      10,000 - 25,000 colonies/ml - 2 different species of Gram-negative Rods    Urine Culture 10,000 - 25,000 colonies/ml Gram-positive Cocci (A)    Echo    Collection Time: 06/17/24  8:50 AM   Result Value Ref Range    Campbell's Biplane MOD Ejection Fraction 55 %    LVOT stroke volume 72.01 cm3    LVIDd 4.12 3.5 - 6.0 cm    LV Systolic Volume 19.70 mL    LVIDs 2.38 2.1 - 4.0 cm    LV  Diastolic Volume 75.10 mL    IVS 0.88 0.6 - 1.1 cm    LVOT diameter 2.10 cm    LVOT area 3.5 cm2    FS 42 28 - 44 %    Left Ventricle Relative Wall Thickness 0.50 cm    Posterior Wall 1.02 0.6 - 1.1 cm    LV mass 123.93 g    MV Peak E Deshawn 1.17 m/s    TDI LATERAL 0.08 m/s    TDI SEPTAL 0.06 m/s    E/E' ratio 16.71 m/s    MV Peak A Deshawn 0.76 m/s    TR Max Deshawn 3.35 m/s    E/A ratio 1.54     E wave deceleration time 158.00 msec    LV SEPTAL E/E' RATIO 19.50 m/s    LV LATERAL E/E' RATIO 14.63 m/s    LVOT peak deshawn 1.23 m/s    Left Ventricular Outflow Tract Mean Velocity 0.85 cm/s    Left Ventricular Outflow Tract Mean Gradient 3.00 mmHg    RVDD 3.35 cm    TAPSE 1.83 cm    RV/LV Ratio 0.81 cm    LA size 3.60 cm    LA volume (mod) 60.00 cm3    AV regurgitation pressure 1/2 time 388 ms    AR Max Deshawn 4.33 m/s    Vn Nyquist MS 0.31 m/s    AV mean gradient 4 mmHg    AV peak gradient 7 mmHg    Ao peak deshawn 1.29 m/s    Ao VTI 21.50 cm    LVOT peak VTI 20.80 cm    AV valve area 3.35 cm²    AV Velocity Ratio 0.95     AV index (prosthetic) 0.97     SRINIVAS by Velocity Ratio 3.30 cm²    Radius 0.70 cm    Vn Nyquist 0.31 m/s    Mr max deshawn 5.37 m/s    MR PISA EROA 0.18 cm2    MV mean gradient 2 mmHg    MV peak gradient 4 mmHg    MV valve area by continuity eq 2.93 cm2    MV VTI 24.6 cm    Triscuspid Valve Regurgitation Peak Gradient 45 mmHg    Mean e' 0.07 m/s   Comprehensive Metabolic Panel    Collection Time: 06/18/24  4:28 AM   Result Value Ref Range    Sodium 135 (L) 136 - 145 mmol/L    Potassium 3.2 (L) 3.5 - 5.1 mmol/L    Chloride 101 98 - 111 mmol/L    CO2 27 23 - 31 mmol/L    Glucose 98 75 - 121 mg/dL    Blood Urea Nitrogen 15.5 9.8 - 20.1 mg/dL    Creatinine 0.82 0.55 - 1.02 mg/dL    Calcium 8.2 (L) 8.4 - 10.2 mg/dL    Protein Total 5.3 (L) 5.8 - 7.6 gm/dL    Albumin 2.9 (L) 3.4 - 4.8 g/dL    Globulin 2.4 2.4 - 3.5 gm/dL    Albumin/Globulin Ratio 1.2 1.1 - 2.0 ratio    Bilirubin Total 0.5 <=1.5 mg/dL     40 - 150 unit/L     ALT 16 0 - 55 unit/L    AST 18 5 - 34 unit/L    eGFR >60 mL/min/1.73/m2    Anion Gap 7.0 mEq/L    BUN/Creatinine Ratio 19    CBC with Differential    Collection Time: 06/18/24  4:28 AM   Result Value Ref Range    WBC 9.64 4.50 - 11.50 x10(3)/mcL    RBC 3.90 (L) 4.20 - 5.40 x10(6)/mcL    Hgb 11.2 (L) 12.0 - 16.0 g/dL    Hct 34.4 (L) 37.0 - 47.0 %    MCV 88.2 80.0 - 94.0 fL    MCH 28.7 27.0 - 31.0 pg    MCHC 32.6 (L) 33.0 - 36.0 g/dL    RDW 13.9 11.5 - 17.0 %    Platelet 295 130 - 400 x10(3)/mcL    MPV 11.2 (H) 7.4 - 10.4 fL    Neut % 60.6 %    Lymph % 21.3 %    Mono % 11.7 %    Eos % 5.7 %    Basophil % 0.6 %    Lymph # 2.05 0.6 - 4.6 x10(3)/mcL    Neut # 5.84 2.1 - 9.2 x10(3)/mcL    Mono # 1.13 0.1 - 1.3 x10(3)/mcL    Eos # 0.55 0 - 0.9 x10(3)/mcL    Baso # 0.06 <=0.2 x10(3)/mcL    IG# 0.01 0 - 0.04 x10(3)/mcL    IG% 0.1 %    NRBC% 0.0 %     Significant Imaging:  Imaging Results              CTA Chest Non-Coronary (PE Studies) (Final result)  Result time 06/16/24 21:35:23      Final result by Dhiraj Beaver MD (06/16/24 21:35:23)                   Impression:      No pulmonary embolus is demonstrated.    Bilateral pleural effusions.      Electronically signed by: Dhiraj Beaver MD  Date:    06/16/2024  Time:    21:35               Narrative:    EXAMINATION:  CTA CHEST NON CORONARY (PE STUDIES)    CLINICAL HISTORY:  Pulmonary embolism (PE) suspected, high prob;    TECHNIQUE:  Low dose axial images, sagittal and coronal reformations were obtained from the thoracic inlet to the lung bases following the IV administration of 75 mL of Omnipaque 350.  Contrast timing was optimized to evaluate the pulmonary arteries.  MIP images were performed.    Automatic exposure control (AEC) was utilized for dose reduction.    Dose: 456 mGycm    COMPARISON:  None    FINDINGS:  Mediastinum reveals no significant adenopathy.  The thoracic aorta appears intact.  There are small bilateral pleural effusions with associated atelectasis  and/or infiltrate.    There is centrilobular emphysema.  There are no filling defects seen within the pulmonary arteries to indicate embolus.                                       X-Ray Chest 1 View (Final result)  Result time 06/16/24 18:21:25      Final result by Dhiraj Beaver MD (06/16/24 18:21:25)                   Impression:      No acute disease is demonstrated.      Electronically signed by: Dhiraj Beaver MD  Date:    06/16/2024  Time:    18:21               Narrative:    EXAMINATION:  XR CHEST 1 VIEW    CLINICAL HISTORY:  short;    TECHNIQUE:  Single frontal view of the chest was performed.    COMPARISON:  04/28/2024    FINDINGS:  There is elevation the right hemidiaphragm.  No infiltrates are seen.  Heart size is within normal limits.  There is vascular calcification noted.                                    EKG:   Results for orders placed or performed during the hospital encounter of 04/12/24   EKG 12-lead    Collection Time: 04/17/24 11:56 AM   Result Value Ref Range    QRS Duration 76 ms    [facility] QTC Calculation 464 ms    Narrative    Test Reason : R00.0,    Vent. Rate : 152 BPM     Atrial Rate : 086 BPM     P-R Int : 000 ms          QRS Dur : 076 ms      QT Int : 292 ms       P-R-T Axes : 000 006 016 degrees     QTc Int : 464 ms    Atrial fibrillation with rapid ventricular response  Abnormal ECG  Confirmed by Frandy Robledo MD ([id number]) on 4/18/2024 9:48:49 AM    Referred By: SHAQ   SELF           Confirmed By:Frandy Robledo MD         Physical Exam  Vitals reviewed.   Constitutional:       General: She is not in acute distress.     Appearance: She is not ill-appearing.   HENT:      Head: Atraumatic.      Mouth/Throat:      Mouth: Mucous membranes are moist.   Eyes:      General: No scleral icterus.     Extraocular Movements: Extraocular movements intact.   Cardiovascular:      Rate and Rhythm: Normal rate. Rhythm irregular.      Heart sounds: No murmur heard.  Pulmonary:      Effort: No  respiratory distress.      Breath sounds: No wheezing or rhonchi.   Abdominal:      General: There is no distension.   Musculoskeletal:      Right lower leg: Edema present.      Left lower leg: Edema present.   Skin:     General: Skin is warm and dry.      Coloration: Skin is not jaundiced or pale.   Neurological:      General: No focal deficit present.      Mental Status: She is alert and oriented to person, place, and time.   Psychiatric:         Behavior: Behavior normal.     Home Medications:   No current facility-administered medications on file prior to encounter.     Current Outpatient Medications on File Prior to Encounter   Medication Sig Dispense Refill    ALPRAZolam (XANAX) 0.25 MG tablet Take 0.25 mg by mouth 2 (two) times a day.      aspirin (ECOTRIN) 81 MG EC tablet Take 1 tablet (81 mg total) by mouth 2 (two) times a day. 60 tablet 0    estradiol 0.05 mg/24 hr td ptsw (VIVELLE-DOT) 0.05 mg/24 hr Place 1 patch onto the skin every Wednesday AND 1 patch every Sunday. 8 patch 0    metoprolol tartrate (LOPRESSOR) 25 MG tablet Take 25 mg by mouth 2 (two) times daily.      tamsulosin (FLOMAX) 0.4 mg Cap Take 1 capsule (0.4 mg total) by mouth every evening. for 14 days 14 capsule 0    furosemide (LASIX) 40 MG tablet Take 1 tablet (40 mg total) by mouth once daily. for 14 days 14 tablet 0    QUEtiapine (SEROQUEL) 25 MG Tab Take 12.5 mg by mouth every evening.       Current Inpatient Medications:    Current Facility-Administered Medications:     acetaminophen tablet 650 mg, 650 mg, Oral, Q8H PRN, Naye Martinez PA-C    acetaminophen tablet 650 mg, 650 mg, Oral, Q4H PRN, Naye Martinez PA-C    aspirin EC tablet 81 mg, 81 mg, Oral, BID, Kimberly Cooper MD, 81 mg at 06/17/24 2055    cefTRIAXone (Rocephin) 1 g in dextrose 5 % in water (D5W) 100 mL IVPB (MB+), 1 g, Intravenous, Q24H, Charley Wilder FNP, Stopped at 06/18/24 0413    dextrose 10% bolus 125 mL 125 mL, 12.5 g, Intravenous, PRN, Naye Martinez  MERCEDEZ, ANURAG    dextrose 10% bolus 250 mL 250 mL, 25 g, Intravenous, PRN, Naye Martinez PA-C    digoxin injection 250 mcg, 250 mcg, Intravenous, Q6H PRN, Michael Savage Q., NP, 250 mcg at 06/16/24 2041    doxycycline tablet 100 mg, 100 mg, Oral, Q12H, Kimberly Cooper MD, 100 mg at 06/17/24 2055    enoxaparin injection 40 mg, 40 mg, Subcutaneous, Q24H (prophylaxis, 1700), Kimberly Cooper MD    furosemide injection 40 mg, 40 mg, Intravenous, Q12H, Charley Wilder FNP, 40 mg at 06/17/24 2055    glucagon (human recombinant) injection 1 mg, 1 mg, Intramuscular, PRN, Naye Martinez, ANURAG    glucose chewable tablet 16 g, 16 g, Oral, PRN, Naye Martinez, PA-ONEIL    glucose chewable tablet 24 g, 24 g, Oral, PRN, Naye Martinez, ANURAG    haloperidol lactate injection 2 mg, 2 mg, Intravenous, Q6H PRN, Kimberly Cooper MD    levalbuterol nebulizer solution 0.63 mg, 0.63 mg, Nebulization, Q4H PRN, Charley Wilder FNP, 0.63 mg at 06/17/24 0820    metoprolol tartrate (LOPRESSOR) tablet 50 mg, 50 mg, Oral, BID, Merly Heart MD, 50 mg at 06/17/24 2055    ondansetron injection 4 mg, 4 mg, Intravenous, Q4H PRN, Naye Martinez, ANURAG    potassium bicarbonate disintegrating tablet 40 mEq, 40 mEq, Oral, Once, Ana María Cedillo, DO    quetiapine split tablet 12.5 mg, 12.5 mg, Oral, QHS, Bux Ana María, DO    sodium chloride 0.9% flush 10 mL, 10 mL, Intravenous, Q12H PRN, Naye Martinez, ANURAG    tamsulosin 24 hr capsule 0.4 mg, 0.4 mg, Oral, QHS, Kimberly Cooper MD, 0.4 mg at 06/17/24 2055  VTE Risk Mitigation (From admission, onward)           Ordered     enoxaparin injection 40 mg  Every 24 hours         06/17/24 1848                  Assessment:     Afib with RVR (Now NSR)        - [id number]  HFpEF      -Grade II Diastolic Dysfunction      -Start on ARB and SGLT2  VHD     -Per TTE 6.17.24, Mod-Severe MR  HTN  COPD   Anemia  Osteoporosis       - Left interotrochanteric femur fracture/left hip IM nailing (4.9.24)     Plan:    Patient now NSR  Continue metoprolol 50mg BID  Continue daily use ASA 81mg  Avoid OAC as patient is high risk for adverse bleeding event given recurrent falls and frailty   Diuresis clinically indicated  Keep K > 4 and Mg>2  Echo reviewed by Dr. Molina; MR reviewed;  no need for intervention for MR at this time.  Will add GDMT for Grade II Diastolic Dysfunction: Start Valsartan 40mg PO Daily; will uptitrate as needed for BP control  Start Farxiga 10mg PO Daily   Will f/u in am        JUAN Snell  Cardiology  [facility] - Emergency Dept  06/18/2024   .

## 2024-06-18 NOTE — NURSING
Nurses Note -- 4 Eyes      6/18/2024   6:35 AM      Skin assessed during: Daily Assessment      [] No Altered Skin Integrity Present    []Prevention Measures Documented      [x] Yes- Altered Skin Integrity Present or Discovered   [x] LDA Added if Not in Epic (Describe Wound)   [x] New Altered Skin Integrity was Present on Admit and Documented in LDA   [x] Wound Image Taken    Wound Care Consulted? No    Attending Nurse:  Thais Carolina RN/Staff Member:  OSCAR Arevalo

## 2024-06-18 NOTE — PLAN OF CARE
06/18/24 1443   Discharge Assessment   Assessment Type Discharge Planning Assessment   Confirmed/corrected address, phone number and insurance Yes   Confirmed Demographics Correct on Facesheet   Source of Information patient   When was your last doctors appointment? 03/15/24   Communicated ANNE with patient/caregiver Date not available/Unable to determine   Reason For Admission sob   People in Home facility resident   Do you expect to return to your current living situation? Yes   Do you have help at home or someone to help you manage your care at home? Yes   Who are your caregiver(s) and their phone number(s)? Lives at [facility]   Current cognitive status: Alert/Oriented   Home Accessibility wheelchair accessible   Home Layout Able to live on 1st floor   Equipment Currently Used at Home walker, rolling;shower chair   Readmission within 30 days? No   Patient currently being followed by outpatient case management? No   Do you currently have service(s) that help you manage your care at home? Yes   Name and Contact number of agency [facility]   Is the pt/caregiver preference to resume services with current agency Yes   Do you take prescription medications? Yes   Do you have any problems affording any of your prescribed medications? No   Is the patient taking medications as prescribed? yes   Who is going to help you get home at discharge? dgt   How do you get to doctors appointments? car, drives self   Are you on dialysis? No   Do you take coumadin? No   Discharge Plan A Assisted Living;Home Health   Discharge Plan B Assisted Living;Home Health   DME Needed Upon Discharge  none   Discharge Plan discussed with: Patient   Financial Resource Strain   How hard is it for you to pay for the very basics like food, housing, medical care, and heating? Not very   Housing Stability   In the last 12 months, was there a time when you were not able to pay the mortgage or rent on time? N   At any time in  the past 12 months, were you homeless or living in a shelter (including now)? N   Transportation Needs   Has the lack of transportation kept you from medical appointments, meetings, work or from getting things needed for daily living? No   Food Insecurity   Within the past 12 months, you worried that your food would run out before you got the money to buy more. Never true   Within the past 12 months, the food you bought just didn't last and you didn't have money to get more. Never true   Stress   Do you feel stress - tense, restless, nervous, or anxious, or unable to sleep at night because your mind is troubled all the time - these days? Not at all   Social Isolation   How often do you feel lonely or isolated from those around you?  Never   Alcohol Use   Q1: How often do you have a drink containing alcohol? Never   Q2: How many drinks containing alcohol do you have on a typical day when you are drinking? None   Q3: How often do you have six or more drinks on one occasion? Never   Utilities   In the past 12 months has the electric, gas, oil, or water company threatened to shut off services in your home? No   Health Literacy   How often do you need to have someone help you when you read instructions, pamphlets, or other written material from your doctor or pharmacy? Never     Pharmacy: [facility]  Current with [facility]; Clinicals sent to [facility] via [employer].  Pt denies any dc needs at this time.

## 2024-06-18 NOTE — PROGRESS NOTES
[facility]  Hospital Medicine Progress Note        Chief Complaint: Inpatient Follow-up for     HPI:   Safia Muñoz is a 89 y.o. White female with a past medical history of hypertension, congestive heart failure, atrial fibrillation, COPD not on home oxygen, anxiety/depression and resident of [facility]. [facility] but [facility] could not be located to give history. The patient presented to [facility] on 6/16/2024 with a primary complaint of respiratory distress.  Patient reports burning with urination, chronic cough and swelling to bilateral lower extremities for the last day. Patient denies complaints of shortness of breath, chest pain, abdominal pain, nausea, vomiting, diarrhea.     Upon presentation to the ED, temperature 97.8° F, heart rate 155, respiratory rate 29, blood pressure 141/114 and SpO2 98% on room air.  Patient was placed on 3 L OxyMask which has been titrated down to room air with SpO2 ranging from 90-96%. CBC within limits.  Alkaline phosphatase 153, BNP 1,182.5, initial troponin 0.020.  UA with trace mucus, 3-5 hyaline casts, trace squamous epithelial cells, occasional bacteria, 21-50 WBCs, 500 leukocyte esterase, 1+ nitrites. Initial EKG with supraventricular tachycardia with a ventricular rate 152 and unable to rule out age undetermined anterior infarct. Chest x-ray with no acute chest disease.  CTA of the chest without pulmonary embolism but bilateral pleural effusions.  In ED patient received IV Cardizem, IV amiodarone, Lasix, metoprolol, Xopenex and Rocephin for UTI.  Amiodarone drip was initiated. Cardiology consulted.  Patient is admitted to hospital medicine services for further medical management.    Interval Hx:   Seen and examined by bedside.  Sitting up in chair.  Daughter by bedside.  Currently on 4 L of oxygen and saturating at 98%.  Vitals and labs remained stable.  No acute overnight events      Case was discussed with patient's  nurse and  on the floor.    Objective/physical exam:  General: In no acute distress, afebrile  Chest: Clear to auscultation bilaterally, on 4 L  Heart: RRR, +S1, S2, no appreciable murmur  Abdomen: Soft, nontender, BS +  MSK: Warm, no lower extremity edema, no clubbing or cyanosis; erythema right antecubital region  Neurologic: Alert and oriented x4, Cranial nerve II-XII intact, Strength 5/5 in all 4 extremities    VITAL SIGNS: 24 HRS MIN & MAX LAST   Temp  Min: 96.5 °F (35.8 °C)  Max: 97.9 °F (36.6 °C) 97.5 °F (36.4 °C)   BP  Min: 116/66  Max: 167/74 116/66   Pulse  Min: 62  Max: 96  83   Resp  Min: 14  Max: 20 14   SpO2  Min: 91 %  Max: 99 % 95 %     I have reviewed the following labs:  Recent Labs   Lab 06/16/24  1851 06/18/24  0428   WBC 9.52 9.64   RBC 4.25 3.90*   HGB 12.2 11.2*   HCT 39.0 34.4*   MCV 91.8 88.2   MCH 28.7 28.7   MCHC 31.3* 32.6*   RDW 14.0 13.9    295   MPV 10.8* 11.2*     Recent Labs   Lab 06/16/24  1818 06/18/24  0428    135*   K 4.4 3.2*    101   CO2 25 27   BUN 17.1 15.5   CREATININE 0.90 0.82   CALCIUM 8.8 8.2*   ALBUMIN 3.2* 2.9*   ALKPHOS 153* 131   ALT 23 16   AST 27 18   BILITOT 0.4 0.5     Microbiology Results (last 7 days)       Procedure Component Value Units Date/Time    Urine culture [[id number]]  (Abnormal) Collected: 06/16/24 6346    Order Status: Completed Specimen: Urine Updated: 06/18/24 0827     Urine Culture 10,000 - 25,000 colonies/ml - 2 different species of Gram-negative Rods      10,000 - 25,000 colonies/ml Gram-positive Cocci     Comment: We have not further evaluated these organisms because three or more species compatible with normal genital jacques usually represents specimen contamination from the time of collection, rather than infection. If clinical circumstances warrant further   workup of these organisms, please contact the Microbiology dept at [phone]; otherwise please have the patient submit another specimen.                See  below for Radiology    Scheduled Med:   aspirin  81 mg Oral BID    cefTRIAXone (Rocephin) IV (PEDS and ADULTS)  1 g Intravenous Q24H    doxycycline  100 mg Oral Q12H    enoxparin  40 mg Subcutaneous Q24H (prophylaxis, 1700)    furosemide (LASIX) injection  40 mg Intravenous Q12H    metoprolol tartrate  50 mg Oral BID    QUEtiapine  12.5 mg Oral QHS    tamsulosin  0.4 mg Oral QHS      Continuous Infusions:     PRN Meds:    Current Facility-Administered Medications:     acetaminophen, 650 mg, Oral, Q8H PRN    acetaminophen, 650 mg, Oral, Q4H PRN    dextrose 10%, 12.5 g, Intravenous, PRN    dextrose 10%, 25 g, Intravenous, PRN    digoxin, 250 mcg, Intravenous, Q6H PRN    glucagon (human recombinant), 1 mg, Intramuscular, PRN    glucose, 16 g, Oral, PRN    glucose, 24 g, Oral, PRN    haloperidol lactate, 2 mg, Intravenous, Q6H PRN    levalbuterol, 0.63 mg, Nebulization, Q4H PRN    ondansetron, 4 mg, Intravenous, Q4H PRN    sodium chloride 0.9%, 10 mL, Intravenous, Q12H PRN     Assessment/Plan:  Acute decompensated diastolic heart failure, improving  Bilateral pleural effusion secondary to above  AFib RVR, now in sinus rhythm  Suspected acute bacterial UTI, culture negative, ruled out  Moderate-to-severe MR  Left antecubital mild cellulitis   Essential HTN-stable   History of COPD/emphysema   History of left hip fracture secondary to osteoporosis requiring intramedullary nailing 04/09/2024  Prophylaxis    Patient is diuresing well with IV Lasix b.i.d.   Can continue IV Lasix today, transitioned to oral tomorrow   Hypokalemia at 3.2, replete and recheck in a.m.   CBC remains unremarkable   Patient currently on 4 L of oxygen saturating of 90%,   Wean oxygen as tolerated to maintain greater than 90%  Urine culture showing growth of multiple organisms on with less than 25,000 colony units, likely contamination   DC ceftriaxone  Continue doxycycline for cellulitis, day 2  Echo reviewed, EF of 60-65% with grade 2 diastolic  dysfunction, valvular heart disease noted at moderate to severe mitral regurgitation   Discussed results with daughter and patient is by bedside.  We will like to continue to monitor and not pursue any invasive workup at this time  Amiodarone.  His heart rate is controlled by Cardiology, Lopressor was resumed   Avoiding anticoagulation as patient is high-risk for adverse bleeding and given recurrent falls and fragility  Continue PT and OT, recommended low intensity therapy   Patient currently lives in assisted living and has home health  Further recs based on clinical course   Labs in a.m.    Critical care note:  Critical care diagnosis:  Heart failure requiring IV Lasix  Critical care interventions: Hands-on evaluation, review of labs/radiographs/records and discussion with patient and family if present  Critical care time spent: 35 minutes     VTE prophylaxis:  Lovenox    Patient condition:  Stable/Fair/Guarded/ Serious/ Critical    Anticipated discharge and Disposition:   Likely return back to assisted living      All diagnosis and differential diagnosis have been reviewed; assessment and plan has been documented; I have personally reviewed the labs and test results that are presently available; I have reviewed the patients medication list; I have reviewed the consulting providers response and recommendations. I have reviewed or attempted to review medical records based upon their availability    All of the patient's questions have been  addressed and answered. Patient's is agreeable to the above stated plan. I will continue to monitor closely and make adjustments to medical management as needed.  _____________________________________________________________________    Nutrition Status:    Radiology:  I have personally reviewed the following imaging and agree with the radiologist.     Echo    Left Ventricle: The left ventricle is normal in size. There is   concentric remodeling. There is normal systolic function  with a visually   estimated ejection fraction of 60 - 65%. Grade II diastolic dysfunction.   Elevated left ventricular filling pressure.    Right Ventricle: Normal right ventricular cavity size. Systolic   function is normal.    Aortic Valve: The aortic valve is a trileaflet valve. Aortic valve peak   velocity is 1.29 m/s. Mean gradient is 4 mmHg.    Mitral Valve: Mildly thickened leaflets. The mean pressure gradient   across the mitral valve is 2 mmHg at a heart rate of  bpm. There is   moderate regurgitation. MV EROA by PISA is 0.18 cm2. Visually, the mitral   regurgitaiton appears closer to moderate-severe.    Tricuspid Valve: There is mild regurgitation.    Pericardium: There is no pericardial effusion.      Ana María Cedillo DO  Department of Hospital Medicine  [facility]  06/18/2024

## 2024-06-18 NOTE — PT/OT/SLP EVAL
Physical Therapy Evaluation    Patient Name:  Safia Muñoz   MRN:  [id number]    Recommendations:     Discharge therapy intensity: Low Intensity Therapy   Discharge Equipment Recommendations: none   Barriers to discharge: None    Assessment:     Safia Muñoz is a 89 y.o. female admitted with a medical diagnosis of acute on chronic CHF exacerbation and riley pleural effusions.  She presents with the following impairments/functional limitations: weakness, impaired endurance, impaired self care skills, impaired functional mobility, gait instability, impaired balance . Pt needed just slight help with fxn'l mobility.    Rehab Prognosis: Good; patient would benefit from acute skilled PT services to address these deficits and reach maximum level of function.    Recent Surgery: * No surgery found *      Plan:     During this hospitalization, patient would benefit from acute PT services 5 x/week to address the identified rehab impairments via   and progress toward the following goals:    Plan of Care Expires:  07/09/24    Subjective     Chief Complaint:   Patient/Family Comments/goals:   Pain/Comfort:       Patients cultural, spiritual, Yarsani conflicts given the current situation:      Living Environment:  Pt lives in assisted living at  the [facility]  Prior to admission, patients level of function was sup to mod ind but had a few falls.  Equipment used at home: walker, rolling.  DME owned (not currently used):  Upon discharge, patient will have assistance from [facility] staff  .    Objective:     Communicated with nurse prior to session.  Patient found supine with oxygen, telemetry, pulse ox (continuous)  upon PT entry to room.    General Precautions: Standard, fall  Orthopedic Precautions:N/A   Braces: N/A  Respiratory Status: Nasal cannula, flow 4 L/min  Blood Pressure:       Exams:  RLE ROM: WFL  RLE Strength: 4-/5  LLE ROM: WFL  LLE Strength: 4-/5  Skin integrity: Visible skin intact      Functional  Mobility:  Bed Mobility:     Supine to Sit: contact guard assistance  Transfers:     Sit to Stand:  contact guard assistance with rolling walker  Bed to Chair: contact guard assistance with  rolling walker  using  Step Transfer  Gait: pt ambulated 60feet with rw sba with 4l o2      AM-PAC 6 CLICK MOBILITY  Total Score:        Treatment & Education:      Pt provided with verbal education education regarding PT role/goals/POC, fall prevention, and safety awareness.  Understanding was verbalized.     Patient left up in chair with all lines intact and call button in reach.    GOALS:   Multidisciplinary Problems       Physical Therapy Goals          Problem: Physical Therapy    Goal Priority Disciplines Outcome Goal Variances Interventions   Physical Therapy Goal     PT, PT/OT Progressing     Description: Pt will be seen for the following goals  1. Pt will be mod ind with all bed mobility  2. Pt will be mod ind with transfers  3. Pt will be mod ind with rw 150ft                       History:     Past Medical History:   Diagnosis Date    COPD (chronic obstructive pulmonary disease)     Hypertension        Past Surgical History:   Procedure Laterality Date    FRACTURE SURGERY  4/8/24    HYSTERECTOMY  1964    RETROGRADE INTRAMEDULLARY RODDING OF DISTAL FEMUR Left 04/09/2024    Procedure: INSERTION, INTRAMEDULLARY NAIL INTERTROCHENTERIC FRACTURE;  Surgeon: Guy Jaquez DO;  Location: [facility];  Service: Orthopedics;  Laterality: Left;  HANA TABLE, SYNTHES TFNA       Time Tracking:     PT Received On:    PT Start Time: 0937     PT Stop Time: 1003  PT Total Time (min): 26 min     Billable Minutes: Evaluation 26 06/18/2024

## 2024-06-18 NOTE — PLAN OF CARE
06/18/24 1443   Medicare Message   Important Message from Medicare regarding Discharge Appeal Rights Given to patient/caregiver;Explained to patient/caregiver     Pt voices understanding of IMM.

## 2024-06-19 VITALS
WEIGHT: 146.69 LBS | HEART RATE: 66 BPM | OXYGEN SATURATION: 100 % | BODY MASS INDEX: 26.83 KG/M2 | TEMPERATURE: 98 F | SYSTOLIC BLOOD PRESSURE: 158 MMHG | RESPIRATION RATE: 16 BRPM | DIASTOLIC BLOOD PRESSURE: 62 MMHG

## 2024-06-19 PROBLEM — I48.91 ATRIAL FIBRILLATION: Status: ACTIVE | Noted: 2024-06-19

## 2024-06-19 LAB
ANION GAP SERPL CALC-SCNC: 8 MEQ/L
BUN SERPL-MCNC: 17 MG/DL (ref 9.8–20.1)
CALCIUM SERPL-MCNC: 8.2 MG/DL (ref 8.4–10.2)
CHLORIDE SERPL-SCNC: 98 MMOL/L (ref 98–111)
CO2 SERPL-SCNC: 31 MMOL/L (ref 23–31)
CREAT SERPL-MCNC: 0.83 MG/DL (ref 0.55–1.02)
CREAT/UREA NIT SERPL: 20
ERYTHROCYTE [DISTWIDTH] IN BLOOD BY AUTOMATED COUNT: 13.8 % (ref 11.5–17)
GFR SERPLBLD CREATININE-BSD FMLA CKD-EPI: >60 ML/MIN/1.73/M2
GLUCOSE SERPL-MCNC: 85 MG/DL (ref 75–121)
HCT VFR BLD AUTO: 35.5 % (ref 37–47)
HGB BLD-MCNC: 11.1 G/DL (ref 12–16)
MCH RBC QN AUTO: 28.2 PG (ref 27–31)
MCHC RBC AUTO-ENTMCNC: 31.3 G/DL (ref 33–36)
MCV RBC AUTO: 90.3 FL (ref 80–94)
NRBC BLD AUTO-RTO: 0 %
OHS QRS DURATION: 76 MS
OHS QRS DURATION: 84 MS
OHS QTC CALCULATION: 453 MS
OHS QTC CALCULATION: 477 MS
PLATELET # BLD AUTO: 298 X10(3)/MCL (ref 130–400)
PMV BLD AUTO: 10.7 FL (ref 7.4–10.4)
POTASSIUM SERPL-SCNC: 3.4 MMOL/L (ref 3.5–5.1)
RBC # BLD AUTO: 3.93 X10(6)/MCL (ref 4.2–5.4)
SODIUM SERPL-SCNC: 137 MMOL/L (ref 136–145)
WBC # BLD AUTO: 8.53 X10(3)/MCL (ref 4.5–11.5)

## 2024-06-19 PROCEDURE — 25000003 PHARM REV CODE 250

## 2024-06-19 PROCEDURE — 99900035 HC TECH TIME PER 15 MIN (STAT)

## 2024-06-19 PROCEDURE — 27000221 HC OXYGEN, UP TO 24 HOURS

## 2024-06-19 PROCEDURE — 94760 N-INVAS EAR/PLS OXIMETRY 1: CPT

## 2024-06-19 PROCEDURE — 25000003 PHARM REV CODE 250: Performed by: INTERNAL MEDICINE

## 2024-06-19 PROCEDURE — 85027 COMPLETE CBC AUTOMATED: CPT | Performed by: STUDENT IN AN ORGANIZED HEALTH CARE EDUCATION/TRAINING PROGRAM

## 2024-06-19 PROCEDURE — 25000003 PHARM REV CODE 250: Performed by: STUDENT IN AN ORGANIZED HEALTH CARE EDUCATION/TRAINING PROGRAM

## 2024-06-19 PROCEDURE — 80048 BASIC METABOLIC PNL TOTAL CA: CPT | Performed by: STUDENT IN AN ORGANIZED HEALTH CARE EDUCATION/TRAINING PROGRAM

## 2024-06-19 PROCEDURE — 25000003 PHARM REV CODE 250: Performed by: NURSE PRACTITIONER

## 2024-06-19 PROCEDURE — 36415 COLL VENOUS BLD VENIPUNCTURE: CPT | Performed by: STUDENT IN AN ORGANIZED HEALTH CARE EDUCATION/TRAINING PROGRAM

## 2024-06-19 RX ORDER — ASPIRIN 81 MG/1
81 TABLET ORAL 2 TIMES DAILY
Qty: 180 TABLET | Refills: 0 | Status: SHIPPED | OUTPATIENT
Start: 2024-06-19 | End: 2024-09-17

## 2024-06-19 RX ORDER — FUROSEMIDE 20 MG/1
20 TABLET ORAL DAILY
Qty: 90 TABLET | Refills: 3 | Status: SHIPPED | OUTPATIENT
Start: 2024-06-19 | End: 2025-06-19

## 2024-06-19 RX ORDER — TAMSULOSIN HYDROCHLORIDE 0.4 MG/1
0.4 CAPSULE ORAL NIGHTLY
Qty: 30 CAPSULE | Refills: 0 | Status: SHIPPED | OUTPATIENT
Start: 2024-06-19 | End: 2024-07-19

## 2024-06-19 RX ORDER — METOPROLOL TARTRATE 50 MG/1
50 TABLET ORAL 2 TIMES DAILY
Qty: 180 TABLET | Refills: 3 | Status: SHIPPED | OUTPATIENT
Start: 2024-06-19 | End: 2025-06-19

## 2024-06-19 RX ORDER — VALSARTAN 40 MG/1
40 TABLET ORAL 2 TIMES DAILY
Qty: 180 TABLET | Refills: 3 | Status: SHIPPED | OUTPATIENT
Start: 2024-06-19 | End: 2025-06-19

## 2024-06-19 RX ORDER — DAPAGLIFLOZIN 10 MG/1
10 TABLET, FILM COATED ORAL DAILY
Qty: 90 TABLET | Refills: 0 | Status: SHIPPED | OUTPATIENT
Start: 2024-06-20 | End: 2024-09-18

## 2024-06-19 RX ORDER — DOXYCYCLINE HYCLATE 100 MG
100 TABLET ORAL EVERY 12 HOURS
Qty: 6 TABLET | Refills: 0 | Status: SHIPPED | OUTPATIENT
Start: 2024-06-19 | End: 2024-06-22

## 2024-06-19 RX ORDER — POTASSIUM CHLORIDE 20 MEQ/1
40 TABLET, EXTENDED RELEASE ORAL ONCE
Status: COMPLETED | OUTPATIENT
Start: 2024-06-19 | End: 2024-06-19

## 2024-06-19 RX ADMIN — METOPROLOL TARTRATE 50 MG: 50 TABLET, FILM COATED ORAL at 09:06

## 2024-06-19 RX ADMIN — VALSARTAN 40 MG: 40 TABLET, FILM COATED ORAL at 09:06

## 2024-06-19 RX ADMIN — DAPAGLIFLOZIN 10 MG: 10 TABLET, FILM COATED ORAL at 09:06

## 2024-06-19 RX ADMIN — POTASSIUM CHLORIDE 40 MEQ: 1500 TABLET, EXTENDED RELEASE ORAL at 09:06

## 2024-06-19 RX ADMIN — ASPIRIN 81 MG: 81 TABLET, COATED ORAL at 09:06

## 2024-06-19 RX ADMIN — DOXYCYCLINE HYCLATE 100 MG: 100 TABLET, COATED ORAL at 09:06

## 2024-06-19 NOTE — PLAN OF CARE
Dr. Cedillo here and states that pt will require Home O2 for D/C.  She is currently on 1 liter nc continuously.  RA  at Rest O2 sat 86% ;  O2 placed at 2 liters nc O2 sat 93% at Rest.    Spoke to pt and reviewed choices of Home O2 suppliers and [facility] is choice- Sugey with [employer] contacted and will have O2 for d/c today.Sugey will contact Dr. Cedillo to get needed paperwork signed.    Nurse Tamie updated.    Called [facility] ph# [phone] and spoke to a staff nurse [facility]- she states d/c info can be faxed to # [phone], and report can be called to Bre or AL nurse.  Info faxed and nurse Tamie updated.   Tamie updated AVS and all d/c inf faxed to [facility] as requested- Tamie will call nurse shortly.    Reported that [facility] delivered O2 to pt's room.    Called [facility] and spoke to Erica- she states they are capable to do weights and pulse ox readings but to send to the Home Health as well Order obtained and spoke to lukas with [facility] and copy of order given to ly member to place in packet that dgter took down to vehicle..

## 2024-06-19 NOTE — PROGRESS NOTES
[facility] - Emergency Dept    Cardiology  Progress Note    Patient Name: Safia Muñoz  MRN: [id number]  Admission Date: 6/16/2024  Hospital Length of Stay: 3 days  Code Status: Full Code   Attending Provider: Micheal Rivera MD   Consulting Provider: Nimco Espinoza NP  Primary Care Physician: Iglesia Steel MD  Principal Problem:<principal problem not specified>    Patient information was obtained from patient, relative(s), and ER records.     Subjective:     Reason for Consult: a fib w/ RVR and CHF    HPI: 89y/o with PMH COPD, CHF, PAF, osteoporosis s/p left hip fracture, anemia presents to ED for SOB per family members. Recent episode of afib during hospitalization for hip fx in 04/2024. She was placed on 50mg metoprolol BID at that time. Upon f/u with cardiology, metoprolol was decreased to 25mg BID. Denies cardiac hx and having followed by cardiologist prior to 04/2024 episode. Family reports multiple falls at home, tries to be active, ambulates with walker. In ED, she was in afib w/ RVR with HR 150s. Patient was placed on amio overnight and now has improvement in HR to goal, although continues to be in afib.     Hospital Course:   6.19.24: NAD, VSS, patient seen resting comfortably in chair she denies SOB, CP, or nausea,     PMH: HTN, COPD, anemia, osteoporosis, left hip fracture/left hip IM nailing  PSH: left hip IM nailing  Social History: Former tobacco use quit 1994, denies EtOH and illicit drug use  Family History: Father-heart disease    Previous Cardiac Diagnostics:   TTE (6.17.24):  Left Ventricle: The left ventricle is normal in size. There is concentric remodeling. There is normal systolic function with a visually estimated ejection fraction of 60 - 65%. Grade II diastolic dysfunction. Elevated left ventricular filling pressure.  Right Ventricle: Normal right ventricular cavity size. Systolic function is normal.  Aortic Valve: The aortic valve is a trileaflet valve. Aortic valve  peak velocity is 1.29 m/s. Mean gradient is 4 mmHg.  Mitral Valve: Mildly thickened leaflets. The mean pressure gradient across the mitral valve is 2 mmHg at a heart rate of  bpm. There is moderate regurgitation. MV EROA by PISA is 0.18 cm2. Visually, the mitral regurgitaiton appears closer to moderate-severe.  Tricuspid Valve: There is mild regurgitation.  Pericardium: There is no pericardial effusion    TTE (5.16.2024)  The study quality is average.   The left ventricle is normal in size. Global left ventricular systolic function is normal.  The left ventricular ejection fraction is 60%.  Moderate (2+) mitral regurgitation. MR PISA Radius 1.15 cm. MR Flow Vol 117 mL. MV ERO Area 0.60^2.  The left atrium is mildly enlarged based on the left atrium volume index of 38.8ml/m².  Mild calcification of the aortic valve is noted with adequate cuspal excursion.   Moderate (2+) aortic regurgitation.  Mild to moderate (1-2+) tricuspid regurgitation. Trace pulmonic regurgitation.   The pulmonary artery systolic pressure is 41 mmHg. Evidence of pulmonary hypertension is noted      Review of patient's allergies indicates:   Allergen Reactions    Adhesive tape-silicones Blisters    Ofloxacin     Penicillins     Sulfamethoxazole-trimethoprim      Other reaction(s): Unknown    Codeine Nausea Only     No current facility-administered medications on file prior to encounter.     Current Outpatient Medications on File Prior to Encounter   Medication Sig    ALPRAZolam (XANAX) 0.25 MG tablet Take 0.25 mg by mouth 2 (two) times a day.    aspirin (ECOTRIN) 81 MG EC tablet Take 1 tablet (81 mg total) by mouth 2 (two) times a day.    estradiol 0.05 mg/24 hr td ptsw (VIVELLE-DOT) 0.05 mg/24 hr Place 1 patch onto the skin every Wednesday AND 1 patch every Sunday.    metoprolol tartrate (LOPRESSOR) 25 MG tablet Take 25 mg by mouth 2 (two) times daily.    tamsulosin (FLOMAX) 0.4 mg Cap Take 1 capsule (0.4 mg total) by mouth every evening. for  14 days    furosemide (LASIX) 40 MG tablet Take 1 tablet (40 mg total) by mouth once daily. for 14 days    QUEtiapine (SEROQUEL) 25 MG Tab Take 12.5 mg by mouth every evening.       Review of Systems   Constitutional:  Negative for fever.   HENT:  Negative for congestion.    Respiratory:  Negative for shortness of breath.         KYLE improving w/ diuresis   Cardiovascular:  Negative for chest pain, palpitations and leg swelling.   Gastrointestinal:  Negative for abdominal pain and nausea.   Genitourinary:  Negative for dysuria.   Skin:  Negative for color change.   Neurological:  Negative for dizziness.     Objective:     Vital Signs (Most Recent):  Temp: 97.8 °F (36.6 °C) (06/19/24 0806)  Pulse: 69 (06/19/24 0806)  Resp: 16 (06/19/24 0806)  BP: (!) 144/72 (06/19/24 0806)  SpO2: (!) 94 % (06/19/24 0806) Vital Signs (24h Range):  Temp:  [97.4 °F (36.3 °C)-97.8 °F (36.6 °C)] 97.8 °F (36.6 °C)  Pulse:  [61-83] 69  Resp:  [14-20] 16  SpO2:  [91 %-99 %] 94 %  BP: (116-167)/(63-74) 144/72   Weight: 66.5 kg (146 lb 11.2 oz)  Body mass index is 26.83 kg/m².  SpO2: (!) 94 %       Intake/Output Summary (Last 24 hours) at 6/19/2024 0834  Last data filed at 6/19/2024 0603  Gross per 24 hour   Intake 1842 ml   Output 1600 ml   Net 242 ml     Lines/Drains/Airways       Peripheral Intravenous Line  Duration                  Peripheral IV - Single Lumen 06/16/24 2053 18 G Right Antecubital 2 days         Peripheral IV - Single Lumen 06/17/24 0305 22 G Left;Posterior Hand 2 days                  Significant Labs:  Recent Results (from the past 72 hour(s))   EKG 12-lead    Collection Time: 06/16/24  6:04 PM   Result Value Ref Range    QRS Duration 76 ms    [facility] QTC Calculation 477 ms   Comprehensive metabolic panel    Collection Time: 06/16/24  6:18 PM   Result Value Ref Range    Sodium 137 136 - 145 mmol/L    Potassium 4.4 3.5 - 5.1 mmol/L    Chloride 103 98 - 111 mmol/L    CO2 25 23 - 31 mmol/L    Glucose 95 75 - 121 mg/dL    Blood  Urea Nitrogen 17.1 9.8 - 20.1 mg/dL    Creatinine 0.90 0.55 - 1.02 mg/dL    Calcium 8.8 8.4 - 10.2 mg/dL    Protein Total 6.4 5.8 - 7.6 gm/dL    Albumin 3.2 (L) 3.4 - 4.8 g/dL    Globulin 3.2 2.4 - 3.5 gm/dL    Albumin/Globulin Ratio 1.0 (L) 1.1 - 2.0 ratio    Bilirubin Total 0.4 <=1.5 mg/dL     (H) 40 - 150 unit/L    ALT 23 0 - 55 unit/L    AST 27 5 - 34 unit/L    eGFR 60 mL/min/1.73/m2    Anion Gap 9.0 mEq/L    BUN/Creatinine Ratio 19    Troponin I #1    Collection Time: 06/16/24  6:18 PM   Result Value Ref Range    Troponin-I 0.020 0.000 - 0.045 ng/mL   B-Type natriuretic peptide (BNP)    Collection Time: 06/16/24  6:18 PM   Result Value Ref Range    Natriuretic Peptide 1,182.5 (H) <=100.0 pg/mL   Lactic Acid, Plasma    Collection Time: 06/16/24  6:18 PM   Result Value Ref Range    Lactic Acid Level 1.3 0.5 - 2.2 mmol/L   CBC with Differential    Collection Time: 06/16/24  6:51 PM   Result Value Ref Range    WBC 9.52 4.50 - 11.50 x10(3)/mcL    RBC 4.25 4.20 - 5.40 x10(6)/mcL    Hgb 12.2 12.0 - 16.0 g/dL    Hct 39.0 37.0 - 47.0 %    MCV 91.8 80.0 - 94.0 fL    MCH 28.7 27.0 - 31.0 pg    MCHC 31.3 (L) 33.0 - 36.0 g/dL    RDW 14.0 11.5 - 17.0 %    Platelet 370 130 - 400 x10(3)/mcL    MPV 10.8 (H) 7.4 - 10.4 fL    Neut % 59.2 %    Lymph % 25.2 %    Mono % 9.8 %    Eos % 4.3 %    Basophil % 1.1 %    Lymph # 2.40 0.6 - 4.6 x10(3)/mcL    Neut # 5.64 2.1 - 9.2 x10(3)/mcL    Mono # 0.93 0.1 - 1.3 x10(3)/mcL    Eos # 0.41 0 - 0.9 x10(3)/mcL    Baso # 0.10 <=0.2 x10(3)/mcL    IG# 0.04 0 - 0.04 x10(3)/mcL    IG% 0.4 %    NRBC% 0.0 %   Troponin I #2    Collection Time: 06/16/24  9:47 PM   Result Value Ref Range    Troponin-I 0.027 0.000 - 0.045 ng/mL   Urinalysis, Reflex to Urine Culture    Collection Time: 06/16/24 11:36 PM    Specimen: Urine   Result Value Ref Range    Color, UA Light-Yellow Yellow, Light-Yellow, Colorless, Straw, Dark-Yellow    Appearance, UA Turbid (A) Clear    Specific Gravity, UA 1.015 1.005 -  1.030    pH, UA 6.0 5.0 - 8.5    Protein, UA Negative Negative    Glucose, UA Normal Negative, Normal    Ketones, UA Negative Negative    Blood, UA Negative Negative    Bilirubin, UA Negative Negative    Urobilinogen, UA Normal 0.2, 1.0, Normal    Nitrites, UA 1+ (A) Negative    Leukocyte Esterase,  (A) Negative    WBC, UA 21-50 (A) None Seen, 0-2, 3-5, 0-5 /HPF    Bacteria, UA Occasional (A) None Seen, Trace /HPF    Squamous Epithelial Cells, UA Trace None Seen /HPF    Mucous, UA Trace (A) None Seen /LPF    Hyaline Casts, UA 3-5 (A) None Seen /lpf    RBC, UA 0-5 None Seen, 0-2, 3-5, 0-5 /HPF   Urine culture    Collection Time: 06/16/24 11:36 PM    Specimen: Urine   Result Value Ref Range    Urine Culture (A)      10,000 - 25,000 colonies/ml - 2 different species of Gram-negative Rods    Urine Culture 10,000 - 25,000 colonies/ml Gram-positive Cocci (A)    EKG 12-lead    Collection Time: 06/17/24  1:04 AM   Result Value Ref Range    QRS Duration 84 ms    [facility] QTC Calculation 453 ms   Echo    Collection Time: 06/17/24  8:50 AM   Result Value Ref Range    Campbell's Biplane MOD Ejection Fraction 55 %    LVOT stroke volume 72.01 cm3    LVIDd 4.12 3.5 - 6.0 cm    LV Systolic Volume 19.70 mL    LVIDs 2.38 2.1 - 4.0 cm    LV Diastolic Volume 75.10 mL    IVS 0.88 0.6 - 1.1 cm    LVOT diameter 2.10 cm    LVOT area 3.5 cm2    FS 42 28 - 44 %    Left Ventricle Relative Wall Thickness 0.50 cm    Posterior Wall 1.02 0.6 - 1.1 cm    LV mass 123.93 g    MV Peak E Deshawn 1.17 m/s    TDI LATERAL 0.08 m/s    TDI SEPTAL 0.06 m/s    E/E' ratio 16.71 m/s    MV Peak A Deshawn 0.76 m/s    TR Max Deshawn 3.35 m/s    E/A ratio 1.54     E wave deceleration time 158.00 msec    LV SEPTAL E/E' RATIO 19.50 m/s    LV LATERAL E/E' RATIO 14.63 m/s    LVOT peak deshawn 1.23 m/s    Left Ventricular Outflow Tract Mean Velocity 0.85 cm/s    Left Ventricular Outflow Tract Mean Gradient 3.00 mmHg    RVDD 3.35 cm    TAPSE 1.83 cm    RV/LV Ratio 0.81 cm    LA size  3.60 cm    LA volume (mod) 60.00 cm3    AV regurgitation pressure 1/2 time 388 ms    AR Max Deshawn 4.33 m/s    Vn Nyquist MS 0.31 m/s    AV mean gradient 4 mmHg    AV peak gradient 7 mmHg    Ao peak deshawn 1.29 m/s    Ao VTI 21.50 cm    LVOT peak VTI 20.80 cm    AV valve area 3.35 cm²    AV Velocity Ratio 0.95     AV index (prosthetic) 0.97     SRINIVAS by Velocity Ratio 3.30 cm²    Radius 0.70 cm    Vn Nyquist 0.31 m/s    Mr max deshawn 5.37 m/s    MR PISA EROA 0.18 cm2    MV mean gradient 2 mmHg    MV peak gradient 4 mmHg    MV valve area by continuity eq 2.93 cm2    MV VTI 24.6 cm    Triscuspid Valve Regurgitation Peak Gradient 45 mmHg    Mean e' 0.07 m/s   Comprehensive Metabolic Panel    Collection Time: 06/18/24  4:28 AM   Result Value Ref Range    Sodium 135 (L) 136 - 145 mmol/L    Potassium 3.2 (L) 3.5 - 5.1 mmol/L    Chloride 101 98 - 111 mmol/L    CO2 27 23 - 31 mmol/L    Glucose 98 75 - 121 mg/dL    Blood Urea Nitrogen 15.5 9.8 - 20.1 mg/dL    Creatinine 0.82 0.55 - 1.02 mg/dL    Calcium 8.2 (L) 8.4 - 10.2 mg/dL    Protein Total 5.3 (L) 5.8 - 7.6 gm/dL    Albumin 2.9 (L) 3.4 - 4.8 g/dL    Globulin 2.4 2.4 - 3.5 gm/dL    Albumin/Globulin Ratio 1.2 1.1 - 2.0 ratio    Bilirubin Total 0.5 <=1.5 mg/dL     40 - 150 unit/L    ALT 16 0 - 55 unit/L    AST 18 5 - 34 unit/L    eGFR >60 mL/min/1.73/m2    Anion Gap 7.0 mEq/L    BUN/Creatinine Ratio 19    CBC with Differential    Collection Time: 06/18/24  4:28 AM   Result Value Ref Range    WBC 9.64 4.50 - 11.50 x10(3)/mcL    RBC 3.90 (L) 4.20 - 5.40 x10(6)/mcL    Hgb 11.2 (L) 12.0 - 16.0 g/dL    Hct 34.4 (L) 37.0 - 47.0 %    MCV 88.2 80.0 - 94.0 fL    MCH 28.7 27.0 - 31.0 pg    MCHC 32.6 (L) 33.0 - 36.0 g/dL    RDW 13.9 11.5 - 17.0 %    Platelet 295 130 - 400 x10(3)/mcL    MPV 11.2 (H) 7.4 - 10.4 fL    Neut % 60.6 %    Lymph % 21.3 %    Mono % 11.7 %    Eos % 5.7 %    Basophil % 0.6 %    Lymph # 2.05 0.6 - 4.6 x10(3)/mcL    Neut # 5.84 2.1 - 9.2 x10(3)/mcL    Mono # 1.13 0.1  - 1.3 x10(3)/mcL    Eos # 0.55 0 - 0.9 x10(3)/mcL    Baso # 0.06 <=0.2 x10(3)/mcL    IG# 0.01 0 - 0.04 x10(3)/mcL    IG% 0.1 %    NRBC% 0.0 %   Basic Metabolic Panel    Collection Time: 06/19/24  5:03 AM   Result Value Ref Range    Sodium 137 136 - 145 mmol/L    Potassium 3.4 (L) 3.5 - 5.1 mmol/L    Chloride 98 98 - 111 mmol/L    CO2 31 23 - 31 mmol/L    Glucose 85 75 - 121 mg/dL    Blood Urea Nitrogen 17.0 9.8 - 20.1 mg/dL    Creatinine 0.83 0.55 - 1.02 mg/dL    BUN/Creatinine Ratio 20     Calcium 8.2 (L) 8.4 - 10.2 mg/dL    Anion Gap 8.0 mEq/L    eGFR >60 mL/min/1.73/m2   CBC Without Differential    Collection Time: 06/19/24  5:03 AM   Result Value Ref Range    WBC 8.53 4.50 - 11.50 x10(3)/mcL    RBC 3.93 (L) 4.20 - 5.40 x10(6)/mcL    Hgb 11.1 (L) 12.0 - 16.0 g/dL    Hct 35.5 (L) 37.0 - 47.0 %    MCV 90.3 80.0 - 94.0 fL    MCH 28.2 27.0 - 31.0 pg    MCHC 31.3 (L) 33.0 - 36.0 g/dL    RDW 13.8 11.5 - 17.0 %    Platelet 298 130 - 400 x10(3)/mcL    MPV 10.7 (H) 7.4 - 10.4 fL    NRBC% 0.0 %     Significant Imaging:  Imaging Results              CTA Chest Non-Coronary (PE Studies) (Final result)  Result time 06/16/24 21:35:23      Final result by Dhiraj Beaver MD (06/16/24 21:35:23)                   Impression:      No pulmonary embolus is demonstrated.    Bilateral pleural effusions.      Electronically signed by: Dhiraj Beaver MD  Date:    06/16/2024  Time:    21:35               Narrative:    EXAMINATION:  CTA CHEST NON CORONARY (PE STUDIES)    CLINICAL HISTORY:  Pulmonary embolism (PE) suspected, high prob;    TECHNIQUE:  Low dose axial images, sagittal and coronal reformations were obtained from the thoracic inlet to the lung bases following the IV administration of 75 mL of Omnipaque 350.  Contrast timing was optimized to evaluate the pulmonary arteries.  MIP images were performed.    Automatic exposure control (AEC) was utilized for dose reduction.    Dose: 456  mGycm    COMPARISON:  None    FINDINGS:  Mediastinum reveals no significant adenopathy.  The thoracic aorta appears intact.  There are small bilateral pleural effusions with associated atelectasis and/or infiltrate.    There is centrilobular emphysema.  There are no filling defects seen within the pulmonary arteries to indicate embolus.                                       X-Ray Chest 1 View (Final result)  Result time 06/16/24 18:21:25      Final result by Dhiraj Beaver MD (06/16/24 18:21:25)                   Impression:      No acute disease is demonstrated.      Electronically signed by: Dhiraj Beaver MD  Date:    06/16/2024  Time:    18:21               Narrative:    EXAMINATION:  XR CHEST 1 VIEW    CLINICAL HISTORY:  short;    TECHNIQUE:  Single frontal view of the chest was performed.    COMPARISON:  04/28/2024    FINDINGS:  There is elevation the right hemidiaphragm.  No infiltrates are seen.  Heart size is within normal limits.  There is vascular calcification noted.                                    EKG:   Results for orders placed or performed during the hospital encounter of 06/16/24   EKG 12-lead    Collection Time: 06/17/24  1:04 AM   Result Value Ref Range    QRS Duration 84 ms    [facility] QTC Calculation 453 ms    Narrative    Test Reason : R07.9,    Vent. Rate : 075 BPM     Atrial Rate : 075 BPM     P-R Int : 188 ms          QRS Dur : 084 ms      QT Int : 406 ms       P-R-T Axes : 045 067 075 degrees     QTc Int : 453 ms    Normal sinus rhythm  Normal ECG  When compared with ECG of 16-JUN-2024 18:04,  Vent. rate has decreased BY  77 BPM  Nonspecific T wave abnormality no longer evident in Inferior leads  T wave amplitude has decreased in Lateral leads  Confirmed by Danny Torres MD ([id number]) on 6/19/2024 7:28:40 AM    Referred By:             Confirmed By:Danny Torres MD         Physical Exam  Vitals reviewed.   Constitutional:       General: She is not in acute distress.     Appearance: She is not  ill-appearing.   HENT:      Head: Atraumatic.      Mouth/Throat:      Mouth: Mucous membranes are moist.   Eyes:      Extraocular Movements: Extraocular movements intact.   Cardiovascular:      Rate and Rhythm: Normal rate. Rhythm irregular.      Heart sounds: No murmur heard.  Pulmonary:      Effort: No respiratory distress.      Breath sounds: No wheezing or rhonchi.   Abdominal:      General: There is no distension.   Musculoskeletal:      Right lower leg: No edema.      Left lower leg: No edema.   Skin:     General: Skin is warm and dry.      Coloration: Skin is not jaundiced or pale.   Neurological:      General: No focal deficit present.      Mental Status: She is alert and oriented to person, place, and time.   Psychiatric:         Behavior: Behavior normal.       Home Medications:   No current facility-administered medications on file prior to encounter.     Current Outpatient Medications on File Prior to Encounter   Medication Sig Dispense Refill    ALPRAZolam (XANAX) 0.25 MG tablet Take 0.25 mg by mouth 2 (two) times a day.      aspirin (ECOTRIN) 81 MG EC tablet Take 1 tablet (81 mg total) by mouth 2 (two) times a day. 60 tablet 0    estradiol 0.05 mg/24 hr td ptsw (VIVELLE-DOT) 0.05 mg/24 hr Place 1 patch onto the skin every Wednesday AND 1 patch every Sunday. 8 patch 0    metoprolol tartrate (LOPRESSOR) 25 MG tablet Take 25 mg by mouth 2 (two) times daily.      tamsulosin (FLOMAX) 0.4 mg Cap Take 1 capsule (0.4 mg total) by mouth every evening. for 14 days 14 capsule 0    furosemide (LASIX) 40 MG tablet Take 1 tablet (40 mg total) by mouth once daily. for 14 days 14 tablet 0    QUEtiapine (SEROQUEL) 25 MG Tab Take 12.5 mg by mouth every evening.       Current Inpatient Medications:    Current Facility-Administered Medications:     acetaminophen tablet 650 mg, 650 mg, Oral, Q8H PRN, Naye Martinez PA-C    acetaminophen tablet 650 mg, 650 mg, Oral, Q4H PRN, Naye Martinez PA-C    aspirin EC tablet  81 mg, 81 mg, Oral, BID, Kimberly Cooper MD, 81 mg at 06/18/24 2011    dapagliflozin propanediol (Farxiga) tablet 10 mg, 10 mg, Oral, Daily, Dana King FNP    dextrose 10% bolus 125 mL 125 mL, 12.5 g, Intravenous, PRN, Naye Martinez, PA-ONEIL    dextrose 10% bolus 250 mL 250 mL, 25 g, Intravenous, PRN, Naye Martinez, ANURAG    digoxin injection 250 mcg, 250 mcg, Intravenous, Q6H PRN, JanetteCallumMichael Q., NP, 250 mcg at 06/16/24 2041    doxycycline tablet 100 mg, 100 mg, Oral, Q12H, Kimberly Cooper MD, 100 mg at 06/18/24 2011    enoxaparin injection 40 mg, 40 mg, Subcutaneous, Q24H (prophylaxis, 1700), Kimberly Cooper MD, 40 mg at 06/18/24 1711    glucagon (human recombinant) injection 1 mg, 1 mg, Intramuscular, PRN, Naye Martinez, ANURAG    glucose chewable tablet 16 g, 16 g, Oral, PRN, Naye Martinez, PA-ONEIL    glucose chewable tablet 24 g, 24 g, Oral, PRN, Naye Martinez PA-C    haloperidol lactate injection 2 mg, 2 mg, Intravenous, Q6H PRN, Kimberly Cooper MD    levalbuterol nebulizer solution 0.63 mg, 0.63 mg, Nebulization, Q4H PRN, Charley Wilder FNP, 0.63 mg at 06/18/24 2035    metoprolol tartrate (LOPRESSOR) tablet 50 mg, 50 mg, Oral, BID, Merly Heart MD, 50 mg at 06/18/24 2010    ondansetron injection 4 mg, 4 mg, Intravenous, Q4H PRN, Naye Martinez, ANURAG    potassium chloride SA CR tablet 40 mEq, 40 mEq, Oral, Once, Bux, Ana María, DO    quetiapine split tablet 12.5 mg, 12.5 mg, Oral, QHS, Bux, Ana María, DO, 12.5 mg at 06/18/24 2011    sodium chloride 0.9% flush 10 mL, 10 mL, Intravenous, Q12H PRN, Naye Martinez, ANURAG    tamsulosin 24 hr capsule 0.4 mg, 0.4 mg, Oral, QHS, Kimberly Cooper MD, 0.4 mg at 06/18/24 2010    valsartan tablet 40 mg, 40 mg, Oral, BID, Dana King FNP, 40 mg at 06/18/24 2011  VTE Risk Mitigation (From admission, onward)           Ordered     enoxaparin injection 40 mg  Every 24 hours         06/17/24 7173                  Assessment:   Afib with RVR ~ Now  NSR        - [id number]  Acute on chronic Diastolic Heart failure ~ resolved      - Grade II Diastolic Dysfunction  VHD     -Per TTE 6.17.24, Mod-Severe MR  HTN  COPD   Anemia  Osteoporosis       - Left interotrochanteric femur fracture/left hip IM nailing (4.9.24)     Plan:   Cont. metoprolol 50mg BID  Cont. ASA 81mg daily  Avoid OAC as patient is high risk for adverse bleeding event given recurrent falls and frailty   Cont. GDMT for Grade II Diastolic dysfunction: Valsartan 40mg PO & Farxiga 10mg daily   Echo reviewed by Dr. Molina; MR reviewed;  no need for intervention for MR at this time.  Cardiology to sign off, please reconsult if needed, will f/u in clinic         Nimco Espinoza NP  Cardiology  [facility] - Emergency Dept  06/19/2024   .

## 2024-06-19 NOTE — DISCHARGE SUMMARY
[facility] Hospital Medicine Discharge Summary    Admit Date: 6/16/2024  Discharge Date and Time: 6/19/202412:22 PM  Admitting Physician: MARISSA Team  Discharging Physician: Ana María Cedillo DO.  Primary Care Physician: Iglesia Steel MD  Consults: Cardiology    Discharge Diagnoses:  Acute decompensated diastolic heart failure, improved  Bilateral pleural effusion secondary to above  AFib RVR, now in sinus rhythm  Suspected acute bacterial UTI, culture negative, ruled out  Moderate-to-severe MR  Left antecubital mild cellulitis   Essential HTN-stable   History of COPD/emphysema   History of left hip fracture secondary to osteoporosis requiring intramedullary nailing 04/09/2024    Hospital Course:   Safia Muñoz is a 89 y.o. White female with a past medical history of hypertension, congestive heart failure, atrial fibrillation, COPD not on home oxygen, anxiety/depression and resident of [facility]. I called the [place] but nurse could not be located to give history. The patient presented to [facility] on 6/16/2024 with a primary complaint of respiratory distress.  Patient reports burning with urination, chronic cough and swelling to bilateral lower extremities for the last day. Patient denies complaints of shortness of breath, chest pain, abdominal pain, nausea, vomiting, diarrhea.     Upon presentation to the ED, temperature 97.8° F, heart rate 155, respiratory rate 29, blood pressure 141/114 and SpO2 98% on room air.  Patient was placed on 3 L OxyMask which has been titrated down to room air with SpO2 ranging from 90-96%. CBC within limits.  Alkaline phosphatase 153, BNP 1,182.5, initial troponin 0.020.  UA with trace mucus, 3-5 hyaline casts, trace squamous epithelial cells, occasional bacteria, 21-50 WBCs, 500 leukocyte esterase, 1+ nitrites. Initial EKG with supraventricular tachycardia with a ventricular rate 152 and unable to rule out age undetermined anterior  infarct. Chest x-ray with no acute chest disease.  CTA of the chest without pulmonary embolism but bilateral pleural effusions.  In ED patient received IV Cardizem, IV amiodarone, Lasix, metoprolol, Xopenex and Rocephin for UTI.  Amiodarone drip was initiated. Cardiology consulted.  Patient is admitted to hospital medicine services for further medical management.  She was also started on doxycycline for mild antecubital cellulitis on the right arm.  Patient will complete course of doxycycline for cellulitis    Patient converted back to sinus rhythm and amiodarone was discontinued and patient was started on Lopressor 50 mg b.i.d..  Patient was continued on daily use of aspirin 81 mg.  Oral anticoagulant was avoided due to patient high-risk for adverse bleeding given recurrent falls and fragility.  Patient was continued on IV Lasix for heart failure.  Echo was done which showed grade 2 diastolic dysfunction with preserved EF.  Echo also showed moderate-to-severe MR and structural cardiology reviewed the echo and no indication for intervention for MR this time.  GDM T was also optimized with valsartan and Farxiga added by Cardiology.  Patient did well and had no other complaints at this time.  Patient worked with PT OT and recommended low intensity therapy.  Patient will be going back to assisted living with home health.  Denied any acute concerns at this time.  Unable to wean patient completely off of oxygenation.  Patient says she does have history of smoking and COPD in the past.  Patient was weaned down to 1 L however we will give her oxygen to go back to assisted living with an can follow up with home health and weaned off later if able.  No other acute concerns at this time.  Labs and vitals reviewed largely unremarkable.  Patient did have mild hypokalemia at 3.4 and was given 40 mEq of potassium for replacement   And we will have patient repeat blood work in a few days after discharge to monitor potassium and  kidney function.  Patient will follow up with the PCP and Cardiology closely after discharge.       Pt was seen and examined on the day of discharge  Vitals:  VITAL SIGNS: 24 HRS MIN & MAX LAST   Temp  Min: 97.4 °F (36.3 °C)  Max: 97.8 °F (36.6 °C) 97.8 °F (36.6 °C)   BP  Min: 127/65  Max: 150/69 (!) 144/72   Pulse  Min: 61  Max: 73  69   Resp  Min: 16  Max: 20 16   SpO2  Min: 90 %  Max: 99 % (!) 92 %       Physical Exam:  General: In no acute distress, afebrile  Chest: Clear to auscultation bilaterally, on 4 L  Heart: RRR, +S1, S2, no appreciable murmur  Abdomen: Soft, nontender, BS +  MSK: Warm, no lower extremity edema, no clubbing or cyanosis; erythema right antecubital region  Neurologic: Alert and oriented x4, Cranial nerve II-XII intact, Strength 5/5 in all 4 extremities    Procedures Performed: No admission procedures for hospital encounter.     Significant Diagnostic Studies: See Full reports for all details    Recent Labs   Lab 06/16/24  1851 06/18/24  0428 06/19/24  0503   WBC 9.52 9.64 8.53   RBC 4.25 3.90* 3.93*   HGB 12.2 11.2* 11.1*   HCT 39.0 34.4* 35.5*   MCV 91.8 88.2 90.3   MCH 28.7 28.7 28.2   MCHC 31.3* 32.6* 31.3*   RDW 14.0 13.9 13.8    295 298   MPV 10.8* 11.2* 10.7*       Recent Labs   Lab 06/16/24  1818 06/18/24  0428 06/19/24  0503    135* 137   K 4.4 3.2* 3.4*    101 98   CO2 25 27 31   BUN 17.1 15.5 17.0   CREATININE 0.90 0.82 0.83   CALCIUM 8.8 8.2* 8.2*   ALBUMIN 3.2* 2.9*  --    ALKPHOS 153* 131  --    ALT 23 16  --    AST 27 18  --    BILITOT 0.4 0.5  --         Microbiology Results (last 7 days)       Procedure Component Value Units Date/Time    Urine culture [[id number]]  (Abnormal) Collected: 06/16/24 6    Order Status: Completed Specimen: Urine Updated: 06/18/24 0827     Urine Culture 10,000 - 25,000 colonies/ml - 2 different species of Gram-negative Rods      10,000 - 25,000 colonies/ml Gram-positive Cocci     Comment: We have not further evaluated these  organisms because three or more species compatible with normal genital jacques usually represents specimen contamination from the time of collection, rather than infection. If clinical circumstances warrant further   workup of these organisms, please contact the Microbiology dept at [phone]; otherwise please have the patient submit another specimen.                Echo    Left Ventricle: The left ventricle is normal in size. There is   concentric remodeling. There is normal systolic function with a visually   estimated ejection fraction of 60 - 65%. Grade II diastolic dysfunction.   Elevated left ventricular filling pressure.    Right Ventricle: Normal right ventricular cavity size. Systolic   function is normal.    Aortic Valve: The aortic valve is a trileaflet valve. Aortic valve peak   velocity is 1.29 m/s. Mean gradient is 4 mmHg.    Mitral Valve: Mildly thickened leaflets. The mean pressure gradient   across the mitral valve is 2 mmHg at a heart rate of  bpm. There is   moderate regurgitation. MV EROA by PISA is 0.18 cm2. Visually, the mitral   regurgitaiton appears closer to moderate-severe.    Tricuspid Valve: There is mild regurgitation.    Pericardium: There is no pericardial effusion.         Medication List        START taking these medications      dapagliflozin propanediol 10 mg tablet  Commonly known as: Farxiga  Take 1 tablet (10 mg total) by mouth once daily.  Start taking on: June 20, 2024     doxycycline 100 MG tablet  Commonly known as: VIBRA-TABS  Take 1 tablet (100 mg total) by mouth every 12 (twelve) hours. for 3 days     valsartan 40 MG tablet  Commonly known as: DIOVAN  Take 1 tablet (40 mg total) by mouth 2 (two) times daily.            CHANGE how you take these medications      furosemide 20 MG tablet  Commonly known as: LASIX  Take 1 tablet (20 mg total) by mouth once daily.  What changed:   medication strength  how much to take     metoprolol tartrate 50 MG tablet  Commonly known as:  LOPRESSOR  Take 1 tablet (50 mg total) by mouth 2 (two) times daily.  What changed:   medication strength  how much to take            CONTINUE taking these medications      ALPRAZolam 0.25 MG tablet  Commonly known as: XANAX     aspirin 81 MG EC tablet  Commonly known as: ECOTRIN  Take 1 tablet (81 mg total) by mouth 2 (two) times a day.     estradiol 0.05 mg/24 hr td ptsw 0.05 mg/24 hr  Commonly known as: VIVELLE-DOT  Place 1 patch onto the skin every Wednesday AND 1 patch every Sunday.     QUEtiapine 25 MG Tab  Commonly known as: SEROQUEL     tamsulosin 0.4 mg Cap  Commonly known as: FLOMAX  Take 1 capsule (0.4 mg total) by mouth every evening.               Where to Get Your Medications        These medications were sent to [facility]/pharmacy #[id number] - [place] - [address] AT [address]  [address], [place] [address]      Phone: [phone]   aspirin 81 MG EC tablet  dapagliflozin propanediol 10 mg tablet  doxycycline 100 MG tablet  furosemide 20 MG tablet  metoprolol tartrate 50 MG tablet  tamsulosin 0.4 mg Cap  valsartan 40 MG tablet          Explained in detail to the patient about the discharge plan, medications, and follow-up visits. Pt understands and agrees with the treatment plan  Discharge Disposition:  Assisted living with home health  Discharged Condition: stable  Diet-   Dietary Orders (From admission, onward)       Start     Ordered    06/17/24 0756  Diet Heart Healthy Low Sodium,2gm  (Diet/Nutrition - [facility])  Diet effective now        Question:  Diet Modifier:  Answer:  Low Sodium,2gm    06/17/24 0755                   Medications Per DC med rec  Activities as tolerated   Follow-up Information       [facility] Follow up.    Specialties: Home Health Services, Home Therapy Services, Home Living Aide Services  Why: This is your current home health provider.  Contact information:  [address]  [facility]  [address]  [phone]             Iglesia Steel MD. Schedule an appointment as soon as possible for a visit in 1 week(s).    Specialty: Internal Medicine  Contact information:  [address]  [address]  [place] [address]  [phone]               Frandy Robledo MD. Schedule an appointment as soon as possible for a visit in 2 week(s).    Specialty: Cardiology  Contact information:  [address] [place] [address]  [phone]                           For further questions contact hospitalist office    Discharge time 33 minutes    For worsening symptoms, chest pain, shortness of breath, increased abdominal pain, high grade fever, stroke or stroke like symptoms, immediately go to the nearest Emergency Room or call 911 as soon as possible.      Ana María Cedillo DO  Department of Hospital Medicine  [facility]  06/19/2024

## 2024-06-26 ENCOUNTER — LAB REQUISITION (OUTPATIENT)
Dept: LAB | Facility: HOSPITAL | Age: 89
End: 2024-06-26
Payer: MEDICARE

## 2024-06-26 DIAGNOSIS — I48.91 UNSPECIFIED ATRIAL FIBRILLATION: ICD-10-CM

## 2024-06-26 DIAGNOSIS — D64.9 ANEMIA, UNSPECIFIED: ICD-10-CM

## 2024-06-26 DIAGNOSIS — J44.9 CHRONIC OBSTRUCTIVE PULMONARY DISEASE, UNSPECIFIED: ICD-10-CM

## 2024-06-26 LAB
25(OH)D3+25(OH)D2 SERPL-MCNC: 45 NG/ML (ref 30–80)
ALBUMIN SERPL-MCNC: 3.1 G/DL (ref 3.4–4.8)
ALBUMIN/GLOB SERPL: 1.1 RATIO (ref 1.1–2)
ALP SERPL-CCNC: 157 UNIT/L (ref 40–150)
ALT SERPL-CCNC: 15 UNIT/L (ref 0–55)
ANION GAP SERPL CALC-SCNC: 10 MEQ/L
AST SERPL-CCNC: 26 UNIT/L (ref 5–34)
BACTERIA #/AREA URNS AUTO: ABNORMAL /HPF
BASOPHILS # BLD AUTO: 0.08 X10(3)/MCL
BASOPHILS NFR BLD AUTO: 1 %
BILIRUB SERPL-MCNC: 0.5 MG/DL
BILIRUB UR QL STRIP.AUTO: NEGATIVE
BNP BLD-MCNC: 535.9 PG/ML
BUN SERPL-MCNC: 13.6 MG/DL (ref 9.8–20.1)
CALCIUM SERPL-MCNC: 8.5 MG/DL (ref 8.4–10.2)
CHLORIDE SERPL-SCNC: 105 MMOL/L (ref 98–111)
CHOLEST SERPL-MCNC: 142 MG/DL
CHOLEST/HDLC SERPL: 3 {RATIO} (ref 0–5)
CLARITY UR: ABNORMAL
CO2 SERPL-SCNC: 23 MMOL/L (ref 23–31)
COLOR UR AUTO: ABNORMAL
CREAT SERPL-MCNC: 0.83 MG/DL (ref 0.55–1.02)
CREAT/UREA NIT SERPL: 16
EOSINOPHIL # BLD AUTO: 0.36 X10(3)/MCL (ref 0–0.9)
EOSINOPHIL NFR BLD AUTO: 4.6 %
ERYTHROCYTE [DISTWIDTH] IN BLOOD BY AUTOMATED COUNT: 13.8 % (ref 11.5–17)
EST. AVERAGE GLUCOSE BLD GHB EST-MCNC: 96.8 MG/DL
FOLATE SERPL-MCNC: 8.4 NG/ML (ref 7–31.4)
GFR SERPLBLD CREATININE-BSD FMLA CKD-EPI: >60 ML/MIN/1.73/M2
GLOBULIN SER-MCNC: 2.9 GM/DL (ref 2.4–3.5)
GLUCOSE SERPL-MCNC: 77 MG/DL (ref 75–121)
GLUCOSE UR QL STRIP: ABNORMAL
HBA1C MFR BLD: 5 %
HCT VFR BLD AUTO: 37.5 % (ref 37–47)
HDLC SERPL-MCNC: 46 MG/DL (ref 35–60)
HGB BLD-MCNC: 12.3 G/DL (ref 12–16)
HGB UR QL STRIP: NEGATIVE
IMM GRANULOCYTES # BLD AUTO: 0.03 X10(3)/MCL (ref 0–0.04)
IMM GRANULOCYTES NFR BLD AUTO: 0.4 %
KETONES UR QL STRIP: NEGATIVE
LDLC SERPL CALC-MCNC: 80 MG/DL (ref 50–140)
LEUKOCYTE ESTERASE UR QL STRIP: 75
LYMPHOCYTES # BLD AUTO: 2.2 X10(3)/MCL (ref 0.6–4.6)
LYMPHOCYTES NFR BLD AUTO: 28.2 %
MCH RBC QN AUTO: 28.7 PG (ref 27–31)
MCHC RBC AUTO-ENTMCNC: 32.8 G/DL (ref 33–36)
MCV RBC AUTO: 87.6 FL (ref 80–94)
MICROALBUMIN UR-MCNC: 9 UG/ML
MONOCYTES # BLD AUTO: 0.76 X10(3)/MCL (ref 0.1–1.3)
MONOCYTES NFR BLD AUTO: 9.7 %
NEUTROPHILS # BLD AUTO: 4.37 X10(3)/MCL (ref 2.1–9.2)
NEUTROPHILS NFR BLD AUTO: 56.1 %
NITRITE UR QL STRIP: NEGATIVE
NRBC BLD AUTO-RTO: 0 %
PH UR STRIP: 6.5 [PH]
PLATELET # BLD AUTO: 405 X10(3)/MCL (ref 130–400)
PMV BLD AUTO: 10.5 FL (ref 7.4–10.4)
POTASSIUM SERPL-SCNC: 3.8 MMOL/L (ref 3.5–5.1)
PROT SERPL-MCNC: 6 GM/DL (ref 5.8–7.6)
PROT UR QL STRIP: NEGATIVE
RBC # BLD AUTO: 4.28 X10(6)/MCL (ref 4.2–5.4)
RBC #/AREA URNS AUTO: ABNORMAL /HPF
SODIUM SERPL-SCNC: 138 MMOL/L (ref 136–145)
SP GR UR STRIP.AUTO: 1.01 (ref 1–1.03)
SQUAMOUS #/AREA URNS LPF: ABNORMAL /HPF
TRIGL SERPL-MCNC: 80 MG/DL (ref 37–140)
TSH SERPL-ACNC: 5.69 UIU/ML (ref 0.35–4.94)
UROBILINOGEN UR STRIP-ACNC: NORMAL
VIT B12 SERPL-MCNC: >2000 PG/ML (ref 213–816)
VLDLC SERPL CALC-MCNC: 16 MG/DL
WBC # BLD AUTO: 7.8 X10(3)/MCL (ref 4.5–11.5)
WBC #/AREA URNS AUTO: ABNORMAL /HPF
YEAST BUDDING URNS QL: ABNORMAL /HPF

## 2024-06-26 PROCEDURE — 87086 URINE CULTURE/COLONY COUNT: CPT | Performed by: NURSE PRACTITIONER

## 2024-06-26 PROCEDURE — 82746 ASSAY OF FOLIC ACID SERUM: CPT | Performed by: NURSE PRACTITIONER

## 2024-06-26 PROCEDURE — 84443 ASSAY THYROID STIM HORMONE: CPT | Performed by: NURSE PRACTITIONER

## 2024-06-26 PROCEDURE — 82043 UR ALBUMIN QUANTITATIVE: CPT | Performed by: NURSE PRACTITIONER

## 2024-06-26 PROCEDURE — 82607 VITAMIN B-12: CPT | Performed by: NURSE PRACTITIONER

## 2024-06-26 PROCEDURE — 85025 COMPLETE CBC W/AUTO DIFF WBC: CPT | Performed by: NURSE PRACTITIONER

## 2024-06-26 PROCEDURE — 82306 VITAMIN D 25 HYDROXY: CPT | Performed by: NURSE PRACTITIONER

## 2024-06-26 PROCEDURE — 83036 HEMOGLOBIN GLYCOSYLATED A1C: CPT | Performed by: NURSE PRACTITIONER

## 2024-06-26 PROCEDURE — 80061 LIPID PANEL: CPT | Performed by: NURSE PRACTITIONER

## 2024-06-26 PROCEDURE — 83880 ASSAY OF NATRIURETIC PEPTIDE: CPT | Performed by: NURSE PRACTITIONER

## 2024-06-26 PROCEDURE — 80053 COMPREHEN METABOLIC PANEL: CPT | Performed by: NURSE PRACTITIONER

## 2024-06-26 PROCEDURE — 81015 MICROSCOPIC EXAM OF URINE: CPT | Performed by: NURSE PRACTITIONER

## 2024-06-26 PROCEDURE — 81001 URINALYSIS AUTO W/SCOPE: CPT | Performed by: NURSE PRACTITIONER

## 2024-06-29 LAB
BACTERIA UR CULT: ABNORMAL
BACTERIA UR CULT: ABNORMAL

## 2024-07-15 PROBLEM — N39.0 FREQUENT UTI: Status: RESOLVED | Noted: 2024-04-12 | Resolved: 2024-07-15

## 2024-07-17 ENCOUNTER — LAB REQUISITION (OUTPATIENT)
Dept: LAB | Facility: HOSPITAL | Age: 89
End: 2024-07-17
Payer: MEDICARE

## 2024-07-17 DIAGNOSIS — R31.9 HEMATURIA, UNSPECIFIED: ICD-10-CM

## 2024-07-17 LAB
BACTERIA #/AREA URNS AUTO: ABNORMAL /HPF
BILIRUB UR QL STRIP.AUTO: NEGATIVE
CLARITY UR: ABNORMAL
COLOR UR AUTO: YELLOW
GLUCOSE UR QL STRIP: ABNORMAL
HGB UR QL STRIP: ABNORMAL
KETONES UR QL STRIP: NEGATIVE
LEUKOCYTE ESTERASE UR QL STRIP: 500
NITRITE UR QL STRIP: NEGATIVE
PH UR STRIP: 8.5 [PH]
PROT UR QL STRIP: ABNORMAL
RBC #/AREA URNS AUTO: ABNORMAL /HPF
SP GR UR STRIP.AUTO: 1.01 (ref 1–1.03)
SQUAMOUS #/AREA URNS LPF: ABNORMAL /HPF
UROBILINOGEN UR STRIP-ACNC: NORMAL
WBC #/AREA URNS AUTO: >100 /HPF
WBC CLUMPS UR QL AUTO: ABNORMAL

## 2024-07-17 PROCEDURE — 87086 URINE CULTURE/COLONY COUNT: CPT | Performed by: NURSE PRACTITIONER

## 2024-07-17 PROCEDURE — 87186 SC STD MICRODIL/AGAR DIL: CPT | Performed by: NURSE PRACTITIONER

## 2024-07-17 PROCEDURE — 81001 URINALYSIS AUTO W/SCOPE: CPT | Performed by: NURSE PRACTITIONER

## 2024-07-20 LAB
BACTERIA UR CULT: ABNORMAL
BACTERIA UR CULT: ABNORMAL

## 2024-07-28 ENCOUNTER — HOSPITAL ENCOUNTER (EMERGENCY)
Facility: HOSPITAL | Age: 89
Discharge: HOME | End: 2024-07-28
Attending: EMERGENCY MEDICINE
Payer: MEDICARE

## 2024-07-28 VITALS
HEIGHT: 62 IN | RESPIRATION RATE: 17 BRPM | OXYGEN SATURATION: 95 % | SYSTOLIC BLOOD PRESSURE: 166 MMHG | BODY MASS INDEX: 27.49 KG/M2 | HEART RATE: 62 BPM | DIASTOLIC BLOOD PRESSURE: 72 MMHG | WEIGHT: 149.38 LBS | TEMPERATURE: 98 F

## 2024-07-28 DIAGNOSIS — S51.811A SKIN TEAR OF RIGHT FOREARM WITHOUT COMPLICATION, INITIAL ENCOUNTER: Primary | ICD-10-CM

## 2024-07-28 DIAGNOSIS — S09.90XA CLOSED HEAD INJURY, INITIAL ENCOUNTER: ICD-10-CM

## 2024-07-28 DIAGNOSIS — R52 PAIN: ICD-10-CM

## 2024-07-28 PROCEDURE — 99284 EMERGENCY DEPT VISIT MOD MDM: CPT | Mod: 25

## 2024-07-28 NOTE — ED PROVIDER NOTES
Encounter Date: 7/28/2024    SCRIBE #1 NOTE: I, Irina Medeiros, am scribing for, and in the presence of,  Zara Flanagan MD. I have scribed the following portions of the note - Other sections scribed: HPI, ROS, PE.       History     Chief Complaint   Patient presents with    Fall     Via [facility] from the [facility] for a fall. Hematoma to posterior head, - BT. Baseline GCS 14     Patient is a 89 year old female with a hx of COPD and HTN presents to the ED via EMS following a fall PTA. Patient reports walking in her home and slipped which cause her fall. She reports hitting the back of her head and her right forearm. She reports the back of her head doesn't hurt very much but her right upper arm hurts. She reports being treated for a UTI recently. She reports recently she has been ambulating with a walker but states she doesn't really need it. She reports using a walker because she's afraid she's going to fall.    The history is provided by the patient and medical records. No  was used.     Review of patient's allergies indicates:   Allergen Reactions    Adhesive tape-silicones Blisters    Ofloxacin     Penicillins     Sulfamethoxazole-trimethoprim      Other reaction(s): Unknown    Codeine Nausea Only     Past Medical History:   Diagnosis Date    COPD (chronic obstructive pulmonary disease)     Hypertension      Past Surgical History:   Procedure Laterality Date    FRACTURE SURGERY  4/8/24    HYSTERECTOMY  1964    RETROGRADE INTRAMEDULLARY RODDING OF DISTAL FEMUR Left 04/09/2024    Procedure: INSERTION, INTRAMEDULLARY NAIL INTERTROCHENTERIC FRACTURE;  Surgeon: Guy Jaquez DO;  Location: [facility];  Service: Orthopedics;  Laterality: Left;  HANA TABLE, SYNTHES TFNA     Family History   Problem Relation Name Age of Onset    Cancer Mother Mother     Heart disease Father Father     Stroke Father Father      Social History     Tobacco Use    Smoking status: Former     Current packs/day: 0.00     Average  packs/day: 1 pack/day for 35.0 years (35.0 ttl pk-yrs)     Types: Cigarettes     Start date: 1950     Quit date: 1985     Years since quittin.5    Smokeless tobacco: Never   Substance Use Topics    Alcohol use: Not Currently    Drug use: Never     Review of Systems   HENT:          Positive for head pain   Musculoskeletal:         Positive for right arm pain       Physical Exam     Initial Vitals [24 1528]   BP Pulse Resp Temp SpO2   (!) 164/74 65 18 98.4 °F (36.9 °C) 95 %      MAP       --         Physical Exam    Nursing note and vitals reviewed.  Constitutional: She appears well-developed and well-nourished. She is not diaphoretic. No distress.   HENT:   Head: Normocephalic.   Nose: Nose normal.   Mouth/Throat: Oropharynx is clear and moist.   Hematoma to the back of the head   Eyes: Conjunctivae and EOM are normal. Pupils are equal, round, and reactive to light.   Neck: Trachea normal. Neck supple.   Normal range of motion.  Cardiovascular:  Normal rate, regular rhythm, normal heart sounds and intact distal pulses.           No murmur heard.  Pulmonary/Chest: Breath sounds normal. No respiratory distress. She has no wheezes. She has no rhonchi. She has no rales. She exhibits no tenderness.   Abdominal: Abdomen is soft. Bowel sounds are normal. She exhibits no distension and no mass. There is no abdominal tenderness. There is no rebound and no guarding.   Musculoskeletal:         General: Normal range of motion.      Cervical back: Normal range of motion and neck supple.      Lumbar back: Normal. Normal range of motion.      Comments: Skin tears and hematoma to RUE     Neurological: She is alert and oriented to person, place, and time. She has normal strength. No cranial nerve deficit or sensory deficit.   Skin: Skin is warm and dry. Capillary refill takes less than 2 seconds. No abscess noted. No erythema. No pallor.   Psychiatric: She has a normal mood and affect. Her behavior is normal.  Judgment and thought content normal.         ED Course   Procedures  Labs Reviewed - No data to display       Imaging Results              CT Cervical Spine Without Contrast (Final result)  Result time 07/28/24 16:34:18      Final result by Corinna Zurita MD (07/28/24 16:34:18)                   Impression:      No acute fracture identified.      Electronically signed by: Corinna Zurita  Date:    07/28/2024  Time:    16:34               Narrative:    EXAMINATION:  CT CERVICAL SPINE WITHOUT CONTRAST    CLINICAL HISTORY:  Neck trauma (Age >= 65y);    TECHNIQUE:  Noncontrast CT images of the cervical spine. Axial, coronal, and sagittal reformatted images were obtained. Dose length product is 308 mGycm. Automatic exposure control, adjustment of mA/kV or iterative reconstruction technique was used to limit radiation dose.    COMPARISON:  CT cervical spine dated 02/27/2019    FINDINGS:  The cervical spine is visualized through the level T1.    There is no acute fracture identified.  There is reversal of normal cervical lordosis grade 1 anterolisthesis of C3, C4 and C5.  There are multilevel degenerative changes with disc height loss, marginal osteophyte formation and facet arthropathy.  There is a subcutaneous hematoma in the suboccipital scalp.                                       CT Head Without Contrast (Final result)  Result time 07/28/24 16:30:06      Final result by Corinna Zurita MD (07/28/24 16:30:06)                   Impression:      1. No acute intracranial abnormality.  2. Chronic microvascular ischemic changes.      Electronically signed by: Corinna Zurita  Date:    07/28/2024  Time:    16:30               Narrative:    EXAMINATION:  CT HEAD WITHOUT CONTRAST    CLINICAL HISTORY:  Head trauma, minor (Age >= 65y);    TECHNIQUE:  Axial scans were obtained from skull base to the vertex.    Coronal and sagittal reconstructions obtained from the axial data.    Automatic exposure control was  utilized to limit radiation dose.    Contrast: None    Radiation Dose:    Total DLP: 952 mGy*cm    COMPARISON:  CT head dated 02/27/2019    FINDINGS:  There is no acute intracranial hemorrhage or edema. The gray-white matter differentiation is preserved.  Patchy hypodensities in the subcortical and periventricular white matter likely represent chronic microvascular ischemic changes.    There is no mass effect or midline shift.  There is diffuse parenchymal volume loss.  The basal cisterns are patent. There is no abnormal extra-axial fluid collection.  Carotid and vertebral artery calcifications are noted.    The calvarium and skull base are intact. The visualized paranasal sinuses and the mastoid air cells are clear.                                       X-Ray Forearm Right (Final result)  Result time 07/28/24 16:12:25      Final result by Seb Saha MD (07/28/24 16:12:25)                   Impression:      No acute findings.      Electronically signed by: Seb Saha  Date:    07/28/2024  Time:    16:12               Narrative:    EXAMINATION:  XR FOREARM RIGHT    CLINICAL HISTORY:  Pain, unspecified    COMPARISON:  None    FINDINGS:  Two views right forearm.  There is no fracture or dislocation.                                       Medications - No data to display  Medical Decision Making  The differential diagnosis includes, but is not limited to, closed head injury, intra cranial hemorrhage, and hematoma.  Ct head and c spine, xr forearm ordered and reviewed and negative  Dress wound, tetanus utd, dc with pcp f/u, local therapy    Problems Addressed:  Closed head injury, initial encounter: acute illness or injury that poses a threat to life or bodily functions  Pain: acute illness or injury that poses a threat to life or bodily functions  Skin tear of right forearm without complication, initial encounter: acute illness or injury that poses a threat to life or bodily functions    Amount and/or Complexity  of Data Reviewed  External Data Reviewed: notes.     Details: Admit for femur fracture, a flutter  Radiology: ordered and independent interpretation performed.    Risk  OTC drugs.            Scribe Attestation:   Scribe #1: I performed the above scribed service and the documentation accurately describes the services I performed. I attest to the accuracy of the note.  Comments: Attending:   Physician Attestation Statement for Scribe #1: IZara MD, personally performed the services described in this documentation. All medical record entries made by the scribe were at my direction and in my presence.  I have reviewed the chart and agree that the record reflects my personal performance and is accurate and complete.        Attending Attestation:           Physician Attestation for Scribe:  Physician Attestation Statement for Scribe #1: I, Zara Flanagan MD, reviewed documentation, as scribed by Irina Medeiros in my presence, and it is both accurate and complete.                                    Clinical Impression:  Final diagnoses:  [R52] Pain  [S51.811A] Skin tear of right forearm without complication, initial encounter (Primary)  [S09.90XA] Closed head injury, initial encounter                 Zara Flanagan MD  07/28/24 3179

## 2024-08-05 ENCOUNTER — LAB REQUISITION (OUTPATIENT)
Dept: LAB | Facility: HOSPITAL | Age: 89
End: 2024-08-05
Payer: MEDICARE

## 2024-08-05 DIAGNOSIS — J43.2 CENTRILOBULAR EMPHYSEMA: ICD-10-CM

## 2024-08-05 LAB
ANION GAP SERPL CALC-SCNC: 7 MEQ/L
BUN SERPL-MCNC: 14.3 MG/DL (ref 9.8–20.1)
CALCIUM SERPL-MCNC: 8.9 MG/DL (ref 8.4–10.2)
CHLORIDE SERPL-SCNC: 104 MMOL/L (ref 98–111)
CO2 SERPL-SCNC: 25 MMOL/L (ref 23–31)
CREAT SERPL-MCNC: 0.86 MG/DL (ref 0.55–1.02)
CREAT/UREA NIT SERPL: 17
GFR SERPLBLD CREATININE-BSD FMLA CKD-EPI: >60 ML/MIN/1.73/M2
GLUCOSE SERPL-MCNC: 126 MG/DL (ref 75–121)
POTASSIUM SERPL-SCNC: 4.6 MMOL/L (ref 3.5–5.1)
SODIUM SERPL-SCNC: 136 MMOL/L (ref 136–145)
TSH SERPL-ACNC: 3.03 UIU/ML (ref 0.35–4.94)

## 2024-08-05 PROCEDURE — 84443 ASSAY THYROID STIM HORMONE: CPT | Performed by: NURSE PRACTITIONER

## 2024-08-05 PROCEDURE — 80048 BASIC METABOLIC PNL TOTAL CA: CPT | Performed by: NURSE PRACTITIONER

## 2024-08-12 ENCOUNTER — LAB REQUISITION (OUTPATIENT)
Dept: LAB | Facility: HOSPITAL | Age: 89
End: 2024-08-12
Payer: MEDICARE

## 2024-08-12 DIAGNOSIS — I10 ESSENTIAL (PRIMARY) HYPERTENSION: ICD-10-CM

## 2024-08-12 DIAGNOSIS — M81.0 AGE-RELATED OSTEOPOROSIS WITHOUT CURRENT PATHOLOGICAL FRACTURE: ICD-10-CM

## 2024-08-12 DIAGNOSIS — E78.2 MIXED HYPERLIPIDEMIA: ICD-10-CM

## 2024-08-12 LAB
25(OH)D3+25(OH)D2 SERPL-MCNC: 40 NG/ML (ref 30–80)
ALBUMIN SERPL-MCNC: 3.2 G/DL (ref 3.4–4.8)
ALBUMIN/GLOB SERPL: 1 RATIO (ref 1.1–2)
ALP SERPL-CCNC: 119 UNIT/L (ref 40–150)
ALT SERPL-CCNC: 19 UNIT/L (ref 0–55)
ANION GAP SERPL CALC-SCNC: 10 MEQ/L
AST SERPL-CCNC: 28 UNIT/L (ref 5–34)
BACTERIA #/AREA URNS AUTO: ABNORMAL /HPF
BASOPHILS # BLD AUTO: 0.07 X10(3)/MCL
BASOPHILS NFR BLD AUTO: 0.9 %
BILIRUB SERPL-MCNC: 0.5 MG/DL
BILIRUB UR QL STRIP.AUTO: NEGATIVE
BUN SERPL-MCNC: 15.4 MG/DL (ref 9.8–20.1)
CALCIUM SERPL-MCNC: 9.1 MG/DL (ref 8.4–10.2)
CHLORIDE SERPL-SCNC: 103 MMOL/L (ref 98–111)
CHOLEST SERPL-MCNC: 195 MG/DL
CHOLEST/HDLC SERPL: 3 {RATIO} (ref 0–5)
CLARITY UR: CLEAR
CO2 SERPL-SCNC: 24 MMOL/L (ref 23–31)
COLOR UR AUTO: ABNORMAL
CREAT SERPL-MCNC: 0.88 MG/DL (ref 0.55–1.02)
CREAT/UREA NIT SERPL: 18
EOSINOPHIL # BLD AUTO: 0.41 X10(3)/MCL (ref 0–0.9)
EOSINOPHIL NFR BLD AUTO: 5.4 %
ERYTHROCYTE [DISTWIDTH] IN BLOOD BY AUTOMATED COUNT: 14.6 % (ref 11.5–17)
GFR SERPLBLD CREATININE-BSD FMLA CKD-EPI: >60 ML/MIN/1.73/M2
GLOBULIN SER-MCNC: 3.1 GM/DL (ref 2.4–3.5)
GLUCOSE SERPL-MCNC: 86 MG/DL (ref 75–121)
GLUCOSE UR QL STRIP: ABNORMAL
HCT VFR BLD AUTO: 42.2 % (ref 37–47)
HDLC SERPL-MCNC: 60 MG/DL (ref 35–60)
HGB BLD-MCNC: 12.8 G/DL (ref 12–16)
HGB UR QL STRIP: NEGATIVE
IMM GRANULOCYTES # BLD AUTO: 0.02 X10(3)/MCL (ref 0–0.04)
IMM GRANULOCYTES NFR BLD AUTO: 0.3 %
KETONES UR QL STRIP: NEGATIVE
LDH SERPL-CCNC: 242 U/L (ref 125–220)
LDLC SERPL CALC-MCNC: 119 MG/DL (ref 50–140)
LEUKOCYTE ESTERASE UR QL STRIP: 500
LYMPHOCYTES # BLD AUTO: 1.84 X10(3)/MCL (ref 0.6–4.6)
LYMPHOCYTES NFR BLD AUTO: 24.3 %
MCH RBC QN AUTO: 27 PG (ref 27–31)
MCHC RBC AUTO-ENTMCNC: 30.3 G/DL (ref 33–36)
MCV RBC AUTO: 89 FL (ref 80–94)
MONOCYTES # BLD AUTO: 0.79 X10(3)/MCL (ref 0.1–1.3)
MONOCYTES NFR BLD AUTO: 10.4 %
NEUTROPHILS # BLD AUTO: 4.44 X10(3)/MCL (ref 2.1–9.2)
NEUTROPHILS NFR BLD AUTO: 58.7 %
NITRITE UR QL STRIP: NEGATIVE
NRBC BLD AUTO-RTO: 0 %
PH UR STRIP: 6.5 [PH]
PLATELET # BLD AUTO: 270 X10(3)/MCL (ref 130–400)
PLATELETS.RETICULATED NFR BLD AUTO: 2.4 % (ref 0.9–11.2)
PMV BLD AUTO: 10.4 FL (ref 7.4–10.4)
POTASSIUM SERPL-SCNC: 4.6 MMOL/L (ref 3.5–5.1)
PROT SERPL-MCNC: 6.3 GM/DL (ref 5.8–7.6)
PROT UR QL STRIP: NEGATIVE
RBC # BLD AUTO: 4.74 X10(6)/MCL (ref 4.2–5.4)
RBC #/AREA URNS AUTO: ABNORMAL /HPF
SODIUM SERPL-SCNC: 137 MMOL/L (ref 136–145)
SP GR UR STRIP.AUTO: 1.01 (ref 1–1.03)
SQUAMOUS #/AREA URNS LPF: ABNORMAL /HPF
TRIGL SERPL-MCNC: 82 MG/DL (ref 37–140)
UROBILINOGEN UR STRIP-ACNC: NORMAL
VLDLC SERPL CALC-MCNC: 16 MG/DL
WBC # BLD AUTO: 7.57 X10(3)/MCL (ref 4.5–11.5)
WBC #/AREA URNS AUTO: ABNORMAL /HPF

## 2024-08-12 PROCEDURE — 85025 COMPLETE CBC W/AUTO DIFF WBC: CPT | Performed by: INTERNAL MEDICINE

## 2024-08-12 PROCEDURE — 87184 SC STD DISK METHOD PER PLATE: CPT | Performed by: INTERNAL MEDICINE

## 2024-08-12 PROCEDURE — 87077 CULTURE AEROBIC IDENTIFY: CPT | Performed by: INTERNAL MEDICINE

## 2024-08-12 PROCEDURE — 80061 LIPID PANEL: CPT | Performed by: INTERNAL MEDICINE

## 2024-08-12 PROCEDURE — 82306 VITAMIN D 25 HYDROXY: CPT | Performed by: INTERNAL MEDICINE

## 2024-08-12 PROCEDURE — 80053 COMPREHEN METABOLIC PANEL: CPT | Performed by: INTERNAL MEDICINE

## 2024-08-12 PROCEDURE — 83615 LACTATE (LD) (LDH) ENZYME: CPT | Performed by: INTERNAL MEDICINE

## 2024-08-12 PROCEDURE — 81001 URINALYSIS AUTO W/SCOPE: CPT | Performed by: INTERNAL MEDICINE

## 2024-08-14 LAB — BACTERIA UR CULT: ABNORMAL

## 2024-08-15 ENCOUNTER — HOSPITAL ENCOUNTER (OUTPATIENT)
Dept: RADIOLOGY | Facility: CLINIC | Age: 89
Discharge: HOME | End: 2024-08-15
Attending: ORTHOPAEDIC SURGERY
Payer: MEDICARE

## 2024-08-15 ENCOUNTER — OFFICE VISIT (OUTPATIENT)
Dept: ORTHOPEDICS | Facility: CLINIC | Age: 89
End: 2024-08-15
Payer: MEDICARE

## 2024-08-15 VITALS
WEIGHT: 147.69 LBS | HEIGHT: 62 IN | BODY MASS INDEX: 27.18 KG/M2 | DIASTOLIC BLOOD PRESSURE: 80 MMHG | SYSTOLIC BLOOD PRESSURE: 144 MMHG

## 2024-08-15 DIAGNOSIS — S72.92XD CLOSED FRACTURE OF LEFT FEMUR WITH ROUTINE HEALING, UNSPECIFIED FRACTURE MORPHOLOGY, UNSPECIFIED PORTION OF FEMUR, SUBSEQUENT ENCOUNTER: ICD-10-CM

## 2024-08-15 DIAGNOSIS — S72.92XD CLOSED FRACTURE OF LEFT FEMUR WITH ROUTINE HEALING, UNSPECIFIED FRACTURE MORPHOLOGY, UNSPECIFIED PORTION OF FEMUR, SUBSEQUENT ENCOUNTER: Primary | ICD-10-CM

## 2024-08-15 PROCEDURE — 99213 OFFICE O/P EST LOW 20 MIN: CPT | Mod: ,,, | Performed by: ORTHOPAEDIC SURGERY

## 2024-08-15 PROCEDURE — 73552 X-RAY EXAM OF FEMUR 2/>: CPT | Mod: LT,,, | Performed by: ORTHOPAEDIC SURGERY

## 2024-08-15 NOTE — PROGRESS NOTES
Subjective:       Patient ID: Safia Muñoz is a 89 y.o. female.  Chief Complaint   Patient presents with    Left Femur - Follow-up     4.5 month f/u from IMN left IT femur fx. Ambulates with walker. Currently in physical therapy. Reports improvement in discomfort.         HPI    Patient presents for 4.5 mo follow up IMN left IT femur fracture. She is ambulatory with a walker.  She is here with family.  They have concerns with her driving.  No dull achy pain no complaints at this time overall feeling back to her baseline.  No numbness or tingling.    ROS:  Constitutional: Denies fever chills  Eyes: No change in vision  ENT: No ringing or current infections  CV: No chest pain  Resp: No labored breathing  MSK: Pain evident at site of injury located in HPI,   Integ: No signs of abrasions or lacerations  Neuro: No numbness or tingling  Lymphatic: No swelling outside the area of injury     Current Outpatient Medications on File Prior to Visit   Medication Sig Dispense Refill    ALPRAZolam (XANAX) 0.25 MG tablet Take 0.25 mg by mouth 2 (two) times a day.      aspirin (ECOTRIN) 81 MG EC tablet Take 1 tablet (81 mg total) by mouth 2 (two) times a day. 180 tablet 0    dapagliflozin propanediol (FARXIGA) 10 mg tablet Take 1 tablet (10 mg total) by mouth once daily. 90 tablet 0    furosemide (LASIX) 20 MG tablet Take 1 tablet (20 mg total) by mouth once daily. 90 tablet 3    metoprolol tartrate (LOPRESSOR) 50 MG tablet Take 1 tablet (50 mg total) by mouth 2 (two) times daily. 180 tablet 3    QUEtiapine (SEROQUEL) 25 MG Tab Take 12.5 mg by mouth every evening.      valsartan (DIOVAN) 40 MG tablet Take 1 tablet (40 mg total) by mouth 2 (two) times daily. 180 tablet 3    estradiol 0.05 mg/24 hr td ptsw (VIVELLE-DOT) 0.05 mg/24 hr Place 1 patch onto the skin every Wednesday AND 1 patch every Sunday. 8 patch 0    tamsulosin (FLOMAX) 0.4 mg Cap Take 1 capsule (0.4 mg total) by mouth every evening. 30 capsule 0     No current  "facility-administered medications on file prior to visit.          Objective:      BP (!) 144/80   Ht 5' 2" (1.575 m)   Wt 67 kg (147 lb 11.3 oz)   BMI 27.02 kg/m²   Physical Exam  General the patient is alert and oriented x3 no acute distress nontoxic-appearing appropriate affect.    Constitutional: Vital signs are examined and stable.  Resp: No signs of labored breathing    LLE: -Skin:No signs of new abrasions or lacerations, no scars           -MSK: Hip and Knee F/E, EHL/FHL, Gastroc/Tib anterior Strength 5/5           -Neuro:  Sensation intact to light touch L3-S1 dermatomes           -Lymphatic: No signs of lymphadenopathy           -CV: Capillary refill is less than 2 seconds. DP/PT pulses 2/4. Compartments soft and compressible                            Body mass index is 27.02 kg/m².  Ideal body weight: 50.1 kg (110 lb 7.2 oz)  Adjusted ideal body weight: 56.9 kg (125 lb 5.7 oz)  Hemoglobin A1c   Date Value Ref Range Status   06/26/2024 5.0 <=7.0 % Final   04/11/2024 5.9 <=7.0 % Final     Hgb   Date Value Ref Range Status   08/12/2024 12.8 12.0 - 16.0 g/dL Final   06/26/2024 12.3 12.0 - 16.0 g/dL Final     Hct   Date Value Ref Range Status   08/12/2024 42.2 37.0 - 47.0 % Final   06/26/2024 37.5 37.0 - 47.0 % Final     Iron Level   Date Value Ref Range Status   04/13/2024 36 (L) 50 - 170 ug/dL Final   04/10/2024 26 (L) 50 - 170 ug/dL Final     No components found for: "FROLATE"  Vitamin D   Date Value Ref Range Status   08/12/2024 40 30 - 80 ng/mL Final   06/26/2024 45 30 - 80 ng/mL Final     WBC   Date Value Ref Range Status   08/12/2024 7.57 4.50 - 11.50 x10(3)/mcL Final   06/26/2024 7.80 4.50 - 11.50 x10(3)/mcL Final       Radiology: 3 view x ray left femur: hardware intact without loosening or failure. Some evidence of controlled collapse of the fracture site is stable.  healing callus      Assessment:         1. Closed fracture of left femur with routine healing, unspecified fracture morphology, " unspecified portion of femur, subsequent encounter  X-Ray Femur 2 View Left              Plan:         No follow-ups on file.    Safia was seen today for follow-up.    Diagnoses and all orders for this visit:    Closed fracture of left femur with routine healing, unspecified fracture morphology, unspecified portion of femur, subsequent encounter  -     X-Ray Femur 2 View Left; Future        -patient is doing well.  Weightbearing as tolerated no pains.  States she does not remember being in the hospital nor does she remember being in pain.  The family has asked me to offer no driving restriction.  Patient can follow up as needed.  Patient has controlled collapse callus formation she does go onto nonunion that is likely the nail to be able to support her weight for her activity.    This note/OR report was created with the assistance of  voice recognition software or phone  dictation.  There may be transcription errors as a result of using this technology however minimal. Effort has been made to assure accuracy of transcription but any obvious errors or omissions should be clarified with the author of the document.       Guy Jaquez, DO  Orthopedic Trauma Surgery         No future appointments.

## 2024-12-01 ENCOUNTER — HOSPITAL ENCOUNTER (EMERGENCY)
Facility: HOSPITAL | Age: 89
Discharge: HOME | End: 2024-12-01
Attending: EMERGENCY MEDICINE
Payer: MEDICARE

## 2024-12-01 VITALS
HEART RATE: 58 BPM | HEIGHT: 64 IN | SYSTOLIC BLOOD PRESSURE: 161 MMHG | DIASTOLIC BLOOD PRESSURE: 85 MMHG | BODY MASS INDEX: 23.9 KG/M2 | WEIGHT: 140 LBS | RESPIRATION RATE: 19 BRPM | OXYGEN SATURATION: 96 % | TEMPERATURE: 98 F

## 2024-12-01 DIAGNOSIS — S00.83XA CONTUSION OF FACE, INITIAL ENCOUNTER: ICD-10-CM

## 2024-12-01 DIAGNOSIS — S50.819A ABRASION OF FOREARM, UNSPECIFIED LATERALITY, INITIAL ENCOUNTER: ICD-10-CM

## 2024-12-01 DIAGNOSIS — S00.03XA CONTUSION OF SCALP, INITIAL ENCOUNTER: ICD-10-CM
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
## 2024-12-01 DIAGNOSIS — S50.10XA CONTUSION OF FOREARM, UNSPECIFIED LATERALITY, INITIAL ENCOUNTER: ICD-10-CM

## 2024-12-01 PROCEDURE — 99284 EMERGENCY DEPT VISIT MOD MDM: CPT | Mod: 25

## 2024-12-01 NOTE — ED PROVIDER NOTES
Encounter Date: 12/1/2024    SCRIBE #1 NOTE: I, Reynaldo Staples, am scribing for, and in the presence of,  Dr. Bland. I have scribed the following portions of the note - Other sections scribed: HPI, ROS, Physical Exam, MDM, Attending.       History     Chief Complaint   Patient presents with    Fall     Pt arrived [facility] after GLF at [facility]. Pt was going to the bathroom when she tripped & fell. +hit head, -LOC, -BT. Pt has bruising to left orbital, left elbow, & abrasions to right digits. Pt is c/o pain in those areas. Denies HA, dizziness, N/V, & neck pain. GCS 15 in triage.      89 year old female with history of hypertension and COPD presents to ED via EMS from [facility] following ground level fall while ambulating about 2 hours ago.  Patient states she slipped on her floor because she was walking too fast, causing her to fall forwards and hit her face.  She did not have loss of consciousness.  She used her arms to brace her fall as well, and she complains of some abrasions on bilateral arms and hands.  Patient is on aspirin daily but no blood thinners.  She denies hip pain, neck pain, back pain or rib pain.  She does not have shortness of breath.  No headache.  No change in vision.  No focal weakness, numbness or paresthesias.      .  Primary care is Dr. Steel.    The history is provided by the patient.   Fall  The accident occurred 1 to 2 hours ago. The fall occurred while walking. Distance fallen: ground level. Pertinent negatives include no loss of consciousness.     Review of patient's allergies indicates:   Allergen Reactions    Adhesive tape-silicones Blisters    Ofloxacin     Penicillins     Sulfamethoxazole-trimethoprim      Other reaction(s): Unknown    Codeine Nausea Only     Past Medical History:   Diagnosis Date    COPD (chronic obstructive pulmonary disease)     Hypertension      Past Surgical History:   Procedure Laterality Date    FRACTURE SURGERY  4/8/24    HYSTERECTOMY  1964     RETROGRADE INTRAMEDULLARY RODDING OF DISTAL FEMUR Left 2024    Procedure: INSERTION, INTRAMEDULLARY NAIL INTERTROCHENTERIC FRACTURE;  Surgeon: Guy Jaquez DO;  Location: [facility] OR;  Service: Orthopedics;  Laterality: Left;  TIGRE TABLE, SYNTHES TFNA     Family History   Problem Relation Name Age of Onset    Cancer Mother Mother     Heart disease Father Father     Stroke Father Father      Social History     Tobacco Use    Smoking status: Former     Current packs/day: 0.00     Average packs/day: 1 pack/day for 35.0 years (35.0 ttl pk-yrs)     Types: Cigarettes     Start date: 1950     Quit date: 1985     Years since quittin.9    Smokeless tobacco: Never   Substance Use Topics    Alcohol use: Not Currently    Drug use: Never     Review of Systems   Constitutional: Negative.    HENT: Negative.     Respiratory: Negative.     Cardiovascular: Negative.    Gastrointestinal: Negative.    Musculoskeletal:         Denies hip pain    Skin:  Wound: abrasion.   Neurological: Negative.  Negative for loss of consciousness.   Hematological:  Bruises/bleeds easily.       Physical Exam     Initial Vitals [24 1300]   BP Pulse Resp Temp SpO2   (!) 162/53 71 18 97.8 °F (36.6 °C) 95 %      MAP       --         Physical Exam    Nursing note and vitals reviewed.  Constitutional: She appears well-developed and well-nourished. She does not appear ill. No distress.   HENT:   Head: Normocephalic. Head is with contusion (left forehead and left cheek area).   Right Ear: Tympanic membrane normal.   Left Ear: Tympanic membrane normal. Mouth/Throat: No posterior oropharyngeal erythema.   No crepitus or clinical findings of fracture   Eyes: Conjunctivae, EOM and lids are normal. Pupils are equal, round, and reactive to light.       No periorbital step offs or deformities or clinical evidence of any fractures; EOM's full and intact   Neck: Neck supple. No tracheal tenderness present. Carotid bruit is not present.    Cardiovascular:  Normal rate and regular rhythm.           Pulmonary/Chest: Breath sounds normal. No respiratory distress.   Abdominal: Abdomen is soft. Bowel sounds are normal. There is no abdominal tenderness.   Musculoskeletal:         General: Normal range of motion.      Cervical back: Neck supple.      Comments: Ribcage without bruising or contusion marks.  No crepitus.  No point tenderness.     Neurological: She is alert and oriented to person, place, and time. She has normal strength. No cranial nerve deficit or sensory deficit. GCS eye subscore is 4. GCS verbal subscore is 5. GCS motor subscore is 6.   Skin: Skin is warm, dry and intact. No cyanosis.   Abrasion to left posterior forearm; abrasion to right volar forearm         ED Course   Procedures  Labs Reviewed - No data to display       Imaging Results              CT Head Without Contrast (Final result)  Result time 12/01/24 14:25:11      Final result by Corinna Zurita MD (12/01/24 14:25:11)                   Impression:      1. No acute intracranial abnormality.  2. Chronic microvascular ischemic changes.      Electronically signed by: Corinna Zurita  Date:    12/01/2024  Time:    14:25               Narrative:    EXAMINATION:  CT HEAD WITHOUT CONTRAST    CLINICAL HISTORY:  trauma;    TECHNIQUE:  Axial scans were obtained from skull base to the vertex.    Coronal and sagittal reconstructions obtained from the axial data.    Automatic exposure control was utilized to limit radiation dose.    Contrast: None    Radiation Dose:    Total DLP: 961 mGy*cm    COMPARISON:  CT head dated 07/28/2024    FINDINGS:  There is no acute intracranial hemorrhage or edema. The gray-white matter differentiation is preserved.  Patchy hypodensities in the subcortical and periventricular white matter likely represent chronic microvascular ischemic changes.    There is no mass effect or midline shift.  There is diffuse parenchymal volume loss.  The basal cisterns  are patent. There is no abnormal extra-axial fluid collection.  Carotid and vertebral artery calcifications are noted.    The calvarium and skull base are intact. The visualized paranasal sinuses and the mastoid air cells are clear.                                       CT Cervical Spine Without Contrast (Final result)  Result time 12/01/24 14:27:33      Final result by Corinna Zurita MD (12/01/24 14:27:33)                   Impression:      No acute fracture identified.      Electronically signed by: Corinna Zurita  Date:    12/01/2024  Time:    14:27               Narrative:    EXAMINATION:  CT CERVICAL SPINE WITHOUT CONTRAST    CLINICAL HISTORY:  fall;    TECHNIQUE:  Noncontrast CT images of the cervical spine. Axial, coronal, and sagittal reformatted images were obtained. Dose length product is 314 mGycm. Automatic exposure control, adjustment of mA/kV or iterative reconstruction technique was used to limit radiation dose.    COMPARISON:  Cervical spine dated 07/28/2024    FINDINGS:  The cervical spine is visualized through the level of T2.  There is no acute fracture identified.  There is reversal of normal cervical lordosis with grade 1 anterolisthesis of C3, C4 and C5.  There are multilevel degenerative changes with disc height loss, marginal osteophyte formation and facet arthropathy.  There is no paraspinal hematoma.                                       Medications - No data to display  Medical Decision Making          Scribe Attestation:   Scribe #1: I performed the above scribed service and the documentation accurately describes the services I performed. I attest to the accuracy of the note.    Attending Attestation:           Physician Attestation for Scribe:  Physician Attestation Statement for Scribe #1: I, reviewed documentation, as scribed by Reynaldo Staples in my presence, and it is both accurate and complete.                        Medical Decision Making:   Initial Assessment:   See HPI and  exam above   Differential Diagnosis:   Contusion, abrasions, ICH, skull fx , face fracture, neck injury  Clinical Tests:   Radiological Study: Ordered and Reviewed  ED Management:  Patient remains coherent and interactive.  No neurological deficits.  CT of the head and neck are without evidence of acute injury or fracture.  She will be discharged back to [facility].  I am told her daughter will be picking her up.  I re-examined the patient.  She was awake and alert.  I explained that she will have more bruising as time goes on which will track down her face as gravity pulls it.  I re-examined her neck and back, chest, abdomen etc..  All nontender.  She remains neurologically intact.             Clinical Impression:  Final diagnoses:  [W19.XXXA] Fall, initial encounter (Primary)  [S00.83XA] Contusion of face, initial encounter  [S50.10XA] Contusion of forearm, unspecified laterality, initial encounter  [S00.03XA] Contusion of scalp, initial encounter  [S50.819A] Abrasion of forearm, unspecified laterality, initial encounter          ED Disposition Condition    Discharge Stable          ED Prescriptions    None       Follow-up Information       Follow up With Specialties Details Why Contact Info    Iglesia Steel MD Internal Medicine In 2 days  [address]  [address]  [facility] [address]  [phone]      Iglesia Steel MD Internal Medicine In 2 days  [address]  [address]  [facility] [address]  [phone]               Tootie Bland MD  12/01/24 1483

## 2024-12-01 NOTE — DISCHARGE INSTRUCTIONS
Ice to bruised areas; can cover abrasions ; return for vomiting, unsteady gait, weakness, headaches , trouble breathing, weakness or any other problems or concerns

## 2025-01-14 ENCOUNTER — HOSPITAL ENCOUNTER (INPATIENT)
Facility: HOSPITAL | Age: OVER 89
LOS: 3 days | Discharge: NURSING, REHAB OR LONG-TERM CARE | DRG: 689 | End: 2025-01-17
Attending: STUDENT IN AN ORGANIZED HEALTH CARE EDUCATION/TRAINING PROGRAM | Admitting: INTERNAL MEDICINE
Payer: MEDICARE

## 2025-01-14 DIAGNOSIS — N39.0 UTI (URINARY TRACT INFECTION): ICD-10-CM

## 2025-01-14 DIAGNOSIS — R41.82 AMS (ALTERED MENTAL STATUS): ICD-10-CM

## 2025-01-14 DIAGNOSIS — J06.9 URI (UPPER RESPIRATORY INFECTION): ICD-10-CM

## 2025-01-14 LAB
ALBUMIN SERPL-MCNC: 3.2 G/DL (ref 3.4–4.8)
ALBUMIN/GLOB SERPL: 1.1 RATIO (ref 1.1–2)
ALP SERPL-CCNC: 147 UNIT/L (ref 40–150)
ALT SERPL-CCNC: 21 UNIT/L (ref 0–55)
AMPHET UR QL SCN: NEGATIVE
ANION GAP SERPL CALC-SCNC: 7 MEQ/L
AST SERPL-CCNC: 29 UNIT/L (ref 5–34)
BACTERIA #/AREA URNS AUTO: ABNORMAL /HPF
BARBITURATE SCN PRESENT UR: NEGATIVE
BASOPHILS # BLD AUTO: 0.07 X10(3)/MCL
BASOPHILS NFR BLD AUTO: 0.6 %
BENZODIAZ UR QL SCN: NEGATIVE
BILIRUB SERPL-MCNC: 0.5 MG/DL
BILIRUB UR QL STRIP.AUTO: NEGATIVE
BUN SERPL-MCNC: 15.6 MG/DL (ref 9.8–20.1)
CALCIUM SERPL-MCNC: 9 MG/DL (ref 8.4–10.2)
CANNABINOIDS UR QL SCN: NEGATIVE
CHLORIDE SERPL-SCNC: 104 MMOL/L (ref 98–111)
CLARITY UR: ABNORMAL
CO2 SERPL-SCNC: 28 MMOL/L (ref 23–31)
COCAINE UR QL SCN: NEGATIVE
COLOR UR AUTO: YELLOW
CREAT SERPL-MCNC: 1.01 MG/DL (ref 0.55–1.02)
CREAT/UREA NIT SERPL: 15
EOSINOPHIL # BLD AUTO: 0.51 X10(3)/MCL (ref 0–0.9)
EOSINOPHIL NFR BLD AUTO: 4.7 %
ERYTHROCYTE [DISTWIDTH] IN BLOOD BY AUTOMATED COUNT: 14.9 % (ref 11.5–17)
FENTANYL UR QL SCN: NEGATIVE
FLUAV AG UPPER RESP QL IA.RAPID: NOT DETECTED
FLUBV AG UPPER RESP QL IA.RAPID: NOT DETECTED
GFR SERPLBLD CREATININE-BSD FMLA CKD-EPI: 52 ML/MIN/1.73/M2
GLOBULIN SER-MCNC: 3 GM/DL (ref 2.4–3.5)
GLUCOSE SERPL-MCNC: 91 MG/DL (ref 75–121)
GLUCOSE UR QL STRIP: ABNORMAL
HCT VFR BLD AUTO: 46.7 % (ref 37–47)
HGB BLD-MCNC: 14.2 G/DL (ref 12–16)
HGB UR QL STRIP: ABNORMAL
HYALINE CASTS #/AREA URNS LPF: ABNORMAL /LPF
IMM GRANULOCYTES # BLD AUTO: 0.08 X10(3)/MCL (ref 0–0.04)
IMM GRANULOCYTES NFR BLD AUTO: 0.7 %
KETONES UR QL STRIP: NEGATIVE
LEUKOCYTE ESTERASE UR QL STRIP: 500
LYMPHOCYTES # BLD AUTO: 1.55 X10(3)/MCL (ref 0.6–4.6)
LYMPHOCYTES NFR BLD AUTO: 14.4 %
MCH RBC QN AUTO: 26.8 PG (ref 27–31)
MCHC RBC AUTO-ENTMCNC: 30.4 G/DL (ref 33–36)
MCV RBC AUTO: 88.1 FL (ref 80–94)
MDMA UR QL SCN: NEGATIVE
MONOCYTES # BLD AUTO: 0.82 X10(3)/MCL (ref 0.1–1.3)
MONOCYTES NFR BLD AUTO: 7.6 %
MUCOUS THREADS URNS QL MICRO: ABNORMAL /LPF
NEUTROPHILS # BLD AUTO: 7.76 X10(3)/MCL (ref 2.1–9.2)
NEUTROPHILS NFR BLD AUTO: 72 %
NITRITE UR QL STRIP: NEGATIVE
NRBC BLD AUTO-RTO: 0 %
OPIATES UR QL SCN: NEGATIVE
PCP UR QL: NEGATIVE
PH UR STRIP: 5.5 [PH]
PH UR: 5.5 [PH] (ref 3–11)
PLATELET # BLD AUTO: 273 X10(3)/MCL (ref 130–400)
PMV BLD AUTO: 10.6 FL (ref 7.4–10.4)
POTASSIUM SERPL-SCNC: 4.6 MMOL/L (ref 3.5–5.1)
PROT SERPL-MCNC: 6.2 GM/DL (ref 5.8–7.6)
PROT UR QL STRIP: ABNORMAL
RBC # BLD AUTO: 5.3 X10(6)/MCL (ref 4.2–5.4)
RBC #/AREA URNS AUTO: ABNORMAL /HPF
RSV A 5' UTR RNA NPH QL NAA+PROBE: NOT DETECTED
SARS-COV-2 RNA RESP QL NAA+PROBE: NOT DETECTED
SODIUM SERPL-SCNC: 139 MMOL/L (ref 136–145)
SP GR UR STRIP.AUTO: 1.01 (ref 1–1.03)
SPECIFIC GRAVITY, URINE AUTO (.000) (OHS): 1.01 (ref 1–1.03)
SQUAMOUS #/AREA URNS LPF: ABNORMAL /HPF
TROPONIN I SERPL-MCNC: <0.01 NG/ML (ref 0–0.04)
UROBILINOGEN UR STRIP-ACNC: NORMAL
WBC # BLD AUTO: 10.79 X10(3)/MCL (ref 4.5–11.5)
WBC #/AREA URNS AUTO: >100 /HPF
WBC CLUMPS UR QL AUTO: ABNORMAL
YEAST BUDDING URNS QL: ABNORMAL /HPF

## 2025-01-14 PROCEDURE — 25000003 PHARM REV CODE 250: Performed by: INTERNAL MEDICINE

## 2025-01-14 PROCEDURE — 81001 URINALYSIS AUTO W/SCOPE: CPT | Performed by: PHYSICIAN ASSISTANT

## 2025-01-14 PROCEDURE — 80307 DRUG TEST PRSMV CHEM ANLYZR: CPT | Performed by: INTERNAL MEDICINE

## 2025-01-14 PROCEDURE — 93010 ELECTROCARDIOGRAM REPORT: CPT | Mod: ,,, | Performed by: INTERNAL MEDICINE

## 2025-01-14 PROCEDURE — 63700000 PHARM REV CODE 250 ALT 637 W/O HCPCS: Performed by: STUDENT IN AN ORGANIZED HEALTH CARE EDUCATION/TRAINING PROGRAM

## 2025-01-14 PROCEDURE — 87086 URINE CULTURE/COLONY COUNT: CPT | Performed by: PHYSICIAN ASSISTANT

## 2025-01-14 PROCEDURE — 99285 EMERGENCY DEPT VISIT HI MDM: CPT | Mod: 25

## 2025-01-14 PROCEDURE — 85025 COMPLETE CBC W/AUTO DIFF WBC: CPT | Performed by: PHYSICIAN ASSISTANT

## 2025-01-14 PROCEDURE — 0241U COVID/RSV/FLU A&B PCR: CPT | Performed by: STUDENT IN AN ORGANIZED HEALTH CARE EDUCATION/TRAINING PROGRAM

## 2025-01-14 PROCEDURE — 63600175 PHARM REV CODE 636 W HCPCS: Performed by: INTERNAL MEDICINE

## 2025-01-14 PROCEDURE — 96374 THER/PROPH/DIAG INJ IV PUSH: CPT

## 2025-01-14 PROCEDURE — 80053 COMPREHEN METABOLIC PANEL: CPT | Performed by: PHYSICIAN ASSISTANT

## 2025-01-14 PROCEDURE — 25000003 PHARM REV CODE 250: Performed by: STUDENT IN AN ORGANIZED HEALTH CARE EDUCATION/TRAINING PROGRAM

## 2025-01-14 PROCEDURE — 93005 ELECTROCARDIOGRAM TRACING: CPT

## 2025-01-14 PROCEDURE — 63600175 PHARM REV CODE 636 W HCPCS: Performed by: STUDENT IN AN ORGANIZED HEALTH CARE EDUCATION/TRAINING PROGRAM

## 2025-01-14 PROCEDURE — 11000001 HC ACUTE MED/SURG PRIVATE ROOM

## 2025-01-14 PROCEDURE — 84443 ASSAY THYROID STIM HORMONE: CPT | Performed by: INTERNAL MEDICINE

## 2025-01-14 PROCEDURE — 82607 VITAMIN B-12: CPT | Performed by: INTERNAL MEDICINE

## 2025-01-14 PROCEDURE — 84484 ASSAY OF TROPONIN QUANT: CPT | Performed by: PHYSICIAN ASSISTANT

## 2025-01-14 RX ORDER — ONDANSETRON HYDROCHLORIDE 2 MG/ML
4 INJECTION, SOLUTION INTRAVENOUS EVERY 8 HOURS PRN
Status: DISCONTINUED | OUTPATIENT
Start: 2025-01-14 | End: 2025-01-17 | Stop reason: HOSPADM

## 2025-01-14 RX ORDER — HYDRALAZINE HYDROCHLORIDE 20 MG/ML
10 INJECTION INTRAMUSCULAR; INTRAVENOUS EVERY 8 HOURS PRN
Status: DISCONTINUED | OUTPATIENT
Start: 2025-01-14 | End: 2025-01-17 | Stop reason: HOSPADM

## 2025-01-14 RX ORDER — AMOXICILLIN 250 MG
1 CAPSULE ORAL 2 TIMES DAILY
Status: DISCONTINUED | OUTPATIENT
Start: 2025-01-14 | End: 2025-01-17 | Stop reason: HOSPADM

## 2025-01-14 RX ORDER — CEFTRIAXONE 1 G/1
1 INJECTION, POWDER, FOR SOLUTION INTRAMUSCULAR; INTRAVENOUS
Status: DISCONTINUED | OUTPATIENT
Start: 2025-01-15 | End: 2025-01-17 | Stop reason: HOSPADM

## 2025-01-14 RX ORDER — ASPIRIN 81 MG/1
81 TABLET ORAL 2 TIMES DAILY
Status: DISCONTINUED | OUTPATIENT
Start: 2025-01-14 | End: 2025-01-17 | Stop reason: HOSPADM

## 2025-01-14 RX ORDER — CEFTRIAXONE 1 G/1
1 INJECTION, POWDER, FOR SOLUTION INTRAMUSCULAR; INTRAVENOUS
Status: COMPLETED | OUTPATIENT
Start: 2025-01-14 | End: 2025-01-14

## 2025-01-14 RX ORDER — SODIUM CHLORIDE 9 MG/ML
INJECTION, SOLUTION INTRAVENOUS CONTINUOUS
Status: DISCONTINUED | OUTPATIENT
Start: 2025-01-14 | End: 2025-01-15

## 2025-01-14 RX ORDER — SODIUM CHLORIDE 0.9 % (FLUSH) 0.9 %
10 SYRINGE (ML) INJECTION
Status: DISCONTINUED | OUTPATIENT
Start: 2025-01-14 | End: 2025-01-17 | Stop reason: HOSPADM

## 2025-01-14 RX ORDER — IPRATROPIUM BROMIDE AND ALBUTEROL SULFATE 2.5; .5 MG/3ML; MG/3ML
3 SOLUTION RESPIRATORY (INHALATION) EVERY 4 HOURS PRN
Status: DISCONTINUED | OUTPATIENT
Start: 2025-01-14 | End: 2025-01-17 | Stop reason: HOSPADM

## 2025-01-14 RX ORDER — METOPROLOL TARTRATE 50 MG/1
50 TABLET ORAL 2 TIMES DAILY
Status: DISCONTINUED | OUTPATIENT
Start: 2025-01-14 | End: 2025-01-17 | Stop reason: HOSPADM

## 2025-01-14 RX ORDER — ENOXAPARIN SODIUM 100 MG/ML
40 INJECTION SUBCUTANEOUS EVERY 24 HOURS
Status: DISCONTINUED | OUTPATIENT
Start: 2025-01-14 | End: 2025-01-17 | Stop reason: HOSPADM

## 2025-01-14 RX ORDER — ALPRAZOLAM 0.25 MG/1
0.25 TABLET ORAL 2 TIMES DAILY PRN
Status: DISCONTINUED | OUTPATIENT
Start: 2025-01-14 | End: 2025-01-17 | Stop reason: HOSPADM

## 2025-01-14 RX ORDER — TAMSULOSIN HYDROCHLORIDE 0.4 MG/1
0.4 CAPSULE ORAL NIGHTLY
Status: DISCONTINUED | OUTPATIENT
Start: 2025-01-14 | End: 2025-01-17 | Stop reason: HOSPADM

## 2025-01-14 RX ORDER — TALC
6 POWDER (GRAM) TOPICAL NIGHTLY PRN
Status: DISCONTINUED | OUTPATIENT
Start: 2025-01-14 | End: 2025-01-17 | Stop reason: HOSPADM

## 2025-01-14 RX ORDER — ACETAMINOPHEN 325 MG/1
650 TABLET ORAL EVERY 4 HOURS PRN
Status: DISCONTINUED | OUTPATIENT
Start: 2025-01-14 | End: 2025-01-17 | Stop reason: HOSPADM

## 2025-01-14 RX ADMIN — ENOXAPARIN SODIUM 40 MG: 40 INJECTION SUBCUTANEOUS at 05:01

## 2025-01-14 RX ADMIN — SODIUM CHLORIDE: 9 INJECTION, SOLUTION INTRAVENOUS at 03:01

## 2025-01-14 RX ADMIN — TAMSULOSIN HYDROCHLORIDE 0.4 MG: 0.4 CAPSULE ORAL at 09:01

## 2025-01-14 RX ADMIN — METOPROLOL TARTRATE 50 MG: 50 TABLET, FILM COATED ORAL at 09:01

## 2025-01-14 RX ADMIN — QUETIAPINE FUMARATE 12.5 MG: 25 TABLET ORAL at 10:01

## 2025-01-14 RX ADMIN — FLUCONAZOLE 150 MG: 50 TABLET ORAL at 03:01

## 2025-01-14 RX ADMIN — SENNOSIDES AND DOCUSATE SODIUM 1 TABLET: 50; 8.6 TABLET ORAL at 09:01

## 2025-01-14 RX ADMIN — ASPIRIN 81 MG: 81 TABLET, COATED ORAL at 09:01

## 2025-01-14 RX ADMIN — CEFTRIAXONE SODIUM 1 G: 1 INJECTION, POWDER, FOR SOLUTION INTRAMUSCULAR; INTRAVENOUS at 03:01

## 2025-01-14 NOTE — FIRST PROVIDER EVALUATION
"Medical screening examination initiated.  I have conducted a focused provider triage encounter, findings are as follows:    Brief history of present illness:  89-year-old female sent from nursing home for evaluation of increasing altered mental status over the last 3 weeks.  Reports that patient is fixing a dog bowl for dog that has not there.    Vitals:    01/14/25 1250   BP: (!) 136/56   Pulse: 77   Resp: 16   Temp: 97.5 °F (36.4 °C)   TempSrc: Temporal   SpO2: 95%   Weight: 66.7 kg (147 lb)   Height: 5' 4" (1.626 m)       Pertinent physical exam:  Patient is awake and alert sitting on stretcher.    Brief workup plan:  Labs, EKG, CT head    Preliminary workup initiated; this workup will be continued and followed by the physician or advanced practice provider that is assigned to the patient when roomed.  " This note was copied from a baby's chart.  Baby sleeping in mom's arms.  He was circumcised at 1220.  Mom advised to pump breasts for stimulation since baby is sleepy.  Also advised that tonight is baby's second night & he needs to bf at least 8 times in 24 hrs so he likely will want to be & feed at the breast frequently this evening.  Encouraged her to get at least some rest during the day today to be more well-rested for frequent feedings this evening.  Mom voiced understanding.

## 2025-01-14 NOTE — ED PROVIDER NOTES
Encounter Date: 1/14/2025    SCRIBE #1 NOTE: I, Lilli Douglass, am scribing for, and in the presence of,  Juan Carlos Sanders MD. I have scribed the following portions of the note - the EKG reading. Other sections scribed: HPI, ROS, PE.       History     Chief Complaint   Patient presents with    Altered Mental Status     AMS x 3 weeks per the [place]. Reports pt fixing food/water for dog that is not present. However on EMS arrival to NH and ED, pt aaox4, GCS 15. EMS states no AMS noted at all. Pt has no complaints     Patient is a 89 year old female with a history of COPD and HTN that presents to the ED via EMS from the [place] for altered mental status for 3 weeks. Per NH report, the patient has been fixing food and water for dogs that are not there. Patient states she is not sure why she was sent here. She has no complaints at this time. Patient reports a history of frequent UTIs. NH report also notes the patient is full code.     The history is provided by the patient, medical records and the nursing home.     Review of patient's allergies indicates:   Allergen Reactions    Adhesive tape-silicones Blisters    Ofloxacin     Penicillins     Sulfamethoxazole-trimethoprim      Other reaction(s): Unknown    Codeine Nausea Only     Past Medical History:   Diagnosis Date    COPD (chronic obstructive pulmonary disease)     Hypertension      Past Surgical History:   Procedure Laterality Date    FRACTURE SURGERY  4/8/24    HYSTERECTOMY  1964    RETROGRADE INTRAMEDULLARY RODDING OF DISTAL FEMUR Left 04/09/2024    Procedure: INSERTION, INTRAMEDULLARY NAIL INTERTROCHENTERIC FRACTURE;  Surgeon: Guy Jaquez DO;  Location: [facility];  Service: Orthopedics;  Laterality: Left;  HANA TABLE, SYNTHES TFNA     Family History   Problem Relation Name Age of Onset    Cancer Mother Mother     Heart disease Father Father     Stroke Father Father      Social History     Tobacco Use    Smoking status: Former     Current packs/day: 0.00     Average  packs/day: 1 pack/day for 35.0 years (35.0 ttl pk-yrs)     Types: Cigarettes     Start date: 1950     Quit date: 1985     Years since quittin.0    Smokeless tobacco: Never   Substance Use Topics    Alcohol use: Not Currently    Drug use: Never     Review of Systems   Constitutional:  Negative for chills and fever.   HENT:  Negative for congestion, rhinorrhea and sore throat.    Respiratory:  Negative for cough and shortness of breath.    Cardiovascular:  Negative for chest pain and leg swelling.   Gastrointestinal:  Negative for abdominal pain, nausea and vomiting.   Genitourinary:  Negative for dysuria.   Psychiatric/Behavioral:          Altered per NH       Physical Exam     Initial Vitals [25 1250]   BP Pulse Resp Temp SpO2   (!) 136/56 77 16 97.5 °F (36.4 °C) 95 %      MAP       --         Physical Exam    Nursing note and vitals reviewed.  Constitutional: She is not diaphoretic. No distress.   HENT:   Head: Normocephalic and atraumatic.   Neck: Neck supple.   Normal range of motion.  Cardiovascular:  Normal rate and regular rhythm.           No murmur heard.  Pulmonary/Chest: Breath sounds normal. No respiratory distress.   Abdominal: Abdomen is soft. She exhibits no distension. There is no abdominal tenderness.   Musculoskeletal:      Cervical back: Normal range of motion and neck supple.     Neurological: She is alert and oriented to person, place, and time. She has normal strength. No cranial nerve deficit or sensory deficit. GCS score is 15. GCS eye subscore is 4. GCS verbal subscore is 5. GCS motor subscore is 6.   Skin: Skin is warm. Capillary refill takes less than 2 seconds.   Psychiatric: She has a normal mood and affect.         ED Course   Procedures  Labs Reviewed   COMPREHENSIVE METABOLIC PANEL - Abnormal       Result Value    Sodium 139      Potassium 4.6      Chloride 104      CO2 28      Glucose 91      Blood Urea Nitrogen 15.6      Creatinine 1.01      Calcium 9.0       Protein Total 6.2      Albumin 3.2 (*)     Globulin 3.0      Albumin/Globulin Ratio 1.1      Bilirubin Total 0.5            ALT 21      AST 29      eGFR 52      Anion Gap 7.0      BUN/Creatinine Ratio 15     URINALYSIS, REFLEX TO URINE CULTURE - Abnormal    Color, UA Yellow      Appearance, UA Turbid (*)     Specific Gravity, UA 1.014      pH, UA 5.5      Protein, UA Trace (*)     Glucose, UA 3+ (*)     Ketones, UA Negative      Blood, UA 2+ (*)     Bilirubin, UA Negative      Urobilinogen, UA Normal      Nitrites, UA Negative      Leukocyte Esterase,  (*)     RBC, UA 50-99 (*)     WBC, UA >100 (*)     WBC Clumps, UA Many (*)     Bacteria, UA Many (*)     Budding Yeast, UA Many (*)     Squamous Epithelial Cells, UA None Seen      Mucous, UA Trace (*)     Hyaline Casts, UA 6-10 (*)    CBC WITH DIFFERENTIAL - Abnormal    WBC 10.79      RBC 5.30      Hgb 14.2      Hct 46.7      MCV 88.1      MCH 26.8 (*)     MCHC 30.4 (*)     RDW 14.9      Platelet 273      MPV 10.6 (*)     Neut % 72.0      Lymph % 14.4      Mono % 7.6      Eos % 4.7      Basophil % 0.6      Imm Grans % 0.7      Neut # 7.76      Lymph # 1.55      Mono # 0.82      Eos # 0.51      Baso # 0.07      Imm Gran # 0.08 (*)     NRBC% 0.0     TROPONIN I - Normal    Troponin-I <0.010     COVID/RSV/FLU A&B PCR - Normal    Influenza A PCR Not Detected      Influenza B PCR Not Detected      Respiratory Syncytial Virus PCR Not Detected      SARS-CoV-2 PCR Not Detected      Narrative:     The Xpert Xpress SARS-CoV-2/FLU/RSV plus is a rapid, multiplexed real-time PCR test intended for the simultaneous qualitative detection and differentiation of SARS-CoV-2, Influenza A, Influenza B, and respiratory syncytial virus (RSV) viral RNA in either nasopharyngeal swab or nasal swab specimens.         CULTURE, URINE   CBC W/ AUTO DIFFERENTIAL    Narrative:     The following orders were created for panel order CBC auto differential.  Procedure                                Abnormality         Status                     ---------                               -----------         ------                     CBC with Differential[[id number]]       Abnormal            Final result                 Please view results for these tests on the individual orders.     EKG Readings: (Independently Interpreted)   Initial Reading: No STEMI. Rhythm: Normal Sinus Rhythm. Heart Rate: 67. Ectopy: No Ectopy. Conduction: Normal. ST Segments: Normal ST Segments. T Waves: Normal. Axis: Normal. Other Impression: No obvious ischemic changes.   1305       Imaging Results              X-Ray Chest AP Portable (Final result)  Result time 01/14/25 15:41:46      Final result by Sergei Webb MD (01/14/25 15:41:46)                   Impression:      No acute chest disease is identified.      Electronically signed by: Sergei Webb  Date:    01/14/2025  Time:    15:41               Narrative:    EXAMINATION:  XR CHEST AP PORTABLE    CLINICAL HISTORY:  , Acute upper respiratory infection, unspecified.    FINDINGS:  No alveolar consolidation, effusion, or pneumothorax is seen.   The thoracic aorta is normal  cardiac silhouette, central pulmonary vessels and mediastinum are normal in size and are grossly unremarkable.   visualized osseous structures are grossly unremarkable.                                       CT Head Without Contrast (Final result)  Result time 01/14/25 13:59:29      Final result by Soraya Mckeon MD (01/14/25 13:59:29)                   Impression:      Chronic age-related changes.    Stable likely calcified meningioma right posterior parietal region      Electronically signed by: Kartik Mckeon  Date:    01/14/2025  Time:    13:59               Narrative:    EXAMINATION:  CT HEAD WITHOUT CONTRAST    CLINICAL HISTORY:  Mental status change, unknown cause;    TECHNIQUE:  Multiple axial images were obtained from the base of the brain to the vertex without contrast  administration.  Sagittal and coronal reconstructions were performed..Automatic exposure control is utilized to reduce patient radiation exposure.    COMPARISON:  12/01/2020    FINDINGS:  There is a partially calcified mass seen in the right posterior parietal region abutting the dural surface.  Is stable since prior examination and likely represents a meningioma.  No hemorrhage is seen.  No acute infarct is seen.  There is some cerebral atrophy seen.  There is some compensatory ventricular dilatation and periventricular white matter changes consistent with the patient's age.  Calvarium is intact.  The posterior fossa is unremarkable..  The visualized portions of the paranasal sinuses show no acute abnormality.                                       Medications   0.9% NaCl infusion ( Intravenous New Bag 1/14/25 1536)   cefTRIAXone injection 1 g (1 g Intravenous Given 1/14/25 1531)   fluconazole tablet 150 mg (150 mg Oral Given 1/14/25 1530)     Medical Decision Making  89-year-old lives at an assisted living facility has been more altered wandering out of her apartment putting up food for imaginary animals  Patient has a UTI in her current altered state unclear if she would be reliable to take her medications as she does not have 24 hour care at her assisted living  Will admit for IV antibiotics to ensure resolution of her mental status and adequate treatment    Differential diagnosis (including but not limited to):   Judging by the patient's chief complaint and pertinent history, the patient has the following possible differential diagnoses, including but not limited to the following.  Some of these are deemed to be lower likelihood and some more likely based on my physical exam and history combined with possible lab work and/or imaging studies.   Please see the pertinent studies, and refer to the HPI.  Some of these diagnoses will take further evaluation to fully rule out, perhaps as an outpatient and the patient  was encouraged to follow up when discharged for more comprehensive evaluation.    Metabolic abnormality, intoxication, toxic ingestion, CVA, infection, structural (SAH, ICH, trauma, neoplastic), seizure/postictal, polypharmacy       Problems Addressed:  AMS (altered mental status): acute illness or injury that poses a threat to life or bodily functions  URI (upper respiratory infection): acute illness or injury that poses a threat to life or bodily functions  UTI (urinary tract infection): acute illness or injury that poses a threat to life or bodily functions    Amount and/or Complexity of Data Reviewed  Independent Historian:      Details: Discussed with the patient's daughter see HPI  External Data Reviewed: notes.     Details: Previous urine cultures reviewed  Labs: ordered.  Radiology: ordered and independent interpretation performed.  ECG/medicine tests: ordered and independent interpretation performed.     Details: 1305. Initial Reading: No STEMI. Rhythm: Normal Sinus Rhythm. Heart Rate: 67. Ectopy: No Ectopy. Conduction: Normal. ST Segments: Normal ST Segments. T Waves: Normal. Axis: Normal. Other Impression: No obvious ischemic changes.   Discussion of management or test interpretation with external provider(s): Discussed with the hospitalist rebekah will admit    Risk  Prescription drug management.  Decision regarding hospitalization.            Scribe Attestation:   Scribe #1: I performed the above scribed service and the documentation accurately describes the services I performed. I attest to the accuracy of the note.    Attending Attestation:           Physician Attestation for Scribe:  Physician Attestation Statement for Scribe #1: I, Juan Carlos Sanders MD, reviewed documentation, as scribed by Lilli Douglass in my presence, and it is both accurate and complete.             ED Course as of 01/14/25 1632   Tue Jan 14, 2025   1524 Talked to the patient's daughter over the phone, Lacey. She reports the patient  has been more confused over the last couple of weeks. She has been trying to leave the [facility] to find her  and pets that are not there. She is concerned the patient is not being checked on enough with her current mental status. She is concerned with the facility's ability to provide intensive care at this point or if the patient needs to be moved to a true nursing home.  [ED]   1532 Admitted to Dr. Guillermo   [AC]      ED Course User Index  [AC] Juan Carlos Sanders IV, MD  [ED] Lilli Douglass                             Clinical Impression:  Final diagnoses:  [R41.82] AMS (altered mental status)  [J06.9] URI (upper respiratory infection)  [N39.0] UTI (urinary tract infection)          ED Disposition Condition    Admit Stable                Juan Carlos Sanders IV, MD  01/14/25 [id number]

## 2025-01-14 NOTE — Clinical Note
Diagnosis: UTI (urinary tract infection) [208985]   Future Attending Provider: NEGRA SAMSON [[id number]]   Admit to which facility:: [facility] [[id number]]   Reason for IP Medical Treatment  (Clinical interventions that can only be accomplished in the IP setting? ) :: IV abx   Plans for Post-Acute care--if anticipated (pick the single best option):: A. No post acute care anticipated at this time   Special Needs:: No Special Needs [1]

## 2025-01-15 LAB
ALBUMIN SERPL-MCNC: 2.8 G/DL (ref 3.4–4.8)
ALBUMIN/GLOB SERPL: 1 RATIO (ref 1.1–2)
ALP SERPL-CCNC: 127 UNIT/L (ref 40–150)
ALT SERPL-CCNC: 21 UNIT/L (ref 0–55)
ANION GAP SERPL CALC-SCNC: 8 MEQ/L
AST SERPL-CCNC: 26 UNIT/L (ref 5–34)
BASOPHILS # BLD AUTO: 0.09 X10(3)/MCL
BASOPHILS NFR BLD AUTO: 0.8 %
BILIRUB SERPL-MCNC: 0.5 MG/DL
BNP BLD-MCNC: 160.8 PG/ML
BUN SERPL-MCNC: 17.2 MG/DL (ref 9.8–20.1)
CALCIUM SERPL-MCNC: 8.7 MG/DL (ref 8.4–10.2)
CHLORIDE SERPL-SCNC: 106 MMOL/L (ref 98–111)
CO2 SERPL-SCNC: 25 MMOL/L (ref 23–31)
CREAT SERPL-MCNC: 1.03 MG/DL (ref 0.55–1.02)
CREAT/UREA NIT SERPL: 17
EOSINOPHIL # BLD AUTO: 0.62 X10(3)/MCL (ref 0–0.9)
EOSINOPHIL NFR BLD AUTO: 5.8 %
ERYTHROCYTE [DISTWIDTH] IN BLOOD BY AUTOMATED COUNT: 14.9 % (ref 11.5–17)
FOLATE SERPL-MCNC: 5.6 NG/ML (ref 7–31.4)
GFR SERPLBLD CREATININE-BSD FMLA CKD-EPI: 51 ML/MIN/1.73/M2
GLOBULIN SER-MCNC: 2.9 GM/DL (ref 2.4–3.5)
GLUCOSE SERPL-MCNC: 99 MG/DL (ref 75–121)
HCT VFR BLD AUTO: 41.2 % (ref 37–47)
HGB BLD-MCNC: 12.7 G/DL (ref 12–16)
IMM GRANULOCYTES # BLD AUTO: 0.07 X10(3)/MCL (ref 0–0.04)
IMM GRANULOCYTES NFR BLD AUTO: 0.7 %
LYMPHOCYTES # BLD AUTO: 1.57 X10(3)/MCL (ref 0.6–4.6)
LYMPHOCYTES NFR BLD AUTO: 14.6 %
MAGNESIUM SERPL-MCNC: 1.9 MG/DL (ref 1.6–2.6)
MCH RBC QN AUTO: 27.1 PG (ref 27–31)
MCHC RBC AUTO-ENTMCNC: 30.8 G/DL (ref 33–36)
MCV RBC AUTO: 87.8 FL (ref 80–94)
MONOCYTES # BLD AUTO: 1.04 X10(3)/MCL (ref 0.1–1.3)
MONOCYTES NFR BLD AUTO: 9.7 %
NEUTROPHILS # BLD AUTO: 7.35 X10(3)/MCL (ref 2.1–9.2)
NEUTROPHILS NFR BLD AUTO: 68.4 %
NRBC BLD AUTO-RTO: 0 %
OHS QRS DURATION: 82 MS
OHS QTC CALCULATION: 443 MS
PHOSPHATE SERPL-MCNC: 3.4 MG/DL (ref 2.3–4.7)
PLATELET # BLD AUTO: 230 X10(3)/MCL (ref 130–400)
PMV BLD AUTO: 10.9 FL (ref 7.4–10.4)
POTASSIUM SERPL-SCNC: 4.2 MMOL/L (ref 3.5–5.1)
PROT SERPL-MCNC: 5.7 GM/DL (ref 5.8–7.6)
RBC # BLD AUTO: 4.69 X10(6)/MCL (ref 4.2–5.4)
SODIUM SERPL-SCNC: 139 MMOL/L (ref 136–145)
TSH SERPL-ACNC: 2.19 UIU/ML (ref 0.35–4.94)
VIT B12 SERPL-MCNC: 1921 PG/ML (ref 213–816)
WBC # BLD AUTO: 10.74 X10(3)/MCL (ref 4.5–11.5)

## 2025-01-15 PROCEDURE — 84100 ASSAY OF PHOSPHORUS: CPT | Performed by: INTERNAL MEDICINE

## 2025-01-15 PROCEDURE — 80053 COMPREHEN METABOLIC PANEL: CPT | Performed by: INTERNAL MEDICINE

## 2025-01-15 PROCEDURE — 82746 ASSAY OF FOLIC ACID SERUM: CPT | Performed by: INTERNAL MEDICINE

## 2025-01-15 PROCEDURE — 25000003 PHARM REV CODE 250: Performed by: STUDENT IN AN ORGANIZED HEALTH CARE EDUCATION/TRAINING PROGRAM

## 2025-01-15 PROCEDURE — 25000003 PHARM REV CODE 250: Performed by: INTERNAL MEDICINE

## 2025-01-15 PROCEDURE — 36415 COLL VENOUS BLD VENIPUNCTURE: CPT | Performed by: INTERNAL MEDICINE

## 2025-01-15 PROCEDURE — 25000242 PHARM REV CODE 250 ALT 637 W/ HCPCS: Performed by: INTERNAL MEDICINE

## 2025-01-15 PROCEDURE — 83880 ASSAY OF NATRIURETIC PEPTIDE: CPT | Performed by: INTERNAL MEDICINE

## 2025-01-15 PROCEDURE — 97161 PT EVAL LOW COMPLEX 20 MIN: CPT

## 2025-01-15 PROCEDURE — 21400001 HC TELEMETRY ROOM

## 2025-01-15 PROCEDURE — 85025 COMPLETE CBC W/AUTO DIFF WBC: CPT | Performed by: INTERNAL MEDICINE

## 2025-01-15 PROCEDURE — 97166 OT EVAL MOD COMPLEX 45 MIN: CPT

## 2025-01-15 PROCEDURE — 83735 ASSAY OF MAGNESIUM: CPT | Performed by: INTERNAL MEDICINE

## 2025-01-15 PROCEDURE — 63600175 PHARM REV CODE 636 W HCPCS: Performed by: INTERNAL MEDICINE

## 2025-01-15 RX ORDER — SERTRALINE HYDROCHLORIDE 25 MG/1
25 TABLET, FILM COATED ORAL DAILY
Status: ON HOLD | COMMUNITY
Start: 2024-11-29 | End: 2025-01-27

## 2025-01-15 RX ORDER — FUROSEMIDE 20 MG/1
20 TABLET ORAL DAILY
Status: DISCONTINUED | OUTPATIENT
Start: 2025-01-15 | End: 2025-01-17 | Stop reason: HOSPADM

## 2025-01-15 RX ORDER — NYSTATIN AND TRIAMCINOLONE ACETONIDE 100000; 1 [USP'U]/G; MG/G
CREAM TOPICAL 2 TIMES DAILY
Status: DISCONTINUED | OUTPATIENT
Start: 2025-01-15 | End: 2025-01-17 | Stop reason: HOSPADM

## 2025-01-15 RX ORDER — VALSARTAN 80 MG/1
80 TABLET ORAL 2 TIMES DAILY
Status: DISCONTINUED | OUTPATIENT
Start: 2025-01-16 | End: 2025-01-17 | Stop reason: HOSPADM

## 2025-01-15 RX ORDER — DAPAGLIFLOZIN 10 MG/1
10 TABLET, FILM COATED ORAL DAILY
Status: ON HOLD | COMMUNITY
Start: 2025-01-08 | End: 2025-01-27

## 2025-01-15 RX ORDER — POTASSIUM CHLORIDE 20 MEQ/1
20 TABLET, EXTENDED RELEASE ORAL DAILY
Status: ON HOLD | COMMUNITY
Start: 2025-01-08 | End: 2025-01-27

## 2025-01-15 RX ORDER — FOLIC ACID 1 MG/1
5 TABLET ORAL ONCE
Status: COMPLETED | OUTPATIENT
Start: 2025-01-15 | End: 2025-01-15

## 2025-01-15 RX ORDER — LEVOTHYROXINE SODIUM 25 UG/1
25 TABLET ORAL
Status: ON HOLD | COMMUNITY
Start: 2024-11-09 | End: 2025-01-27

## 2025-01-15 RX ORDER — VALSARTAN 40 MG/1
40 TABLET ORAL 2 TIMES DAILY
Status: DISCONTINUED | OUTPATIENT
Start: 2025-01-15 | End: 2025-01-15

## 2025-01-15 RX ADMIN — SODIUM CHLORIDE: 9 INJECTION, SOLUTION INTRAVENOUS at 01:01

## 2025-01-15 RX ADMIN — ALPRAZOLAM 0.25 MG: 0.25 TABLET ORAL at 04:01

## 2025-01-15 RX ADMIN — SENNOSIDES AND DOCUSATE SODIUM 1 TABLET: 50; 8.6 TABLET ORAL at 08:01

## 2025-01-15 RX ADMIN — SENNOSIDES AND DOCUSATE SODIUM 1 TABLET: 50; 8.6 TABLET ORAL at 10:01

## 2025-01-15 RX ADMIN — TAMSULOSIN HYDROCHLORIDE 0.4 MG: 0.4 CAPSULE ORAL at 10:01

## 2025-01-15 RX ADMIN — NYSTATIN, TRIAMCINOLONE ACETONIDE: 100000; 1 CREAM TOPICAL at 08:01

## 2025-01-15 RX ADMIN — VALSARTAN 40 MG: 40 TABLET, FILM COATED ORAL at 08:01

## 2025-01-15 RX ADMIN — METOPROLOL TARTRATE 50 MG: 50 TABLET, FILM COATED ORAL at 10:01

## 2025-01-15 RX ADMIN — ASPIRIN 81 MG: 81 TABLET, COATED ORAL at 10:01

## 2025-01-15 RX ADMIN — CEFTRIAXONE SODIUM 1 G: 1 INJECTION, POWDER, FOR SOLUTION INTRAMUSCULAR; INTRAVENOUS at 04:01

## 2025-01-15 RX ADMIN — FUROSEMIDE 20 MG: 20 TABLET ORAL at 08:01

## 2025-01-15 RX ADMIN — FOLIC ACID 5 MG: 1 TABLET ORAL at 08:01

## 2025-01-15 RX ADMIN — NYSTATIN, TRIAMCINOLONE ACETONIDE: 100000; 1 CREAM TOPICAL at 10:01

## 2025-01-15 RX ADMIN — ASPIRIN 81 MG: 81 TABLET, COATED ORAL at 08:01

## 2025-01-15 RX ADMIN — METOPROLOL TARTRATE 50 MG: 50 TABLET, FILM COATED ORAL at 08:01

## 2025-01-15 RX ADMIN — VALSARTAN 40 MG: 40 TABLET, FILM COATED ORAL at 10:01

## 2025-01-15 RX ADMIN — IPRATROPIUM BROMIDE AND ALBUTEROL SULFATE 3 ML: .5; 3 SOLUTION RESPIRATORY (INHALATION) at 10:01

## 2025-01-15 RX ADMIN — QUETIAPINE FUMARATE 12.5 MG: 25 TABLET ORAL at 10:01

## 2025-01-15 RX ADMIN — IPRATROPIUM BROMIDE AND ALBUTEROL SULFATE 3 ML: .5; 3 SOLUTION RESPIRATORY (INHALATION) at 05:01

## 2025-01-15 RX ADMIN — ENOXAPARIN SODIUM 40 MG: 40 INJECTION SUBCUTANEOUS at 04:01

## 2025-01-15 NOTE — PLAN OF CARE
01/15/25 1124   Medicare Message   Important Message from Medicare regarding Discharge Appeal Rights Given to patient/caregiver;Explained to patient/caregiver     Pt and dgt Lacey voice understanding of IMM.

## 2025-01-15 NOTE — PLAN OF CARE
Problem: Occupational Therapy  Goal: Occupational Therapy Goal  Description: LTG: Pt will perform basic ADLs and ADL transfers with Modified independence using LRAD by discharge.    STG: to be met by 2/15/25    Pt will complete grooming standing at sink with LRAD with SBA.  Pt will complete UB dressing with SBA.  Pt will complete LB dressing with SBA using LRAD.  Pt will complete toileting with SBA using LRAD.  Pt will complete functional mobility to/from toilet and toilet transfer with SBA using LRAD.   Outcome: Progressing      none

## 2025-01-15 NOTE — PROGRESS NOTES
[facility] - 4th Floor Medical Telemetry  Wound Care    Patient Name:  Safia Muñoz   MRN:  [id number]  Date: 1/15/2025  Diagnosis: <principal problem not specified>    History:     Past Medical History:   Diagnosis Date    COPD (chronic obstructive pulmonary disease)     Hypertension        Social History     Socioeconomic History    Marital status:    Tobacco Use    Smoking status: Former     Current packs/day: 0.00     Average packs/day: 1 pack/day for 35.0 years (35.0 ttl pk-yrs)     Types: Cigarettes     Start date: 1950     Quit date: 1985     Years since quittin.0    Smokeless tobacco: Never   Substance and Sexual Activity    Alcohol use: Not Currently    Drug use: Never    Sexual activity: Not Currently     Partners: Male     Birth control/protection: Post-menopausal     Social Drivers of Health     Financial Resource Strain: Low Risk  (2024)    Overall Financial Resource Strain (CARDIA)     Difficulty of Paying Living Expenses: Not very hard   Food Insecurity: No Food Insecurity (2024)    Hunger Vital Sign     Worried About Running Out of Food in the Last Year: Never true     Ran Out of Food in the Last Year: Never true   Transportation Needs: No Transportation Needs (2024)    TRANSPORTATION NEEDS     Transportation : No   Stress: No Stress Concern Present (2024)    [employer] [place] [employer] - Occupational Stress Questionnaire     Feeling of Stress : Not at all   Housing Stability: Low Risk  (2024)    Housing Stability Vital Sign     Unable to Pay for Housing in the Last Year: No     Homeless in the Last Year: No       Precautions:     Allergies as of 2025 - Reviewed 2025   Allergen Reaction Noted    Adhesive tape-silicones Blisters 2024    Ofloxacin  2020    Penicillins  10/07/2023    Sulfamethoxazole-trimethoprim  2021    Codeine Nausea Only 2021       WOC Assessment Details/Treatment      Initial visit regarding affected areas at labia and gluteal cleft, family at bedside also requested attention to LLE skin tear, which is cleansed and assessed and redressed. EMR reviewed noting areas at labia and gluteal cleft / perianal area suggestive if Incontinence associated dermatitus.Discussed same with patient and daughter at bedside, patient is currently seated up in chair , therefore allowed only limited perineal and perianal assessment.    01/15/25 0930        Wound 06/17/24 0800 Skin Tear Left anterior;lower Leg   Date First Assessed/Time First Assessed: 06/17/24 0800   Present on Original Admission: Yes  Primary Wound Type: Skin Tear  Side: Left  Orientation: anterior;lower  Location: Leg  Is this injury device related?: No   Wound Image    Dressing Appearance Open to air   Drainage Amount Scant   Drainage Characteristics/Odor Serosanguineous   Appearance Red;Moist   Tissue loss description Partial thickness   Red (%), Wound Tissue Color 80 %   Periwound Area Dry;Intact   Wound Edges Defined   Wound Length (cm) 1 cm   Wound Width (cm) 1 cm   Wound Surface Area (cm^2) 1 cm^2   Care Antimicrobial agent   Dressing Applied;Foam;Silicone   Periwound Care Dry periwound area maintained   Dressing Change Due 01/17/25        Wound 01/15/25 0930 Incontinence associated dermatitis Labia   Date First Assessed/Time First Assessed: 01/15/25 0930   Present on Original Admission: Yes  Primary Wound Type: Incontinence associated dermatitis  Location: [place]   Wound Image    Dressing Appearance Open to air   Drainage Amount None   Appearance Red   Wound Edges Undefined        Wound 01/15/25 0930 Incontinence associated dermatitis Perirectal   Date First Assessed/Time First Assessed: 01/15/25 0930   Present on Original Admission: Yes  Primary Wound Type: Incontinence associated dermatitis  Location: Perirectal   Wound Image    Dressing Appearance Open to air   Drainage Amount None   Appearance Red   Tissue loss  description Not applicable   Periwound Area Intact;Dry   Wound Edges Undefined   Periwound Care Dry periwound area maintained         Recommendations made to primary team for local wound and skin care measures and hygiene measures and pressure injury prevention measures. . Orders placed.     01/15/2025

## 2025-01-15 NOTE — PT/OT/SLP EVAL
Occupational Therapy  Evaluation    Name: Safia Muñoz  MRN: [id number]  Recent Surgery: * No surgery found *      Recommendations:     Discharge therapy intensity: Moderate Intensity Therapy (vs low with increased supervision)   Discharge Equipment Recommendations:  none  Barriers to discharge:  Other (Comment) (ongoing medical needs)    Assessment:     Safia Muñoz is a 89 y.o. female with a medical diagnosis of acute metabolic encephalopathy due to UTI, delirium. She is A&Ox3; however presents confused throughout session. She presents with decreased safety awareness and noted poor recall of recent events. She required verbal cues throughout session for attention to task and RW management. She presents with the following performance deficits affecting function: weakness, impaired endurance, impaired self care skills, decreased safety awareness. She required CGA for bed mobility and functional mobility within room using RW. Recommend moderate intensity therapy vs low intensity pending family discussion on acquiring increased assist at home.     Rehab Prognosis: Good; patient would benefit from acute skilled OT services to address these deficits and reach maximum level of function.       Plan:     Patient to be seen 4 x/week to address the above listed problems via self-care/home management  Plan of Care Expires: 02/15/25  Plan of Care Reviewed with: patient    Subjective     Chief Complaint: n/a  Patient/Family Comments/goals: to go home     Occupational Profile:  Living Environment: Pt lives at [facility] in an apartment. She has a walk-in shower with a shower chair.   Previous level of function: Pt reports being independent with ADLs prior.   Roles and Routines: mother  Equipment Used at Home: bath bench, rollator, oxygen, bedside commode  Assistance upon Discharge: Pt may have assist from staff/sitters pending family discussion.     Pain/Comfort:  Pain Rating 1: 0/10    Patients cultural, spiritual,  Yazidi conflicts given the current situation: no    Objective:     OT communicated with RN prior to session.      Patient was found HOB elevated with peripheral IV, PureWick, bed alarm upon OT entry to room.    General Precautions: Standard, fall  Orthopedic Precautions: N/A  Braces: N/A    Vital Signs: Respiratory Status: on room air    Bed Mobility:    Patient completed Scooting/Bridging with contact guard assistance  Patient completed Supine to Sit with contact guard assistance    Functional Mobility/Transfers:  Patient completed Sit <> Stand Transfer with contact guard assistance  with  rolling walker   Patient completed Bed <> Chair Transfer using Step Transfer technique with contact guard assistance with rolling walker  Patient completed Toilet Transfer Step Transfer technique with contact guard assistance with  rolling walker    Activities of Daily Living:  Grooming: contact guard assistance to brush teeth and hair while standing at the sink.   Upper Body Dressing: moderate assistance to doff soiled gown and don clean gown.   Lower Body Dressing: maximal assistance to don brief.   Toileting: contact guard assistance for toilet t/f and perineal hygiene.     AMPAC 6 Click ADL:  AMPAC Total Score: 20    Functional Cognition:  Orientation: oriented to Person, Place, and Time  Safety Awareness: Impaired.      Visual Perceptual Skills:  Intact    Upper Extremity Function:  Right Upper Extremity:   WFL    Left Upper Extremity:  WFL    Balance:   Overall CGA    Therapeutic Positioning  Risk for acquired pressure injuries is increased due to relative decrease in mobility d/t hospitalization , impaired mobility, and altered skin already present.    OT interventions performed during the course of today's session:   Therapeutic positioning was provided at the conclusion of session to offload all bony prominences for the prevention and/or reduction of pressure injuries    Skin assessment: all bony prominences were  assessed    Findings: known area of altered skin integrity at groin and sacral redness    OT recommendations for therapeutic positioning throughout hospitalization:   Follow [facility] Pressure Injury Prevention Protocol    Patient Education:  Patient provided with verbal education education regarding OT role/goals/POC, fall prevention, safety awareness, and Discharge/DME recommendations.  Understanding was verbalized.     Patient left up in chair with all lines intact, call button in reach, chair alarm on, and RN notified.    GOALS:   Multidisciplinary Problems       Occupational Therapy Goals          Problem: Occupational Therapy    Goal Priority Disciplines Outcome Interventions   Occupational Therapy Goal     OT, PT/OT Progressing    Description: LTG: Pt will perform basic ADLs and ADL transfers with Modified independence using LRAD by discharge.    STG: to be met by 2/15/25    Pt will complete grooming standing at sink with LRAD with SBA.  Pt will complete UB dressing with SBA.  Pt will complete LB dressing with SBA using LRAD.  Pt will complete toileting with SBA using LRAD.  Pt will complete functional mobility to/from toilet and toilet transfer with SBA using LRAD.                        History:     Past Medical History:   Diagnosis Date    COPD (chronic obstructive pulmonary disease)     Hypertension          Past Surgical History:   Procedure Laterality Date    FRACTURE SURGERY  4/8/24    HYSTERECTOMY  1964    RETROGRADE INTRAMEDULLARY RODDING OF DISTAL FEMUR Left 04/09/2024    Procedure: INSERTION, INTRAMEDULLARY NAIL INTERTROCHENTERIC FRACTURE;  Surgeon: Guy Jaquez DO;  Location: [facility];  Service: Orthopedics;  Laterality: Left;  HANA TABLE, SYNTHES TFNA       Time Tracking:     OT Date of Treatment: 01/15/25  OT Start Time: 1338  OT Stop Time: 1411  OT Total Time (min): 33 min    Billable Minutes:Evaluation Moderate Complexity.     1/15/2025

## 2025-01-15 NOTE — PLAN OF CARE
Problem: Physical Therapy  Goal: Physical Therapy Goal  Description: Goals to be met by: 02/15/2025     Patient will increase functional independence with mobility by performin. Supine to sit with Modified [place]  2. Sit to stand transfer with Supervision  3. Bed to chair transfer with Supervision using Rolling Walker  4. Gait  x 500 feet with Supervision using Rolling Walker.     Outcome: Progressing

## 2025-01-15 NOTE — PT/OT/SLP EVAL
Physical Therapy Evaluation    Patient Name:  Safia Muñoz   MRN:  [id number]    Recommendations:     Discharge therapy intensity: Moderate Intensity Therapy (vs low with increased supervision)   Discharge Equipment Recommendations: none   Barriers to discharge: Decreased caregiver support    Assessment:     Safia Muñoz is a 89 y.o. female admitted with a medical diagnosis of acute metabolic encephalopathy due to UTI, delirium.  She presents with the following impairments/functional limitations: weakness, impaired endurance, decreased safety awareness. Pt mobilizes well but demonstrates decreased safety awareness and poor recall of recent events. She required frequent redirection to task and cues to properly use AD. At baseline pt lives in [facility] with minimal staff assistance but has been having increased falls and confusion. PT recommending increased supervision for pt either in moderate or low intensity setting pending family decision on acquiring increased help at home.    Rehab Prognosis: Good; patient would benefit from acute skilled PT services to address these deficits and reach maximum level of function.    Recent Surgery: * No surgery found *      Plan:     During this hospitalization, patient would benefit from acute PT services 5 x/week to address the identified rehab impairments via gait training, therapeutic activities, therapeutic exercises, neuromuscular re-education and progress toward the following goals:    Plan of Care Expires:  02/15/25    Subjective     Chief Complaint: none  Patient/Family Comments/goals:  Pain/Comfort:  Pain Rating 1: 0/10  Pain Rating Post-Intervention 1: 0/10    Patients cultural, spiritual, Moravian conflicts given the current situation: no    Living Environment:  [facility] in [facility]. She uses rollator for mobility.   Prior to admission, patients level of function was declining in recent months with increased falls, decreased social interaction,  and fatigue.  Equipment used at home: rollator.  DME owned (not currently used): none.  Upon discharge, patient will have assistance from TBD family considering sitters vs increased staff assist.    Objective:     Communicated with nurse prior to session.  Patient found up in chair with peripheral IV, PureWick  upon PT entry to room.    General Precautions: Standard, fall  Orthopedic Precautions:N/A   Braces: N/A  Respiratory Status: Room air      Exams:  Cognitive Exam:  Patient is oriented to Person, Place, and Time  Pt confused with living situation (reporting home vs senior living apartment) as well as current activity level. Daughter at bedside present to update on current status  RLE ROM: WFL  RLE Strength: WFL  LLE ROM: WFL  LLE Strength: WFL  Skin integrity: Visible skin intact      Functional Mobility:  Transfers:     Sit to Stand:  contact guard assistance with rolling walker  Gait: 200ft with RW, CGA with frequent redirection to task and cues for proper use of RW. Pt easily distracted by surroundings  Balance: fair      AM-PAC 6 CLICK MOBILITY  Total Score:18       Patient provided with verbal education and demonstrations education regarding PT role/goals/POC, fall prevention, safety awareness, and discharge/DME recommendations.  Understanding was verbalized, however additional teaching warranted.     Patient left up in chair with all lines intact, call button in reach, nurse notified, and daughter present.    GOALS:   Multidisciplinary Problems       Physical Therapy Goals          Problem: Physical Therapy    Goal Priority Disciplines Outcome Interventions   Physical Therapy Goal     PT, PT/OT Progressing    Description: Goals to be met by: 02/15/2025     Patient will increase functional independence with mobility by performin. Supine to sit with Modified [place]  2. Sit to stand transfer with Supervision  3. Bed to chair transfer with Supervision using Rolling Walker  4. Gait  x 500 feet with  Supervision using Rolling Walker.                          History:     Past Medical History:   Diagnosis Date    COPD (chronic obstructive pulmonary disease)     Hypertension        Past Surgical History:   Procedure Laterality Date    FRACTURE SURGERY  4/8/24    HYSTERECTOMY  1964    RETROGRADE INTRAMEDULLARY RODDING OF DISTAL FEMUR Left 04/09/2024    Procedure: INSERTION, INTRAMEDULLARY NAIL INTERTROCHENTERIC FRACTURE;  Surgeon: Guy Jaquez DO;  Location: [facility];  Service: Orthopedics;  Laterality: Left;  HANA TABLE, SYNTHES TFNA       Time Tracking:     PT Received On: 01/15/25  PT Start Time: 0928     PT Stop Time: 1002  PT Total Time (min): 34 min     Billable Minutes: Evaluation low      01/15/2025

## 2025-01-15 NOTE — PLAN OF CARE
"Lives at [facility]. States she indep baths and dresses self. Lacey (dgt) states she has medical and financial POA. Dgt states over last few months pt has been not getting dress daily, is "sundowning". Dgt states she has cameras installed in pt's room at [facility].  Lacey states she is interested in pt going to SNF to see if pt can get stronger. Lacey states she would like referral sent to [facility], that she has been in communication with [facility]. She states pt is originally from [place] and that pt may do well where she knows other people.  PASRR completed and given to [facility].       01/15/25 1124   Discharge Assessment   Assessment Type Discharge Planning Assessment   Confirmed/corrected address, phone number and insurance Yes   Confirmed Demographics Correct on Facesheet   Source of Information patient;family   When was your last doctors appointment? 11/15/24   Communicated ANNE with patient/caregiver Date not available/Unable to determine   Reason For Admission Acute UTI   People in Home alone   Do you expect to return to your current living situation? No   Do you have help at home or someone to help you manage your care at home? Yes   Who are your caregiver(s) and their phone number(s)? dgt/ POA  Lacey Pizarro [phone]   Prior to hospitilization cognitive status: Alert/Oriented   Current cognitive status: Alert/Oriented   Walking or Climbing Stairs Difficulty yes   Walking or Climbing Stairs ambulation difficulty, requires equipment   Mobility Management rollator   Dressing/Bathing Difficulty yes   Dressing/Bathing bathing difficulty, requires equipment   Dressing/Bathing Management bath bench, bsc over toilet seat   Home Accessibility wheelchair accessible   Home Layout Able to live on 1st floor   Equipment Currently Used at Home bath bench;rollator;oxygen;bedside commode   Readmission within 30 days? No   Patient currently being followed by outpatient case management? No "   Do you currently have service(s) that help you manage your care at home? No   Do you take prescription medications? Yes   Do you have any problems affording any of your prescribed medications? No   Is the patient taking medications as prescribed? yes   How do you get to doctors appointments? family or friend will provide   Are you on dialysis? No   Do you take coumadin? No   Discharge Plan A Skilled Nursing Facility   Discharge Plan B Home Health   DME Needed Upon Discharge  none   Discharge Plan discussed with: Adult children;Patient   Transition of Care Barriers None   Physical Activity   On average, how many days per week do you engage in moderate to strenuous exercise (like a brisk walk)? 0 days   On average, how many minutes do you engage in exercise at this level? 0 min   Financial Resource Strain   How hard is it for you to pay for the very basics like food, housing, medical care, and heating? Not hard   Housing Stability   In the last 12 months, was there a time when you were not able to pay the mortgage or rent on time? N   At any time in the past 12 months, were you homeless or living in a shelter (including now)? N   Transportation Needs   Has the lack of transportation kept you from medical appointments, meetings, work or from getting things needed for daily living? No   Food Insecurity   Within the past 12 months, you worried that your food would run out before you got the money to buy more. Never true   Within the past 12 months, the food you bought just didn't last and you didn't have money to get more. Never true   Stress   Do you feel stress - tense, restless, nervous, or anxious, or unable to sleep at night because your mind is troubled all the time - these days? Only a littl   Social Isolation   How often do you feel lonely or isolated from those around you?  Never   Alcohol Use   Q1: How often do you have a drink containing alcohol? Monthly or l   Q2: How many drinks containing alcohol do you  have on a typical day when you are drinking? 1 or 2   Q3: How often do you have six or more drinks on one occasion? Never   Utilities   In the past 12 months has the electric, gas, oil, or water company threatened to shut off services in your home? No   Health Literacy   How often do you need to have someone help you when you read instructions, pamphlets, or other written material from your doctor or pharmacy? Sometimes

## 2025-01-15 NOTE — H&P
[facility] Hospital Medicine History & Physical Examination       Patient Name: Safia Muñoz  MRN: [id number]  Patient Class: IP- Inpatient   Admission Date: 2025   Admitting Physician: MARISSA Service   Length of Stay: 0  Attending Physician: Jn Guillermo MD  Primary Care Provider: Iglesia Steel MD  Face-to-Face encounter date: 2025  Code Status: full   Chief Complaint: Altered Mental Status (AMS x 3 weeks per the [place]. Reports pt fixing food/water for dog that is not present. However on EMS arrival to NH and ED, pt aaox4, GCS 15. EMS states no AMS noted at all. Pt has no complaints)      Screening for Social Drivers for health:  Patient screened for food insecurity, housing instability, transportation needs, utility difficulties, and interpersonal safety (select all that apply as identified as concern)  []Housing or Food  []Transportation Needs  []Utility Difficulties  []Interpersonal safety  [x]None      Patient information was obtained from patient, patient's family, past medical records and ER records.  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 Per NH report, the patient has been fixing food and water for dogs that are not there. On exam, Pt was fully awake, alert, oriented to self,  and place however disoriented to situation. She did not know why she was brought in to the ED. Pt denies chest pain, palpitations, SOB, fever, chills, nausea , vomiting, abd pain, dysuria. She does report  urinary frequency but thinks she drinks too much water. Afebrile , hemodynamically stable on arrival with /56,  HR 77, RR 16, Temp 97.5 and O2 sat 95% on RA. Labs showed normal WBC, Hgb 14.2, preserved kidney function, normal LFT, Troponin neg, UDS neg, Flu/COVID/RSV neg. UA c/w UTI with WBC>100/HPF. CT head with no acute abnormality except known Rt parietal meningioma, age appropriate cerebral atrophy, CXR with no acute disease.  Pt was given IVF, Rocephin 1 gram IV in the ED and admitted to  service for further management.       PAST MEDICAL HISTORY:     Past Medical History:   Diagnosis Date    COPD (chronic obstructive pulmonary disease)     Hypertension        PAST SURGICAL HISTORY:     Past Surgical History:   Procedure Laterality Date    FRACTURE SURGERY  24    HYSTERECTOMY  1964    RETROGRADE INTRAMEDULLARY RODDING OF DISTAL FEMUR Left 2024    Procedure: INSERTION, INTRAMEDULLARY NAIL INTERTROCHENTERIC FRACTURE;  Surgeon: Guy Jaquez DO;  Location: [facility];  Service: Orthopedics;  Laterality: Left;  HANA TABLE, SYNTHES TFNA       ALLERGIES:   Adhesive tape-silicones, Ofloxacin, Penicillins, Sulfamethoxazole-trimethoprim, and Codeine    FAMILY HISTORY:   Reviewed and negative    SOCIAL HISTORY:     Social History     Tobacco Use    Smoking status: Former     Current packs/day: 0.00     Average packs/day: 1 pack/day for 35.0 years (35.0 ttl pk-yrs)     Types: Cigarettes     Start date: 1950     Quit date: 1985     Years since quittin.0    Smokeless tobacco: Never   Substance Use Topics    Alcohol use: Not Currently        HOME MEDICATIONS:     Prior to Admission medications    Medication Sig Start Date End Date Taking? Authorizing Provider   ALPRAZolam (XANAX) 0.25 MG tablet Take 0.25 mg by mouth 2 (two) times a day.    Provider, Historical   aspirin (ECOTRIN) 81 MG EC tablet Take 1 tablet (81 mg total) by mouth 2 (two) times a day. 24  BuxAna María,    estradiol 0.05 mg/24 hr td ptsw (VIVELLE-DOT) 0.05 mg/24 hr Place 1 patch onto the skin every Wednesday AND 1 patch every  Sunday. 5/1/24 6/16/24  Augusto Guan, FNP   furosemide (LASIX) 20 MG tablet Take 1 tablet (20 mg total) by mouth once daily. 6/19/24 6/19/25  Ana María Cedillo DO   metoprolol tartrate (LOPRESSOR) 50 MG tablet Take 1 tablet (50 mg total) by mouth 2 (two) times daily. 6/19/24 6/19/25  Ana María Cedillo DO   QUEtiapine (SEROQUEL) 25 MG Tab Take 12.5 mg by mouth every evening. 5/10/24   Provider, Crista   tamsulosin (FLOMAX) 0.4 mg Cap Take 1 capsule (0.4 mg total) by mouth every evening. 6/19/24 7/19/24  Ana María Cedillo DO   valsartan (DIOVAN) 40 MG tablet Take 1 tablet (40 mg total) by mouth 2 (two) times daily. 6/19/24 6/19/25  Ana María Cedillo DO       REVIEW OF SYSTEMS:   Except as documented, all other systems reviewed and negative     PHYSICAL EXAM:     VITAL SIGNS: 24 HRS MIN & MAX LAST   Temp  Min: 97.5 °F (36.4 °C)  Max: 98.2 °F (36.8 °C) 98.2 °F (36.8 °C)   BP  Min: 114/77  Max: 156/92 (!) 156/92   Pulse  Min: 71  Max: 85  82   Resp  Min: 16  Max: 25 19   SpO2  Min: 93 %  Max: 97 % (!) 94 % (hx: copd)     General appearance: Well-developed, well-nourished female in no apparent distress.  HENT: Atraumatic head. Moist mucous membranes of oral cavity.  Eyes: Normal extraocular movements.   Neck: Supple.   Lungs: Clear to auscultation bilaterally. No wheezing present.   Heart: Regular rate and rhythm. S1 and S2 present with no murmurs/gallop/rub. No pedal edema. No JVD present.   Abdomen: Soft, non-distended, non-tender. No rebound tenderness/guarding. Bowel sounds are normal.   Extremities: No cyanosis, clubbing, or edema.  Skin: No Rash.   Neuro: Motor and sensory exams grossly intact. Good tone. Muscle strength 4/5 in all 4 extremities  Psych/mental status: Appropriate mood and affect. Responds appropriately to questions.     LABS AND IMAGING:     Recent Labs   Lab 01/14/25  1319   WBC 10.79   RBC 5.30   HGB 14.2   HCT 46.7   MCV 88.1   MCH 26.8*   MCHC 30.4*   RDW 14.9      MPV 10.6*       Recent Labs    Lab 01/14/25  1319      K 4.6      CO2 28   BUN 15.6   CREATININE 1.01   CALCIUM 9.0   ALBUMIN 3.2*   ALKPHOS 147   ALT 21   AST 29   BILITOT 0.5       Microbiology Results (last 7 days)       Procedure Component Value Units Date/Time    Urine culture [[id number]] Collected: 01/14/25 1335    Order Status: Sent Specimen: Urine Updated: 01/14/25 1420             X-Ray Chest AP Portable  Narrative: EXAMINATION:  XR CHEST AP PORTABLE    CLINICAL HISTORY:  , Acute upper respiratory infection, unspecified.    FINDINGS:  No alveolar consolidation, effusion, or pneumothorax is seen.   The thoracic aorta is normal  cardiac silhouette, central pulmonary vessels and mediastinum are normal in size and are grossly unremarkable.   visualized osseous structures are grossly unremarkable.  Impression: No acute chest disease is identified.    Electronically signed by: Sergei Webb  Date:    01/14/2025  Time:    15:41  CT Head Without Contrast  Narrative: EXAMINATION:  CT HEAD WITHOUT CONTRAST    CLINICAL HISTORY:  Mental status change, unknown cause;    TECHNIQUE:  Multiple axial images were obtained from the base of the brain to the vertex without contrast administration.  Sagittal and coronal reconstructions were performed..Automatic exposure control is utilized to reduce patient radiation exposure.    COMPARISON:  12/01/2020    FINDINGS:  There is a partially calcified mass seen in the right posterior parietal region abutting the dural surface.  Is stable since prior examination and likely represents a meningioma.  No hemorrhage is seen.  No acute infarct is seen.  There is some cerebral atrophy seen.  There is some compensatory ventricular dilatation and periventricular white matter changes consistent with the patient's age.  Calvarium is intact.  The posterior fossa is unremarkable..  The visualized portions of the paranasal sinuses show no acute abnormality.  Impression: Chronic age-related changes.    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   Supportive care as indicated   PT/OT consult       VTE Prophylaxis:Lovenox     Patient condition:  Fair    __________________________________________________________________________  INPATIENT LIST OF MEDICATIONS     Scheduled Meds:   aspirin  81 mg Oral BID    [START ON 1/15/2025] cefTRIAXone (Rocephin) IV (PEDS and ADULTS)  1 g Intravenous Q24H    enoxparin  40 mg Subcutaneous Daily    metoprolol tartrate  50 mg Oral BID    QUEtiapine  12.5 mg Oral QHS    senna-docusate 8.6-50 mg  1 tablet Oral BID    tamsulosin  0.4 mg Oral QHS     Continuous Infusions:   0.9% NaCl   Intravenous Continuous 100 mL/hr at 01/14/25 1536 New Bag at 01/14/25 1536     PRN Meds:.  Current Facility-Administered Medications:     acetaminophen, 650 mg, Oral, Q4H PRN    albuterol-ipratropium, 3 mL, Nebulization, Q4H PRN    ALPRAZolam, 0.25 mg, Oral, BID PRN    hydrALAZINE, 10 mg, Intravenous, Q8H PRN    melatonin, 6 mg, Oral, Nightly PRN    ondansetron, 4 mg, Intravenous, Q8H PRN    sodium chloride 0.9%, 10 mL, Intravenous, PRN          No mobility needs. Ambulating well. Good social support system.   Anticipated discharge    If patient was admitted under observational status it is with my approval/permission.        All diagnosis and differential diagnosis have been reviewed; assessment and plan has been  documented; I have personally reviewed the labs and test results that are presently available; I have reviewed the patients medication list; I have reviewed the consulting providers response and recommendations. I have reviewed or attempted to review medical records based upon their availability.    All of the patient and family questions have been addressed and answered. Patient's is agreeable to the above stated plan. I will continue to monitor closely and make adjustments to medical management as needed.    If patient was admitted under observational status it is with my approval/permission.      Jn Guillermo MD   01/14/2025

## 2025-01-16 LAB — BACTERIA UR CULT: ABNORMAL

## 2025-01-16 PROCEDURE — 25000003 PHARM REV CODE 250: Performed by: INTERNAL MEDICINE

## 2025-01-16 PROCEDURE — 63600175 PHARM REV CODE 636 W HCPCS: Performed by: INTERNAL MEDICINE

## 2025-01-16 PROCEDURE — 21400001 HC TELEMETRY ROOM

## 2025-01-16 PROCEDURE — 25000003 PHARM REV CODE 250

## 2025-01-16 PROCEDURE — 97535 SELF CARE MNGMENT TRAINING: CPT

## 2025-01-16 PROCEDURE — 97530 THERAPEUTIC ACTIVITIES: CPT

## 2025-01-16 RX ORDER — BENZONATATE 100 MG/1
100 CAPSULE ORAL 3 TIMES DAILY PRN
Status: DISCONTINUED | OUTPATIENT
Start: 2025-01-16 | End: 2025-01-17 | Stop reason: HOSPADM

## 2025-01-16 RX ORDER — FOLIC ACID 1 MG/1
2 TABLET ORAL DAILY
Status: DISCONTINUED | OUTPATIENT
Start: 2025-01-16 | End: 2025-01-17 | Stop reason: HOSPADM

## 2025-01-16 RX ADMIN — VALSARTAN 80 MG: 80 TABLET, FILM COATED ORAL at 08:01

## 2025-01-16 RX ADMIN — ASPIRIN 81 MG: 81 TABLET, COATED ORAL at 08:01

## 2025-01-16 RX ADMIN — ALPRAZOLAM 0.25 MG: 0.25 TABLET ORAL at 01:01

## 2025-01-16 RX ADMIN — CEFTRIAXONE SODIUM 1 G: 1 INJECTION, POWDER, FOR SOLUTION INTRAMUSCULAR; INTRAVENOUS at 02:01

## 2025-01-16 RX ADMIN — BENZONATATE 100 MG: 100 CAPSULE ORAL at 09:01

## 2025-01-16 RX ADMIN — METOPROLOL TARTRATE 50 MG: 50 TABLET, FILM COATED ORAL at 08:01

## 2025-01-16 RX ADMIN — QUETIAPINE FUMARATE 12.5 MG: 25 TABLET ORAL at 08:01

## 2025-01-16 RX ADMIN — SENNOSIDES AND DOCUSATE SODIUM 1 TABLET: 50; 8.6 TABLET ORAL at 08:01

## 2025-01-16 RX ADMIN — ALPRAZOLAM 0.25 MG: 0.25 TABLET ORAL at 08:01

## 2025-01-16 RX ADMIN — ENOXAPARIN SODIUM 40 MG: 40 INJECTION SUBCUTANEOUS at 04:01

## 2025-01-16 RX ADMIN — FUROSEMIDE 20 MG: 20 TABLET ORAL at 09:01

## 2025-01-16 RX ADMIN — VALSARTAN 80 MG: 80 TABLET, FILM COATED ORAL at 09:01

## 2025-01-16 RX ADMIN — TAMSULOSIN HYDROCHLORIDE 0.4 MG: 0.4 CAPSULE ORAL at 08:01

## 2025-01-16 RX ADMIN — SENNOSIDES AND DOCUSATE SODIUM 1 TABLET: 50; 8.6 TABLET ORAL at 09:01

## 2025-01-16 RX ADMIN — FOLIC ACID 2 MG: 1 TABLET ORAL at 09:01

## 2025-01-16 RX ADMIN — ASPIRIN 81 MG: 81 TABLET, COATED ORAL at 09:01

## 2025-01-16 RX ADMIN — METOPROLOL TARTRATE 50 MG: 50 TABLET, FILM COATED ORAL at 09:01

## 2025-01-16 RX ADMIN — BENZONATATE 100 MG: 100 CAPSULE ORAL at 06:01

## 2025-01-16 RX ADMIN — NYSTATIN, TRIAMCINOLONE ACETONIDE: 100000; 1 CREAM TOPICAL at 09:01

## 2025-01-16 NOTE — PT/OT/SLP PROGRESS
Physical Therapy Treatment    Patient Name:  Safia Muñoz   MRN:  [id number]    Recommendations:     Discharge therapy intensity: Moderate Intensity Therapy   Discharge Equipment Recommendations: none  Barriers to discharge: Impaired mobility and Ongoing medical needs    Assessment:     Safia Muñoz is a 89 y.o. female admitted with a medical diagnosis of acute metabolic encephalopathy due to UTI, delirium.  She presents with the following impairments/functional limitations: weakness, impaired endurance, decreased safety awareness.    Continued gait training and LE exercises. Tolerated well. Chair alarm set at end of session. Continue to recommend  moderate intensity.     Rehab Prognosis: Good; patient would benefit from acute skilled PT services to address these deficits and reach maximum level of function.    Recent Surgery: * No surgery found *      Plan:     During this hospitalization, patient would benefit from acute PT services 5 x/week to address the identified rehab impairments via gait training, therapeutic activities, therapeutic exercises, neuromuscular re-education and progress toward the following goals:    Plan of Care Expires:  02/15/25    Subjective     Chief Complaint: none  Patient/Family Comments/goals: to get stronger.   Pain/Comfort:  Pain Rating Post-Intervention 1: 0/10      Objective:     Communicated with nurse prior to session.  Patient found up in chair with peripheral IV, PureWick upon PT entry to room.     General Precautions: Standard, fall  Orthopedic Precautions: N/A  Braces: N/A  Respiratory Status: Room air      Functional Mobility:  Transfers:     Sit to Stand:  contact guard assistance with rollator.   Gait: 60ft x 2 with 4WW and CGA, slow pace.     Therapeutic Activities/Exercises:  Seated exercises: ankle pumps, quad sets, SLR 3x10    Education:  Patient and son/s were provided with verbal education education regarding PT role/goals/POC, fall prevention, and safety  awareness.  Understanding was verbalized.     Patient left up in chair with all lines intact, call button in reach, and chair alarm on    GOALS:   Multidisciplinary Problems       Physical Therapy Goals          Problem: Physical Therapy    Goal Priority Disciplines Outcome Interventions   Physical Therapy Goal     PT, PT/OT Progressing    Description: Goals to be met by: 02/15/2025     Patient will increase functional independence with mobility by performin. Supine to sit with Modified [place]  2. Sit to stand transfer with Supervision  3. Bed to chair transfer with Supervision using Rolling Walker  4. Gait  x 500 feet with Supervision using Rolling Walker.                          Time Tracking:     PT Received On: 25  PT Start Time: 1433     PT Stop Time: 1447  PT Total Time (min): 14 min     Billable Minutes: Therapeutic Activity 14    Treatment Type: Treatment  PT/PTA: PT     Number of PTA visits since last PT visit: 2025

## 2025-01-16 NOTE — PT/OT/SLP PROGRESS
Occupational Therapy   Treatment    Name: Safia Muñoz  MRN: [id number]    Recommendations:     Recommended therapy intensity at discharge: Moderate Intensity Therapy   Discharge Equipment Recommendations:  to be determined by next level of care  Barriers to discharge:  None    Assessment:     Safia Muñoz is a 89 y.o. female with a medical diagnosis of acute metabolic encephalopathy due to UTI, delirium.  She presents with motivation to participate. Performance deficits affecting function are weakness, impaired endurance, impaired self care skills, decreased safety awareness. Patient's O2 remained WNL during today' session on room air.    Rehab Prognosis:  Good; patient would benefit from acute skilled OT services to address these deficits and reach maximum level of function.       Plan:     Patient to be seen 4 x/week to address the above listed problems via self-care/home management  Plan of Care Expires: 02/15/25  Plan of Care Reviewed with: patient    Subjective     Pain/Comfort:  Pain Rating 1: 0/10    Objective:     Communicated with: nurse prior to session.  Patient found HOB elevated with peripheral IV, telemetry, pulse ox (continuous) upon OT entry to room.    General Precautions: Standard, fall    Orthopedic Precautions:N/A  Braces: N/A  Respiratory Status: Room air  Vital Signs: Sp02: 88-94% with activity     Occupational Performance:     Bed Mobility:    Patient completed Supine to Sit with contact guard assistance     Functional Mobility/Transfers:  Patient completed Sit <> Stand Transfer with contact guard assistance  with  4 wheeled walker   Patient completed Bed <> Chair Transfer using Step Transfer technique with contact guard assistance with 4 wheeled walker  Patient completed Toilet Transfer Step Transfer technique with contact guard assistance with  4 wheeled walker and grab bars  Functional Mobility: no LOB; needs cues to lock breaks on rollator    Activities of Daily Living:  Grooming:  minimum assistance oral care, washing face, and applying makeup while standing at sink with good balance  Upper Body Dressing: minimum assistance doff soiled gown; tegan clean gown and house coat  Lower Body Dressing: minimum assistance doff soiled brief and donned clean brief  Toileting: contact guard assistance and maximal assistance sitting on toilet CGA for hygiene; max A for new purewick in place at end of session    [facility] 6 Click ADL: 19    Patient Education:  Patient provided with verbal education education regarding OT role/goals/POC, fall prevention, safety awareness, and Discharge/DME recommendations.  Understanding was verbalized, however additional teaching warranted.      Patient left up in chair with all lines intact, call button in reach, chair alarm on, nurse notified, and son present.    GOALS:   Multidisciplinary Problems       Occupational Therapy Goals          Problem: Occupational Therapy    Goal Priority Disciplines Outcome Interventions   Occupational Therapy Goal     OT, PT/OT Progressing    Description: LTG: Pt will perform basic ADLs and ADL transfers with Modified independence using LRAD by discharge.    STG: to be met by 2/15/25    Pt will complete grooming standing at sink with LRAD with SBA.  Pt will complete UB dressing with SBA.  Pt will complete LB dressing with SBA using LRAD.  Pt will complete toileting with SBA using LRAD.  Pt will complete functional mobility to/from toilet and toilet transfer with SBA using LRAD.                        Time Tracking:     OT Date of Treatment: 01/16/25  OT Start Time: 0942  OT Stop Time: 1020  OT Total Time (min): 38 min    Billable Minutes:Self Care/Home Management 38    OT/SHELLI: OT     Number of SHELLI visits since last OT visit: 1 1/16/2025

## 2025-01-16 NOTE — PROGRESS NOTES
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 Per NH report, the patient has been fixing food and water for dogs that are not there. On exam, Pt was fully awake, alert, oriented to self,  and place however disoriented to situation. She did not know why she was brought in to the ED. Pt denies chest pain, palpitations, SOB, fever, chills, nausea , vomiting, abd pain, dysuria. She does report  urinary frequency but thinks she drinks too much water. Afebrile , hemodynamically stable on arrival with /56, HR 77, RR 16, Temp 97.5 and O2 sat 95% on RA. Labs showed normal WBC, Hgb 14.2, preserved kidney function, normal LFT, Troponin neg, UDS neg, Flu/COVID/RSV neg. UA c/w UTI with WBC>100/HPF. CT head with no acute abnormality except known Rt parietal meningioma, age appropriate cerebral atrophy, CXR with no acute disease.  Pt was given IVF, Rocephin 1 gram IV in the ED and admitted to  service for further management.     Interval Hx:   Today, patient stated she was doing well had and had no new complaints.  Reports improvement in dysuria since admission.  Remains on IV Rocephin.  Urine culture growing pansensitive E coli.  Will consult PT/OT.    Objective/physical exam:  General: In no acute distress, afebrile  Chest: Clear to auscultation bilaterally  Heart: RRR, +S1, S2, no appreciable murmur  Abdomen: Soft, nontender, BS +  MSK: Warm, no lower extremity edema, no clubbing or cyanosis  Neurologic: Alert and oriented x3, moves all ext spontaneously      VITAL SIGNS: 24 HRS MIN & MAX LAST   Temp  Min: 97.4 °F (36.3 °C)   Max: 98.1 °F (36.7 °C) 97.4 °F (36.3 °C)   BP  Min: 120/72  Max: 179/74 120/72   Pulse  Min: 62  Max: 80  62   Resp  Min: 18  Max: 20 18   SpO2  Min: 87 %  Max: 94 % (!) 92 %     I have reviewed the following labs:  Recent Labs   Lab 01/14/25  1319 01/15/25  0456   WBC 10.79 10.74   RBC 5.30 4.69   HGB 14.2 12.7   HCT 46.7 41.2   MCV 88.1 87.8   MCH 26.8* 27.1   MCHC 30.4* 30.8*   RDW 14.9 14.9    230   MPV 10.6* 10.9*     Recent Labs   Lab 01/14/25  1319 01/15/25  0456    139   K 4.6 4.2    106   CO2 28 25   BUN 15.6 17.2   CREATININE 1.01 1.03*   CALCIUM 9.0 8.7   MG  --  1.90   ALBUMIN 3.2* 2.8*   ALKPHOS 147 127   ALT 21 21   AST 29 26   BILITOT 0.5 0.5     Assessment/Plan:  Acute metabolic encephalopathy due to UTI, POA  Acute UTI due to unspecified organism, POA  Delirium on possible underlying dementia with progression  Advanced age     Hx- HTN, Former smoker, 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  Continue monitoring patient's symptoms    VTE prophylaxis: Lovenox     Patient condition:  Fair    Anticipated discharge and Disposition:     To be determined    All diagnosis and differential diagnosis have been reviewed; assessment and plan has been documented; I have personally reviewed the labs and test results that are presently available; I have reviewed the patients medication list; I have reviewed the consulting providers response and  recommendations. I have reviewed or attempted to review medical records based upon their availability    All of the patient's questions have been  addressed and answered. Patient's is agreeable to the above stated plan. I will continue to monitor closely and make adjustments to medical management as needed.    Portions of this note dictated using EMR integrated voice recognition software, and may be subject to voice recognition errors not corrected at proofreading. Please contact writer for clarification if needed.   _____________________________________________________________________    Luis Alberto Mann MD  Department of Hospital Medicine   [facility]   01/16/2025

## 2025-01-16 NOTE — PROGRESS NOTES
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 Per NH report, the patient has been fixing food and water for dogs that are not there. On exam, Pt was fully awake, alert, oriented to self,  and place however disoriented to situation. She did not know why she was brought in to the ED. Pt denies chest pain, palpitations, SOB, fever, chills, nausea , vomiting, abd pain, dysuria. She does report  urinary frequency but thinks she drinks too much water. Afebrile , hemodynamically stable on arrival with /56, HR 77, RR 16, Temp 97.5 and O2 sat 95% on RA. Labs showed normal WBC, Hgb 14.2, preserved kidney function, normal LFT, Troponin neg, UDS neg, Flu/COVID/RSV neg. UA c/w UTI with WBC>100/HPF. CT head with no acute abnormality except known Rt parietal meningioma, age appropriate cerebral atrophy, CXR with no acute disease.  Pt was given IVF, Rocephin 1 gram IV in the ED and admitted to  service for further management.        Interval Hx:   Pt was seen at bedside with daughter in the room. She is fully awake, alert, oriented to self, place and person but disoriented to situation.   Daughter is concerned that her mother would not be able to return to the facility to live independently. Asking for SNF placement followed by long term placement.     Afebrile. BP mod elevated. On RA    Case was discussed with patient's nurse and  on the floor.    Objective/physical exam:  General: In no acute distress, afebrile  Chest: Clear to auscultation bilaterally  Heart: RRR, +S1, S2, no appreciable murmur  Abdomen:  Soft, nontender, BS +  MSK: Warm, no lower extremity edema, no clubbing or cyanosis  Neurologic: Alert and oriented x3, moves all ext spontaneously      VITAL SIGNS: 24 HRS MIN & MAX LAST   Temp  Min: 97.6 °F (36.4 °C)  Max: 98.4 °F (36.9 °C) 98 °F (36.7 °C)   BP  Min: 137/73  Max: 161/87 (!) 152/79   Pulse  Min: 68  Max: 82  80   Resp  Min: 17  Max: 20 20   SpO2  Min: 93 %  Max: 95 % (!) 93 %     I have reviewed the following labs:  Recent Labs   Lab 01/14/25  1319 01/15/25  0456   WBC 10.79 10.74   RBC 5.30 4.69   HGB 14.2 12.7   HCT 46.7 41.2   MCV 88.1 87.8   MCH 26.8* 27.1   MCHC 30.4* 30.8*   RDW 14.9 14.9    230   MPV 10.6* 10.9*     Recent Labs   Lab 01/14/25  1319 01/15/25  0456    139   K 4.6 4.2    106   CO2 28 25   BUN 15.6 17.2   CREATININE 1.01 1.03*   CALCIUM 9.0 8.7   MG  --  1.90   ALBUMIN 3.2* 2.8*   ALKPHOS 147 127   ALT 21 21   AST 29 26   BILITOT 0.5 0.5     Microbiology Results (last 7 days)       Procedure Component Value Units Date/Time    Urine culture [[id number]]  (Abnormal) Collected: 01/14/25 1335    Order Status: Completed Specimen: Urine Updated: 01/15/25 0926     Urine Culture >/= 100,000 colonies/ml Gram-negative Rods             See below for Radiology    Assessment/Plan:  Acute metabolic encephalopathy due to UTI, POA  Acute UTI due to unspecified organism, POA  Delirium on possible underlying dementia with progression  Advanced age     Hx- HTN, Former smoker, 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 Adjust meds as indicated.   Check B12, folic acid, TFT  PT/OT consult to assess current functional status   CM consult to assist with DC planning  Family is concerned that Pt would not be able to live independently any longer.  Requesting SNF placement and eventual long term NH placement.      Home meds are reviewed ad resumed.   Supportive care as indicated       VTE prophylaxis: Lovenox     Patient condition:  Fair    Anticipated discharge and Disposition:     To be determined    All diagnosis and differential diagnosis have been reviewed; assessment and plan has been documented; I have personally reviewed the labs and test results that are presently available; I have reviewed the patients medication list; I have reviewed the consulting providers response and recommendations. I have reviewed or attempted to review medical records based upon their availability    All of the patient's questions have been  addressed and answered. Patient's is agreeable to the above stated plan. I will continue to monitor closely and make adjustments to medical management as needed.    Portions of this note dictated using EMR integrated voice recognition software, and may be subject to voice recognition errors not corrected at proofreading. Please contact writer for clarification if needed.   _____________________________________________________________________    Malnutrition Status:  Nutrition consulted. Most recent weight and BMI monitored-     Measurements:  Wt Readings from Last 1 Encounters:   01/15/25 65.8 kg (145 lb 1 oz)   Body mass index is 25.7 kg/m².    Patient has been screened and assessed by RD.    Malnutrition Type:  Context:    Level:      Malnutrition Characteristic Summary:       Interventions/Recommendations (treatment strategy):        Scheduled Med:   aspirin  81 mg Oral BID    cefTRIAXone (Rocephin) IV (PEDS and ADULTS)  1 g Intravenous Q24H    enoxparin  40 mg Subcutaneous Daily    furosemide  20 mg Oral Daily    metoprolol tartrate  50 mg Oral BID    nystatin-triamcinolone   Topical (Top) BID    QUEtiapine  12.5 mg Oral QHS    senna-docusate 8.6-50 mg  1 tablet Oral BID    tamsulosin  0.4 mg Oral QHS    valsartan  40 mg Oral BID       Continuous Infusions:     PRN Meds:    Current Facility-Administered Medications:     acetaminophen, 650 mg, Oral, Q4H PRN    albuterol-ipratropium, 3 mL, Nebulization, Q4H PRN    ALPRAZolam, 0.25 mg, Oral, BID PRN    hydrALAZINE, 10 mg, Intravenous, Q8H PRN    melatonin, 6 mg, Oral, Nightly PRN    ondansetron, 4 mg, Intravenous, Q8H PRN    sodium chloride 0.9%, 10 mL, Intravenous, PRN         Jn Guillermo MD  Department of Hospital Medicine   [facility]   01/15/2025

## 2025-01-17 VITALS
BODY MASS INDEX: 25.7 KG/M2 | SYSTOLIC BLOOD PRESSURE: 127 MMHG | HEIGHT: 63 IN | RESPIRATION RATE: 18 BRPM | OXYGEN SATURATION: 93 % | HEART RATE: 65 BPM | TEMPERATURE: 98 F | DIASTOLIC BLOOD PRESSURE: 65 MMHG | WEIGHT: 145.06 LBS

## 2025-01-17 PROBLEM — R41.82 AMS (ALTERED MENTAL STATUS): Status: ACTIVE | Noted: 2025-01-17

## 2025-01-17 PROCEDURE — 25000003 PHARM REV CODE 250: Performed by: INTERNAL MEDICINE

## 2025-01-17 RX ORDER — QUETIAPINE FUMARATE 25 MG/1
25 TABLET, FILM COATED ORAL NIGHTLY
Qty: 30 TABLET | Refills: 0 | Status: ON HOLD | OUTPATIENT
Start: 2025-01-17 | End: 2025-01-27

## 2025-01-17 RX ORDER — ASPIRIN 81 MG/1
81 TABLET ORAL 2 TIMES DAILY
Qty: 180 TABLET | Refills: 0 | Status: ON HOLD | OUTPATIENT
Start: 2025-01-17 | End: 2025-01-27

## 2025-01-17 RX ORDER — VALSARTAN 80 MG/1
80 TABLET ORAL 2 TIMES DAILY
Qty: 180 TABLET | Refills: 3 | Status: ON HOLD | OUTPATIENT
Start: 2025-01-17 | End: 2025-01-27

## 2025-01-17 RX ORDER — CEFDINIR 300 MG/1
300 CAPSULE ORAL 2 TIMES DAILY
Qty: 8 CAPSULE | Refills: 0 | Status: SHIPPED | OUTPATIENT
Start: 2025-01-17 | End: 2025-01-21

## 2025-01-17 RX ORDER — FOLIC ACID 1 MG/1
2 TABLET ORAL DAILY
Qty: 60 TABLET | Refills: 11 | Status: ON HOLD | OUTPATIENT
Start: 2025-01-17 | End: 2025-01-27

## 2025-01-17 RX ORDER — ALPRAZOLAM 0.25 MG/1
0.25 TABLET ORAL 2 TIMES DAILY PRN
Qty: 10 TABLET | Refills: 0 | Status: ON HOLD | OUTPATIENT
Start: 2025-01-17 | End: 2025-01-27

## 2025-01-17 RX ADMIN — FUROSEMIDE 20 MG: 20 TABLET ORAL at 09:01

## 2025-01-17 RX ADMIN — SENNOSIDES AND DOCUSATE SODIUM 1 TABLET: 50; 8.6 TABLET ORAL at 09:01

## 2025-01-17 RX ADMIN — VALSARTAN 80 MG: 80 TABLET, FILM COATED ORAL at 09:01

## 2025-01-17 RX ADMIN — FOLIC ACID 2 MG: 1 TABLET ORAL at 09:01

## 2025-01-17 RX ADMIN — METOPROLOL TARTRATE 50 MG: 50 TABLET, FILM COATED ORAL at 09:01

## 2025-01-17 RX ADMIN — ASPIRIN 81 MG: 81 TABLET, COATED ORAL at 09:01

## 2025-01-17 RX ADMIN — NYSTATIN, TRIAMCINOLONE ACETONIDE: 100000; 1 CREAM TOPICAL at 09:01

## 2025-01-17 NOTE — PLAN OF CARE
01/17/25 1017   Final Note   Assessment Type Final Discharge Note   Anticipated Discharge Disposition SNF   Post-Acute Status   Post-Acute Authorization Placement   Post-Acute Placement Status Set-up Complete/Auth obtained   Discharge Delays None known at this time     Patient will discharge to [facility].

## 2025-01-24 ENCOUNTER — HOSPITAL ENCOUNTER (INPATIENT)
Facility: HOSPITAL | Age: OVER 89
LOS: 2 days | Discharge: HOSPICE | DRG: 064 | End: 2025-01-27
Attending: EMERGENCY MEDICINE | Admitting: INTERNAL MEDICINE
Payer: MEDICARE

## 2025-01-24 DIAGNOSIS — I63.10 CEREBROVASCULAR ACCIDENT (CVA) DUE TO EMBOLISM OF PRECEREBRAL ARTERY: ICD-10-CM

## 2025-01-24 DIAGNOSIS — R00.0 TACHYCARDIA: ICD-10-CM

## 2025-01-24 DIAGNOSIS — I10 BENIGN ESSENTIAL HTN: Primary | ICD-10-CM

## 2025-01-24 DIAGNOSIS — R29.818 ACUTE FOCAL NEUROLOGICAL DEFICIT: ICD-10-CM

## 2025-01-24 DIAGNOSIS — R41.82 ALTERED MENTAL STATUS, UNSPECIFIED ALTERED MENTAL STATUS TYPE: ICD-10-CM

## 2025-01-24 DIAGNOSIS — G45.9 TIA (TRANSIENT ISCHEMIC ATTACK): ICD-10-CM

## 2025-01-24 LAB
ALBUMIN SERPL-MCNC: 3.2 G/DL (ref 3.4–4.8)
ALBUMIN/GLOB SERPL: 1 RATIO (ref 1.1–2)
ALP SERPL-CCNC: 114 UNIT/L (ref 40–150)
ALT SERPL-CCNC: 23 UNIT/L (ref 0–55)
ANION GAP SERPL CALC-SCNC: 16 MMOL/L (ref 8–16)
ANION GAP SERPL CALC-SCNC: 7 MEQ/L
APICAL FOUR CHAMBER EJECTION FRACTION: 58 %
APICAL TWO CHAMBER EJECTION FRACTION: 58 %
AST SERPL-CCNC: 25 UNIT/L (ref 5–34)
AV INDEX (PROSTH): 1.01
AV MEAN GRADIENT: 3 MMHG
AV PEAK GRADIENT: 6 MMHG
AV REGURGITATION PRESSURE HALF TIME: 552 MS
AV VALVE AREA BY VELOCITY RATIO: 2.1 CM²
AV VALVE AREA: 2.3 CM²
AV VELOCITY RATIO: 0.92
BASOPHILS # BLD AUTO: 0.06 X10(3)/MCL
BASOPHILS NFR BLD AUTO: 0.7 %
BILIRUB SERPL-MCNC: 0.5 MG/DL
BUN SERPL-MCNC: 14.2 MG/DL (ref 9.8–20.1)
BUN SERPL-MCNC: 15 MG/DL (ref 6–30)
CALCIUM SERPL-MCNC: 8.6 MG/DL (ref 8.4–10.2)
CHLORIDE SERPL-SCNC: 100 MMOL/L (ref 98–111)
CHLORIDE SERPL-SCNC: 96 MMOL/L (ref 95–110)
CHOLEST SERPL-MCNC: 203 MG/DL
CHOLEST/HDLC SERPL: 4 {RATIO} (ref 0–5)
CO2 SERPL-SCNC: 27 MMOL/L (ref 23–31)
CREAT SERPL-MCNC: 0.81 MG/DL (ref 0.55–1.02)
CREAT SERPL-MCNC: 1 MG/DL (ref 0.5–1.4)
CREAT/UREA NIT SERPL: 18
CV ECHO LV RWT: 0.55 CM
DOP CALC AO PEAK VEL: 1.2 M/S
DOP CALC AO VTI: 26.5 CM
DOP CALC LVOT AREA: 2.3 CM2
DOP CALC LVOT DIAMETER: 1.7 CM
DOP CALC LVOT PEAK VEL: 1.1 M/S
DOP CALC LVOT STROKE VOLUME: 60.6 CM3
DOP CALC MV VTI: 22.5 CM
DOP CALCLVOT PEAK VEL VTI: 26.7 CM
E WAVE DECELERATION TIME: 219 MSEC
E/A RATIO: 0.74
E/E' RATIO: 10 M/S
ECHO LV POSTERIOR WALL: 1.1 CM (ref 0.6–1.1)
EOSINOPHIL # BLD AUTO: 0.54 X10(3)/MCL (ref 0–0.9)
EOSINOPHIL NFR BLD AUTO: 5.9 %
ERYTHROCYTE [DISTWIDTH] IN BLOOD BY AUTOMATED COUNT: 15.5 % (ref 11.5–17)
FRACTIONAL SHORTENING: 35 % (ref 28–44)
GFR SERPLBLD CREATININE-BSD FMLA CKD-EPI: >60 ML/MIN/1.73/M2
GLOBULIN SER-MCNC: 3.3 GM/DL (ref 2.4–3.5)
GLUCOSE SERPL-MCNC: 84 MG/DL (ref 75–121)
GLUCOSE SERPL-MCNC: 86 MG/DL (ref 70–110)
HCT VFR BLD AUTO: 42.7 % (ref 37–47)
HCT VFR BLD CALC: 44 %PCV (ref 36–54)
HDLC SERPL-MCNC: 50 MG/DL (ref 35–60)
HGB BLD-MCNC: 13.1 G/DL (ref 12–16)
HGB BLD-MCNC: 15 G/DL
HR MV ECHO: 62 BPM
IMM GRANULOCYTES # BLD AUTO: 0.07 X10(3)/MCL (ref 0–0.04)
IMM GRANULOCYTES NFR BLD AUTO: 0.8 %
INR PPP: 1
INTERVENTRICULAR SEPTUM: 1 CM (ref 0.6–1.1)
LDLC SERPL CALC-MCNC: 110 MG/DL (ref 50–140)
LEFT ATRIUM AREA SYSTOLIC (APICAL 2 CHAMBER): 22.1 CM2
LEFT ATRIUM AREA SYSTOLIC (APICAL 4 CHAMBER): 14.6 CM2
LEFT ATRIUM SIZE: 3.2 CM
LEFT ATRIUM VOLUME MOD: 56 ML
LEFT INTERNAL DIMENSION IN SYSTOLE: 2.6 CM (ref 2.1–4)
LEFT VENTRICLE DIASTOLIC VOLUME: 71.7 ML
LEFT VENTRICLE END DIASTOLIC VOLUME APICAL 2 CHAMBER: 54.1 ML
LEFT VENTRICLE END DIASTOLIC VOLUME APICAL 4 CHAMBER: 66 ML
LEFT VENTRICLE END SYSTOLIC VOLUME APICAL 2 CHAMBER: 71.3 ML
LEFT VENTRICLE END SYSTOLIC VOLUME APICAL 4 CHAMBER: 36.1 ML
LEFT VENTRICLE SYSTOLIC VOLUME: 23.9 ML
LEFT VENTRICULAR INTERNAL DIMENSION IN DIASTOLE: 4 CM (ref 3.5–6)
LEFT VENTRICULAR MASS: 136.2 G
LV LATERAL E/E' RATIO: 8.8 M/S
LV SEPTAL E/E' RATIO: 11.7 M/S
LVED V (TEICH): 71.7 ML
LVES V (TEICH): 23.9 ML
LVOT MG: 2 MMHG
LVOT MV: 0.68 CM/S
LYMPHOCYTES # BLD AUTO: 1.69 X10(3)/MCL (ref 0.6–4.6)
LYMPHOCYTES NFR BLD AUTO: 18.6 %
MCH RBC QN AUTO: 26.7 PG (ref 27–31)
MCHC RBC AUTO-ENTMCNC: 30.7 G/DL (ref 33–36)
MCV RBC AUTO: 87 FL (ref 80–94)
MONOCYTES # BLD AUTO: 0.75 X10(3)/MCL (ref 0.1–1.3)
MONOCYTES NFR BLD AUTO: 8.2 %
MV MEAN GRADIENT: 1 MMHG
MV PEAK A VEL: 0.94 M/S
MV PEAK E VEL: 0.7 M/S
MV PEAK GRADIENT: 2 MMHG
MV STENOSIS PRESSURE HALF TIME: 210 MS
MV VALVE AREA BY CONTINUITY EQUATION: 2.69 CM2
MV VALVE AREA P 1/2 METHOD: 1.05 CM2
NEUTROPHILS # BLD AUTO: 5.99 X10(3)/MCL (ref 2.1–9.2)
NEUTROPHILS NFR BLD AUTO: 65.8 %
NRBC BLD AUTO-RTO: 0 %
OHS LV EJECTION FRACTION SIMPSONS BIPLANE MOD: 57 %
PISA AR MAX VEL: 4.6 M/S
PISA TR MAX VEL: 2.2 M/S
PLATELET # BLD AUTO: 260 X10(3)/MCL (ref 130–400)
PMV BLD AUTO: 11.3 FL (ref 7.4–10.4)
POC IONIZED CALCIUM: 1.2 MMOL/L (ref 1.06–1.42)
POC PTINR: 1 (ref 0.9–1.2)
POC TCO2 (MEASURED): 28 MMOL/L (ref 23–29)
POCT GLUCOSE: 89 MG/DL (ref 70–110)
POCT GLUCOSE: 93 MG/DL (ref 70–110)
POTASSIUM BLD-SCNC: 4.1 MMOL/L (ref 3.5–5.1)
POTASSIUM SERPL-SCNC: 4 MMOL/L (ref 3.5–5.1)
PROT SERPL-MCNC: 6.5 GM/DL (ref 5.8–7.6)
PROTHROMBIN TIME: 13.7 SECONDS (ref 12.5–14.5)
PV PEAK GRADIENT: 2 MMHG
PV PEAK VELOCITY: 0.77 M/S
RA PRESSURE ESTIMATED: 3 MMHG
RBC # BLD AUTO: 4.91 X10(6)/MCL (ref 4.2–5.4)
RV TB RVSP: 5 MMHG
SAMPLE: NORMAL
SAMPLE: NORMAL
SODIUM BLD-SCNC: 136 MMOL/L (ref 136–145)
SODIUM SERPL-SCNC: 134 MMOL/L (ref 136–145)
TDI LATERAL: 0.08 M/S
TDI SEPTAL: 0.06 M/S
TDI: 0.07 M/S
TR MAX PG: 19 MMHG
TRICUSPID ANNULAR PLANE SYSTOLIC EXCURSION: 2.13 CM
TRIGL SERPL-MCNC: 217 MG/DL (ref 37–140)
TSH SERPL-ACNC: 3.94 UIU/ML (ref 0.35–4.94)
TV REST PULMONARY ARTERY PRESSURE: 22 MMHG
VLDLC SERPL CALC-MCNC: 43 MG/DL
WBC # BLD AUTO: 9.1 X10(3)/MCL (ref 4.5–11.5)

## 2025-01-24 PROCEDURE — G0378 HOSPITAL OBSERVATION PER HR: HCPCS

## 2025-01-24 PROCEDURE — 99285 EMERGENCY DEPT VISIT HI MDM: CPT | Mod: 25

## 2025-01-24 PROCEDURE — 25000003 PHARM REV CODE 250: Performed by: EMERGENCY MEDICINE

## 2025-01-24 PROCEDURE — 99223 1ST HOSP IP/OBS HIGH 75: CPT | Mod: ,,, | Performed by: PSYCHIATRY & NEUROLOGY

## 2025-01-24 PROCEDURE — 25500020 PHARM REV CODE 255: Performed by: EMERGENCY MEDICINE

## 2025-01-24 PROCEDURE — 93005 ELECTROCARDIOGRAM TRACING: CPT

## 2025-01-24 PROCEDURE — 25000003 PHARM REV CODE 250: Performed by: INTERNAL MEDICINE

## 2025-01-24 PROCEDURE — 93010 ELECTROCARDIOGRAM REPORT: CPT | Mod: ,,, | Performed by: STUDENT IN AN ORGANIZED HEALTH CARE EDUCATION/TRAINING PROGRAM

## 2025-01-24 PROCEDURE — 84443 ASSAY THYROID STIM HORMONE: CPT | Performed by: EMERGENCY MEDICINE

## 2025-01-24 PROCEDURE — 80061 LIPID PANEL: CPT | Performed by: EMERGENCY MEDICINE

## 2025-01-24 PROCEDURE — 85025 COMPLETE CBC W/AUTO DIFF WBC: CPT | Performed by: EMERGENCY MEDICINE

## 2025-01-24 PROCEDURE — 82565 ASSAY OF CREATININE: CPT

## 2025-01-24 PROCEDURE — 82962 GLUCOSE BLOOD TEST: CPT

## 2025-01-24 PROCEDURE — 80047 BASIC METABLC PNL IONIZED CA: CPT

## 2025-01-24 PROCEDURE — 80053 COMPREHEN METABOLIC PANEL: CPT | Performed by: EMERGENCY MEDICINE

## 2025-01-24 PROCEDURE — 85610 PROTHROMBIN TIME: CPT | Performed by: EMERGENCY MEDICINE

## 2025-01-24 RX ORDER — VALSARTAN 80 MG/1
80 TABLET ORAL 2 TIMES DAILY
Status: DISCONTINUED | OUTPATIENT
Start: 2025-01-24 | End: 2025-01-24

## 2025-01-24 RX ORDER — BISACODYL 10 MG/1
10 SUPPOSITORY RECTAL DAILY PRN
Status: DISCONTINUED | OUTPATIENT
Start: 2025-01-24 | End: 2025-01-27 | Stop reason: HOSPADM

## 2025-01-24 RX ORDER — ASPIRIN 325 MG
325 TABLET, DELAYED RELEASE (ENTERIC COATED) ORAL
Status: COMPLETED | OUTPATIENT
Start: 2025-01-24 | End: 2025-01-24

## 2025-01-24 RX ORDER — SERTRALINE HYDROCHLORIDE 25 MG/1
25 TABLET, FILM COATED ORAL DAILY
Status: DISCONTINUED | OUTPATIENT
Start: 2025-01-25 | End: 2025-01-27

## 2025-01-24 RX ORDER — LEVOTHYROXINE SODIUM 25 UG/1
25 TABLET ORAL
Status: DISCONTINUED | OUTPATIENT
Start: 2025-01-25 | End: 2025-01-27

## 2025-01-24 RX ORDER — QUETIAPINE FUMARATE 25 MG/1
25 TABLET, FILM COATED ORAL NIGHTLY
Status: DISCONTINUED | OUTPATIENT
Start: 2025-01-24 | End: 2025-01-25

## 2025-01-24 RX ORDER — METOPROLOL TARTRATE 50 MG/1
50 TABLET ORAL 2 TIMES DAILY
Status: DISCONTINUED | OUTPATIENT
Start: 2025-01-24 | End: 2025-01-27

## 2025-01-24 RX ORDER — LABETALOL HYDROCHLORIDE 5 MG/ML
10 INJECTION, SOLUTION INTRAVENOUS EVERY 6 HOURS PRN
Status: DISCONTINUED | OUTPATIENT
Start: 2025-01-24 | End: 2025-01-27 | Stop reason: HOSPADM

## 2025-01-24 RX ORDER — TAMSULOSIN HYDROCHLORIDE 0.4 MG/1
0.4 CAPSULE ORAL NIGHTLY
Status: DISCONTINUED | OUTPATIENT
Start: 2025-01-24 | End: 2025-01-27

## 2025-01-24 RX ORDER — FOLIC ACID 1 MG/1
2 TABLET ORAL DAILY
Status: DISCONTINUED | OUTPATIENT
Start: 2025-01-25 | End: 2025-01-27

## 2025-01-24 RX ORDER — ASPIRIN 81 MG/1
81 TABLET ORAL 2 TIMES DAILY
Status: DISCONTINUED | OUTPATIENT
Start: 2025-01-25 | End: 2025-01-27

## 2025-01-24 RX ORDER — ALPRAZOLAM 0.25 MG/1
0.25 TABLET ORAL 2 TIMES DAILY PRN
Status: DISCONTINUED | OUTPATIENT
Start: 2025-01-24 | End: 2025-01-27 | Stop reason: HOSPADM

## 2025-01-24 RX ADMIN — QUETIAPINE FUMARATE 25 MG: 25 TABLET ORAL at 10:01

## 2025-01-24 RX ADMIN — ASPIRIN 325 MG: 325 TABLET, COATED ORAL at 03:01

## 2025-01-24 RX ADMIN — IOHEXOL 75 ML: 350 INJECTION, SOLUTION INTRAVENOUS at 12:01

## 2025-01-24 RX ADMIN — METOPROLOL TARTRATE 50 MG: 50 TABLET, FILM COATED ORAL at 07:01

## 2025-01-24 RX ADMIN — TAMSULOSIN HYDROCHLORIDE 0.4 MG: 0.4 CAPSULE ORAL at 10:01

## 2025-01-24 NOTE — CONSULTS
[facility] - Emergency Dept  Neurology  Consult Note      Safia Muñoz is a 89 y.o. female who presented to [facility] on 2025 with reports of  right sided weakness, right facial droop, expressive aphasia . Onset of symptoms was  unknown . Stroke risk factors include atrial fibrillation and hypertension. Prior stroke history: none.       Past Medical History:   Diagnosis Date    COPD (chronic obstructive pulmonary disease)     Hypertension      Past Surgical History:   Procedure Laterality Date    FRACTURE SURGERY  24    HYSTERECTOMY  1964    RETROGRADE INTRAMEDULLARY RODDING OF DISTAL FEMUR Left 2024    Procedure: INSERTION, INTRAMEDULLARY NAIL INTERTROCHENTERIC FRACTURE;  Surgeon: Guy Jaquez DO;  Location: [facility];  Service: Orthopedics;  Laterality: Left;  HANA TABLE, SYNTHES TFNA     Family History   Problem Relation Name Age of Onset    Cancer Mother Mother     Heart disease Father Father     Stroke Father Father      Social History     Tobacco Use    Smoking status: Former     Current packs/day: 0.00     Average packs/day: 1 pack/day for 35.0 years (35.0 ttl pk-yrs)     Types: Cigarettes     Start date: 1950     Quit date: 1985     Years since quittin.0    Smokeless tobacco: Never   Substance Use Topics    Alcohol use: Not Currently    Drug use: Never      Review of patient's allergies indicates:   Allergen Reactions    Adhesive tape-silicones Blisters    Ofloxacin     Penicillins     Sulfamethoxazole-trimethoprim      Other reaction(s): Unknown    Codeine Nausea Only         STROKE DOCUMENTATION   Acute Stroke Times   Last Known Normal Date: 25  Last Known Normal Time: 1055  Symptom Onset Date: 25  Symptom Onset Time: 1100  Stroke Team Called Date: 25  Stroke Team Called Time: 1155  Stroke Team Arrival Date: 25  Stroke Team Arrival Time: 1157  Thrombolytic Therapy Recommended: No  Thrombectomy Recommended: No    NIH Scale:  1a. Level of  Consciousness: 0-->Alert, keenly responsive  1b. LOC Questions: 0-->Answers both questions correctly  1c. LOC Commands: 0-->Performs both tasks correctly  2. Best Gaze: 0-->Normal  3. Visual: 0-->No visual loss  4. Facial Palsy: 0-->Normal symmetrical movements  5a. Motor Arm, Left: 0-->No drift, limb holds 90 (or 45) degrees for full 10 secs  5b. Motor Arm, Right: 0-->No drift, limb holds 90 (or 45) degrees for full 10 secs  6a. Motor Leg, Left: 0-->No drift, leg holds 30 degree position for full 5 secs  6b. Motor Leg, Right: 0-->No drift, leg holds 30 degree position for full 5 secs  7. Limb Ataxia: 0-->Absent  8. Sensory: 0-->Normal, no sensory loss  9. Best Language: 0-->No aphasia, normal  10. Dysarthria: 0-->Normal  11. Extinction and Inattention (formerly Neglect): 0-->No abnormality  Total (NIH Stroke Scale): 0     Modified Jez    Kirby Coma Scale:    ABCD2 Score:    [id number] Score:   HAS -BLED Score:   ICH Score:   Hunt & Sam Classification:         Neurological Exam:   LOC: alert and follows requests  Speech: No dysarthria  Orientation: Person  Visual Fields (CN II): Full  Motor*: Arm Left:  Normal (5/5), Leg Left:   Normal (5/5), Arm Right:   Normal (5/5), Leg Right:   Normal (5/5)  Sensation: intact to light touch, temperature and vibration        Laboratory:  BMP:   Lab Results   Component Value Date    GLUCOSE 99 01/15/2025     01/15/2025    K 4.2 01/15/2025     01/15/2025    CO2 25 01/15/2025    BUN 17.2 01/15/2025    CREATININE 1.03 (H) 01/15/2025    CALCIUM 8.7 01/15/2025     CBC:   Lab Results   Component Value Date    WBC 10.74 01/15/2025    RBC 4.69 01/15/2025    HGB 12.7 01/15/2025    HCT 41.2 01/15/2025     01/15/2025    MCV 87.8 01/15/2025    MCH 27.1 01/15/2025    MCHC 30.8 (L) 01/15/2025     Lipid Panel:   Lab Results   Component Value Date    CHOL 195 08/12/2024    HDL 60 08/12/2024    TRIG 82 08/12/2024     Coagulation:   Lab Results   Component Value Date     INR 1.1 04/08/2024    APTT 31.0 04/08/2024     Hgb A1C:   Lab Results   Component Value Date    HGBA1C 5.0 06/26/2024     TSH:   Lab Results   Component Value Date    TSH 2.188 01/14/2025       Diagnostic Results  Brain Imaging:   -CTh: No acute hemorrhage  Cerebrovascular Imaging:   -CTA h/n: pending  Cardiac Evaluation:   -EKG/Telemetry:         Discussed with neurologist on call: Bergeron  Thrombolysis Candidate? No, Patient back to neurological baseline   -Delays to Thrombolysis?  No    Interventional Revascularization Candidate? Awaiting CTA results from [facility] for determination  Low NIH, no thrombectomy recomended  -Delays to Thrombectomy? No  Discussed with Interventional Neurology at 1202 , recommends no intervention due to low NIH          PLAN:     - r/o stroke      - Admit for stroke workup  - Allow permissive HTN ... prn hydralazine and labetalol for SBP > 220 or DBP > 120     - after 24 hours from symptom onset, ok to normalize blood pressure  - Neuro checks q4hr ... stat CTh if any neuro change   - Begin ASA 325mg daily .... if failed Medina, then ASA 300mg CO daily  - DVT ppx with SCD or lovenox 40 s/c daily  - Continuous telemetry monitoring  - NPO until passes Medina or cleared by SLP  - No bedrest, ok for patient to ambulate, RN to observe first ambulation for safety  - PT/OT/SLP to evaluate            Thank you for your consult.   Will follow up with patient.      Negrita Garrett NP  Neurology  [facility] - Emergency Dept

## 2025-01-24 NOTE — H&P
[facility] Medicine History & Physical Examination       Patient Name: Safia Muñoz  MRN: [id number]  Patient Class: OP- Observation   Admission Date: 1/24/2025   Admitting Physician: MARISSA Service   Length of Stay: 0  Attending Physician: Bola Sanchez MD  Primary Care Provider: Iglesia Steel MD  Face-to-Face encounter date: 01/24/2025  Code Status: DNR  Chief Complaint: Cerebrovascular Accident (Pt arrived [facility] with stroke like symptoms onset around 1040 this AM. Pt arrived in triage with a right-sided facial droop. CBG 93. )      Screening for Social Drivers for health:  Patient screened for food insecurity, housing instability, transportation needs, utility difficulties, and interpersonal safety (select all that apply as identified as concern)  []Housing or Food  []Transportation Needs  []Utility Difficulties  []Interpersonal safety  [x]None      Patient information was obtained from patient, patient's family, past medical records and ER records.  ED records were reviewed in detail and documented below    HISTORY OF PRESENT ILLNESS:   Safia Muñoz is a 89 y.o. female who  has a past medical history of COPD (chronic obstructive pulmonary disease) and Hypertension.. The patient presented to [facility] on 1/24/2025 with right sided droop, dysarthria and right sided weakness. Currently she is in a SNF unit. She lives at [facility]. Baseline she ambulates with a walker, need assist for some ADLs. Has memory issues and mild dementia.   She is currently at baseline. No neurological deficits. She is oriented to self and place. Following all verbal commands.     Vitals showed she was hypertensive. Labs unremarkable. CT brain was normal. CTA brain showed  Impression:  No large vessel occlusion seen  50% stenosis at the M1 segment in the left MCA  75% stenosis in the right proximal internal carotid artery secondary to calcified plaque  Enlarged pulmonary  arteries consistent with pulmonary artery hypertension    Patient will be observed overnight. MRI brain pending.     Advanced Directives:  I spent 30 mins of face to face discussion regarding advanced directives and end of life planning which included the patient and patients family, who concluded, that they would like to made DNR status.     PAST MEDICAL HISTORY:     Past Medical History:   Diagnosis Date    COPD (chronic obstructive pulmonary disease)     Hypertension        PAST SURGICAL HISTORY:     Past Surgical History:   Procedure Laterality Date    FRACTURE SURGERY  24    HYSTERECTOMY  1964    RETROGRADE INTRAMEDULLARY RODDING OF DISTAL FEMUR Left 2024    Procedure: INSERTION, INTRAMEDULLARY NAIL INTERTROCHENTERIC FRACTURE;  Surgeon: Guy Jaquez DO;  Location: [facility];  Service: Orthopedics;  Laterality: Left;  [place], [facility]       ALLERGIES:   Adhesive tape-silicones, Ofloxacin, Penicillins, Sulfamethoxazole-trimethoprim, and Codeine    FAMILY HISTORY:   Reviewed and negative    SOCIAL HISTORY:     Social History     Tobacco Use    Smoking status: Former     Current packs/day: 0.00     Average packs/day: 1 pack/day for 35.0 years (35.0 ttl pk-yrs)     Types: Cigarettes     Start date: 1950     Quit date: 1985     Years since quittin.0    Smokeless tobacco: Never   Substance Use Topics    Alcohol use: Not Currently        HOME MEDICATIONS:     Prior to Admission medications    Medication Sig Start Date End Date Taking? Authorizing Provider   ALPRAZolam (XANAX) 0.25 MG tablet Take 1 tablet (0.25 mg total) by mouth 2 (two) times daily as needed for Anxiety. 25   Luis Alberto Mann MD   aspirin (ECOTRIN) 81 MG EC tablet Take 1 tablet (81 mg total) by mouth 2 (two) times a day. 25  Luis Alberto Mann MD   estradiol 0.05 mg/24 hr td ptsw (VIVELLE-DOT) 0.05 mg/24 hr Place 1 patch onto the skin every Wednesday AND 1 patch every . 24  Freida  Augusto LEGER, FNP   FARXIGA 10 mg tablet Take 10 mg by mouth once daily. 1/8/25   Provider, Historical   folic acid (FOLVITE) 1 MG tablet Take 2 tablets (2 mg total) by mouth once daily. 1/17/25 1/17/26  Luis Alberto Mann MD   furosemide (LASIX) 20 MG tablet Take 1 tablet (20 mg total) by mouth once daily. 6/19/24 6/19/25  Ana María Cedillo DO   levothyroxine (SYNTHROID) 25 MCG tablet Take 25 mcg by mouth before breakfast. 11/9/24   Provider, Historical   metoprolol tartrate (LOPRESSOR) 50 MG tablet Take 1 tablet (50 mg total) by mouth 2 (two) times daily. 6/19/24 6/19/25  Ana María Cedillo DO   potassium chloride SA (K-DUR,KLOR-CON) 20 MEQ tablet Take 20 mEq by mouth once daily. 1/8/25   Provider, Historical   QUEtiapine (SEROQUEL) 25 MG Tab Take 1 tablet (25 mg total) by mouth every evening. 1/17/25   Luis Alberto Mann MD   sertraline (ZOLOFT) 25 MG tablet Take 25 mg by mouth once daily. 11/29/24   Provider, Historical   tamsulosin (FLOMAX) 0.4 mg Cap Take 1 capsule (0.4 mg total) by mouth every evening. 6/19/24 7/19/24  Ana María Cedillo DO   valsartan (DIOVAN) 80 MG tablet Take 1 tablet (80 mg total) by mouth 2 (two) times daily. 1/17/25 1/17/26  Luis Alberto Mann MD       REVIEW OF SYSTEMS:   Except as documented, all other systems reviewed and negative     PHYSICAL EXAM:     VITAL SIGNS: 24 HRS MIN & MAX LAST   No data recorded     BP  Min: 188/90  Max: 188/90 (!) 188/90   Pulse  Min: 90  Max: 90  90   Resp  Min: 18  Max: 18 18   SpO2  Min: 98 %  Max: 98 % 98 %     General appearance: Well-developed, well-nourished female in no apparent distress.  HENT: Atraumatic head. Moist mucous membranes of oral cavity.  Eyes: Normal extraocular movements.   Neck: Supple.   Lungs: Clear to auscultation bilaterally. No wheezing present.   Heart: Regular rate and rhythm. S1 and S2 present with no murmurs/gallop/rub. No pedal edema. No JVD present.   Abdomen: Soft, non-distended, non-tender. No rebound tenderness/guarding. Bowel sounds are normal.    Extremities: No cyanosis, clubbing, or edema.  Skin: No Rash.   Neuro: Motor and sensory exams grossly intact. Good tone. Muscle strength 5/5 in all 4 extremities  Psych/mental status: Appropriate mood and affect. Responds appropriately to questions. + mild cognitive deficits     LABS AND IMAGING:     Recent Labs   Lab 01/24/25  1201 01/24/25  1232   WBC  --  9.10   RBC  --  4.91   HGB  --  13.1   HCT 44 42.7   MCV  --  87.0   MCH  --  26.7*   MCHC  --  30.7*   RDW  --  15.5   PLT  --  260   MPV  --  11.3*       Recent Labs   Lab 01/24/25  1232   *   K 4.0      CO2 27   BUN 14.2   CREATININE 0.81   CALCIUM 8.6   ALBUMIN 3.2*   ALKPHOS 114   ALT 23   AST 25   BILITOT 0.5       Microbiology Results (last 7 days)       ** No results found for the last 168 hours. **             CTA Head and Neck (xpd)  Narrative: EXAMINATION:  CTA HEAD AND NECK (XPD)    CLINICAL HISTORY:  Neuro deficit, acute, stroke suspected;    TECHNIQUE:  Non contrast low dose axial images were obtained through the head.  CT angiogram was performed from the level of the brittani to the top of the head following the IV administration 100 cc of Isovue 370 contrast .  Sagittal and coronal reconstructions and maximum intensity projection reconstructions were performed. Arterial stenosis percentages are based on NASCET measurement criteria.  Additional multiplanar reconstructions were performed on post processed imaging.  Automatic exposure control (AEC) is utilized to reduce patient radiation exposure.    COMPARISON:  None    FINDINGS:  Source images: No intracranial mass lesion is seen.  No hemorrhage is seen.  No infarct is seen.  Ventricles and basilar cisterns appear grossly unremarkable.  No cervical mass or lesion is seen.  No abnormal lymphadenopathy seen.  There is a right thyroid nodule.  Larynx appears normal.  Trachea is midline.  Chronic lung changes are seen at the thoracic inlet.    Vascular images: The pulmonary artery is  enlarged and dilated consistent with pulmonary artery hypertension.  Thoracic aorta is normal in caliber with some mild atherosclerotic plaque.  There is a 3 vessel arch seen.  There is calcified plaque at the origin of the left common carotid artery with approximate 30% stenosis.  Some plaque is seen in the left subclavian artery and in the brachiocephalic artery but no significant stenosis is seen.  The common carotid arteries are patent.  Scattered areas plaque are seen in the common carotid arteries but no high-grade stenosis is seen.  There is dense calcified plaque at the carotid bulb and proximal internal carotid artery on the left with a 40% stenosis in the left proximal internal carotid artery.  There is calcified plaque in the right carotid bulb and proximal internal carotid artery with a 75% stenosis in the proximal right internal carotid artery secondary to calcified plaque.  The distal internal carotid arteries are widely patent.  They are seen to level of the petrous ridge and clinoid and supraclinoid portions.  There is calcified plaque seen in the cavernous and clinoid portions of the internal carotid arteries bilaterally with areas of stenosis measuring up to 30%.    The MCAs are patent.  The M1 segments, M2 segments M3 segments are patent.  There is a calcified plaque seen in the left M1 segment which causes a 50% stenosis.  The A1 segments are patent.  Anterior communicating arteries patent.  Anterior cerebral arteries are widely patent.  No aneurysm is seen.    Both vertebral arteries are patent.  They are normal in caliber.  No fibromuscular dysplasia is seen.  No dissection is seen.  There is some calcified plaque at the left distal vertebral artery at the base of the skull but no significant stenosis is seen.  Both vertebral arteries perfuse a normal appearing basilar artery.  Posterior cerebral arteries are widely patent.  No aneurysm or obstruction is seen.    .  Impression: No large vessel  occlusion seen    50% stenosis at the M1 segment in the left MCA    75% stenosis in the right proximal internal carotid artery secondary to calcified plaque    Enlarged pulmonary arteries consistent with pulmonary artery hypertension    Right thyroid nodule.  Ultrasound correlation is recommended if not previously done    Electronically signed by: Kartik Mckeon  Date:    01/24/2025  Time:    12:25  CT HEAD FOR STROKE  Narrative: EXAMINATION:  CT HEAD FOR STROKE    CLINICAL HISTORY:  Neuro deficit, acute, stroke suspected;    TECHNIQUE:  Multiple axial images were obtained from the base of the brain to the vertex without contrast administration.  Sagittal and coronal reconstructions were performed..Automatic exposure control is utilized to reduce patient radiation exposure.    COMPARISON:  01/14/2025    FINDINGS:  There is a dural based calcified lesion in the right posterior parietal region consistent with a likely meningioma.  It is stable.  No other lesion is seen..  No hemorrhage is seen.  No acute infarct is seen.  There is some cerebral atrophy seen.  There is some compensatory ventricular dilatation and periventricular white matter changes consistent with the patient's age.  Calvarium is intact.  The posterior fossa is unremarkable..  The visualized portions of the paranasal sinuses show no acute abnormality.  Impression: No change since prior    Electronically signed by: Kartik Mckeon  Date:    01/24/2025  Time:    12:13      ASSESSMENT & PLAN:   TIA   HTN, benign   COPD   Mild dementia       Plan:  Patient looks comfortable and at baseline  Has no neurological deficits   Will continue ASA  Home meds reviewed, will slowly control BP   MRI brain pending     Continue strict aspiration, fall and decubitus precautions        Continue supportive care and keep on delirium precautions     Now on a cardiac diet     Family at bedside, explained in detail about the patients condition, diagnosis, vitals, labs and  treatment plan. They understand and agree with the plan. All their questions were answered.      VTE Prophylaxis: SCD      Patient condition:  Fair    __________________________________________________________________________  INPATIENT LIST OF MEDICATIONS     Scheduled Meds:  Continuous Infusions:  PRN Meds:.  Current Facility-Administered Medications:     bisacodyL, 10 mg, Rectal, Daily PRN    labetalol, 10 mg, Intravenous, Q6H PRN        ________________________________________________________________________________      If patient was admitted under observational status it is with my approval/permission.        All diagnosis and differential diagnosis have been reviewed; assessment and plan has been documented; I have personally reviewed the labs and test results that are presently available; I have reviewed the patients medication list; I have reviewed the consulting providers response and recommendations. I have reviewed or attempted to review medical records based upon their availability.    All of the patient and family questions have been addressed and answered. Patient's is agreeable to the above stated plan. I will continue to monitor closely and make adjustments to medical management as needed.    If patient was admitted under observational status it is with my approval/permission.      Bola Sanchez MD   01/24/2025

## 2025-01-24 NOTE — ED NOTES
"Pt found in hallway walking around stating " I'm okay. I don't need to be here, nothing is wrong with me". Pt instructed to return to room and lie in stretcher. Pt refused. MD spoke with pt about results and need to be admitted. Pt verbalized will remain in recliner, not in stretcher, and will allow cardiac monitoring. Pt seated in room at this time. Vitals stable. GCS 15.  "

## 2025-01-24 NOTE — Clinical Note
Diagnosis: Acute focal neurological deficit [278626]   Future Attending Provider: [facility] ODSS [[id number]]   Admit to which facility:: [facility] [[id number]]

## 2025-01-24 NOTE — ED PROVIDER NOTES
Encounter Date: 1/24/2025    SCRIBE #1 NOTE: I, Nahid Brown, am scribing for, and in the presence of,  Zara Flanagan MD. I have scribed the following portions of the note - Other sections scribed: HPI, ROS, and PE.       History     Chief Complaint   Patient presents with    Cerebrovascular Accident     Pt arrived [facility] with stroke like symptoms onset around 1040 this AM. Pt arrived in triage with a right-sided facial droop. CBG 93.      Safia Muñoz is a 89 y.o. female patient with a PMHx of COPD and HTN who presents to the Emergency Department for evaluation of stroke like symptoms first noticed at 1100 today. She was reported to have right sided weakness, right side facial droop, slurred speech, and expressive aphasia, CBG 76. Upon arrival it has mostly resolved beside some right side facial droop. No mitigating or exacerbating factors reported. Patient denies any headaches.       The history is provided by the patient. No  was used.     Review of patient's allergies indicates:   Allergen Reactions    Adhesive tape-silicones Blisters    Ofloxacin     Penicillins     Sulfamethoxazole-trimethoprim      Other reaction(s): Unknown    Codeine Nausea Only     Past Medical History:   Diagnosis Date    COPD (chronic obstructive pulmonary disease)     Hypertension      Past Surgical History:   Procedure Laterality Date    FRACTURE SURGERY  4/8/24    HYSTERECTOMY  1964    RETROGRADE INTRAMEDULLARY RODDING OF DISTAL FEMUR Left 04/09/2024    Procedure: INSERTION, INTRAMEDULLARY NAIL INTERTROCHENTERIC FRACTURE;  Surgeon: Guy Jauqez DO;  Location: [facility];  Service: Orthopedics;  Laterality: Left;  HANA TABLE, SYNTHES TFNA     Family History   Problem Relation Name Age of Onset    Cancer Mother Mother     Heart disease Father Father     Stroke Father Father      Social History     Tobacco Use    Smoking status: Former     Current packs/day: 0.00     Average packs/day: 1 pack/day for 35.0  years (35.0 ttl pk-yrs)     Types: Cigarettes     Start date: 1950     Quit date: 1985     Years since quittin.0    Smokeless tobacco: Never   Substance Use Topics    Alcohol use: Not Currently    Drug use: Never     Review of Systems   Constitutional:  Negative for chills and fever.   Respiratory:  Negative for shortness of breath.    Cardiovascular:  Negative for chest pain.   Gastrointestinal:  Negative for abdominal pain, diarrhea, nausea and vomiting.   Genitourinary:  Negative for dysuria.   Musculoskeletal:  Negative for myalgias.   Neurological:  Positive for facial asymmetry, speech difficulty and weakness. Negative for headaches.       Physical Exam     Initial Vitals [25 1153]   BP Pulse Resp Temp SpO2   (!) 188/90 90 18 -- 98 %      MAP       --         Physical Exam    Nursing note and vitals reviewed.  Constitutional: No distress.   HENT:   Head: Normocephalic and atraumatic.   Eyes: Conjunctivae and EOM are normal. Pupils are equal, round, and reactive to light.   Neck: Trachea normal. Neck supple.   Normal range of motion.  Cardiovascular:  Normal rate and regular rhythm.           No murmur heard.  Pulmonary/Chest: Breath sounds normal. No respiratory distress. She exhibits no tenderness.   Abdominal: Abdomen is soft. Bowel sounds are normal. There is no abdominal tenderness.   Musculoskeletal:         General: Normal range of motion.      Cervical back: Normal range of motion and neck supple.      Lumbar back: Normal. Normal range of motion.     Neurological: She is alert and oriented to person, place, and time. She has normal strength. No cranial nerve deficit or sensory deficit.   Right lower facial droop.    Skin: Skin is warm and dry. Capillary refill takes less than 2 seconds.   Psychiatric: She has a normal mood and affect.         ED Course   Procedures  Labs Reviewed   COMPREHENSIVE METABOLIC PANEL - Abnormal       Result Value    Sodium 134 (*)     Potassium 4.0       Chloride 100      CO2 27      Glucose 84      Blood Urea Nitrogen 14.2      Creatinine 0.81      Calcium 8.6      Protein Total 6.5      Albumin 3.2 (*)     Globulin 3.3      Albumin/Globulin Ratio 1.0 (*)     Bilirubin Total 0.5            ALT 23      AST 25      eGFR >60      Anion Gap 7.0      BUN/Creatinine Ratio 18     LIPID PANEL - Abnormal    Cholesterol Total 203 (*)     HDL Cholesterol 50      Triglyceride 217 (*)     Cholesterol/HDL Ratio 4      Very Low Density Lipoprotein 43      LDL Cholesterol 110.00     CBC WITH DIFFERENTIAL - Abnormal    WBC 9.10      RBC 4.91      Hgb 13.1      Hct 42.7      MCV 87.0      MCH 26.7 (*)     MCHC 30.7 (*)     RDW 15.5      Platelet 260      MPV 11.3 (*)     Neut % 65.8      Lymph % 18.6      Mono % 8.2      Eos % 5.9      Basophil % 0.7      Imm Grans % 0.8      Neut # 5.99      Lymph # 1.69      Mono # 0.75      Eos # 0.54      Baso # 0.06      Imm Gran # 0.07 (*)     NRBC% 0.0     PROTIME-INR - Normal    PT 13.7      INR 1.0     TSH - Normal    TSH 3.937     CBC W/ AUTO DIFFERENTIAL    Narrative:     The following orders were created for panel order CBC W/ AUTO DIFFERENTIAL.  Procedure                               Abnormality         Status                     ---------                               -----------         ------                     CBC with Differential[[id number]]       Abnormal            Final result                 Please view results for these tests on the individual orders.   POCT GLUCOSE, HAND-HELD DEVICE   POCT GLUCOSE    POCT Glucose 93     ISTAT PROCEDURE    POC PTINR 1.0      Sample unknown     ISTAT CHEM8    POC Glucose 86      POC BUN 15      POC Creatinine 1.0      POC Sodium 136      POC Potassium 4.1      POC Chloride 96      POC TCO2 (MEASURED) 28      POC Anion Gap 16      POC Ionized Calcium 1.20      POC Hematocrit 44      POC HEMOGLOBIN 15      Sample VENOUS     POCT GLUCOSE    POCT Glucose 89       EKG Readings:  (Independently Interpreted)   EKG obtained at 1:52 p.m. rate of 64 normal sinus rhythm       Imaging Results              CTA Head and Neck (xpd) (Final result)  Result time 01/24/25 12:25:27      Final result by Soraya Mckeon MD (01/24/25 12:25:27)                   Impression:        No large vessel occlusion seen    50% stenosis at the M1 segment in the left MCA    75% stenosis in the right proximal internal carotid artery secondary to calcified plaque    Enlarged pulmonary arteries consistent with pulmonary artery hypertension    Right thyroid nodule.  Ultrasound correlation is recommended if not previously done      Electronically signed by: Kartik Mckeon  Date:    01/24/2025  Time:    12:25               Narrative:    EXAMINATION:  CTA HEAD AND NECK (XPD)    CLINICAL HISTORY:  Neuro deficit, acute, stroke suspected;    TECHNIQUE:  Non contrast low dose axial images were obtained through the head.  CT angiogram was performed from the level of the brittani to the top of the head following the IV administration 100 cc of Isovue 370 contrast .  Sagittal and coronal reconstructions and maximum intensity projection reconstructions were performed. Arterial stenosis percentages are based on NASCET measurement criteria.  Additional multiplanar reconstructions were performed on post processed imaging.  Automatic exposure control (AEC) is utilized to reduce patient radiation exposure.    COMPARISON:  None    FINDINGS:  Source images: No intracranial mass lesion is seen.  No hemorrhage is seen.  No infarct is seen.  Ventricles and basilar cisterns appear grossly unremarkable.  No cervical mass or lesion is seen.  No abnormal lymphadenopathy seen.  There is a right thyroid nodule.  Larynx appears normal.  Trachea is midline.  Chronic lung changes are seen at the thoracic inlet.    Vascular images: The pulmonary artery is enlarged and dilated consistent with pulmonary artery hypertension.  Thoracic aorta is normal  in caliber with some mild atherosclerotic plaque.  There is a 3 vessel arch seen.  There is calcified plaque at the origin of the left common carotid artery with approximate 30% stenosis.  Some plaque is seen in the left subclavian artery and in the brachiocephalic artery but no significant stenosis is seen.  The common carotid arteries are patent.  Scattered areas plaque are seen in the common carotid arteries but no high-grade stenosis is seen.  There is dense calcified plaque at the carotid bulb and proximal internal carotid artery on the left with a 40% stenosis in the left proximal internal carotid artery.  There is calcified plaque in the right carotid bulb and proximal internal carotid artery with a 75% stenosis in the proximal right internal carotid artery secondary to calcified plaque.  The distal internal carotid arteries are widely patent.  They are seen to level of the petrous ridge and clinoid and supraclinoid portions.  There is calcified plaque seen in the cavernous and clinoid portions of the internal carotid arteries bilaterally with areas of stenosis measuring up to 30%.    The MCAs are patent.  The M1 segments, M2 segments M3 segments are patent.  There is a calcified plaque seen in the left M1 segment which causes a 50% stenosis.  The A1 segments are patent.  Anterior communicating arteries patent.  Anterior cerebral arteries are widely patent.  No aneurysm is seen.    Both vertebral arteries are patent.  They are normal in caliber.  No fibromuscular dysplasia is seen.  No dissection is seen.  There is some calcified plaque at the left distal vertebral artery at the base of the skull but no significant stenosis is seen.  Both vertebral arteries perfuse a normal appearing basilar artery.  Posterior cerebral arteries are widely patent.  No aneurysm or obstruction is seen.    .                                       CT HEAD FOR STROKE (Final result)  Result time 01/24/25 12:13:56      Final result by  Soraya Mckeon MD (01/24/25 12:13:56)                   Impression:      No change since prior      Electronically signed by: Kartik Mckeon  Date:    01/24/2025  Time:    12:13               Narrative:    EXAMINATION:  CT HEAD FOR STROKE    CLINICAL HISTORY:  Neuro deficit, acute, stroke suspected;    TECHNIQUE:  Multiple axial images were obtained from the base of the brain to the vertex without contrast administration.  Sagittal and coronal reconstructions were performed..Automatic exposure control is utilized to reduce patient radiation exposure.    COMPARISON:  01/14/2025    FINDINGS:  There is a dural based calcified lesion in the right posterior parietal region consistent with a likely meningioma.  It is stable.  No other lesion is seen..  No hemorrhage is seen.  No acute infarct is seen.  There is some cerebral atrophy seen.  There is some compensatory ventricular dilatation and periventricular white matter changes consistent with the patient's age.  Calvarium is intact.  The posterior fossa is unremarkable..  The visualized portions of the paranasal sinuses show no acute abnormality.                                       Medications   aspirin EC tablet 325 mg (has no administration in time range)   labetaloL injection 10 mg (has no administration in time range)   bisacodyL suppository 10 mg (has no administration in time range)   iohexoL (OMNIPAQUE 350) injection 75 mL (75 mLs Intravenous Given 1/24/25 1218)     Medical Decision Making  The differential diagnosis includes, but is not limited to, CVA, TIA, hypoglycemia, and intracranial hemorrhage.  Cbc, cmp, coasg, lipid panel, ct head, cta ordered and reviewed  Not a candidate for thrombolytics given resolution of symptoms, admit, asa    Problems Addressed:  Acute focal neurological deficit: acute illness or injury that poses a threat to life or bodily functions  Benign essential HTN: chronic illness or injury  TIA (transient ischemic attack):  acute illness or injury that poses a threat to life or bodily functions    Amount and/or Complexity of Data Reviewed  External Data Reviewed: notes.     Details: Admit for encephalopathy, visits for falls  Labs: ordered.  Radiology: ordered and independent interpretation performed.  ECG/medicine tests: ordered and independent interpretation performed.    Risk  OTC drugs.  Prescription drug management.  Decision regarding hospitalization.      Additional MDM:     NIH Stroke Scale:   Interval = baseline (upon arrival/admit)  Level of consciousness = 0 - alert  LOC questions = 0 - answers both correctly  LOC commands = 0 - performs both correctly  Best gaze = 0 - normal  Visual = 0 - no visual loss  Facial palsy = 1 - minor  Motor left arm =  0 - no drift  Motor right arm =  0 - no drift  Motor left leg = 0 - no drift  Motor right leg =  0 - no drift  Limb ataxia = 0 - absent  Sensory = 0 - normal  Best language = 0 - no aphasia  Dysarthria = 0 - normal articulation  Extinction and inattention = 0 - no neglect  NIH Stroke Scale Total = 1           Scribe Attestation:   Scribe #1: I performed the above scribed service and the documentation accurately describes the services I performed. I attest to the accuracy of the note.  Comments: Attending:   Physician Attestation Statement for Scribe #1: Zara AMEZQUITA MD, personally performed the services described in this documentation. All medical record entries made by the scribe were at my direction and in my presence.  I have reviewed the chart and agree that the record reflects my personal performance and is accurate and complete.        Attending Attestation:           Physician Attestation for Scribe:  Physician Attestation Statement for Scribe #1: Panchito AMEZQUITA Brooke R, MD, reviewed documentation, as scribed by Nahid Brown in my presence, and it is both accurate and complete.             ED Course as of 01/24/25 1405   Fri Jan 24, 2025 1219 Discussed with stroke  np who reports all symptoms resolved, no thrombolytics [BS]   1251 All symptoms resolved, pt actually ambulated in hallway [BS]      ED Course User Index  [BS] Zara Flanagan MD                             Clinical Impression:  Final diagnoses:  [R29.818] Acute focal neurological deficit  [G45.9] TIA (transient ischemic attack)  [I10] Benign essential HTN (Primary)          ED Disposition Condition    Observation Stable                Zara Flanagan MD  01/24/25 1352       Zara Flanagan MD  01/24/25 1405

## 2025-01-25 PROBLEM — I63.9 CEREBROVASCULAR ACCIDENT (CVA) DUE TO EMBOLISM: Status: ACTIVE | Noted: 2025-01-25

## 2025-01-25 PROBLEM — I10 BENIGN ESSENTIAL HTN: Status: ACTIVE | Noted: 2025-01-25

## 2025-01-25 LAB
LEFT CCA DIST DIAS: 5 CM/S
LEFT CCA DIST SYS: 31 CM/S
LEFT CCA PROX DIAS: 5 CM/S
LEFT CCA PROX SYS: 40 CM/S
LEFT ECA DIAS: 7 CM/S
LEFT ECA SYS: 60 CM/S
LEFT ICA DIST DIAS: 9 CM/S
LEFT ICA DIST SYS: 49 CM/S
LEFT ICA MID DIAS: 16 CM/S
LEFT ICA MID SYS: 59 CM/S
LEFT ICA PROX DIAS: 7 CM/S
LEFT ICA PROX SYS: 50 CM/S
LEFT VERTEBRAL DIAS: 7 CM/S
LEFT VERTEBRAL SYS: 33 CM/S
OHS CV CAROTID RIGHT ICA EDV HIGHEST: 9
OHS CV CAROTID ULTRASOUND LEFT ICA/CCA RATIO: 1.9
OHS CV CAROTID ULTRASOUND RIGHT ICA/CCA RATIO: 1.35
OHS CV PV CAROTID LEFT HIGHEST CCA: 40
OHS CV PV CAROTID LEFT HIGHEST ICA: 59
OHS CV PV CAROTID RIGHT HIGHEST CCA: 37
OHS CV PV CAROTID RIGHT HIGHEST ICA: 50
OHS CV US CAROTID LEFT HIGHEST EDV: 16
OHS QRS DURATION: 82 MS
OHS QTC CALCULATION: 441 MS
RIGHT CCA DIST DIAS: 4 CM/S
RIGHT CCA DIST SYS: 37 CM/S
RIGHT CCA PROX DIAS: 5 CM/S
RIGHT CCA PROX SYS: 32 CM/S
RIGHT ECA DIAS: 12 CM/S
RIGHT ECA SYS: 126 CM/S
RIGHT ICA DIST DIAS: 5 CM/S
RIGHT ICA DIST SYS: 48 CM/S
RIGHT ICA MID DIAS: 9 CM/S
RIGHT ICA MID SYS: 46 CM/S
RIGHT ICA PROX DIAS: 8 CM/S
RIGHT ICA PROX SYS: 50 CM/S
RIGHT VERTEBRAL DIAS: 6 CM/S
RIGHT VERTEBRAL SYS: 28 CM/S

## 2025-01-25 PROCEDURE — 63600175 PHARM REV CODE 636 W HCPCS: Performed by: NURSE PRACTITIONER

## 2025-01-25 PROCEDURE — 99233 SBSQ HOSP IP/OBS HIGH 50: CPT | Mod: ,,, | Performed by: PSYCHIATRY & NEUROLOGY

## 2025-01-25 PROCEDURE — 63600175 PHARM REV CODE 636 W HCPCS: Performed by: PSYCHIATRY & NEUROLOGY

## 2025-01-25 PROCEDURE — 97162 PT EVAL MOD COMPLEX 30 MIN: CPT

## 2025-01-25 PROCEDURE — 63600175 PHARM REV CODE 636 W HCPCS: Performed by: INTERNAL MEDICINE

## 2025-01-25 PROCEDURE — 25000003 PHARM REV CODE 250: Performed by: INTERNAL MEDICINE

## 2025-01-25 PROCEDURE — 25000003 PHARM REV CODE 250: Performed by: PSYCHIATRY & NEUROLOGY

## 2025-01-25 PROCEDURE — 21400001 HC TELEMETRY ROOM

## 2025-01-25 PROCEDURE — 92523 SPEECH SOUND LANG COMPREHEN: CPT

## 2025-01-25 PROCEDURE — 95816 EEG AWAKE AND DROWSY: CPT

## 2025-01-25 PROCEDURE — 97166 OT EVAL MOD COMPLEX 45 MIN: CPT

## 2025-01-25 RX ORDER — ACETAMINOPHEN 10 MG/ML
1000 INJECTION, SOLUTION INTRAVENOUS ONCE
Status: ACTIVE | OUTPATIENT
Start: 2025-01-25 | End: 2025-01-26

## 2025-01-25 RX ORDER — HALOPERIDOL 5 MG/ML
2 INJECTION INTRAMUSCULAR ONCE
Status: COMPLETED | OUTPATIENT
Start: 2025-01-25 | End: 2025-01-25

## 2025-01-25 RX ORDER — HALOPERIDOL 2 MG/1
2 TABLET ORAL ONCE
Status: DISCONTINUED | OUTPATIENT
Start: 2025-01-25 | End: 2025-01-25

## 2025-01-25 RX ORDER — HALOPERIDOL 5 MG/ML
2 INJECTION INTRAMUSCULAR EVERY 6 HOURS PRN
Status: DISCONTINUED | OUTPATIENT
Start: 2025-01-25 | End: 2025-01-27 | Stop reason: HOSPADM

## 2025-01-25 RX ORDER — ACETAMINOPHEN 325 MG/1
650 TABLET ORAL EVERY 6 HOURS PRN
Status: DISCONTINUED | OUTPATIENT
Start: 2025-01-25 | End: 2025-01-27 | Stop reason: HOSPADM

## 2025-01-25 RX ORDER — VALSARTAN 80 MG/1
80 TABLET ORAL 2 TIMES DAILY
Status: DISCONTINUED | OUTPATIENT
Start: 2025-01-25 | End: 2025-01-27

## 2025-01-25 RX ORDER — IPRATROPIUM BROMIDE AND ALBUTEROL SULFATE 2.5; .5 MG/3ML; MG/3ML
3 SOLUTION RESPIRATORY (INHALATION) EVERY 6 HOURS PRN
Status: DISCONTINUED | OUTPATIENT
Start: 2025-01-25 | End: 2025-01-27 | Stop reason: HOSPADM

## 2025-01-25 RX ORDER — ATORVASTATIN CALCIUM 40 MG/1
40 TABLET, FILM COATED ORAL DAILY
Status: DISCONTINUED | OUTPATIENT
Start: 2025-01-25 | End: 2025-01-27

## 2025-01-25 RX ADMIN — METOPROLOL TARTRATE 50 MG: 50 TABLET, FILM COATED ORAL at 08:01

## 2025-01-25 RX ADMIN — LEUCINE, PHENYLALANINE, LYSINE, METHIONINE, ISOLEUCINE, VALINE, HISTIDINE, THREONINE, TRYPTOPHAN, ALANINE, GLYCINE, ARGININE, PROLINE, SERINE, TYROSINE, DEXTROSE: 311; 238; 247; 170; 255; 247; 204; 179; 77; 880; 438; 489; 289; 213; 17; 5 INJECTION INTRAVENOUS at 05:01

## 2025-01-25 RX ADMIN — LEVETIRACETAM 500 MG: 100 INJECTION, SOLUTION INTRAVENOUS at 08:01

## 2025-01-25 RX ADMIN — LABETALOL HYDROCHLORIDE 10 MG: 5 INJECTION, SOLUTION INTRAVENOUS at 08:01

## 2025-01-25 RX ADMIN — LEVETIRACETAM 500 MG: 100 INJECTION, SOLUTION INTRAVENOUS at 02:01

## 2025-01-25 RX ADMIN — ATORVASTATIN CALCIUM 40 MG: 40 TABLET, FILM COATED ORAL at 08:01

## 2025-01-25 RX ADMIN — VALSARTAN 80 MG: 80 TABLET, FILM COATED ORAL at 08:01

## 2025-01-25 RX ADMIN — FOLIC ACID 2 MG: 1 TABLET ORAL at 08:01

## 2025-01-25 RX ADMIN — HALOPERIDOL LACTATE 2 MG: 5 INJECTION, SOLUTION INTRAMUSCULAR at 04:01

## 2025-01-25 RX ADMIN — LEVOTHYROXINE SODIUM 25 MCG: 25 TABLET ORAL at 06:01

## 2025-01-25 RX ADMIN — ASPIRIN 81 MG: 81 TABLET, COATED ORAL at 08:01

## 2025-01-25 RX ADMIN — HALOPERIDOL LACTATE 2 MG: 5 INJECTION, SOLUTION INTRAMUSCULAR at 02:01

## 2025-01-25 RX ADMIN — SERTRALINE HYDROCHLORIDE 25 MG: 25 TABLET ORAL at 08:01

## 2025-01-25 NOTE — PT/OT/SLP EVAL
[facility]  Speech Language Pathology Department  Cognitive-Communication Evaluation    Patient Name:  Safia Muñoz   MRN:  [id number]    Recommendations     General recommendations:   complete remaining SLCE  Communication strategies:  provide increased time to answer, go to room if call light pushed, and consider use of yes/no questions     Discharge therapy intensity:   moderate  Barriers to safe discharge: acuity of illness and limited insight into deficits    History     Safia Muñoz is a/n 89 y.o. female with past medical history of COPD (chronic obstructive pulmonary disease) and Hypertension.The patient presented to [facility] on 1/24/2025 with right sided droop, dysarthria and right sided weakness. While she recently placed in a SNF, she was previously residing in [facility]. Baseline she ambulates with a walker, need assist for some ADLs. Has memory issues and mild dementia.     Past Medical History:   Diagnosis Date    COPD (chronic obstructive pulmonary disease)     Hypertension      Past Surgical History:   Procedure Laterality Date    FRACTURE SURGERY  4/8/24    HYSTERECTOMY  1964    RETROGRADE INTRAMEDULLARY RODDING OF DISTAL FEMUR Left 04/09/2024    Procedure: INSERTION, INTRAMEDULLARY NAIL INTERTROCHENTERIC FRACTURE;  Surgeon: Guy Jaquez DO;  Location: [facility];  Service: Orthopedics;  Laterality: Left;  HANA TABLE, SYNTHES TFNA       Previous level of Function  Education: unknown - pt unable to provide at this time  Occupation: retired  Lives:  SNF - was previously residing at an Assisted Living  Handed: Right  Hearing Aids: no    Imaging   Results for orders placed during the hospital encounter of 01/24/25    MRI Brain Without Contrast    Narrative  EXAMINATION:  MRI BRAIN WITHOUT CONTRAST    CLINICAL HISTORY:  Transient ischemic attack (TIA);    TECHNIQUE:  Multiplanar multisequence MR imaging of the brain was performed without  "contrast.    COMPARISON:  None    FINDINGS:  No intracranial mass or lesion is seen.  No hemorrhage is seen.  There is a punctate area of acute infarct in the left cerebral convexity there is a punctate area of acute infarct in the left frontal lobe.  Is subcortical.  There is a punctate area of acute infarct in the left posterior temporal region.  . There is diffuse cerebral atrophy seen along with some compensatory ventricular dilatation and periventricular white matter change consistent with patient's age.  Posterior fossa appears normal.  Calvarium is intact.  Paranasal sinuses appear grossly unremarkable.    Impression  A couple of scattered areas of punctate acute infarct in the left frontal, left temporal and left parietal region near the cerebral convexity.  Findings may represent small embolic phenomena    Chronic age related changes      Electronically signed by: Kartik Mckeon  Date:    01/24/2025  Time:    17:51    Subjective     Patient awake, distracted, and confused.  Patient goals: Pt did not verbalize when inquired   Spiritual/Cultural/Sikhism Beliefs/Practices that affect care: no    Pain/Comfort: Pain Rating 1: other (see comments) (yelling "it hurts" while holding stomach; RN notified)  Pain Addressed 1: Nurse notified    Respiratory Status:  room air    Objective     ORAL MUSCULATURE  Dentition: own teeth  Facial Movement: general weakness (difficult to assess given difficulties with direction following)    SPEECH PRODUCTION  Phoneme Production: adequate  Voice Quality: adequate  Voice Production: adequate  Speech Rate: appropriate  Loudness: variable  Respiration: WFL for speech  Resonance: adequate  Prosody: adequate  Speech Intelligibility  Known Context: Greater that 90%  Unknown Context: Greater that 90%    AUDITORY COMPREHENSION  Identification:  Body parts: Impaired  Following Directions:  1-Step: Impaired (able to complete with visual cueing)  2-Step: Impaired  Yes/No " "Questions:  Biographical: 60%  Environmental: 50%  Simple: 50%  Complex: 50%    VERBAL EXPRESSION  Automatic Speech:  Not able complete - pt began moaning and yelling in pain stating "it hurts" while hold her stomach      Assessment     Further assessment to be completed at later time as pt began moaning and yelling "it hurts" while holding her stomach during this evaluation impacting her ability to participate. Will defer goals pending completion. RN notified of suspected stomach pain as pt was unable to articulate location of discomfort but was notably holding her stomach.     Patient Education     Patient provided with verbal education regarding POC.  Understanding was verbalized, however additional teaching warranted.    Plan     SLP Follow-Up:  yes   Plan of Care reviewed with:  patient      Time Tracking     SLP Treatment Date:   01/25/25  Speech Start Time:  0730  Speech Stop Time:  0746     Speech Total Time (min):  16 min    Billable minutes:  Evaluation of Speech Sound Production with Comprehension and Expression, 16 minutes     01/25/2025          "

## 2025-01-25 NOTE — SUBJECTIVE & OBJECTIVE
Subjective:     Interval History: RN reported patient did not sleep well last night. Patient very confused and agitated this morning.     Imaging Results:   CT brain was normal.   CTA brain showed no large vessel occlusion seen  50% stenosis at the M1 segment in the left MCA  75% stenosis in the right proximal internal carotid artery secondary to calcified plaque  Enlarged pulmonary arteries consistent with pulmonary artery hypertension  MRI brain revealing for A couple of scattered areas of punctate acute infarct in the left frontal, left temporal and left parietal region near the cerebral convexity    Reviewed chart and confirmed history of A Fib, not on any anticoagulation. Daughter present at bedside. She told me that patient was noted to be in A Fib during rehab for hip fracture. Patient was not anticoagulated due to multiple falls and was placed on low dose aspirin. She was also diagnosed with A Fib 9 years prior, treated with rate control. Patient took herself off of medications. Current NH medication list includes Estradiol patches, but states that this was discontinued several months ago.     Long discussion with daughter about treatment options and she informed that do not wish any invasive procedures or to start full dose anticoagulants. She reports multiple falls and risky behavior at NH, including found climbing on step stool in her apartment. I discussed the risk for potential future strokes and she stated that she and her brothers have discussed this and are prepared. This coincides with her mother's wishes.            Current Neurological Medications:     Current Facility-Administered Medications   Medication Dose Route Frequency Provider Last Rate Last Admin    acetaminophen tablet 650 mg  650 mg Oral Q6H PRN Bola Sanchez MD        ALPRAZolam tablet 0.25 mg  0.25 mg Oral BID PRN Bola Sanchez MD        aspirin EC tablet 81 mg  81 mg Oral BID Bola Sanchez MD   81 mg at  01/25/25 0839    atorvastatin tablet 40 mg  40 mg Oral Daily Bola Sanchez MD   40 mg at 01/25/25 0838    bisacodyL suppository 10 mg  10 mg Rectal Daily PRN Ирина Giordano AGACNP-BC        folic acid tablet 2 mg  2 mg Oral Daily Bola Sanchez MD   2 mg at 01/25/25 0838    labetaloL injection 10 mg  10 mg Intravenous Q6H PRN Ирина Giordano AGACNP-BC        levothyroxine tablet 25 mcg  25 mcg Oral Before breakfast Bola Sanchez MD   25 mcg at 01/25/25 0611    metoprolol tartrate (LOPRESSOR) tablet 50 mg  50 mg Oral BID Bola Sanchez MD   50 mg at 01/25/25 0839    QUEtiapine tablet 25 mg  25 mg Oral QHS Bola Sanchez MD   25 mg at 01/24/25 2241    sertraline tablet 25 mg  25 mg Oral Daily Bola Sanchez MD   25 mg at 01/25/25 0838    tamsulosin 24 hr capsule 0.4 mg  0.4 mg Oral QHS Bola Sanchez MD   0.4 mg at 01/24/25 2242    valsartan tablet 80 mg  80 mg Oral BID Bola Sanchez MD   80 mg at 01/25/25 0839       Review of Systems  Objective:     Vital Signs (Most Recent):  Temp: 96.4 °F (35.8 °C) (01/25/25 0809)  Pulse: 61 (01/25/25 0809)  Resp: 17 (01/25/25 0435)  BP: (!) 184/85 (01/25/25 0809)  SpO2: (!) 91 % (01/25/25 0809) Vital Signs (24h Range):  Temp:  [96.4 °F (35.8 °C)-98 °F (36.7 °C)] 96.4 °F (35.8 °C)  Pulse:  [61-90] 61  Resp:  [14-19] 17  SpO2:  [89 %-99 %] 91 %  BP: (163-198)/() 184/85     Weight: 66.7 kg (147 lb 1.6 oz)  Body mass index is 26.06 kg/m².     Physical Exam    Lethargic    Intermittently following commands    ?mild right facial droop     Antigravity in all extremities       Significant Labs: All pertinent lab results from the past 24 hours have been reviewed.    Significant Imaging: I have reviewed all pertinent imaging results/findings within the past 24 hours.

## 2025-01-25 NOTE — PT/OT/SLP EVAL
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 female with a medical diagnosis of  Left punctate of temporal and left parietal region, concerning for embolic etiology. Pt presents from SNF with AMS and R-sided face droop. Pt dtr in room and provides case hx: pt was at SNF for 1week where she was participating in OT/PT to maximize restored fxnl independence before going home to Assisted Living Facility.  She presents with the following performance deficits affecting function: weakness, impaired cognition, impaired endurance, impaired self care skills, impaired functional mobility, gait instability, decreased lower extremity function, decreased upper extremity function, decreased safety awareness, impaired balance, impaired coordination. Pt unable to actively engage in today's eval 2/2 appearing very lethargic and non-verbal t/o. BP WNL. Dtr at bedside states "this is a significant change compared to how she was in the ED, walking and talking". Pt unable to follow any commands, requiring total A for all bed mobility and to maintain sitting balance EOB. Recommending moderate intensity upon d/c and will continue to update POC as appropriate pending update in GOC and family's wishes for therapy.     Rehab Prognosis:  Guarded ; patient would benefit from acute skilled OT services to address these deficits and reach maximum level of function.       Plan:     Patient to be seen 3 x/week to address the above listed problems via self-care/home management, therapeutic activities, therapeutic exercises, neuromuscular re-education, " cognitive retraining, sensory integration  Plan of Care Expires: 02/21/25  Plan of Care Reviewed with:      Subjective     Chief Complaint: N/A  Patient/Family Comments/goals: Family expresses their goals to maximize restored PLOF as able, but ultimately want to maximize pt's comfort.     Occupational Profile:  Living Environment: lives in nursing home.   Previous level of function: mod (I) with SPV from nursing home staff during bathing. Used rollator for mobility.   Roles and Routines: mother, grandmother  Equipment Used at Home: shower chair, grab bar, rollator  Assistance upon Discharge: TBD    Pain/Comfort:  Pain Rating 1:  (unable to assess 2/2 pt's impaired cognitive status upon arrival)    Patients cultural, spiritual, Uatsdin conflicts given the current situation: no    Objective:     OT communicated with RN prior to session.      Patient was found HOB elevated with pulse ox (continuous), telemetry upon OT entry to room.    General Precautions: Standard, aspiration, fall  Orthopedic Precautions: N/A  Braces: N/A    Vital Signs: Blood Pressure: 128/71 MAP 90 HR: 68bpm  Sp02: 94% t/o  Respiratory Status: on room air    Bed Mobility:      Functional Mobility/Transfers:  Unable to assess 2/2 pt's impaired cognitive status during eval.    Activities of Daily Living:  Lower Body Dressing: total assistance    Toileting: total assistance      AMPAC 6 Click ADL:  AMPAC Total Score: 6    Functional Cognition:  Affect: Lethargic  Orientation: oriented to pt unable to state her name when prompted. Very lethargic and continuing to close eyes.   Attention: unable. Opens her eyes momentarily with tactile/verbal cues, before closing them again.   Command Following: Unable to follow commands despite cues  Communication: pt nonverbal t/o session. Her mouth remained open and she frequently yawned. No attempt to communicate with therapist or family members.     Visual Perceptual Skills:  Unable to assess 2/2 pt's impaired cognitive status  during eval.    Upper Extremity Function:  Right Upper Extremity:   Unable to assess 2/2 pt's impaired cognitive status during eval.    Left Upper Extremity:  Unable to assess 2/2 pt's impaired cognitive status during eval.    Balance:   Impaired.   Pt requiring total A for sit balance EOB.   Heavy posterior and Right lateral leaning noted throughout.. pt not responding to cues to attempt self-correction    Therapeutic Positioning  Risk for acquired pressure injuries is significantly increased due to impaired mobility, incontinence, inability to communicate toileting needs, and severity of deficits resulting in prolonged immobility .    OT interventions performed during the course of today's session:   Education was provided on benefits of and recommendations for therapeutic positioning  Therapeutic positioning was provided at the conclusion of session to offload all bony prominences for the prevention and/or reduction of pressure injuries    Skin assessment: all bony prominences were assessed    Findings: no redness or breakdown noted    OT recommendations for therapeutic positioning throughout hospitalization:   Follow [facility] Pressure Injury Prevention Protocol  Geomat recommended for sacral protection while [facility]    Patient Education:  Patient and family were provided with verbal education education regarding OT role/goals/POC, fall prevention, safety awareness, and Discharge/DME recommendations.  Understanding was verbalized. By fly.    Patient left HOB elevated with all lines intact, call button in reach, RN notified, and fly present.    GOALS:   Multidisciplinary Problems       Occupational Therapy Goals          Problem: Occupational Therapy    Goal Priority Disciplines Outcome Interventions   Occupational Therapy Goal     OT, PT/OT Progressing    Description: LTG: Pt will perform basic ADLs and ADL t/fs with min A using LRAD by d/c.    STG: to be met by 2/7/25  1. Pt will follow >80% of simple one step  commands  2. Pt will perform grooming EOB with min A.   3. Pt will perform functional grasp/reach/release of items >80% of trials in prep for increased participation in ADL tasks.   4. Pt will perform sit<>stand with mod A x 2 in prep for ADL t/fs.                        History:     Past Medical History:   Diagnosis Date    COPD (chronic obstructive pulmonary disease)     Hypertension          Past Surgical History:   Procedure Laterality Date    FRACTURE SURGERY  4/8/24    HYSTERECTOMY  1964    RETROGRADE INTRAMEDULLARY RODDING OF DISTAL FEMUR Left 04/09/2024    Procedure: INSERTION, INTRAMEDULLARY NAIL INTERTROCHENTERIC FRACTURE;  Surgeon: Guy Jaquez DO;  Location: [facility];  Service: Orthopedics;  Laterality: Left;  HANA TABLE, SYNTHES TFNA       Time Tracking:     OT Date of Treatment: 01/25/25  OT Start Time: 1025  OT Stop Time: 1048  OT Total Time (min): 23 min    Billable Minutes:Evaluation moderate    1/25/2025

## 2025-01-25 NOTE — PLAN OF CARE
Problem: Physical Therapy  Goal: Physical Therapy Goal  Description: Goals to be met by: 2025     Patient will increase functional independence with mobility by performin. Supine to sit with Moderate Assistance  2. Sit to supine with Moderate Assistance  3. Rolling to Left and Right with Moderate Assistance.  4. Sit to stand transfer with Moderate Assistance.  5. Gait TBD.     Outcome: Progressing

## 2025-01-25 NOTE — CARE UPDATE
Patient in delirium. She has dementia   Will use iv prn low dose haldol for now   Started on iv clinimix, keep NPO   Keep on delirium precautions   Will check CXR now.   Was informed she was hypoxic but her O2 sats on RA was >94%  BP and HR stable     Will add as needed bronchodilators     Labs in am     Family at bedside, reassured them and explained in detail about the tx plan    Patient is DNR since admit.     Per family patient was recently diagnosed with Afib and her cardiologist did not recommend anticoagulation secondary to high risk of fall

## 2025-01-25 NOTE — PLAN OF CARE
Problem: Occupational Therapy  Goal: Occupational Therapy Goal  Description: LTG: Pt will perform basic ADLs and ADL t/fs with min A using LRAD by d/c.    STG: to be met by 2/7/25  1. Pt will follow >80% of simple one step commands  2. Pt will perform grooming EOB with min A.   3. Pt will perform functional grasp/reach/release of items >80% of trials in prep for increased participation in ADL tasks.   4. Pt will perform sit<>stand with mod A x 2 in prep for ADL t/fs.   Outcome: Progressing

## 2025-01-25 NOTE — PROGRESS NOTES
[facility] - 9th Floor Medical Telemetry  Neurology  Progress Note    Patient Name: Safia Muñoz  MRN: [id number]  Admission Date: 1/24/2025  Hospital Length of Stay: 0 days  Code Status: DNR   Attending Provider: Bola Sanchez MD  Primary Care Physician: Iglesia Steel MD   Principal Problem:<principal problem not specified>    HPI:   No notes on file    Overview/Hospital Course:  No notes on file        Subjective:     Interval History: RN reported patient did not sleep well last night. Patient very confused and agitated this morning.     Imaging Results:   CT brain was normal.   CTA brain showed no large vessel occlusion seen  50% stenosis at the M1 segment in the left MCA  75% stenosis in the right proximal internal carotid artery secondary to calcified plaque  Enlarged pulmonary arteries consistent with pulmonary artery hypertension  MRI brain revealing for A couple of scattered areas of punctate acute infarct in the left frontal, left temporal and left parietal region near the cerebral convexity    Reviewed chart and confirmed history of A Fib, not on any anticoagulation. Daughter present at bedside. She told me that patient was noted to be in A Fib during rehab for hip fracture. Patient was not anticoagulated due to multiple falls and was placed on low dose aspirin. She was also diagnosed with A Fib 9 years prior, treated with rate control. Patient took herself off of medications. Current NH medication list includes Estradiol patches, but states that this was discontinued several months ago.     Long discussion with daughter about treatment options and she informed that do not wish any invasive procedures or to start full dose anticoagulants. She reports multiple falls and risky behavior at NH, including found climbing on step stool in her apartment. I discussed the risk for potential future strokes and she stated that she and her brothers have discussed this and are prepared.  This coincides with her mother's wishes.            Current Neurological Medications:     Current Facility-Administered Medications   Medication Dose Route Frequency Provider Last Rate Last Admin    acetaminophen tablet 650 mg  650 mg Oral Q6H PRN Bola Sanchez MD        ALPRAZolam tablet 0.25 mg  0.25 mg Oral BID PRN Bola Sanchez MD        aspirin EC tablet 81 mg  81 mg Oral BID Bola Sanchez MD   81 mg at 01/25/25 0839    atorvastatin tablet 40 mg  40 mg Oral Daily Bola Sanchez MD   40 mg at 01/25/25 0838    bisacodyL suppository 10 mg  10 mg Rectal Daily PRN Ирина Giordano AGACNP-BC        folic acid tablet 2 mg  2 mg Oral Daily Bola Sanchez MD   2 mg at 01/25/25 0838    labetaloL injection 10 mg  10 mg Intravenous Q6H PRN Ирина Giordano AGACNP-BC        levothyroxine tablet 25 mcg  25 mcg Oral Before breakfast Bola Sanchez MD   25 mcg at 01/25/25 0611    metoprolol tartrate (LOPRESSOR) tablet 50 mg  50 mg Oral BID Bola Sanchez MD   50 mg at 01/25/25 0839    QUEtiapine tablet 25 mg  25 mg Oral QHS Bola Sanchez MD   25 mg at 01/24/25 2241    sertraline tablet 25 mg  25 mg Oral Daily Bola Sanchez MD   25 mg at 01/25/25 0838    tamsulosin 24 hr capsule 0.4 mg  0.4 mg Oral QHS Bola Sanchez MD   0.4 mg at 01/24/25 2242    valsartan tablet 80 mg  80 mg Oral BID Bola Sanchez MD   80 mg at 01/25/25 0839       Review of Systems  Objective:     Vital Signs (Most Recent):  Temp: 96.4 °F (35.8 °C) (01/25/25 0809)  Pulse: 61 (01/25/25 0809)  Resp: 17 (01/25/25 0435)  BP: (!) 184/85 (01/25/25 0809)  SpO2: (!) 91 % (01/25/25 0809) Vital Signs (24h Range):  Temp:  [96.4 °F (35.8 °C)-98 °F (36.7 °C)] 96.4 °F (35.8 °C)  Pulse:  [61-90] 61  Resp:  [14-19] 17  SpO2:  [89 %-99 %] 91 %  BP: (163-198)/() 184/85     Weight: 66.7 kg (147 lb 1.6 oz)  Body mass index is 26.06 kg/m².     Physical Exam    Lethargic     Intermittently following commands    ?mild right facial droop     Antigravity in all extremities       Significant Labs: All pertinent lab results from the past 24 hours have been reviewed.    Significant Imaging: I have reviewed all pertinent imaging results/findings within the past 24 hours.  Assessment and Plan:     Cerebrovascular accident (CVA) due to embolism  - presented with right facial droop and AMS  - Stroke RF: HTN, A Fib  - Intervention: no TNK  due to low NIH and resolution of symptoms  - Etiology: TBD    Stroke workup:  -CTh: no acute abnormality   -CTA h/n: 50% stenosis at the M1 segment in the left MCA   75% stenosis in the right proximal internal carotid artery secondary to calcified plaque   Enlarged pulmonary arteries consistent with pulmonary artery hypertension   Right thyroid nodule.   -MRI brain: A couple of scattered areas of punctate acute infarct in the left frontal, left temporal and left parietal region near the cerebral convexity   -ECHO: Normal left atrial size. Agitated saline study of the atrial septum is negative, suggesting absence of intracardiac shunt at the atrial level.ejection fraction of 55 - 60%. Grade I diastolic dysfunction. No thrombus seen on TTE  -CUS: The right internal carotid artery is patent with less than 50% stenosis.  The left internal carotid artery is patent with less than 50% stenosis.  Bilateral vertebral arteries are patent with antegrade flow.   -LDL: 61   -home medications include: aspirin 81 mg daily  NIH Stroke Scale      1a  Level of consciousness: 1=not alert but arousable by minor stimulation to obey, answer or respond   1b. LOC questions:  0=Answers both tasks correctly   1c. LOC commands: 0=Answers both tasks correctly   2.  Best Gaze: 0=normal   3.  Visual: 0=No visual loss   4. Facial Palsy: 1=Minor paralysis (flattened nasolabial fold, asymmetric on smiling)   5a.  Motor left arm: 0=No drift, limb holds 90 (or 45) degrees for full 10 seconds    5b.  Motor right arm: 0=No drift, limb holds 90 (or 45) degrees for full 10 seconds   6a. motor left le=No drift, limb holds 90 (or 45) degrees for full 10 seconds   6b  Motor right le=No drift, limb holds 90 (or 45) degrees for full 10 seconds   7. Limb Ataxia: 0=Absent   8.  Sensory: 0=Normal; no sensory loss   9. Best Language:  0=No aphasia, normal   10. Dysarthria: 0=Normal   11. Extinction and Inattention: 0=No abnormality   12. Distal motor function: 0=Normal    Total:   2         Assessment/Plan: Left punctate of temporal and left parietal region, concerning for embolic etiology     No interventions after discussion with daughter. MD also spoke with patient and daughter at bedside and she did not wish any invasive procedures, including Vascular Intervention, Oral Anticoagulation, or further Cardiac Workup. I did discuss the risk of future strokes and daughter is aware. They would likely transition to Hospice at that time. I feel that all questions and concerns were addressed and daughter is very knowledgeable concerning her mother's medical history as well as her wishes.     -continue home aspirin 81 mg.   -no need to continue statin, LDL 61  -no need to follow up in stroke clinic if family does not wish to pursue treatment options.  -PT/OT/SLp to evaluate.   -EEG ordered for AMS as symptoms do not match up with small areas of infarct    Further recommendations per MD.           VTE Risk Mitigation (From admission, onward)           Ordered     IP VTE HIGH RISK PATIENT  Once         25 1325     Place sequential compression device  Until discontinued         25 1325                    Negrita Garrett NP  Neurology  [facility] - 9th Floor Medical Telemetry    I have seen/examined the patient.  NP was scribe.  I agree with the findings unless outlined below:    Jakub Bergeron MD  [facility]     She is totally delirious  She is not responding well to  stim  She is awake and staring off  She has 1-2 years of early dementia that has rapidly progressed ove rthe last 3 mos  She is moving to NH from assisted living soon    Eeg ordered; keppra started; I suspect she is seizing  Mri positive for small stroke; family elects aspirin only  Discussion held with son/DIL

## 2025-01-25 NOTE — PROGRESS NOTES
[facility] Medicine Progress Note        Chief Complaint: Inpatient Follow-up for CVA    HPI:   Safia Muñoz is a 89 y.o. female who  has a past medical history of COPD (chronic obstructive pulmonary disease) and Hypertension.. The patient presented to [facility] on 1/24/2025 with right sided droop, dysarthria and right sided weakness. Currently she is in a SNF unit. She lives at [facility]. Baseline she ambulates with a walker, need assist for some ADLs. Has memory issues and mild dementia.   She is currently at baseline. No neurological deficits. She is oriented to self and place. Following all verbal commands.      Vitals showed she was hypertensive. Labs unremarkable. CT brain was normal. CTA brain showed  Impression:  No large vessel occlusion seen  50% stenosis at the M1 segment in the left MCA  75% stenosis in the right proximal internal carotid artery secondary to calcified plaque  Enlarged pulmonary arteries consistent with pulmonary artery hypertension     Patient will be observed overnight. ECHO was unremarkable with negative bubble study.  MRI done and showed   A couple of scattered areas of punctate acute infarct in the left frontal, left temporal and left parietal region near the cerebral convexity. Findings may represent small embolic phenomena. Stroke/Intervention neurology consulted. She was continued on ASA and statin.     Patient with advanced age and mild dementia. She was placed on delirium precautions. BP was elevated and home BP meds was restarted.      Interval Hx:   Patient today awake and comfortable. C/O generalized body pain,. Did not sleep well last night. Per nurse patients seroquel was given much later in the night. She has been afebrile. Moving all 4 extremities.     No family at bedside.     Case was discussed with patient's nurse and  on the floor.    Objective/physical exam:  General: In no acute distress,  frail  Chest: Clear to auscultation bilaterally  Heart: RRR, +S1, S2, no appreciable murmur  Abdomen: Soft, nontender, BS +  MSK: Warm, no lower extremity edema, no clubbing or cyanosis  Neurologic: Cranial nerve II-XII intact, Strength 5/5 in all 4 extremities    VITAL SIGNS: 24 HRS MIN & MAX LAST   Temp  Min: 96.4 °F (35.8 °C)  Max: 98 °F (36.7 °C) 96.4 °F (35.8 °C)   BP  Min: 163/75  Max: 198/102 (!) 184/85   Pulse  Min: 61  Max: 90  61   Resp  Min: 14  Max: 19 17   SpO2  Min: 89 %  Max: 99 % (!) 91 %     I have reviewed the following labs:  Recent Labs   Lab 01/24/25  1201 01/24/25  1232   WBC  --  9.10   RBC  --  4.91   HGB  --  13.1   HCT 44 42.7   MCV  --  87.0   MCH  --  26.7*   MCHC  --  30.7*   RDW  --  15.5   PLT  --  260   MPV  --  11.3*     Recent Labs   Lab 01/24/25  1232   *   K 4.0      CO2 27   BUN 14.2   CREATININE 0.81   CALCIUM 8.6   ALBUMIN 3.2*   ALKPHOS 114   ALT 23   AST 25   BILITOT 0.5     Microbiology Results (last 7 days)       ** No results found for the last 168 hours. **             See below for Radiology    Assessment/Plan:  CVA ? Embolic   HTN, Uncontrolled   COPD   Mild dementia     Plan:  Patient did not sleep well last night  C/O generalized body pain. Will start on prn tylenol   She has no neurological deficits. MRI brain shows small ? Embolic CVA  Stroke neuro team consulted   Will continue ASA and statin for now   Dont see a H/O Afib. Tele not showing any events     BP is uncontrolled. Home po meds resumed   Will monitor closely     Continue strict aspiration, fall and decubitus precautions      Keep on delirium precautions     Critical care note:  Critical care diagnosis: Hypertensive urgency needing ib prn labetalol   Critical care interventions: Hands-on evaluation, review of labs/radiographs/records and discussion with patient and family if present  Critical care time spent: 35 minutes       VTE prophylaxis: SCD    Patient condition:  Guarded    Anticipated  discharge and Disposition:  Assisted living       All diagnosis and differential diagnosis have been reviewed; assessment and plan has been documented; I have personally reviewed the labs and test results that are presently available; I have reviewed the patients medication list; I have reviewed the consulting providers response and recommendations. I have reviewed or attempted to review medical records based upon their availability    All of the patient's questions have been  addressed and answered. Patient's is agreeable to the above stated plan. I will continue to monitor closely and make adjustments to medical management as needed.    Portions of this note dictated using EMR integrated voice recognition software, and may be subject to voice recognition errors not corrected at proofreading. Please contact writer for clarification if needed.   _____________________________________________________________________    Malnutrition Status:  Nutrition consulted. Most recent weight and BMI monitored-     Measurements:  Wt Readings from Last 1 Encounters:   01/24/25 66.7 kg (147 lb 1.6 oz)   Body mass index is 26.06 kg/m².    Patient has been screened and assessed by RD.    Malnutrition Type:  Context:    Level:      Malnutrition Characteristic Summary:       Interventions/Recommendations (treatment strategy):        Scheduled Med:   aspirin  81 mg Oral BID    atorvastatin  40 mg Oral Daily    folic acid  2 mg Oral Daily    levothyroxine  25 mcg Oral Before breakfast    metoprolol tartrate  50 mg Oral BID    QUEtiapine  25 mg Oral QHS    sertraline  25 mg Oral Daily    tamsulosin  0.4 mg Oral QHS    valsartan  80 mg Oral BID      Continuous Infusions:     PRN Meds:    Current Facility-Administered Medications:     acetaminophen, 650 mg, Oral, Q6H PRN    ALPRAZolam, 0.25 mg, Oral, BID PRN    bisacodyL, 10 mg, Rectal, Daily PRN    labetalol, 10 mg, Intravenous, Q6H PRN     Radiology:  I have personally reviewed the  following imaging and agree with the radiologist.     Echo    Left Ventricle: The left ventricle is normal in size. Normal wall   thickness. There is normal systolic function with a visually estimated   ejection fraction of 55 - 60%. Grade I diastolic dysfunction.    Right Ventricle: Normal right ventricular cavity size. Systolic   function is normal.    Left Atrium: Agitated saline study of the atrial septum is negative,   suggesting absence of intracardiac shunt at the atrial level.    Aortic Valve: There is mild aortic regurgitation.    Mitral Valve: There is mild mitral annular calcification present. There   is mild to moderate regurgitation.    Tricuspid Valve: There is mild regurgitation.    IVC/SVC: Normal venous pressure at 3 mmHg.    Pericardium: There is no pericardial effusion.  MRI Brain Without Contrast  Narrative: EXAMINATION:  MRI BRAIN WITHOUT CONTRAST    CLINICAL HISTORY:  Transient ischemic attack (TIA);    TECHNIQUE:  Multiplanar multisequence MR imaging of the brain was performed without contrast.    COMPARISON:  None    FINDINGS:  No intracranial mass or lesion is seen.  No hemorrhage is seen.  There is a punctate area of acute infarct in the left cerebral convexity there is a punctate area of acute infarct in the left frontal lobe.  Is subcortical.  There is a punctate area of acute infarct in the left posterior temporal region.  . There is diffuse cerebral atrophy seen along with some compensatory ventricular dilatation and periventricular white matter change consistent with patient's age.  Posterior fossa appears normal.  Calvarium is intact.  Paranasal sinuses appear grossly unremarkable.  Impression: A couple of scattered areas of punctate acute infarct in the left frontal, left temporal and left parietal region near the cerebral convexity.  Findings may represent small embolic phenomena    Chronic age related changes    Electronically signed by: Kartik  [facility]  Date:    01/24/2025  Time:    17:51  CTA Head and Neck (xpd)  Narrative: EXAMINATION:  CTA HEAD AND NECK (XPD)    CLINICAL HISTORY:  Neuro deficit, acute, stroke suspected;    TECHNIQUE:  Non contrast low dose axial images were obtained through the head.  CT angiogram was performed from the level of the brittani to the top of the head following the IV administration 100 cc of Isovue 370 contrast .  Sagittal and coronal reconstructions and maximum intensity projection reconstructions were performed. Arterial stenosis percentages are based on NASCET measurement criteria.  Additional multiplanar reconstructions were performed on post processed imaging.  Automatic exposure control (AEC) is utilized to reduce patient radiation exposure.    COMPARISON:  None    FINDINGS:  Source images: No intracranial mass lesion is seen.  No hemorrhage is seen.  No infarct is seen.  Ventricles and basilar cisterns appear grossly unremarkable.  No cervical mass or lesion is seen.  No abnormal lymphadenopathy seen.  There is a right thyroid nodule.  Larynx appears normal.  Trachea is midline.  Chronic lung changes are seen at the thoracic inlet.    Vascular images: The pulmonary artery is enlarged and dilated consistent with pulmonary artery hypertension.  Thoracic aorta is normal in caliber with some mild atherosclerotic plaque.  There is a 3 vessel arch seen.  There is calcified plaque at the origin of the left common carotid artery with approximate 30% stenosis.  Some plaque is seen in the left subclavian artery and in the brachiocephalic artery but no significant stenosis is seen.  The common carotid arteries are patent.  Scattered areas plaque are seen in the common carotid arteries but no high-grade stenosis is seen.  There is dense calcified plaque at the carotid bulb and proximal internal carotid artery on the left with a 40% stenosis in the left proximal internal carotid artery.  There is calcified plaque in the right  carotid bulb and proximal internal carotid artery with a 75% stenosis in the proximal right internal carotid artery secondary to calcified plaque.  The distal internal carotid arteries are widely patent.  They are seen to level of the petrous ridge and clinoid and supraclinoid portions.  There is calcified plaque seen in the cavernous and clinoid portions of the internal carotid arteries bilaterally with areas of stenosis measuring up to 30%.    The MCAs are patent.  The M1 segments, M2 segments M3 segments are patent.  There is a calcified plaque seen in the left M1 segment which causes a 50% stenosis.  The A1 segments are patent.  Anterior communicating arteries patent.  Anterior cerebral arteries are widely patent.  No aneurysm is seen.    Both vertebral arteries are patent.  They are normal in caliber.  No fibromuscular dysplasia is seen.  No dissection is seen.  There is some calcified plaque at the left distal vertebral artery at the base of the skull but no significant stenosis is seen.  Both vertebral arteries perfuse a normal appearing basilar artery.  Posterior cerebral arteries are widely patent.  No aneurysm or obstruction is seen.    .  Impression: No large vessel occlusion seen    50% stenosis at the M1 segment in the left MCA    75% stenosis in the right proximal internal carotid artery secondary to calcified plaque    Enlarged pulmonary arteries consistent with pulmonary artery hypertension    Right thyroid nodule.  Ultrasound correlation is recommended if not previously done    Electronically signed by: Kartik Mckeon  Date:    01/24/2025  Time:    12:25  CT HEAD FOR STROKE  Narrative: EXAMINATION:  CT HEAD FOR STROKE    CLINICAL HISTORY:  Neuro deficit, acute, stroke suspected;    TECHNIQUE:  Multiple axial images were obtained from the base of the brain to the vertex without contrast administration.  Sagittal and coronal reconstructions were performed..Automatic exposure control is utilized to  reduce patient radiation exposure.    COMPARISON:  01/14/2025    FINDINGS:  There is a dural based calcified lesion in the right posterior parietal region consistent with a likely meningioma.  It is stable.  No other lesion is seen..  No hemorrhage is seen.  No acute infarct is seen.  There is some cerebral atrophy seen.  There is some compensatory ventricular dilatation and periventricular white matter changes consistent with the patient's age.  Calvarium is intact.  The posterior fossa is unremarkable..  The visualized portions of the paranasal sinuses show no acute abnormality.  Impression: No change since prior    Electronically signed by: Kartik Mckeon  Date:    01/24/2025  Time:    12:13      Bola Sanchez MD  Department of Hospital Medicine   [facility]   01/25/2025

## 2025-01-25 NOTE — PT/OT/SLP EVAL
Physical Therapy Evaluation    Patient Name:  Safia Muñoz   MRN:  [id number]    Recommendations:     Discharge therapy intensity: Moderate Intensity Therapy   Discharge Equipment Recommendations: to be determined by next level of care   Barriers to discharge: Impaired mobility and Ongoing medical needs    Assessment:     Safia Muñoz is a 89 y.o. female admitted with a medical diagnosis of Left punctate of temporal and left parietal region, concerning for embolic etiology. Pt presents from SNF with AMS and R-sided face droop. Daughter present in room upon evaluation able to provide pt history. Pt was recently at SNF for about 1 week. Pt went to SNF to regain independence in order to return to assisted living facility. She present with impaired balance, weakness, impaired cognition, impaired self care skills, impaired functional mobility, decreased lower extremity function. Pt is mod I at baseline using a rollator.     Pt extremely lethargic upon entry. Unable to maintain arousal/keep eyes open despite maximal stimulation. Pt unable to follow any commands and no verbal responses noted with questioning. Pt req total A for supine to sit. Pt demo posterior lean requiring total A to maintain sitting balance. Family present in room trying to engage/arouse pt while sitting EOB. Unsafe to further mobilize pt at this time. Pt returned back to bed. Recommending moderate intensity upon d/c and will continue to update POC as appropriate.     Rehab Prognosis: Fair; patient would benefit from acute skilled PT services to address these deficits and reach maximum level of function.    Recent Surgery: * No surgery found *      Plan:     During this hospitalization, patient would benefit from acute PT services 3 x/week to address the identified rehab impairments via gait training, therapeutic activities, therapeutic exercises and progress toward the following goals:    Plan of Care Expires:  02/25/25    Subjective     Chief  Complaint: unable to verbalize   Patient/Family Comments/goals: family: wanting pt to regain functional independence/PLOF  Pain/Comfort:  Pain Rating 1: 0/10    Patients cultural, spiritual, Jehovah's witness conflicts given the current situation: no    Living Environment:  Pt recently was at SNF (1 week). Prior to this patient was living at assistive living facility.   Prior to admission, patients level of function was Mod I using rollator.  Equipment used at home: rollator.  DME owned (not currently used): none.  Upon discharge, patient will have assistance from family.    Objective:     Communicated with nsg prior to session.  Patient found HOB elevated with pulse ox (continuous), telemetry  upon PT entry to room.    General Precautions: Standard, aspiration, fall  Orthopedic Precautions:N/A   Braces: N/A  Respiratory Status: Room air, 94%  Blood Pressure: 90/50 prior to mobilization, 121/67 after mobilization       Exams:  Unable to test BLE strength and ROM at this time due to decreased command following.   Skin integrity: Visible skin intact      Functional Mobility:  Bed Mobility:     Supine to Sit: total assistance  Sit to Supine: total assistance      AM-PAC 6 CLICK MOBILITY  Total Score:8       Treatment & Education:      Patient and family were provided with verbal education education regarding PT role/goals/POC, fall prevention, safety awareness, and discharge/DME recommendations.  Additional teaching is warranted.     Patient left HOB elevated with all lines intact, call button in reach, and family present.    GOALS:   Multidisciplinary Problems       Physical Therapy Goals          Problem: Physical Therapy    Goal Priority Disciplines Outcome Interventions   Physical Therapy Goal     PT, PT/OT Progressing    Description: Goals to be met by: 2025     Patient will increase functional independence with mobility by performin. Supine to sit with Moderate Assistance  2. Sit to supine with Moderate  Assistance  3. Rolling to Left and Right with Moderate Assistance.  4. Sit to stand transfer with Moderate Assistance.  5. Gait TBD.                          History:     Past Medical History:   Diagnosis Date    COPD (chronic obstructive pulmonary disease)     Hypertension        Past Surgical History:   Procedure Laterality Date    FRACTURE SURGERY  4/8/24    HYSTERECTOMY  1964    RETROGRADE INTRAMEDULLARY RODDING OF DISTAL FEMUR Left 04/09/2024    Procedure: INSERTION, INTRAMEDULLARY NAIL INTERTROCHENTERIC FRACTURE;  Surgeon: Guy Jaquez DO;  Location: [facility];  Service: Orthopedics;  Laterality: Left;  MogiA TABLE, SYNTHES TFNA       Time Tracking:     PT Received On: 01/25/25  PT Start Time: 1025     PT Stop Time: 1043  PT Total Time (min): 18 min     Billable Minutes: Evaluation Mod      01/25/2025

## 2025-01-25 NOTE — ASSESSMENT & PLAN NOTE
- presented with right facial droop and AMS  - Stroke RF: HTN, A Fib  - Intervention: no TNK  due to low NIH and resolution of symptoms  - Etiology: TBD    Stroke workup:  -CTh: no acute abnormality   -CTA h/n: 50% stenosis at the M1 segment in the left MCA   75% stenosis in the right proximal internal carotid artery secondary to calcified plaque   Enlarged pulmonary arteries consistent with pulmonary artery hypertension   Right thyroid nodule.   -MRI brain: A couple of scattered areas of punctate acute infarct in the left frontal, left temporal and left parietal region near the cerebral convexity   -ECHO: Normal left atrial size. Agitated saline study of the atrial septum is negative, suggesting absence of intracardiac shunt at the atrial level.ejection fraction of 55 - 60%. Grade I diastolic dysfunction. No thrombus seen on TTE  -CUS: The right internal carotid artery is patent with less than 50% stenosis.  The left internal carotid artery is patent with less than 50% stenosis.  Bilateral vertebral arteries are patent with antegrade flow.   -LDL: 61   -home medications include: aspirin 81 mg daily  NIH Stroke Scale      1a  Level of consciousness: 1=not alert but arousable by minor stimulation to obey, answer or respond   1b. LOC questions:  0=Answers both tasks correctly   1c. LOC commands: 0=Answers both tasks correctly   2.  Best Gaze: 0=normal   3.  Visual: 0=No visual loss   4. Facial Palsy: 1=Minor paralysis (flattened nasolabial fold, asymmetric on smiling)   5a.  Motor left arm: 0=No drift, limb holds 90 (or 45) degrees for full 10 seconds   5b.  Motor right arm: 0=No drift, limb holds 90 (or 45) degrees for full 10 seconds   6a. motor left le=No drift, limb holds 90 (or 45) degrees for full 10 seconds   6b  Motor right le=No drift, limb holds 90 (or 45) degrees for full 10 seconds   7. Limb Ataxia: 0=Absent   8.  Sensory: 0=Normal; no sensory loss   9. Best Language:  0=No aphasia, normal   10.  Dysarthria: 0=Normal   11. Extinction and Inattention: 0=No abnormality   12. Distal motor function: 0=Normal    Total:   2         Assessment/Plan: Left punctate of temporal and left parietal region, concerning for embolic etiology     No interventions after discussion with daughter. MD also spoke with patient and daughter at bedside and she did not wish any invasive procedures, including Vascular Intervention, Oral Anticoagulation, or further Cardiac Workup. I did discuss the risk of future strokes and daughter is aware. They would likely transition to Hospice at that time. I feel that all questions and concerns were addressed and daughter is very knowledgeable concerning her mother's medical history as well as her wishes.     -continue home aspirin 81 mg.   -no need to continue statin, LDL 61  -no need to follow up in stroke clinic if family does not wish to pursue treatment options.  -PT/OT/SLp to evaluate.   -EEG ordered for AMS as symptoms do not match up with small areas of infarct    Further recommendations per MD.

## 2025-01-26 PROBLEM — R29.818 ACUTE FOCAL NEUROLOGICAL DEFICIT: Status: ACTIVE | Noted: 2025-01-26

## 2025-01-26 LAB
ANION GAP SERPL CALC-SCNC: 9 MEQ/L
BASOPHILS # BLD AUTO: 0.04 X10(3)/MCL
BASOPHILS NFR BLD AUTO: 0.2 %
BUN SERPL-MCNC: 16.9 MG/DL (ref 9.8–20.1)
CALCIUM SERPL-MCNC: 8.6 MG/DL (ref 8.4–10.2)
CHLORIDE SERPL-SCNC: 104 MMOL/L (ref 98–111)
CO2 SERPL-SCNC: 24 MMOL/L (ref 23–31)
CREAT SERPL-MCNC: 0.76 MG/DL (ref 0.55–1.02)
CREAT/UREA NIT SERPL: 22
EOSINOPHIL # BLD AUTO: 0.01 X10(3)/MCL (ref 0–0.9)
EOSINOPHIL NFR BLD AUTO: 0.1 %
ERYTHROCYTE [DISTWIDTH] IN BLOOD BY AUTOMATED COUNT: 15.7 % (ref 11.5–17)
GFR SERPLBLD CREATININE-BSD FMLA CKD-EPI: >60 ML/MIN/1.73/M2
GLUCOSE SERPL-MCNC: 153 MG/DL (ref 75–121)
HCT VFR BLD AUTO: 37.3 % (ref 37–47)
HGB BLD-MCNC: 11.7 G/DL (ref 12–16)
IMM GRANULOCYTES # BLD AUTO: 0.13 X10(3)/MCL (ref 0–0.04)
IMM GRANULOCYTES NFR BLD AUTO: 0.8 %
LYMPHOCYTES # BLD AUTO: 1.36 X10(3)/MCL (ref 0.6–4.6)
LYMPHOCYTES NFR BLD AUTO: 8.1 %
MCH RBC QN AUTO: 27.3 PG (ref 27–31)
MCHC RBC AUTO-ENTMCNC: 31.4 G/DL (ref 33–36)
MCV RBC AUTO: 86.9 FL (ref 80–94)
MONOCYTES # BLD AUTO: 1.79 X10(3)/MCL (ref 0.1–1.3)
MONOCYTES NFR BLD AUTO: 10.6 %
NEUTROPHILS # BLD AUTO: 13.55 X10(3)/MCL (ref 2.1–9.2)
NEUTROPHILS NFR BLD AUTO: 80.2 %
NRBC BLD AUTO-RTO: 0 %
PLATELET # BLD AUTO: 231 X10(3)/MCL (ref 130–400)
PMV BLD AUTO: 11.2 FL (ref 7.4–10.4)
POTASSIUM SERPL-SCNC: 4.1 MMOL/L (ref 3.5–5.1)
RBC # BLD AUTO: 4.29 X10(6)/MCL (ref 4.2–5.4)
SODIUM SERPL-SCNC: 137 MMOL/L (ref 136–145)
WBC # BLD AUTO: 16.88 X10(3)/MCL (ref 4.5–11.5)

## 2025-01-26 PROCEDURE — 99233 SBSQ HOSP IP/OBS HIGH 50: CPT | Mod: ,,, | Performed by: PSYCHIATRY & NEUROLOGY

## 2025-01-26 PROCEDURE — 85025 COMPLETE CBC W/AUTO DIFF WBC: CPT | Performed by: INTERNAL MEDICINE

## 2025-01-26 PROCEDURE — 94760 N-INVAS EAR/PLS OXIMETRY 1: CPT

## 2025-01-26 PROCEDURE — 36415 COLL VENOUS BLD VENIPUNCTURE: CPT | Performed by: INTERNAL MEDICINE

## 2025-01-26 PROCEDURE — 63600175 PHARM REV CODE 636 W HCPCS: Mod: JZ | Performed by: INTERNAL MEDICINE

## 2025-01-26 PROCEDURE — 63600175 PHARM REV CODE 636 W HCPCS: Performed by: PSYCHIATRY & NEUROLOGY

## 2025-01-26 PROCEDURE — 80048 BASIC METABOLIC PNL TOTAL CA: CPT | Performed by: INTERNAL MEDICINE

## 2025-01-26 PROCEDURE — 27000221 HC OXYGEN, UP TO 24 HOURS

## 2025-01-26 PROCEDURE — 25000003 PHARM REV CODE 250: Performed by: PSYCHIATRY & NEUROLOGY

## 2025-01-26 PROCEDURE — 21400001 HC TELEMETRY ROOM

## 2025-01-26 PROCEDURE — 99900035 HC TECH TIME PER 15 MIN (STAT)

## 2025-01-26 PROCEDURE — 95816 EEG AWAKE AND DROWSY: CPT | Mod: 26,,, | Performed by: PSYCHIATRY & NEUROLOGY

## 2025-01-26 RX ORDER — MORPHINE SULFATE 4 MG/ML
2 INJECTION, SOLUTION INTRAMUSCULAR; INTRAVENOUS EVERY 4 HOURS PRN
Status: DISCONTINUED | OUTPATIENT
Start: 2025-01-26 | End: 2025-01-27 | Stop reason: HOSPADM

## 2025-01-26 RX ORDER — GLYCOPYRROLATE 0.2 MG/ML
0.2 INJECTION INTRAMUSCULAR; INTRAVENOUS EVERY 4 HOURS PRN
Status: DISCONTINUED | OUTPATIENT
Start: 2025-01-26 | End: 2025-01-27 | Stop reason: HOSPADM

## 2025-01-26 RX ADMIN — LEVETIRACETAM 500 MG: 100 INJECTION, SOLUTION INTRAVENOUS at 08:01

## 2025-01-26 RX ADMIN — GLYCOPYRROLATE 0.2 MG: 0.2 INJECTION INTRAMUSCULAR; INTRAVENOUS at 07:01

## 2025-01-26 RX ADMIN — HALOPERIDOL LACTATE 2 MG: 5 INJECTION, SOLUTION INTRAMUSCULAR at 01:01

## 2025-01-26 NOTE — PLAN OF CARE
Problem: Infection  Goal: Absence of Infection Signs and Symptoms  Outcome: Progressing     Problem: Stroke, Ischemic (Includes Transient Ischemic Attack)  Goal: Optimal Coping  Outcome: Progressing  Goal: Effective Bowel Elimination  Outcome: Progressing  Goal: Optimal Cerebral Tissue Perfusion  Outcome: Progressing  Goal: Optimal Cognitive Function  Outcome: Progressing  Goal: Improved Communication Skills  Outcome: Progressing  Goal: Optimal Functional Ability  Outcome: Progressing  Goal: Optimal Nutrition Intake  Outcome: Progressing  Goal: Effective Oxygenation and Ventilation  Outcome: Progressing  Goal: Improved Sensorimotor Function  Outcome: Progressing  Goal: Safe and Effective Swallow  Outcome: Progressing  Goal: Effective Urinary Elimination  Outcome: Progressing     Problem: Adult Inpatient Plan of Care  Goal: Plan of Care Review  Outcome: Progressing  Goal: Patient-Specific Goal (Individualized)  Outcome: Progressing  Goal: Absence of Hospital-Acquired Illness or Injury  Outcome: Progressing  Goal: Optimal Comfort and Wellbeing  Outcome: Progressing  Goal: Readiness for Transition of Care  Outcome: Progressing     Problem: Fall Injury Risk  Goal: Absence of Fall and Fall-Related Injury  Outcome: Progressing     Problem: Skin Injury Risk Increased  Goal: Skin Health and Integrity  Outcome: Progressing     Problem: Wound  Goal: Optimal Coping  Outcome: Progressing  Goal: Optimal Functional Ability  Outcome: Progressing  Goal: Absence of Infection Signs and Symptoms  Outcome: Progressing  Goal: Improved Oral Intake  Outcome: Progressing  Goal: Optimal Pain Control and Function  Outcome: Progressing  Goal: Skin Health and Integrity  Outcome: Progressing  Goal: Optimal Wound Healing  Outcome: Progressing

## 2025-01-26 NOTE — PLAN OF CARE
Spoke to family present in room. Pt's son Emery state he is mPOA. Jeremy present in room. They stated pt's sister left recently and will be back shortly.  Family present stated they would like a informational phone call from [facility].  I sent info to [facility] via [employer]. I messaged Gustavo with [facility] to inform of referral. He said they received it and he will have the on call person call Jeremy (son) as requested.  I spoke to Dr Dela Cruz. He said if [facility]\"" can accept today and family in agreement he is in agreement to send pt today to [facility].     01/26/25 [id number]   Discharge Assessment   Assessment Type Discharge Planning Assessment   Source of Information family   Reason For Admission CVA   People in Home alone   Facility Arrived From: Lives at [facility]; Came to hospital from [facility]   Current cognitive status: Unable to Assess   Discharge Plan A Inpatient Hospice   Discharge Plan B Inpatient Hospice

## 2025-01-26 NOTE — PLAN OF CARE
Valeriano with [facility] called me and stated she was not able to retrieve the referral I sent and asked if I could fax. I faxed referral to [phone]

## 2025-01-26 NOTE — PROGRESS NOTES
[facility] Medicine Progress Note        Chief Complaint: Inpatient Follow-up for CVA    HPI:   Safia Muñoz is a 89 y.o. female who  has a past medical history of COPD (chronic obstructive pulmonary disease) and Hypertension.. The patient presented to [facility] on 1/24/2025 with right sided droop, dysarthria and right sided weakness. Currently she is in a SNF unit. She lives at [facility]. Baseline she ambulates with a walker, need assist for some ADLs. Has memory issues and mild dementia.   She is currently at baseline. No neurological deficits. She is oriented to self and place. Following all verbal commands.      Vitals showed she was hypertensive. Labs unremarkable. CT brain was normal. CTA brain showed  Impression:  No large vessel occlusion seen  50% stenosis at the M1 segment in the left MCA  75% stenosis in the right proximal internal carotid artery secondary to calcified plaque  Enlarged pulmonary arteries consistent with pulmonary artery hypertension     Patient will be observed overnight. ECHO was unremarkable with negative bubble study.  MRI done and showed   A couple of scattered areas of punctate acute infarct in the left frontal, left temporal and left parietal region near the cerebral convexity. Findings may represent small embolic phenomena. Stroke/Intervention neurology consulted. She was continued on ASA and statin.     Patient with advanced age and mild dementia. She was placed on delirium precautions. BP was elevated and home BP meds was restarted. Patient was given iv prn haldol. Her mental status did not improve. Discussed goals of care with family. They want patient to be kept comfortable. They will have further family discussion and get back to us.      Interval Hx:   Patient today somnolent, not moving much. Non verbal. Has been afebrile. She did receive haldol x 1 last night.     Family at bedside, explained in detail about the  patients condition, diagnosis, vitals, labs and treatment plan. They understand and agree with the plan. All their questions were answered.  They are looking at comfort care. They will have more family discussions and get back to us.     Case was discussed with patient's nurse and  on the floor.    Objective/physical exam:  General: In no acute distress, Somnolent, non verbal and not following commands.   Chest: Clear to auscultation bilaterally  Heart: RRR, +S1, S2, no appreciable murmur  Abdomen: Soft, nontender, BS +  Neurologic: Unable to examine     VITAL SIGNS: 24 HRS MIN & MAX LAST   Temp  Min: 96.7 °F (35.9 °C)  Max: 100.1 °F (37.8 °C) 100.1 °F (37.8 °C)   BP  Min: 108/65  Max: 206/108 (!) 194/98   Pulse  Min: 65  Max: 93  90   Resp  Min: 18  Max: 20 20   SpO2  Min: 91 %  Max: 99 % 99 %     I have reviewed the following labs:  Recent Labs   Lab 01/24/25  1201 01/24/25  1232 01/26/25  0405   WBC  --  9.10 16.88*   RBC  --  4.91 4.29   HGB  --  13.1 11.7*   HCT 44 42.7 37.3   MCV  --  87.0 86.9   MCH  --  26.7* 27.3   MCHC  --  30.7* 31.4*   RDW  --  15.5 15.7   PLT  --  260 231   MPV  --  11.3* 11.2*     Recent Labs   Lab 01/24/25  1232 01/26/25  0405   * 137   K 4.0 4.1    104   CO2 27 24   BUN 14.2 16.9   CREATININE 0.81 0.76   CALCIUM 8.6 8.6   ALBUMIN 3.2*  --    ALKPHOS 114  --    ALT 23  --    AST 25  --    BILITOT 0.5  --      Microbiology Results (last 7 days)       ** No results found for the last 168 hours. **             See below for Radiology    Assessment/Plan:  Acute metabolic encephalopathy   CVA ? Embolic   HTN, Uncontrolled   Leukocytosis   COPD   Mild dementia   H/O PAF Not on home anticoagulation     Plan:  Patients delirium has become worse  She received haldol x 1 last night. Very sedated today   Having deep respiration. On O2 support     MRI brain shows small ? Embolic CVA    WBC elevated, possible infectious etiology     Continue strict aspiration, fall and  decubitus precautions, NPO now     Over all condition worsening, family aware. They will discuss and let us know when they are ready for transition to comfort care. All options discussed including aggressive tx with iv abx and sepsis work up and rpt brain imaging.     Critical care note:  Critical care diagnosis: Hypertensive urgency needing iv prn labetalol   Critical care interventions: Hands-on evaluation, review of labs/radiographs/records and discussion with patient and family if present  Critical care time spent: 35 minutes       VTE prophylaxis: SCD    Patient condition:  Guarded    Anticipated discharge and Disposition:  TBD      All diagnosis and differential diagnosis have been reviewed; assessment and plan has been documented; I have personally reviewed the labs and test results that are presently available; I have reviewed the patients medication list; I have reviewed the consulting providers response and recommendations. I have reviewed or attempted to review medical records based upon their availability    All of the patient's questions have been  addressed and answered. Patient's is agreeable to the above stated plan. I will continue to monitor closely and make adjustments to medical management as needed.    Portions of this note dictated using EMR integrated voice recognition software, and may be subject to voice recognition errors not corrected at proofreading. Please contact writer for clarification if needed.   _____________________________________________________________________    Malnutrition Status:  Nutrition consulted. Most recent weight and BMI monitored-     Measurements:  Wt Readings from Last 1 Encounters:   01/24/25 66.7 kg (147 lb 1.6 oz)   Body mass index is 26.06 kg/m².    Patient has been screened and assessed by RD.    Malnutrition Type:  Context:    Level:      Malnutrition Characteristic Summary:       Interventions/Recommendations (treatment strategy):        Scheduled Med:    acetaminophen  1,000 mg Intravenous Once    aspirin  81 mg Oral BID    atorvastatin  40 mg Oral Daily    folic acid  2 mg Oral Daily    levETIRAcetam (Keppra) IV (PEDS and ADULTS)  500 mg Intravenous Q12H    levothyroxine  25 mcg Oral Before breakfast    metoprolol tartrate  50 mg Oral BID    sertraline  25 mg Oral Daily    tamsulosin  0.4 mg Oral QHS    valsartan  80 mg Oral BID      Continuous Infusions:   amino acid 4.25% - dextrose 5% solution   Intravenous Continuous 50 mL/hr at 01/25/25 2045 Rate Change at 01/25/25 2045      PRN Meds:    Current Facility-Administered Medications:     acetaminophen, 650 mg, Oral, Q6H PRN    albuterol-ipratropium, 3 mL, Nebulization, Q6H PRN    ALPRAZolam, 0.25 mg, Oral, BID PRN    bisacodyL, 10 mg, Rectal, Daily PRN    haloperidol lactate, 2 mg, Intravenous, Q6H PRN    labetalol, 10 mg, Intravenous, Q6H PRN     Radiology:  I have personally reviewed the following imaging and agree with the radiologist.     CV Ultrasound Bilateral Doppler Carotid  The right internal carotid artery is patent with less than 50% stenosis.  The left internal carotid artery is patent with less than 50% stenosis.  Bilateral vertebral arteries are patent with antegrade flow.   X-Ray Chest 1 View  Narrative: EXAMINATION:  XR CHEST 1 VIEW    CLINICAL HISTORY:  Hypoxia;    TECHNIQUE:  AP chest    COMPARISON:  Chest x-ray dated 01/16/2025    FINDINGS:  The patient is rotated.  The heart is stable in size.  There none left basilar airspace opacities.  There is no definite visible pneumothorax  Impression: Increased left basilar airspace opacities    Electronically signed by: Corinna Zurita  Date:    01/25/2025  Time:    14:49      Bola Sanchez MD  Department of Hospital Medicine   [facility]   01/26/2025

## 2025-01-26 NOTE — ASSESSMENT & PLAN NOTE
- presented with right facial droop and AMS  - Stroke RF: HTN, A Fib  - Intervention: no TNK  due to low NIH and resolution of symptoms  - Etiology: likely cardioembolic     Stroke workup:  -CTh: no acute abnormality   -CTA h/n: 50% stenosis at the M1 segment in the left MCA   75% stenosis in the right proximal internal carotid artery secondary to calcified plaque   Enlarged pulmonary arteries consistent with pulmonary artery hypertension   Right thyroid nodule.   -MRI brain: A couple of scattered areas of punctate acute infarct in the left frontal, left temporal and left parietal region near the cerebral convexity   -ECHO: Normal left atrial size. Agitated saline study of the atrial septum is negative, suggesting absence of intracardiac shunt at the atrial level.ejection fraction of 55 - 60%. Grade I diastolic dysfunction. No thrombus seen on TTE  -CUS: The right internal carotid artery is patent with less than 50% stenosis.  The left internal carotid artery is patent with less than 50% stenosis.  Bilateral vertebral arteries are patent with antegrade flow.   -LDL: 61   -home medications include: aspirin 81 mg daily  NIH Stroke Scale      1a  Level of consciousness: 3=responds only with reflex motor or automatic effects or totally unresponsive, flaccid, areflexic   1b. LOC questions:  2=Answers neither task correctly   1c. LOC commands: 2=Answers neither task correctly   2.  Best Gaze: 1=partial gaze palsy   3.  Visual: 0=No visual loss   4. Facial Palsy: 1=Minor paralysis (flattened nasolabial fold, asymmetric on smiling)   5a.  Motor left arm: 3=No effort against gravity, limb falls   5b.  Motor right arm: 4=No movement   6a. motor left leg: 3=No effort against gravity, limb falls   6b  Motor right leg:  3=No effort against gravity, limb falls   7. Limb Ataxia: 0=Absent   8.  Sensory: 0=Normal; no sensory loss   9. Best Language:  3=Mute, global aphasia; no usable speech or auditory comprehension   10.  Dysarthria: 0=Normal   11. Extinction and Inattention: 0=No abnormality   12. Distal motor function: 0=Normal    Total:   25         Assessment/Plan: Left punctate of temporal and left parietal region, concerning for embolic etiology     (1/25/2025) No interventions after discussion with daughter. MD also spoke with patient and daughter at bedside and she did not wish any invasive procedures, including Vascular Intervention, Oral Anticoagulation, or further Cardiac Workup. Family is aware of risk of future strokes. They would likely transition to Hospice at that time.      -continue home aspirin 81 mg.   -no need to continue statin, LDL 61  -no need to follow up in stroke clinic if family does not wish to pursue treatment options.  -appreciate PT/OT recs  -SLP to evaluate.. keep NPO unless patient starts to wake up   -EEG ordered for AMS as symptoms do not match up with small areas of infarct  -Consider palliative consult if family wishes    Further recommendations per MD.

## 2025-01-26 NOTE — PROGRESS NOTES
[facility] - 9th Floor Medical Telemetry  Neurology  Progress Note    Patient Name: Safia Muñoz  MRN: [id number]  Admission Date: 1/24/2025  Hospital Length of Stay: 1 days  Code Status: DNR   Attending Provider: Bola Sanchez MD  Primary Care Physician: Iglesia Steel MD   Principal Problem:<principal problem not specified>      Subjective:     Interval History: Patient lying in bed, appears stuporous. Multiple family members present at bedside. Per family, the patient has not opened her eyes since yesterday morning around 9AM. They also describe one sided gaze when she did last open her eyes, no spontaneous movement of right arm, as well as increased tone to left arm. Discussed potential evolving stroke vs seizure activity, or combination of both contributing to current state. Answered multiple questions at the time of my rounding. Assured any additional questions could be discussed with MD during his rounds.     Current Neurological Medications:      Current Facility-Administered Medications   Medication Dose Route Frequency Provider Last Rate Last Admin    acetaminophen 1,000 mg/100 mL (10 mg/mL) injection 1,000 mg  1,000 mg Intravenous Once Charley Wilder FNP        acetaminophen tablet 650 mg  650 mg Oral Q6H PRN Bola Sanchez MD        albuterol-ipratropium 2.5 mg-0.5 mg/3 mL nebulizer solution 3 mL  3 mL Nebulization Q6H PRN Bola Sanchez MD        ALPRAZolam tablet 0.25 mg  0.25 mg Oral BID PRN Bola Sanchez MD        amino acid 4.25% - dextrose 5% solution   Intravenous Continuous Charley Wilder FNP 50 mL/hr at 01/25/25 2045 Rate Change at 01/25/25 2045    aspirin EC tablet 81 mg  81 mg Oral BID Bola Sanchez MD   81 mg at 01/25/25 0839    atorvastatin tablet 40 mg  40 mg Oral Daily Bola Sanchez MD   40 mg at 01/25/25 0838    bisacodyL suppository 10 mg  10 mg Rectal Daily PRN Ирина Giordano, AGACN-BC        folic  acid tablet 2 mg  2 mg Oral Daily Bola Sanchez MD   2 mg at 01/25/25 0838    haloperidol lactate injection 2 mg  2 mg Intravenous Q6H PRN Bola Sanchez MD   2 mg at 01/26/25 0125    labetaloL injection 10 mg  10 mg Intravenous Q6H PRN Ирина Giordano, AGACNP-BC   10 mg at 01/25/25 2038    levETIRAcetam (Keppra) 500 mg in D5W 100 mL IVPB (MB+)  500 mg Intravenous Q12H Jakub Bergeron MD   Stopped at 01/26/25 0913    levothyroxine tablet 25 mcg  25 mcg Oral Before breakfast Bola Sanchez MD   25 mcg at 01/25/25 0611    metoprolol tartrate (LOPRESSOR) tablet 50 mg  50 mg Oral BID Bola Sanchez MD   50 mg at 01/25/25 0839    sertraline tablet 25 mg  25 mg Oral Daily Bola Sanchez MD   25 mg at 01/25/25 0838    tamsulosin 24 hr capsule 0.4 mg  0.4 mg Oral QHS Bola Sanchez MD   0.4 mg at 01/24/25 2242    valsartan tablet 80 mg  80 mg Oral BID Bola Sanchez MD   80 mg at 01/25/25 0839       Review of Systems   Unable to perform ROS: Acuity of condition     Objective:     Vital Signs (Most Recent):  Temp: 100.1 °F (37.8 °C) (01/26/25 0834)  Pulse: 90 (01/26/25 0835)  Resp: 20 (01/26/25 0834)  BP: (!) 194/98 (01/26/25 0845)  SpO2: 99 % (01/26/25 0900) Vital Signs (24h Range):  Temp:  [96.7 °F (35.9 °C)-100.1 °F (37.8 °C)] 100.1 °F (37.8 °C)  Pulse:  [65-93] 90  Resp:  [18-20] 20  SpO2:  [91 %-99 %] 99 %  BP: (108-206)/() 194/98     Weight: 66.7 kg (147 lb 1.6 oz)  Body mass index is 26.06 kg/m².     Physical Exam  Vitals and nursing note reviewed.   Constitutional:       General: She is not in acute distress.     Appearance: She is ill-appearing and diaphoretic.   HENT:      Nose: Nose normal.      Mouth/Throat:      Mouth: Mucous membranes are dry.   Eyes:      Pupils: Pupils are equal, round, and reactive to light.   Pulmonary:      Effort: Pulmonary effort is normal.      Comments: Oxy mask in place with snorous breathing   Musculoskeletal:       Cervical back: Normal range of motion.      Right lower leg: No edema.      Left lower leg: No edema.   Skin:     General: Skin is warm.      Capillary Refill: Capillary refill takes less than 2 seconds.   Neurological:      Mental Status: She is unresponsive.      Cranial Nerves: No facial asymmetry.      Motor: Weakness (right) and abnormal muscle tone present. No tremor.      Comments: Limited exam 2/2 patient stuporous and unable to arouse   Does not open eyes or follow any commands to verbal or painful stimuli           NEUROLOGICAL EXAMINATION:     MENTAL STATUS   Level of consciousness: responsive to painful stimuli    CRANIAL NERVES     CN III, IV, VI   Pupils are equal, round, and reactive to light.    MOTOR EXAM   Right arm tone: decreased  Left arm tone: increased  Right leg tone: increased  Left leg tone: increased       Bilateral LE with sluggish withdraw to painful stimuli   RUE with no response  LUE with sluggish withdrawal        Significant Labs: CBC:   Recent Labs   Lab 01/24/25  1201 01/24/25  1232 01/26/25  0405   WBC  --  9.10 16.88*   HGB  --  13.1 11.7*   HCT 44 42.7 37.3   PLT  --  260 231     CMP:   Recent Labs   Lab 01/24/25  1232 01/26/25  0405   * 137   K 4.0 4.1    104   CO2 27 24   BUN 14.2 16.9   CREATININE 0.81 0.76   CALCIUM 8.6 8.6   ALBUMIN 3.2*  --    BILITOT 0.5  --    ALKPHOS 114  --    AST 25  --    ALT 23  --      All pertinent lab results from the past 24 hours have been reviewed.    Significant Imaging: I have reviewed all pertinent imaging results/findings within the past 24 hours.  Assessment and Plan:     Cerebrovascular accident (CVA) due to embolism  - presented with right facial droop and AMS  - Stroke RF: HTN, A Fib  - Intervention: no TNK  due to low NIH and resolution of symptoms  - Etiology: likely cardioembolic     Stroke workup:  -CTh: no acute abnormality   -CTA h/n: 50% stenosis at the M1 segment in the left MCA   75% stenosis in the right proximal  internal carotid artery secondary to calcified plaque   Enlarged pulmonary arteries consistent with pulmonary artery hypertension   Right thyroid nodule.   -MRI brain: A couple of scattered areas of punctate acute infarct in the left frontal, left temporal and left parietal region near the cerebral convexity   -ECHO: Normal left atrial size. Agitated saline study of the atrial septum is negative, suggesting absence of intracardiac shunt at the atrial level.ejection fraction of 55 - 60%. Grade I diastolic dysfunction. No thrombus seen on TTE  -CUS: The right internal carotid artery is patent with less than 50% stenosis.  The left internal carotid artery is patent with less than 50% stenosis.  Bilateral vertebral arteries are patent with antegrade flow.   -LDL: 61   -home medications include: aspirin 81 mg daily  NIH Stroke Scale      1a  Level of consciousness: 3=responds only with reflex motor or automatic effects or totally unresponsive, flaccid, areflexic   1b. LOC questions:  2=Answers neither task correctly   1c. LOC commands: 2=Answers neither task correctly   2.  Best Gaze: 1=partial gaze palsy   3.  Visual: 0=No visual loss   4. Facial Palsy: 1=Minor paralysis (flattened nasolabial fold, asymmetric on smiling)   5a.  Motor left arm: 3=No effort against gravity, limb falls   5b.  Motor right arm: 4=No movement   6a. motor left leg: 3=No effort against gravity, limb falls   6b  Motor right leg:  3=No effort against gravity, limb falls   7. Limb Ataxia: 0=Absent   8.  Sensory: 0=Normal; no sensory loss   9. Best Language:  3=Mute, global aphasia; no usable speech or auditory comprehension   10. Dysarthria: 0=Normal   11. Extinction and Inattention: 0=No abnormality   12. Distal motor function: 0=Normal    Total:   25         Assessment/Plan: Left punctate of temporal and left parietal region, concerning for embolic etiology     (1/25/2025) No interventions after discussion with daughter. MD also spoke with  patient and daughter at bedside and she did not wish any invasive procedures, including Vascular Intervention, Oral Anticoagulation, or further Cardiac Workup. Family is aware of risk of future strokes. They would likely transition to Hospice at that time.      -continue home aspirin 81 mg.   -no need to continue statin, LDL 61  -no need to follow up in stroke clinic if family does not wish to pursue treatment options.  -appreciate PT/OT recs  -SLP to evaluate.. keep NPO unless patient starts to wake up   -EEG ordered for AMS as symptoms do not match up with small areas of infarct  -Consider palliative consult if family wishes    Further recommendations per MD.           VTE Risk Mitigation (From admission, onward)           Ordered     IP VTE HIGH RISK PATIENT  Once         01/24/25 1325     Place sequential compression device  Until discontinued         01/24/25 1325                    Alexandrea Rhodes, JUAN  Neurology  [facility] - 9th Floor Medical Telemetry    I have seen/examined the patient.  NP was scribe.  I agree with the findings unless outlined below:    Jakub Bergeron MD  [facility]     Pt is worse  Very delirious  Eeg L side slowing  Now with infection  On keppra    Spoke with family at length  Hospice eval appropriate  Would continue keppra for now    Signing off

## 2025-01-26 NOTE — SUBJECTIVE & OBJECTIVE
Subjective:     Interval History: Patient lying in bed, appears stuporous. Multiple family members present at bedside. Per family, the patient has not opened her eyes since yesterday morning around 9AM. They also describe one sided gaze when she did last open her eyes, no spontaneous movement of right arm, as well as increased tone to left arm. Discussed potential evolving stroke vs seizure activity, or combination of both contributing to current state. Answered multiple questions at the time of my rounding. Assured any additional questions could be discussed with MD during his rounds.     Current Neurological Medications:      Current Facility-Administered Medications   Medication Dose Route Frequency Provider Last Rate Last Admin    acetaminophen 1,000 mg/100 mL (10 mg/mL) injection 1,000 mg  1,000 mg Intravenous Once Charley Wildre FNP        acetaminophen tablet 650 mg  650 mg Oral Q6H PRN Bola Sanchez MD        albuterol-ipratropium 2.5 mg-0.5 mg/3 mL nebulizer solution 3 mL  3 mL Nebulization Q6H PRN Bola Sanchez MD        ALPRAZolam tablet 0.25 mg  0.25 mg Oral BID PRN Bola Sanchez MD        amino acid 4.25% - dextrose 5% solution   Intravenous Continuous Charley Wilder FNP 50 mL/hr at 01/25/25 2045 Rate Change at 01/25/25 2045    aspirin EC tablet 81 mg  81 mg Oral BID Bola Sanchez MD   81 mg at 01/25/25 0839    atorvastatin tablet 40 mg  40 mg Oral Daily Bola Sanchez MD   40 mg at 01/25/25 0838    bisacodyL suppository 10 mg  10 mg Rectal Daily PRN Ирина Giordano AGACNP-BC        folic acid tablet 2 mg  2 mg Oral Daily Bola Sanchez MD   2 mg at 01/25/25 0838    haloperidol lactate injection 2 mg  2 mg Intravenous Q6H PRN Bola Sanchez MD   2 mg at 01/26/25 0125    labetaloL injection 10 mg  10 mg Intravenous Q6H PRN Ирина Giordano AGACNP-BC   10 mg at 01/25/25 2038    levETIRAcetam (Keppra) 500 mg in D5W 100 mL IVPB (MB+)   500 mg Intravenous Q12H Jakub Bergeron MD   Stopped at 01/26/25 0913    levothyroxine tablet 25 mcg  25 mcg Oral Before breakfast Bola Sanchez MD   25 mcg at 01/25/25 0611    metoprolol tartrate (LOPRESSOR) tablet 50 mg  50 mg Oral BID Bola Sanchez MD   50 mg at 01/25/25 0839    sertraline tablet 25 mg  25 mg Oral Daily Bola Sanchez MD   25 mg at 01/25/25 0838    tamsulosin 24 hr capsule 0.4 mg  0.4 mg Oral QHS Bola Sanchez MD   0.4 mg at 01/24/25 2242    valsartan tablet 80 mg  80 mg Oral BID Bola Sanchez MD   80 mg at 01/25/25 0839       Review of Systems   Unable to perform ROS: Acuity of condition     Objective:     Vital Signs (Most Recent):  Temp: 100.1 °F (37.8 °C) (01/26/25 0834)  Pulse: 90 (01/26/25 0835)  Resp: 20 (01/26/25 0834)  BP: (!) 194/98 (01/26/25 0845)  SpO2: 99 % (01/26/25 0900) Vital Signs (24h Range):  Temp:  [96.7 °F (35.9 °C)-100.1 °F (37.8 °C)] 100.1 °F (37.8 °C)  Pulse:  [65-93] 90  Resp:  [18-20] 20  SpO2:  [91 %-99 %] 99 %  BP: (108-206)/() 194/98     Weight: 66.7 kg (147 lb 1.6 oz)  Body mass index is 26.06 kg/m².     Physical Exam  Vitals and nursing note reviewed.   Constitutional:       General: She is not in acute distress.     Appearance: She is ill-appearing and diaphoretic.   HENT:      Nose: Nose normal.      Mouth/Throat:      Mouth: Mucous membranes are dry.   Eyes:      Pupils: Pupils are equal, round, and reactive to light.   Pulmonary:      Effort: Pulmonary effort is normal.      Comments: Oxy mask in place with snorous breathing   Musculoskeletal:      Cervical back: Normal range of motion.      Right lower leg: No edema.      Left lower leg: No edema.   Skin:     General: Skin is warm.      Capillary Refill: Capillary refill takes less than 2 seconds.   Neurological:      Mental Status: She is unresponsive.      Cranial Nerves: No facial asymmetry.      Motor: Weakness (right) and abnormal muscle tone present. No  tremor.      Comments: Limited exam 2/2 patient stuporous and unable to arouse   Does not open eyes or follow any commands to verbal or painful stimuli           NEUROLOGICAL EXAMINATION:     MENTAL STATUS   Level of consciousness: responsive to painful stimuli    CRANIAL NERVES     CN III, IV, VI   Pupils are equal, round, and reactive to light.    MOTOR EXAM   Right arm tone: decreased  Left arm tone: increased  Right leg tone: increased  Left leg tone: increased       Bilateral LE with sluggish withdraw to painful stimuli   RUE with no response  LUE with sluggish withdrawal        Significant Labs: CBC:   Recent Labs   Lab 01/24/25  1201 01/24/25  1232 01/26/25  0405   WBC  --  9.10 16.88*   HGB  --  13.1 11.7*   HCT 44 42.7 37.3   PLT  --  260 231     CMP:   Recent Labs   Lab 01/24/25  1232 01/26/25  0405   * 137   K 4.0 4.1    104   CO2 27 24   BUN 14.2 16.9   CREATININE 0.81 0.76   CALCIUM 8.6 8.6   ALBUMIN 3.2*  --    BILITOT 0.5  --    ALKPHOS 114  --    AST 25  --    ALT 23  --      All pertinent lab results from the past 24 hours have been reviewed.    Significant Imaging: I have reviewed all pertinent imaging results/findings within the past 24 hours.

## 2025-01-26 NOTE — PROCEDURES
EEG    Dx: Delirium, stroke    Duration: 25 min    Technical: Standard digital EEG was performed at [facility]. The 10/20 international system of electrode placement was used.  Photic   stimulation was performed.     Description: The posterior dominant rhythm was 6 Hz.  Mild left hemispheric slowing was present..  The patient drowsed.  There were   no epileptiform discharges or clinical seizures seen.    Impression: Moderate background slowing with superimposed left sided slowing consistent with moderate encephalopathy and known stroke.

## 2025-01-26 NOTE — PLAN OF CARE
Problem: Infection  Goal: Absence of Infection Signs and Symptoms  Outcome: Progressing     Problem: Stroke, Ischemic (Includes Transient Ischemic Attack)  Goal: Optimal Coping  Outcome: Progressing  Goal: Effective Bowel Elimination  Outcome: Progressing  Goal: Optimal Cerebral Tissue Perfusion  Outcome: Progressing  Goal: Optimal Cognitive Function  Outcome: Progressing  Goal: Improved Communication Skills  Outcome: Progressing  Goal: Optimal Functional Ability  Outcome: Progressing  Goal: Optimal Nutrition Intake  Outcome: Progressing  Goal: Effective Oxygenation and Ventilation  Outcome: Progressing  Goal: Improved Sensorimotor Function  Outcome: Progressing  Goal: Safe and Effective Swallow  Outcome: Progressing  Goal: Effective Urinary Elimination  Outcome: Progressing     Problem: Adult Inpatient Plan of Care  Goal: Plan of Care Review  Outcome: Progressing  Goal: Patient-Specific Goal (Individualized)  Outcome: Progressing  Goal: Absence of Hospital-Acquired Illness or Injury  Outcome: Progressing  Goal: Optimal Comfort and Wellbeing  Outcome: Progressing  Goal: Readiness for Transition of Care  Outcome: Progressing     Problem: Fall Injury Risk  Goal: Absence of Fall and Fall-Related Injury  Outcome: Progressing     Problem: Wound  Goal: Optimal Coping  Outcome: Progressing  Goal: Optimal Functional Ability  Outcome: Progressing  Goal: Absence of Infection Signs and Symptoms  Outcome: Progressing  Goal: Improved Oral Intake  Outcome: Progressing  Goal: Optimal Pain Control and Function  Outcome: Progressing  Goal: Skin Health and Integrity  Outcome: Progressing  Goal: Optimal Wound Healing  Outcome: Progressing     Problem: Skin Injury Risk Increased  Goal: Skin Health and Integrity  Outcome: Progressing

## 2025-01-26 NOTE — PROGRESS NOTES
SLP attempt to see pt for completion of speech, language, and cognition evaluation; however, pt unable to achieve an appropriate level alertness for participation. Pt's son present at pt's bedside. Discussed POC. Will follow up at later time schedule permitting.

## 2025-01-27 VITALS
RESPIRATION RATE: 17 BRPM | WEIGHT: 147.13 LBS | SYSTOLIC BLOOD PRESSURE: 115 MMHG | DIASTOLIC BLOOD PRESSURE: 71 MMHG | HEART RATE: 44 BPM | BODY MASS INDEX: 26.06 KG/M2 | OXYGEN SATURATION: 95 % | TEMPERATURE: 99 F

## 2025-01-27 LAB
OHS QRS DURATION: 78 MS
OHS QTC CALCULATION: 485 MS
POCT GLUCOSE: 114 MG/DL (ref 70–110)

## 2025-01-27 PROCEDURE — 63600175 PHARM REV CODE 636 W HCPCS: Performed by: NURSE PRACTITIONER

## 2025-01-27 PROCEDURE — 27000221 HC OXYGEN, UP TO 24 HOURS

## 2025-01-27 PROCEDURE — 63600175 PHARM REV CODE 636 W HCPCS: Performed by: INTERNAL MEDICINE

## 2025-01-27 PROCEDURE — 93010 ELECTROCARDIOGRAM REPORT: CPT | Mod: ,,, | Performed by: INTERNAL MEDICINE

## 2025-01-27 PROCEDURE — 99900035 HC TECH TIME PER 15 MIN (STAT)

## 2025-01-27 PROCEDURE — 93005 ELECTROCARDIOGRAM TRACING: CPT

## 2025-01-27 PROCEDURE — 25000003 PHARM REV CODE 250

## 2025-01-27 PROCEDURE — 94760 N-INVAS EAR/PLS OXIMETRY 1: CPT

## 2025-01-27 RX ORDER — METOPROLOL TARTRATE 1 MG/ML
5 INJECTION, SOLUTION INTRAVENOUS ONCE
Status: COMPLETED | OUTPATIENT
Start: 2025-01-27 | End: 2025-01-27

## 2025-01-27 RX ORDER — SODIUM CHLORIDE 9 MG/ML
INJECTION, SOLUTION INTRAVENOUS CONTINUOUS
Status: DISCONTINUED | OUTPATIENT
Start: 2025-01-27 | End: 2025-01-27

## 2025-01-27 RX ORDER — SODIUM CHLORIDE 9 MG/ML
INJECTION, SOLUTION INTRAVENOUS CONTINUOUS
Status: DISCONTINUED | OUTPATIENT
Start: 2025-01-27 | End: 2025-01-27 | Stop reason: HOSPADM

## 2025-01-27 RX ADMIN — MORPHINE SULFATE 2 MG: 4 INJECTION, SOLUTION INTRAMUSCULAR; INTRAVENOUS at 03:01

## 2025-01-27 RX ADMIN — SODIUM CHLORIDE 500 ML: 9 INJECTION, SOLUTION INTRAVENOUS at 05:01

## 2025-01-27 RX ADMIN — METOPROLOL TARTRATE 5 MG: 1 INJECTION, SOLUTION INTRAVENOUS at 04:01

## 2025-01-27 RX ADMIN — METOPROLOL TARTRATE 5 MG: 1 INJECTION, SOLUTION INTRAVENOUS at 05:01

## 2025-01-27 RX ADMIN — LABETALOL HYDROCHLORIDE 10 MG: 5 INJECTION, SOLUTION INTRAVENOUS at 03:01

## 2025-01-27 RX ADMIN — METOPROLOL TARTRATE 5 MG: 1 INJECTION, SOLUTION INTRAVENOUS at 06:01

## 2025-01-27 RX ADMIN — HALOPERIDOL LACTATE 2 MG: 5 INJECTION, SOLUTION INTRAMUSCULAR at 10:01

## 2025-01-27 NOTE — DISCHARGE SUMMARY
[facility] Hospital Medicine Discharge Summary    Admit Date: 1/24/2025  Discharge Date and Time: 1/27/20259:10 AM  Admitting Physician: MARISSA Team  Discharging Physician: Bola Sanchez MD.  Primary Care Physician: Iglesia Steel MD  Consults: Neurology    Discharge Diagnoses:  Acute metabolic encephalopathy   CVA ? Embolic   HTN, Uncontrolled   Leukocytosis   COPD   Mild dementia   H/O PAF Not on home anticoagulation     Hospital Course:   Safia Muñoz is a 89 y.o. female who  has a past medical history of COPD (chronic obstructive pulmonary disease) and Hypertension.. The patient presented to [facility] on 1/24/2025 with right sided droop, dysarthria and right sided weakness. Currently she is in a SNF unit. She lives at [facility]. Baseline she ambulates with a walker, need assist for some ADLs. Has memory issues and mild dementia.   She is currently at baseline. No neurological deficits. She is oriented to self and place. Following all verbal commands.      Vitals showed she was hypertensive. Labs unremarkable. CT brain was normal. CTA brain showed  Impression:  No large vessel occlusion seen  50% stenosis at the M1 segment in the left MCA  75% stenosis in the right proximal internal carotid artery secondary to calcified plaque  Enlarged pulmonary arteries consistent with pulmonary artery hypertension     Patient will be observed overnight. ECHO was unremarkable with negative bubble study.  MRI done and showed   A couple of scattered areas of punctate acute infarct in the left frontal, left temporal and left parietal region near the cerebral convexity. Findings may represent small embolic phenomena. Stroke/Intervention neurology consulted. She was continued on ASA and statin.      Patient with advanced age and mild dementia. She was placed on delirium precautions. BP was elevated and home BP meds was restarted. Patient was given iv prn haldol. Her mental  status did not improve. Discussed goals of care with family. They want patient to be kept comfortable. They will have further family discussion and get back to us.      Family opted for going to in-patient hospice care at [facility]. CM informed and patient was transferred to in-patient hospice care.     Pt was seen and examined on the day of discharge  Vitals:  VITAL SIGNS: 24 HRS MIN & MAX LAST   Temp  Min: 97.7 °F (36.5 °C)  Max: 100.3 °F (37.9 °C) 98.8 °F (37.1 °C)   BP  Min: 99/67  Max: 205/99 115/71   Pulse  Min: 44  Max: 139  (!) 44   Resp  Min: 17  Max: 24 17   SpO2  Min: 94 %  Max: 99 % 95 %       Physical Exam:  General: In no acute distress, Somnolent, non verbal and not following commands.   Chest: Clear to auscultation bilaterally  Heart: RRR, +S1, S2, no appreciable murmur  Abdomen: Soft, nontender, BS +  Neurologic: Unable to examine     Procedures Performed: No admission procedures for hospital encounter.     Significant Diagnostic Studies: See Full reports for all details    Recent Labs   Lab 01/24/25  1201 01/24/25  1232 01/26/25  0405   WBC  --  9.10 16.88*   RBC  --  4.91 4.29   HGB  --  13.1 11.7*   HCT 44 42.7 37.3   MCV  --  87.0 86.9   MCH  --  26.7* 27.3   MCHC  --  30.7* 31.4*   RDW  --  15.5 15.7   PLT  --  260 231   MPV  --  11.3* 11.2*       Recent Labs   Lab 01/24/25  1232 01/26/25  0405   * 137   K 4.0 4.1    104   CO2 27 24   BUN 14.2 16.9   CREATININE 0.81 0.76   CALCIUM 8.6 8.6   ALBUMIN 3.2*  --    ALKPHOS 114  --    ALT 23  --    AST 25  --    BILITOT 0.5  --         Microbiology Results (last 7 days)       ** No results found for the last 168 hours. **             CV Ultrasound Bilateral Doppler Carotid  The right internal carotid artery is patent with less than 50% stenosis.  The left internal carotid artery is patent with less than 50% stenosis.  Bilateral vertebral arteries are patent with antegrade flow.   X-Ray Chest 1 View  Narrative: EXAMINATION:  XR CHEST  1 VIEW    CLINICAL HISTORY:  Hypoxia;    TECHNIQUE:  AP chest    COMPARISON:  Chest x-ray dated 01/16/2025    FINDINGS:  The patient is rotated.  The heart is stable in size.  There none left basilar airspace opacities.  There is no definite visible pneumothorax  Impression: Increased left basilar airspace opacities    Electronically signed by: Corinna Zurita  Date:    01/25/2025  Time:    14:49         Medication List        STOP taking these medications      ALPRAZolam 0.25 MG tablet  Commonly known as: XANAX     aspirin 81 MG EC tablet  Commonly known as: ECOTRIN     estradiol 0.05 mg/24 hr td ptsw 0.05 mg/24 hr  Commonly known as: VIVELLE-DOT     FARXIGA 10 mg tablet  Generic drug: dapagliflozin propanediol     folic acid 1 MG tablet  Commonly known as: FOLVITE     furosemide 20 MG tablet  Commonly known as: LASIX     levothyroxine 25 MCG tablet  Commonly known as: SYNTHROID     metoprolol tartrate 50 MG tablet  Commonly known as: LOPRESSOR     potassium chloride SA 20 MEQ tablet  Commonly known as: K-DUR,KLOR-CON     QUEtiapine 25 MG Tab  Commonly known as: SEROQUEL     sertraline 25 MG tablet  Commonly known as: ZOLOFT     tamsulosin 0.4 mg Cap  Commonly known as: FLOMAX     valsartan 80 MG tablet  Commonly known as: DIOVAN               Explained in detail to the patient about the discharge plan, medications, and follow-up visits. Pt understands and agrees with the treatment plan  Discharge Disposition: Hospice   Discharged Condition: stable  Diet-   Dietary Orders (From admission, onward)       Start     Ordered    01/25/25 1351  Diet NPO Except for: Ice Chips  Diet effective now        Question:  Except for:  Answer:  Ice Chips    01/25/25 1350                   Medications Per DC med rec  Activities as tolerated    For further questions contact hospitalist office    Discharge time 33 minutes    For worsening symptoms, chest pain, shortness of breath, increased abdominal pain, high grade fever, stroke or  stroke like symptoms, immediately go to the nearest Emergency Room or call 911 as soon as possible.      Bola Bowles M.D, on 1/27/2025. at 9:10 AM.

## 2025-01-27 NOTE — CARE UPDATE
Notified by patient's nurse that patient's heart rate is sustained 160-180 bpm, and the labetalol 10 mg IV p.r.n. SBP > 220 DBP>100 was administered. EKG ordered and showed AFib RVR at 155 beats per minute. Patient's current /82. NaCl 500 mL/hr IV and metoprolol 5 mg IV ordered.  We will continue to reassess.  Patient has clinda mix ordered, but patient's family has refused. Ordered NaCl at 100 mL/hr x 10 hours, we will defer option to patient's family as they have refused certain interventions.

## 2025-01-27 NOTE — PLAN OF CARE
01/27/25 1202   Final Note   Assessment Type Final Discharge Note   Anticipated Discharge Disposition HospiceMedic   Post-Acute Status   Discharge Delays (!) Ambulance Transport/Facility Transport

## 2025-01-29 NOTE — DISCHARGE SUMMARY
[facility] Hospital Medicine Discharge Summary    Admit Date: 2025  Discharge Date and Time: 2025  Admitting Physician:  Team  Discharging Physician: Luis Alberto Mann MD.  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 Per NH report, the patient has been fixing food and water for dogs that are not there. On exam, Pt was fully awake, alert, oriented to self,  and place however disoriented to situation. She did not know why she was brought in to the ED. Pt denies chest pain, palpitations, SOB, fever, chills, nausea , vomiting, abd pain, dysuria. She does report urinary frequency but thinks she drinks too much water. Afebrile , hemodynamically stable on arrival with /56, HR 77, RR 16, Temp 97.5 and O2 sat 95% on RA. Labs showed normal WBC, Hgb 14.2, preserved kidney function, normal LFT, Troponin neg, UDS neg, Flu/COVID/RSV neg. UA c/w UTI with WBC>100/HPF. CT head with no acute abnormality except known Rt parietal meningioma, age appropriate cerebral atrophy, CXR with no acute disease. Pt was given IVF, Rocephin 1 gram IV in the ED and admitted to  service for further management. Urine culture growing pansensitive E coli; kept on IV Rocephin. PT/OT consulted; recommended moderate  intensity therapy.    Pt was seen and examined on the day of discharge. Denied any new complaints. Reported improvement in dysuria. Plan for DC to SNF.    Vitals:  VITAL SIGNS: 24 HRS MIN & MAX LAST   No data recorded 97.7 °F (36.5 °C)   No data recorded 127/65   No data recorded  65   No data recorded 18   No data recorded (!) 93 %     Physical Exam:  General: In no acute distress, afebrile  Chest: Clear to auscultation bilaterally  Heart: RRR, +S1, S2, no appreciable murmur  Abdomen: Soft, nontender, BS +  MSK: Warm, no lower extremity edema, no clubbing or cyanosis  Neurologic: Alert and oriented x3, moves all ext spontaneously    Procedures Performed: No admission procedures for hospital encounter.     Significant Diagnostic Studies: See Full reports for all details    Recent Labs   Lab 01/24/25  1201 01/24/25  1232 01/26/25  0405   WBC  --  9.10 16.88*   RBC  --  4.91 4.29   HGB  --  13.1 11.7*   HCT 44 42.7 37.3   MCV  --  87.0 86.9   MCH  --  26.7* 27.3   MCHC  --  30.7* 31.4*   RDW  --  15.5 15.7   PLT  --  260 231   MPV  --  11.3* 11.2*       Recent Labs   Lab 01/24/25  1232 01/26/25  0405   * 137   K 4.0 4.1    104   CO2 27 24   BUN 14.2 16.9   CREATININE 0.81 0.76   CALCIUM 8.6 8.6   ALBUMIN 3.2*  --    ALKPHOS 114  --    ALT 23  --    AST 25  --    BILITOT 0.5  --         Microbiology Results (last 7 days)       Procedure Component Value Units Date/Time    Urine culture [[id number]]  (Abnormal)  (Susceptibility) Collected: 01/14/25 1335    Order Status: Completed Specimen: Urine Updated: 01/16/25 0905     Urine Culture >/= 100,000 colonies/ml Escherichia coli             CV Ultrasound Bilateral Doppler Carotid  The right internal carotid artery is patent with less than 50% stenosis.  The left internal carotid artery is patent with less than 50% stenosis.  Bilateral vertebral arteries are patent with antegrade flow.   X-Ray Chest 1 View  Narrative: EXAMINATION:  XR CHEST 1 VIEW    CLINICAL  HISTORY:  Hypoxia;    TECHNIQUE:  AP chest    COMPARISON:  Chest x-ray dated 01/16/2025    FINDINGS:  The patient is rotated.  The heart is stable in size.  There none left basilar airspace opacities.  There is no definite visible pneumothorax  Impression: Increased left basilar airspace opacities    Electronically signed by: Corinna Zurita  Date:    01/25/2025  Time:    14:49         Medication List        STOP taking these medications      ALPRAZolam 0.25 MG tablet  Commonly known as: XANAX     aspirin 81 MG EC tablet  Commonly known as: ECOTRIN     QUEtiapine 25 MG Tab  Commonly known as: SEROQUEL     valsartan 40 MG tablet  Commonly known as: DIOVAN            ASK your doctor about these medications      cefdinir 300 MG capsule  Commonly known as: OMNICEF  Take 1 capsule (300 mg total) by mouth 2 (two) times daily. for 4 days  Ask about: Should I take this medication?               Where to Get Your Medications        These medications were sent to [facility]  [facility] [place] [address]      Phone: [phone]   cefdinir 300 MG capsule          Explained in detail to the patient about the discharge plan, medications, and follow-up visits. Pt understands and agrees with the treatment plan  Discharge Disposition: Hospice/Medical Facility   Discharged Condition: stable  Diet-    Medications Per DC med rec  Activities as tolerated    For further questions contact hospitalist office    Discharge time 33 minutes    For worsening symptoms, chest pain, shortness of breath, increased abdominal pain, high grade fever, stroke or stroke like symptoms, immediately go to the nearest Emergency Room or call 911 as soon as possible.      Luis Alberto Yi M.D.

## 2025-01-29 NOTE — PHYSICIAN QUERY
"Provider, due to the conflicting clinical picture, please clinically validate the diagnosis of "Acute metabolic encephalopathy." If validated, please provide additional clinical support for the diagnosis.   Other clarification (please specify): please read the note  Delirium + elevated WBC (which is in the note)= acute metabolic encephalopathy  "

## (undated) DEVICE — Device

## (undated) DEVICE — BLADE SURG CARBON STEEL #10

## (undated) DEVICE — DRESSING XEROFORM 5X9IN

## (undated) DEVICE — GLOVE SIGNATURE MICRO LTX 6.5

## (undated) DEVICE — SUT CTD VICRYL CT-1 27

## (undated) DEVICE — SUT MCRYL PLUS 2-0 CT-1 36IN

## (undated) DEVICE — APPLICATOR CHLORAPREP ORN 26ML

## (undated) DEVICE — GLOVE SENSICARE PI GRN 6.5

## (undated) DEVICE — DRAPE TOP 53X102IN

## (undated) DEVICE — COVER FULLGUARD SHOE HIGH-TOP

## (undated) DEVICE — TAPE SILK 3IN

## (undated) DEVICE — DRAPE C-ARMOR EQUIPMENT COVER

## (undated) DEVICE — GOWN SMARTSLEEVE AAMI LVL4 XL

## (undated) DEVICE — BANDAGE ACE DOUBLE STER 6IN

## (undated) DEVICE — COVER TABLE HVY DTY 60X90IN

## (undated) DEVICE — ELECTRODE REM POLYHESIVE II

## (undated) DEVICE — COVER SHOE FLUID RESISTANT XL

## (undated) DEVICE — STAPLER SKIN PROXIMATE WIDE

## (undated) DEVICE — GLOVE 8 PROTEXIS PI ORTHO

## (undated) DEVICE — DRESSING ADH ISLAND 2.5 X 3

## (undated) DEVICE — GLOVE PROTEXIS LTX MICRO 8

## (undated) DEVICE — PADDDING CAST WEBRIL 4YDX3IN

## (undated) DEVICE — PAD CAST 6X4YD SPECIALISTIC

## (undated) DEVICE — DRAPE STERI U-SHAPED 47X51IN

## (undated) DEVICE — DRESSING XEROFORM 1X8IN

## (undated) DEVICE — SPONGE GAUZE 16PLY 4X4

## (undated) DEVICE — SPONGE LAP 18X18 PREWASHED

## (undated) DEVICE — REAMER STEPPED DHS DCS

## (undated) DEVICE — DRAPE ORTH SPLIT 77X108IN

## (undated) DEVICE — DRESSING ADHESIVE ISLAND 3 X 6